# Patient Record
Sex: FEMALE | Race: WHITE | NOT HISPANIC OR LATINO | URBAN - METROPOLITAN AREA
[De-identification: names, ages, dates, MRNs, and addresses within clinical notes are randomized per-mention and may not be internally consistent; named-entity substitution may affect disease eponyms.]

---

## 2023-09-07 PROBLEM — G45.9 TRANSIENT ISCHEMIC ATTACK: Status: ACTIVE | Noted: 2023-09-07

## 2023-09-07 PROBLEM — J44.9 CHRONIC OBSTRUCTIVE LUNG DISEASE (MULTI): Status: ACTIVE | Noted: 2023-09-07

## 2023-09-07 PROBLEM — N93.9 ABNORMAL VAGINAL BLEEDING: Status: ACTIVE | Noted: 2023-09-07

## 2023-09-07 PROBLEM — E66.9 DIABETES MELLITUS TYPE 2 IN OBESE: Status: ACTIVE | Noted: 2023-09-07

## 2023-09-07 PROBLEM — I48.20 CHRONIC ATRIAL FIBRILLATION (MULTI): Status: ACTIVE | Noted: 2023-09-07

## 2023-09-07 PROBLEM — I50.9 HEART FAILURE (MULTI): Status: ACTIVE | Noted: 2023-09-07

## 2023-09-07 PROBLEM — E11.69 DIABETES MELLITUS TYPE 2 IN OBESE: Status: ACTIVE | Noted: 2023-09-07

## 2023-09-07 PROBLEM — I10 BENIGN ESSENTIAL HYPERTENSION: Status: ACTIVE | Noted: 2023-09-07

## 2023-09-07 PROBLEM — I42.9 CARDIOMYOPATHY (MULTI): Status: ACTIVE | Noted: 2023-09-07

## 2023-09-07 PROBLEM — I50.22 CHRONIC SYSTOLIC HEART FAILURE (MULTI): Status: ACTIVE | Noted: 2023-09-07

## 2023-09-07 PROBLEM — I27.81 COR PULMONALE (MULTI): Status: ACTIVE | Noted: 2023-09-07

## 2023-09-07 PROBLEM — E11.9 DIABETES MELLITUS (MULTI): Status: ACTIVE | Noted: 2023-09-07

## 2023-09-07 PROBLEM — I25.9 ASYMPTOMATIC CORONARY HEART DISEASE: Status: ACTIVE | Noted: 2023-09-07

## 2023-09-07 PROBLEM — Z91.89 AT RISK FOR INJURY RELATED TO RESTRAINTS: Status: ACTIVE | Noted: 2023-09-07

## 2023-09-07 PROBLEM — R53.1 ASTHENIA: Status: ACTIVE | Noted: 2023-09-07

## 2023-09-07 PROBLEM — N18.30 CHRONIC KIDNEY DISEASE, STAGE 3 (MULTI): Status: ACTIVE | Noted: 2023-09-07

## 2023-09-07 PROBLEM — R73.09 BLOOD GLUCOSE ABNORMAL: Status: ACTIVE | Noted: 2017-04-06

## 2023-09-19 DIAGNOSIS — Z79.01 LONG TERM (CURRENT) USE OF ANTICOAGULANTS: ICD-10-CM

## 2023-09-19 DIAGNOSIS — I48.91 ATRIAL FIBRILLATION, UNSPECIFIED TYPE (MULTI): Primary | ICD-10-CM

## 2023-09-19 LAB
INR IN PPP BY COAGULATION ASSAY EXTERNAL: 2.4
PROTHROMBIN TIME (PT) IN PPP BY COAGULATION ASSAY EXTERNAL: NORMAL SECONDS

## 2023-10-09 DIAGNOSIS — E11.21 TYPE 2 DIABETES MELLITUS WITH DIABETIC NEPHROPATHY, WITH LONG-TERM CURRENT USE OF INSULIN (MULTI): ICD-10-CM

## 2023-10-09 DIAGNOSIS — Z79.4 TYPE 2 DIABETES MELLITUS WITH DIABETIC NEPHROPATHY, WITH LONG-TERM CURRENT USE OF INSULIN (MULTI): ICD-10-CM

## 2023-10-09 PROBLEM — R26.81 UNSTEADY GAIT: Status: ACTIVE | Noted: 2023-10-09

## 2023-10-09 PROBLEM — I50.23 ACUTE ON CHRONIC SYSTOLIC HEART FAILURE (MULTI): Status: ACTIVE | Noted: 2023-09-07

## 2023-10-09 RX ORDER — WARFARIN 2 MG/1
TABLET ORAL
COMMUNITY
End: 2023-11-01 | Stop reason: SDUPTHER

## 2023-10-09 RX ORDER — ERGOCALCIFEROL 1.25 MG/1
1 CAPSULE ORAL
COMMUNITY
Start: 2018-12-11 | End: 2024-02-13

## 2023-10-09 RX ORDER — NYSTATIN 100000 [USP'U]/G
POWDER TOPICAL
COMMUNITY
Start: 2016-12-12

## 2023-10-09 RX ORDER — CARVEDILOL 3.12 MG/1
3.12 TABLET ORAL EVERY 12 HOURS
COMMUNITY
Start: 2020-11-18 | End: 2023-10-26

## 2023-10-09 RX ORDER — DAPAGLIFLOZIN 10 MG/1
10 TABLET, FILM COATED ORAL DAILY
COMMUNITY
End: 2024-02-13

## 2023-10-09 RX ORDER — SPIRONOLACTONE 25 MG/1
12.5 TABLET ORAL DAILY
COMMUNITY
End: 2024-02-13

## 2023-10-09 RX ORDER — INSULIN LISPRO 100 [IU]/ML
INJECTION, SOLUTION INTRAVENOUS; SUBCUTANEOUS
COMMUNITY
End: 2023-10-27

## 2023-10-09 RX ORDER — FUROSEMIDE 40 MG/1
40 TABLET ORAL DAILY
COMMUNITY
End: 2024-02-12 | Stop reason: SDUPTHER

## 2023-10-09 RX ORDER — INSULIN GLARGINE 100 [IU]/ML
22 INJECTION, SOLUTION SUBCUTANEOUS NIGHTLY
COMMUNITY
Start: 2017-07-25 | End: 2023-12-19 | Stop reason: SDUPTHER

## 2023-10-09 RX ORDER — LOSARTAN POTASSIUM 25 MG/1
25 TABLET ORAL DAILY
COMMUNITY
End: 2024-02-12 | Stop reason: SDUPTHER

## 2023-10-09 RX ORDER — BLOOD SUGAR DIAGNOSTIC
1 STRIP MISCELLANEOUS 3 TIMES DAILY
Qty: 100 STRIP | Refills: 11 | Status: SHIPPED | OUTPATIENT
Start: 2023-10-09

## 2023-10-09 RX ORDER — PEN NEEDLE, DIABETIC 30 GX3/16"
NEEDLE, DISPOSABLE MISCELLANEOUS
COMMUNITY
Start: 2017-02-13

## 2023-10-09 RX ORDER — LANCETS
EACH MISCELLANEOUS
Qty: 100 EACH | Refills: 11 | Status: SHIPPED | OUTPATIENT
Start: 2023-10-09

## 2023-10-09 RX ORDER — DEXTROSE 4 G
TABLET,CHEWABLE ORAL
Qty: 1 EACH | Refills: 0 | Status: SHIPPED | OUTPATIENT
Start: 2023-10-09

## 2023-10-16 PROBLEM — I48.91 ATRIAL FIBRILLATION (MULTI): Status: ACTIVE | Noted: 2023-10-16

## 2023-10-16 PROBLEM — Z79.01 LONG TERM (CURRENT) USE OF ANTICOAGULANTS: Status: ACTIVE | Noted: 2023-10-16

## 2023-10-17 ENCOUNTER — APPOINTMENT (OUTPATIENT)
Dept: CARDIOLOGY | Facility: CLINIC | Age: 73
End: 2023-10-17

## 2023-10-17 ENCOUNTER — ANTICOAGULATION - WARFARIN VISIT (OUTPATIENT)
Dept: CARDIOLOGY | Facility: CLINIC | Age: 73
End: 2023-10-17
Payer: MEDICARE

## 2023-10-17 DIAGNOSIS — Z79.01 LONG TERM (CURRENT) USE OF ANTICOAGULANTS: ICD-10-CM

## 2023-10-17 DIAGNOSIS — I48.91 ATRIAL FIBRILLATION, UNSPECIFIED TYPE (MULTI): ICD-10-CM

## 2023-10-17 LAB
POC INR: 1.5
POC PROTHROMBIN TIME: NORMAL

## 2023-10-17 PROCEDURE — 85610 PROTHROMBIN TIME: CPT | Mod: QW

## 2023-10-17 PROCEDURE — 99211 OFF/OP EST MAY X REQ PHY/QHP: CPT | Performed by: INTERNAL MEDICINE

## 2023-10-17 NOTE — PROGRESS NOTES
Patient identification verified with 2 identifiers.    Location: [facility] - [address] [place] [address] [phone]     Referring Physician: Broderick  Enrollment/ Re-enrollment date: 2024   INR Goal: 2.0-3.0  INR monitoring is per [facility] protocol.  Anticoagulation Medication: warfarin  Indication: atrial fibrillation    Subjective   Bleeding signs/symptoms: No    Bruising: No   Major bleeding event: No  Thrombosis signs/symptoms: No  Thromboembolic event: No  Missed doses: No  Extra doses: No  Medication changes: No  Dietary changes: No  Change in health: No  Change in activity: No  Alcohol: No  Other concerns: No    Upcoming Surgeries:  Does the Patient Have any upcoming surgeries that require interruption in anticoagulation therapy? no  Does the patient require bridging? no      Anticoagulation Summary  As of 10/17/2023      INR goal:  2.0-3.0   TTR:  16.3 % (2.6 wk)   INR used for dosin.50 (10/17/2023)   Weekly warfarin total:  14 mg               Assessment/Plan   Subtherapeutic     1. New dose: 14   2. Next INR: 1 week      Education provided to patient during the visit:  Patient instructed to call in interim with questions, concerns and changes.   Patient educated on compliance with dosing, follow up appointments, and prescribed plan of care.

## 2023-10-19 ENCOUNTER — DOCUMENTATION (OUTPATIENT)
Dept: PRIMARY CARE | Facility: CLINIC | Age: 73
End: 2023-10-19
Payer: MEDICARE

## 2023-10-19 NOTE — PROGRESS NOTES
[facility] CM follow up call placed to patient. Spoke with spouse, Matt. Remains active with anticoagulation clinic. Next appointment scheduled for 10/26. Weight staying around 200. Checking BG levels, was 166 this morning and 183 before lunch today. Taking meds as prescribed. Compliant with Lantus. Inconsistent with Humalog. Next cardiology follow up in January.

## 2023-10-24 ENCOUNTER — APPOINTMENT (OUTPATIENT)
Dept: CARDIOLOGY | Facility: CLINIC | Age: 73
End: 2023-10-24
Payer: MEDICARE

## 2023-10-24 ENCOUNTER — ANTICOAGULATION - WARFARIN VISIT (OUTPATIENT)
Dept: CARDIOLOGY | Facility: CLINIC | Age: 73
End: 2023-10-24
Payer: MEDICARE

## 2023-10-24 DIAGNOSIS — Z79.01 LONG TERM (CURRENT) USE OF ANTICOAGULANTS: ICD-10-CM

## 2023-10-24 DIAGNOSIS — I48.91 ATRIAL FIBRILLATION, UNSPECIFIED TYPE (MULTI): ICD-10-CM

## 2023-10-26 DIAGNOSIS — E11.65 TYPE 2 DIABETES MELLITUS WITH HYPERGLYCEMIA, WITH LONG-TERM CURRENT USE OF INSULIN (MULTI): Primary | ICD-10-CM

## 2023-10-26 DIAGNOSIS — I10 PRIMARY HYPERTENSION: Primary | ICD-10-CM

## 2023-10-26 DIAGNOSIS — Z79.4 TYPE 2 DIABETES MELLITUS WITH HYPERGLYCEMIA, WITH LONG-TERM CURRENT USE OF INSULIN (MULTI): Primary | ICD-10-CM

## 2023-10-26 RX ORDER — CARVEDILOL 3.12 MG/1
TABLET ORAL
Qty: 56 TABLET | Refills: 10 | Status: SHIPPED | OUTPATIENT
Start: 2023-10-26

## 2023-10-27 RX ORDER — INSULIN LISPRO 100 [IU]/ML
INJECTION, SOLUTION INTRAVENOUS; SUBCUTANEOUS
Qty: 30 ML | Refills: 10 | Status: SHIPPED | OUTPATIENT
Start: 2023-10-27

## 2023-10-31 ENCOUNTER — ANTICOAGULATION - WARFARIN VISIT (OUTPATIENT)
Dept: CARDIOLOGY | Facility: CLINIC | Age: 73
End: 2023-10-31
Payer: MEDICARE

## 2023-10-31 ENCOUNTER — TELEPHONE (OUTPATIENT)
Dept: PRIMARY CARE | Facility: CLINIC | Age: 73
End: 2023-10-31

## 2023-10-31 DIAGNOSIS — Z79.01 LONG TERM (CURRENT) USE OF ANTICOAGULANTS: ICD-10-CM

## 2023-10-31 DIAGNOSIS — I48.91 ATRIAL FIBRILLATION, UNSPECIFIED TYPE (MULTI): ICD-10-CM

## 2023-10-31 LAB
POC INR: 2.2
POC PROTHROMBIN TIME: NORMAL

## 2023-10-31 PROCEDURE — 99211 OFF/OP EST MAY X REQ PHY/QHP: CPT | Mod: 27 | Performed by: INTERNAL MEDICINE

## 2023-10-31 PROCEDURE — 85610 PROTHROMBIN TIME: CPT | Mod: QW

## 2023-10-31 NOTE — PROGRESS NOTES
Patient identification verified with 2 identifiers.    Location: [facility] - [address] [place] [address] [phone]     Referring Physician: Broderick  Enrollment/ Re-enrollment date: 2024   INR Goal: 2.0-3.0  INR monitoring is per [facility] protocol.  Anticoagulation Medication: warfarin  Indication: Atrial Fibrillation/Atrial Flutter    Subjective   Bleeding signs/symptoms: No    Bruising: No   Major bleeding event: No  Thrombosis signs/symptoms: No  Thromboembolic event: No  Missed doses: No  Extra doses: No  Medication changes: No  Dietary changes: No  Change in health: No  Change in activity: No  Alcohol: No  Other concerns: No    Upcoming Surgeries:  Does the Patient Have any upcoming surgeries that require interruption in anticoagulation therapy? no  Does the patient require bridging? no      Anticoagulation Summary  As of 10/31/2023      INR goal:  2.0-3.0   TTR:  21.6 % (1.1 mo)   INR used for dosin.20 (10/31/2023)   Weekly warfarin total:  14 mg               Assessment/Plan   Therapeutic     1. New dose: no change    2. Next INR:  10 days      Education provided to patient during the visit:  Patient instructed to call in interim with questions, concerns and changes.   Patient educated on compliance with dosing, follow up appointments, and prescribed plan of care.

## 2023-11-01 ENCOUNTER — LAB (OUTPATIENT)
Dept: LAB | Facility: LAB | Age: 73
End: 2023-11-01
Payer: MEDICARE

## 2023-11-01 ENCOUNTER — OFFICE VISIT (OUTPATIENT)
Dept: PRIMARY CARE | Facility: CLINIC | Age: 73
End: 2023-11-01
Payer: MEDICARE

## 2023-11-01 VITALS
HEIGHT: 64 IN | WEIGHT: 201 LBS | HEART RATE: 78 BPM | OXYGEN SATURATION: 94 % | BODY MASS INDEX: 34.31 KG/M2 | RESPIRATION RATE: 16 BRPM | SYSTOLIC BLOOD PRESSURE: 128 MMHG | DIASTOLIC BLOOD PRESSURE: 78 MMHG | TEMPERATURE: 97.7 F

## 2023-11-01 DIAGNOSIS — I10 BENIGN ESSENTIAL HYPERTENSION: ICD-10-CM

## 2023-11-01 DIAGNOSIS — J44.9 CHRONIC OBSTRUCTIVE PULMONARY DISEASE, UNSPECIFIED COPD TYPE (MULTI): ICD-10-CM

## 2023-11-01 DIAGNOSIS — I48.20 CHRONIC ATRIAL FIBRILLATION (MULTI): ICD-10-CM

## 2023-11-01 DIAGNOSIS — N18.32 TYPE 2 DIABETES MELLITUS WITH STAGE 3B CHRONIC KIDNEY DISEASE, WITH LONG-TERM CURRENT USE OF INSULIN (MULTI): Primary | ICD-10-CM

## 2023-11-01 DIAGNOSIS — I50.9 CHRONIC CONGESTIVE HEART FAILURE, UNSPECIFIED HEART FAILURE TYPE (MULTI): ICD-10-CM

## 2023-11-01 DIAGNOSIS — E55.9 VITAMIN D DEFICIENCY: ICD-10-CM

## 2023-11-01 DIAGNOSIS — E11.22 TYPE 2 DIABETES MELLITUS WITH STAGE 3B CHRONIC KIDNEY DISEASE, WITH LONG-TERM CURRENT USE OF INSULIN (MULTI): Primary | ICD-10-CM

## 2023-11-01 DIAGNOSIS — Z79.4 TYPE 2 DIABETES MELLITUS WITH STAGE 3B CHRONIC KIDNEY DISEASE, WITH LONG-TERM CURRENT USE OF INSULIN (MULTI): Primary | ICD-10-CM

## 2023-11-01 PROBLEM — I25.810 ARTERIOSCLEROSIS OF CORONARY ARTERY BYPASS GRAFT: Status: ACTIVE | Noted: 2023-11-01

## 2023-11-01 PROBLEM — I21.02 ST ELEVATION (STEMI) MYOCARDIAL INFARCTION INVOLVING LEFT ANTERIOR DESCENDING CORONARY ARTERY (MULTI): Status: ACTIVE | Noted: 2023-11-01

## 2023-11-01 PROBLEM — R41.82 ACUTE ALTERATION IN MENTAL STATUS: Status: ACTIVE | Noted: 2023-11-01

## 2023-11-01 PROBLEM — Z91.148 NONADHERENCE TO MEDICATION: Status: ACTIVE | Noted: 2023-11-01

## 2023-11-01 PROBLEM — G89.29 OTHER CHRONIC PAIN: Status: ACTIVE | Noted: 2023-11-01

## 2023-11-01 PROBLEM — I48.11 LONGSTANDING PERSISTENT ATRIAL FIBRILLATION (MULTI): Status: ACTIVE | Noted: 2023-11-01

## 2023-11-01 PROBLEM — I13.0 HYPERTENSIVE HEART AND CHRONIC KIDNEY DISEASE WITH HEART FAILURE AND STAGE 1 THROUGH STAGE 4 CHRONIC KIDNEY DISEASE, OR UNSPECIFIED CHRONIC KIDNEY DISEASE (MULTI): Status: ACTIVE | Noted: 2023-11-01

## 2023-11-01 PROBLEM — E66.01 MORBID OBESITY (MULTI): Status: ACTIVE | Noted: 2023-11-01

## 2023-11-01 PROBLEM — G47.33 OBSTRUCTIVE SLEEP APNEA: Status: ACTIVE | Noted: 2023-11-01

## 2023-11-01 PROBLEM — E78.5 HYPERLIPIDEMIA: Status: ACTIVE | Noted: 2023-11-01

## 2023-11-01 PROBLEM — I25.10 ARTERIOSCLEROSIS OF CORONARY ARTERY: Status: ACTIVE | Noted: 2023-11-01

## 2023-11-01 PROBLEM — Z90.49 HISTORY OF CHOLECYSTECTOMY: Status: ACTIVE | Noted: 2023-11-01

## 2023-11-01 PROBLEM — D72.829 LEUKOCYTOSIS: Status: ACTIVE | Noted: 2023-11-01

## 2023-11-01 PROBLEM — I25.5 ISCHEMIC CARDIOMYOPATHY: Status: ACTIVE | Noted: 2023-11-01

## 2023-11-01 PROBLEM — E03.8 OTHER SPECIFIED HYPOTHYROIDISM: Status: ACTIVE | Noted: 2023-11-01

## 2023-11-01 LAB
25(OH)D3 SERPL-MCNC: 50 NG/ML (ref 31–100)
ALBUMIN SERPL-MCNC: 4.1 G/DL (ref 3.5–5)
ALP BLD-CCNC: 102 U/L (ref 35–125)
ALT SERPL-CCNC: 21 U/L (ref 5–40)
ANION GAP SERPL CALC-SCNC: 16 MMOL/L
AST SERPL-CCNC: 22 U/L (ref 5–40)
BILIRUB SERPL-MCNC: 0.7 MG/DL (ref 0.1–1.2)
BUN SERPL-MCNC: 32 MG/DL (ref 8–25)
CALCIUM SERPL-MCNC: 9.5 MG/DL (ref 8.5–10.4)
CHLORIDE SERPL-SCNC: 102 MMOL/L (ref 97–107)
CO2 SERPL-SCNC: 21 MMOL/L (ref 24–31)
CREAT SERPL-MCNC: 1.6 MG/DL (ref 0.4–1.6)
ERYTHROCYTE [DISTWIDTH] IN BLOOD BY AUTOMATED COUNT: 13.9 % (ref 11.5–14.5)
EST. AVERAGE GLUCOSE BLD GHB EST-MCNC: 180 MG/DL
GFR SERPL CREATININE-BSD FRML MDRD: 34 ML/MIN/1.73M*2
GLUCOSE SERPL-MCNC: 221 MG/DL (ref 65–99)
HBA1C MFR BLD: 7.9 %
HCT VFR BLD AUTO: 43.8 % (ref 36–46)
HGB BLD-MCNC: 14.4 G/DL (ref 12–16)
MCH RBC QN AUTO: 34.4 PG (ref 26–34)
MCHC RBC AUTO-ENTMCNC: 32.9 G/DL (ref 32–36)
MCV RBC AUTO: 105 FL (ref 80–100)
NRBC BLD-RTO: 0 /100 WBCS (ref 0–0)
PLATELET # BLD AUTO: 193 X10*3/UL (ref 150–450)
POTASSIUM SERPL-SCNC: 4.1 MMOL/L (ref 3.4–5.1)
PROT SERPL-MCNC: 6.8 G/DL (ref 5.9–7.9)
RBC # BLD AUTO: 4.19 X10*6/UL (ref 4–5.2)
SODIUM SERPL-SCNC: 139 MMOL/L (ref 133–145)
WBC # BLD AUTO: 6.3 X10*3/UL (ref 4.4–11.3)

## 2023-11-01 PROCEDURE — 36415 COLL VENOUS BLD VENIPUNCTURE: CPT

## 2023-11-01 PROCEDURE — 36415 COLL VENOUS BLD VENIPUNCTURE: CPT | Performed by: NURSE PRACTITIONER

## 2023-11-01 PROCEDURE — 1126F AMNT PAIN NOTED NONE PRSNT: CPT | Performed by: NURSE PRACTITIONER

## 2023-11-01 PROCEDURE — 99349 HOME/RES VST EST MOD MDM 40: CPT | Performed by: NURSE PRACTITIONER

## 2023-11-01 PROCEDURE — 80053 COMPREHEN METABOLIC PANEL: CPT

## 2023-11-01 PROCEDURE — 82306 VITAMIN D 25 HYDROXY: CPT

## 2023-11-01 PROCEDURE — 85027 COMPLETE CBC AUTOMATED: CPT

## 2023-11-01 PROCEDURE — 3074F SYST BP LT 130 MM HG: CPT | Performed by: NURSE PRACTITIONER

## 2023-11-01 PROCEDURE — 3051F HG A1C>EQUAL 7.0%<8.0%: CPT | Performed by: NURSE PRACTITIONER

## 2023-11-01 PROCEDURE — 3078F DIAST BP <80 MM HG: CPT | Performed by: NURSE PRACTITIONER

## 2023-11-01 PROCEDURE — 1159F MED LIST DOCD IN RCRD: CPT | Performed by: NURSE PRACTITIONER

## 2023-11-01 PROCEDURE — 1160F RVW MEDS BY RX/DR IN RCRD: CPT | Performed by: NURSE PRACTITIONER

## 2023-11-01 PROCEDURE — 3066F NEPHROPATHY DOC TX: CPT | Performed by: NURSE PRACTITIONER

## 2023-11-01 PROCEDURE — 83036 HEMOGLOBIN GLYCOSYLATED A1C: CPT

## 2023-11-01 PROCEDURE — 1036F TOBACCO NON-USER: CPT | Performed by: NURSE PRACTITIONER

## 2023-11-01 PROCEDURE — 4010F ACE/ARB THERAPY RXD/TAKEN: CPT | Performed by: NURSE PRACTITIONER

## 2023-11-01 RX ORDER — WARFARIN 2 MG/1
2 TABLET ORAL EVERY EVENING
Qty: 30 TABLET | Refills: 3 | Status: SHIPPED | OUTPATIENT
Start: 2023-11-01 | End: 2024-02-26

## 2023-11-01 ASSESSMENT — ENCOUNTER SYMPTOMS
NERVOUS/ANXIOUS: 0
PALPITATIONS: 0
NAUSEA: 0
CHILLS: 0
ARTHRALGIAS: 0
TROUBLE SWALLOWING: 0
OCCASIONAL FEELINGS OF UNSTEADINESS: 0
ABDOMINAL PAIN: 0
DIZZINESS: 0
VOMITING: 0
FEVER: 0
APPETITE CHANGE: 0
ENDOCRINE COMMENTS: POSITIVE FOR DIABETES
DIARRHEA: 1
WHEEZING: 0
BRUISES/BLEEDS EASILY: 1
LIGHT-HEADEDNESS: 0
SHORTNESS OF BREATH: 0
CONSTIPATION: 0
COUGH: 0
LOSS OF SENSATION IN FEET: 0
DEPRESSION: 0
DIFFICULTY URINATING: 0

## 2023-11-01 ASSESSMENT — PAIN SCALES - GENERAL: PAINLEVEL: 0-NO PAIN

## 2023-11-01 NOTE — PROGRESS NOTES
Subjective   Patient ID: Jing Sanchez is a 73 y.o. female who presents for Follow-up (Chronic medical conditions, HTN, CHF, DM).    Visit for 72 y/o female seen today in private home, accompanied by her spouse Matt for routine follow up of chronic medical conditions. Patient is sitting on recliner this afternoon. She is alert, able to answer very simple questions regarding her health. Her spouse is present and will assist with HPI as needed. Patient does not drive or have a vehicle. She typically only leaves the house for medical appointments but does go out to lunch with her spouse on occasion. She receives her medications via health direct mail delivery in pill packs. She does get her Coumadin from the local drugstore. She reports that she is doing a better job taking her medications as prescribed. She is more compliant with her Lantus but does not take her Humalog consistently. She is monitoring her BG levels 2-3 times per day. She will report that she will occasionally skip a meal so she does not check it when she does that. She is keeping a log with her BG readings. FBS was 201 this morning. Her lowest glucose level was 153. She denies dizziness, lightheadedness. She has peripheral neuropathy. Reports numbness and tingling of bilateral feet. She is ambulatory without assistive device. She denies recent fall or injury. Remains active with [employer]. Receives meals on wheels M-F. She will cook at times but her spouse helps prepare most meals. She denies appetite changes. Has had some weight gain but does admit to unhealthy diet habits. She denies abdominal pain, nausea, vomiting. Reports intermittent loose stools, depending on what she eats. She denies urinary concerns but is incontinent at times. Denies difficulty sleeping. h/o Afib, CHF. Remains active with Coumadin Clinic. Last INR was 2.2. She denies any increased bruising or bleeding concerns. She remains active with cardiologist Dr. Villafana. Denies issues with  "chest pain, palpitations, shortness of breath, cough, wheezing or increased edema.     Home Visit:          Medically necessary due to: Illness or condition that results in activity lmitation or restriction that impacts the ability to leave home such as:, shortness of breath with minimal exertion, unsteady gait/poor balance.         Current Outpatient Medications:     blood sugar diagnostic (Accu-Chek Guide test strips) strip, 1 strip 3 times a day., Disp: 100 strip, Rfl: 11    blood-glucose meter (Accu-Chek Guide Glucose Meter) misc, Test TID, Disp: 1 each, Rfl: 0    carvedilol (Coreg) 3.125 mg tablet, 1 TAB BY MOUTH TWICE A DAY WITH OR AFTER MEALS HOLD FOR HR <55 BPM AND/OR SBP <90, Disp: 56 tablet, Rfl: 10    ergocalciferol (Vitamin D-2) 1.25 MG (06832 UT) capsule, Take 1 capsule (1,250 mcg) by mouth 1 (one) time per week., Disp: , Rfl:     Farxiga 10 mg, Take 1 tablet (10 mg) by mouth once daily., Disp: , Rfl:     furosemide (Lasix) 40 mg tablet, Take 1 tablet (40 mg) by mouth once daily., Disp: , Rfl:     insulin lispro (HumaLOG) 100 unit/mL injection, INJECT SUBQ PER SLIDING SCALE WITH MEALS (MAX DAILY DOSE OF 80 UNITS), Disp: 30 mL, Rfl: 10    lancets misc, Accucheck lancets, Disp: 100 each, Rfl: 11    Lantus Solostar U-100 Insulin 100 unit/mL (3 mL) pen, Inject 22 Units under the skin once daily at bedtime., Disp: , Rfl:     losartan (Cozaar) 25 mg tablet, Take 1 tablet (25 mg) by mouth once daily., Disp: , Rfl:     nystatin (Mycostatin) 100,000 unit/gram powder, apply to breasts Topically tid / as needed, Disp: , Rfl:     pen needle, diabetic (BD Ultra-Fine Short Pen Needle) 31 gauge x 5/16\" needle, Use and discard 4 pen needles per day subcutaneously Dx: E11.65 for 90 days, Disp: , Rfl:     spironolactone (Aldactone) 25 mg tablet, Take 0.5 tablets (12.5 mg) by mouth once daily., Disp: , Rfl:     warfarin (Coumadin) 2 mg tablet, TAKE 1 TABLET BY MOUTH EVERY DAY OR AS DIRECTED BY CLINIC, Disp: , Rfl:  " "    Review of Systems   Constitutional:  Negative for appetite change, chills and fever.   HENT:  Negative for hearing loss and trouble swallowing.    Respiratory:  Negative for cough, shortness of breath and wheezing.    Cardiovascular:  Negative for chest pain, palpitations and leg swelling.   Gastrointestinal:  Positive for diarrhea (occasional). Negative for abdominal pain, constipation, nausea and vomiting.   Endocrine:        Positive for diabetes   Genitourinary:  Negative for difficulty urinating.        Positive for urinary incontinence, intermittent   Musculoskeletal:  Negative for arthralgias.   Neurological:  Negative for dizziness and light-headedness.        Positive for numbness and tingling to BLE   Hematological:  Bruises/bleeds easily.   Psychiatric/Behavioral:  The patient is not nervous/anxious.      Objective   /78 (BP Location: Left arm, Patient Position: Sitting, BP Cuff Size: Adult)   Pulse 78   Temp 36.5 °C (97.7 °F) (Temporal)   Resp 16   Ht 1.626 m (5' 4\")   Wt 91.2 kg (201 lb)   SpO2 94%   BMI 34.50 kg/m²     Physical Exam  Constitutional:       General: She is not in acute distress.     Appearance: Normal appearance.      Comments: Sitting on recliner with legs dependent   HENT:      Head: Normocephalic and atraumatic.      Comments: Poor dentition, missing teeth     Nose: Nose normal.      Mouth/Throat:      Mouth: Mucous membranes are moist.      Pharynx: Oropharynx is clear.   Eyes:      Pupils: Pupils are equal, round, and reactive to light.      Comments: Wearing glasses   Cardiovascular:      Rate and Rhythm: Rhythm irregular.      Heart sounds: No murmur heard.     No friction rub. No gallop.   Pulmonary:      Effort: Pulmonary effort is normal. No respiratory distress.      Breath sounds: Normal breath sounds. No wheezing, rhonchi or rales.   Abdominal:      General: Bowel sounds are normal. There is no distension.      Palpations: Abdomen is soft.      Tenderness: " There is no abdominal tenderness.   Musculoskeletal:         General: Normal range of motion.      Cervical back: Neck supple.      Right lower leg: No edema.      Left lower leg: No edema.   Skin:     General: Skin is warm and dry.   Neurological:      General: No focal deficit present.      Mental Status: She is alert and oriented to person, place, and time.   Psychiatric:         Mood and Affect: Mood normal.         Behavior: Behavior normal.       Assessment/Plan   Diagnoses and all orders for this visit:  Type 2 diabetes mellitus with stage 3b chronic kidney disease, with long-term current use of insulin (CMS/[facility])  Comments:  chronic, managed with Lantus, Humalog, Pt to continue monitoring glucose levels 4x daily  Orders:  -     Hemoglobin A1c  Chronic obstructive pulmonary disease, unspecified COPD type (CMS/[facility])  Comments:  chronic, respiratory status stable  Benign essential hypertension  Comments:  chronic, vitals stable, continue carvedilol, losartan  Orders:  -     CBC  -     Comprehensive metabolic panel  Chronic atrial fibrillation (CMS/[facility])  Comments:  chronic, managed with coumadin. Continue to follow up with the coumadin clinic as scheduled  Chronic congestive heart failure, unspecified heart failure type (CMS/[facility])  Comments:  chronic, stable, continue furosemide, spironolactone  Vitamin D deficiency  Comments:  chronic, stable, continue vitamin d supplement    Routine labs obtained in home- CBC, CMP, A1c, Vitamin D level- pt tolerated well        MARTÍN Tan-CNP

## 2023-11-09 ENCOUNTER — ANTICOAGULATION - WARFARIN VISIT (OUTPATIENT)
Dept: CARDIOLOGY | Facility: CLINIC | Age: 73
End: 2023-11-09
Payer: MEDICARE

## 2023-11-09 ENCOUNTER — APPOINTMENT (OUTPATIENT)
Dept: CARDIOLOGY | Facility: CLINIC | Age: 73
End: 2023-11-09
Payer: MEDICARE

## 2023-11-09 DIAGNOSIS — Z79.01 LONG TERM (CURRENT) USE OF ANTICOAGULANTS: ICD-10-CM

## 2023-11-09 DIAGNOSIS — I48.91 ATRIAL FIBRILLATION, UNSPECIFIED TYPE (MULTI): ICD-10-CM

## 2023-11-09 LAB
POC INR: 2.3
POC PROTHROMBIN TIME: NORMAL

## 2023-11-09 PROCEDURE — 99211 OFF/OP EST MAY X REQ PHY/QHP: CPT | Mod: 27 | Performed by: INTERNAL MEDICINE

## 2023-11-09 PROCEDURE — 85610 PROTHROMBIN TIME: CPT | Mod: QW

## 2023-11-09 NOTE — PROGRESS NOTES
Patient identification verified with 2 identifiers.    Location: [facility] - [address] [place] [address] [phone]     Referring Physician: Broderick  Enrollment/ Re-enrollment date: 06/2024   INR Goal: 2.0-3.0  INR monitoring is per [facility] protocol.  Anticoagulation Medication: warfarin  Indication: Atrial Fibrillation/Atrial Flutter    Subjective   Bleeding signs/symptoms: No    Bruising: No   Major bleeding event: No  Thrombosis signs/symptoms: No  Thromboembolic event: No  Missed doses: No  Extra doses: No  Medication changes: No  Dietary changes: No  Change in health: No  Change in activity: No  Alcohol: No  Other concerns: No    Upcoming Surgeries:  Does the Patient Have any upcoming surgeries that require interruption in anticoagulation therapy? no  Does the patient require bridging? no      Anticoagulation Summary  As of 11/9/2023      INR goal:  2.0-3.0   TTR:  21.6 % (1.1 mo)   INR used for dosing:                 Assessment/Plan   Therapeutic     1. New dose: no change    2. Next INR: 1 month      Education provided to patient during the visit:  Patient instructed to call in interim with questions, concerns and changes.   Patient educated on compliance with dosing, follow up appointments, and prescribed plan of care.

## 2023-11-27 ENCOUNTER — DOCUMENTATION (OUTPATIENT)
Dept: PRIMARY CARE | Facility: CLINIC | Age: 73
End: 2023-11-27
Payer: MEDICARE

## 2023-11-27 NOTE — PROGRESS NOTES
House Calls CM follow up phone call. Patient reported taking medications as prescribed from prepackaged pill packs. She reports compliance with Lantus administration as well. Fasting blood glucose this morning 165. Continues with daily weights. Weight today 201 pounds (same weight documented on 11/1 provider visit). Stated weight has been staying between 201-203. Aware to report weight gain of 3 pounds or more in one day, swelling, or shortness of breath. Compliant with visits to Anticoagulation Clinic. Next visit 12/7/23.

## 2023-12-07 ENCOUNTER — ANTICOAGULATION - WARFARIN VISIT (OUTPATIENT)
Dept: CARDIOLOGY | Facility: CLINIC | Age: 73
End: 2023-12-07
Payer: MEDICARE

## 2023-12-07 DIAGNOSIS — Z79.01 LONG TERM (CURRENT) USE OF ANTICOAGULANTS: ICD-10-CM

## 2023-12-07 DIAGNOSIS — I48.91 ATRIAL FIBRILLATION, UNSPECIFIED TYPE (MULTI): ICD-10-CM

## 2023-12-07 LAB
POC INR: 3.2
POC PROTHROMBIN TIME: NORMAL

## 2023-12-07 PROCEDURE — 99211 OFF/OP EST MAY X REQ PHY/QHP: CPT | Performed by: INTERNAL MEDICINE

## 2023-12-07 PROCEDURE — 85610 PROTHROMBIN TIME: CPT | Mod: QW

## 2023-12-07 NOTE — PROGRESS NOTES
Patient identification verified with 2 identifiers.    Location: [facility] - [address] [place] [address] [phone]     Referring Physician: Broderick  Enrollment/ Re-enrollment date: 06/2024   INR Goal: 2.0-3.0  INR monitoring is per [facility] protocol.  Anticoagulation Medication: warfarin  Indication: Atrial Fibrillation/Atrial Flutter    Subjective   Bleeding signs/symptoms: No    Bruising: No   Major bleeding event: No  Thrombosis signs/symptoms: No  Thromboembolic event: No  Missed doses: No  Extra doses: No  Medication changes: No  Dietary changes: No  Change in health: No  Change in activity: No  Alcohol: No  Other concerns: No    Upcoming Surgeries:  Does the Patient Have any upcoming surgeries that require interruption in anticoagulation therapy? no  Does the patient require bridging? no      Anticoagulation Summary  As of 12/7/2023      INR goal:  2.0-3.0   TTR:  38.5 % (1.4 mo)   INR used for dosing:                 Assessment/Plan   Therapeutic     1. New dose: no change    2. Next INR: 1 week can not return for 3 weeks      Education provided to patient during the visit:  Patient instructed to call in interim with questions, concerns and changes.   Patient educated on compliance with dosing, follow up appointments, and prescribed plan of care.

## 2023-12-12 ENCOUNTER — DOCUMENTATION (OUTPATIENT)
Dept: PRIMARY CARE | Facility: CLINIC | Age: 73
End: 2023-12-12
Payer: MEDICARE

## 2023-12-12 DIAGNOSIS — N95.0 POSTMENOPAUSAL BLEEDING: Primary | ICD-10-CM

## 2023-12-12 NOTE — PROGRESS NOTES
Patient reported having bleeding on her pad for the last several months. She had some post menopausal bleeding earlier this year and was evaluated by GYN during hospitalization in March with recommendations to follow up outpatient which patient did not do. I will send referral to Dr. Chan who patient was referred to see earlier this year.

## 2023-12-12 NOTE — PROGRESS NOTES
House Calls CM follow up in home visit. Sitting in chair. Spouse present. Continues to record daily weights in notebook. Weight between 201-203 over past 2 weeks. Checking BG consistently in morning but inconsistently remainder of day. States she is taking Lantus routinely at night and Humalog as needed. Multiple insulin pens noted in refrigerator. Informed patient of 2  pens and placed on counter. Remains active with Anticoagulation Clinic. Next visit . Patient reported having bleeding on pad with some clots noted. She stated it is not every day, but has been persistent over past couple months. The bleeding is not associated with bowel movements. Provider, Kathie Selby CNP, updated. Per provider, patient had this issue previously and a referral was sent to Dr. Rocio Talley. Another referral to be sent. Patient updated and provided [facility]. Instructed to call to schedule appointment. Expressed understanding.

## 2023-12-19 ENCOUNTER — ANTICOAGULATION - WARFARIN VISIT (OUTPATIENT)
Dept: CARDIOLOGY | Facility: CLINIC | Age: 73
End: 2023-12-19
Payer: MEDICARE

## 2023-12-19 DIAGNOSIS — Z79.01 LONG TERM (CURRENT) USE OF ANTICOAGULANTS: ICD-10-CM

## 2023-12-19 DIAGNOSIS — E11.69 DIABETES MELLITUS TYPE 2 IN OBESE: Primary | ICD-10-CM

## 2023-12-19 DIAGNOSIS — E66.9 DIABETES MELLITUS TYPE 2 IN OBESE: Primary | ICD-10-CM

## 2023-12-19 DIAGNOSIS — I48.91 ATRIAL FIBRILLATION, UNSPECIFIED TYPE (MULTI): ICD-10-CM

## 2023-12-19 RX ORDER — INSULIN GLARGINE 100 [IU]/ML
22 INJECTION, SOLUTION SUBCUTANEOUS NIGHTLY
Qty: 18 ML | Refills: 3 | Status: SHIPPED | OUTPATIENT
Start: 2023-12-19 | End: 2024-03-04 | Stop reason: DRUGHIGH

## 2023-12-28 ENCOUNTER — ANTICOAGULATION - WARFARIN VISIT (OUTPATIENT)
Dept: CARDIOLOGY | Facility: CLINIC | Age: 73
End: 2023-12-28
Payer: MEDICARE

## 2023-12-28 DIAGNOSIS — I48.91 ATRIAL FIBRILLATION, UNSPECIFIED TYPE (MULTI): ICD-10-CM

## 2023-12-28 DIAGNOSIS — Z79.01 LONG TERM (CURRENT) USE OF ANTICOAGULANTS: ICD-10-CM

## 2023-12-28 LAB
POC INR: 2.8
POC PROTHROMBIN TIME: NORMAL

## 2023-12-28 PROCEDURE — 99211 OFF/OP EST MAY X REQ PHY/QHP: CPT | Mod: 27 | Performed by: INTERNAL MEDICINE

## 2023-12-28 PROCEDURE — 85610 PROTHROMBIN TIME: CPT | Mod: QW

## 2023-12-28 NOTE — PROGRESS NOTES
Patient identification verified with 2 identifiers.    Location: [facility] - [address] [place] [address] [phone]     Referring Physician: Broderick  Enrollment/ Re-enrollment date: 06/2024   INR Goal: 2.0-3.0  INR monitoring is per [facility] protocol.  Anticoagulation Medication: warfarin  Indication: Atrial Fibrillation/Atrial Flutter    Subjective   Bleeding signs/symptoms: No    Bruising: No   Major bleeding event: No  Thrombosis signs/symptoms: No  Thromboembolic event: No  Missed doses: No  Extra doses: No  Medication changes: No  Dietary changes: No  Change in health: No  Change in activity: No  Alcohol: No  Other concerns: No    Upcoming Surgeries:  Does the Patient Have any upcoming surgeries that require interruption in anticoagulation therapy? no  Does the patient require bridging? no      Anticoagulation Summary  As of 12/28/2023      INR goal:  2.0-3.0   TTR:  54.3 % (2.3 mo)   INR used for dosing:                 Assessment/Plan   Therapeutic     1. New dose: no change    2. Next INR: 1 month      Education provided to patient during the visit:  Patient instructed to call in interim with questions, concerns and changes.   Patient educated on compliance with dosing, follow up appointments, and prescribed plan of care.

## 2024-01-03 PROBLEM — E03.9 HYPOTHYROIDISM: Status: ACTIVE | Noted: 2023-11-01

## 2024-01-03 PROBLEM — G47.33 OBSTRUCTIVE SLEEP APNEA SYNDROME: Status: ACTIVE | Noted: 2023-03-11

## 2024-01-03 PROBLEM — E66.9 TYPE 2 DIABETES MELLITUS WITH OBESITY (MULTI): Status: ACTIVE | Noted: 2023-03-11

## 2024-01-03 PROBLEM — N18.30 STAGE 3 CHRONIC KIDNEY DISEASE (MULTI): Status: ACTIVE | Noted: 2023-03-11

## 2024-01-03 PROBLEM — E11.69 TYPE 2 DIABETES MELLITUS WITH OBESITY (MULTI): Status: ACTIVE | Noted: 2023-03-11

## 2024-01-04 ENCOUNTER — DOCUMENTATION (OUTPATIENT)
Dept: PRIMARY CARE | Facility: CLINIC | Age: 74
End: 2024-01-04
Payer: MEDICARE

## 2024-01-04 ENCOUNTER — TELEPHONE (OUTPATIENT)
Dept: PRIMARY CARE | Facility: CLINIC | Age: 74
End: 2024-01-04
Payer: MEDICARE

## 2024-01-04 NOTE — PROGRESS NOTES
Spoke with spouse to confirm [facility] Call[facility] provider visit scheduled for 1/5/24. Weight today 202.6, no change from previous month. Remains active with Anticoagulation Clinic. Last visit 12/28/23. To return in one month.

## 2024-01-05 ENCOUNTER — OFFICE VISIT (OUTPATIENT)
Dept: PRIMARY CARE | Facility: CLINIC | Age: 74
End: 2024-01-05
Payer: MEDICARE

## 2024-01-05 VITALS
HEART RATE: 89 BPM | DIASTOLIC BLOOD PRESSURE: 64 MMHG | HEIGHT: 64 IN | OXYGEN SATURATION: 95 % | WEIGHT: 203.2 LBS | RESPIRATION RATE: 16 BRPM | SYSTOLIC BLOOD PRESSURE: 110 MMHG | TEMPERATURE: 96.9 F | BODY MASS INDEX: 34.69 KG/M2

## 2024-01-05 DIAGNOSIS — E11.65 TYPE 2 DIABETES MELLITUS WITH HYPERGLYCEMIA, WITH LONG-TERM CURRENT USE OF INSULIN (MULTI): ICD-10-CM

## 2024-01-05 DIAGNOSIS — N95.0 POSTMENOPAUSAL BLEEDING: ICD-10-CM

## 2024-01-05 DIAGNOSIS — I48.20 CHRONIC ATRIAL FIBRILLATION (MULTI): ICD-10-CM

## 2024-01-05 DIAGNOSIS — I50.23 ACUTE ON CHRONIC SYSTOLIC HEART FAILURE (MULTI): ICD-10-CM

## 2024-01-05 DIAGNOSIS — Z79.4 TYPE 2 DIABETES MELLITUS WITH HYPERGLYCEMIA, WITH LONG-TERM CURRENT USE OF INSULIN (MULTI): ICD-10-CM

## 2024-01-05 DIAGNOSIS — I10 BENIGN ESSENTIAL HYPERTENSION: Primary | ICD-10-CM

## 2024-01-05 PROBLEM — L68.0 HIRSUTISM: Status: ACTIVE | Noted: 2023-03-11

## 2024-01-05 PROBLEM — I21.09 ACUTE MYOCARDIAL INFARCTION OF ANTERIOR WALL (MULTI): Status: ACTIVE | Noted: 2023-11-01

## 2024-01-05 PROBLEM — J96.00 ACUTE RESPIRATORY FAILURE (MULTI): Status: ACTIVE | Noted: 2023-03-11

## 2024-01-05 PROBLEM — K85.90 ACUTE PANCREATITIS (HHS-HCC): Status: ACTIVE | Noted: 2024-01-05

## 2024-01-05 PROBLEM — F41.9 ANXIETY: Status: ACTIVE | Noted: 2024-01-05

## 2024-01-05 PROCEDURE — 4010F ACE/ARB THERAPY RXD/TAKEN: CPT | Performed by: NURSE PRACTITIONER

## 2024-01-05 PROCEDURE — 3078F DIAST BP <80 MM HG: CPT | Performed by: NURSE PRACTITIONER

## 2024-01-05 PROCEDURE — 3074F SYST BP LT 130 MM HG: CPT | Performed by: NURSE PRACTITIONER

## 2024-01-05 PROCEDURE — 1126F AMNT PAIN NOTED NONE PRSNT: CPT | Performed by: NURSE PRACTITIONER

## 2024-01-05 PROCEDURE — 1036F TOBACCO NON-USER: CPT | Performed by: NURSE PRACTITIONER

## 2024-01-05 PROCEDURE — 99349 HOME/RES VST EST MOD MDM 40: CPT | Performed by: NURSE PRACTITIONER

## 2024-01-05 PROCEDURE — 3066F NEPHROPATHY DOC TX: CPT | Performed by: NURSE PRACTITIONER

## 2024-01-05 PROCEDURE — 1160F RVW MEDS BY RX/DR IN RCRD: CPT | Performed by: NURSE PRACTITIONER

## 2024-01-05 PROCEDURE — 1159F MED LIST DOCD IN RCRD: CPT | Performed by: NURSE PRACTITIONER

## 2024-01-05 ASSESSMENT — PAIN SCALES - GENERAL: PAINLEVEL: 0-NO PAIN

## 2024-01-05 NOTE — PROGRESS NOTES
"Subjective   Patient ID: Jing Sanchez is a 73 y.o. female who presents for Follow-up (HTN, Afib, HF, DM).    Visit for 72 y/o female seen today in private home, accompanied by her spouse Matt for follow up. Patient is sitting on couch this afternoon watching TV. She is alert, able to answer very simple questions regarding her health. Her spouse is present and will assist with HPI as needed. Patient does not drive or have a vehicle. She is able to manage her own medications. Receives all medications via health direct mail delivery in pill packs. She gets her coumadin from the local pharmacy. Reports that she has been doing better with medication compliance. She will miss doses of her insulin but has been trying to be more compliant with her Lantus. She is still not consistent with her Humalog. Pt reports that she has only been monitoring her glucose levels once daily. She states \"I know I should be doing it more often\". Her BG level before lunch was 270. She admits that she has had increased junk food and carbs over the holiday and has not been as strict with her diet. Pt continues to report numbness and tingling of bilateral feet.     Patient is able to perform all ADLs without difficulty. She will occasionally cook but her spouse does most of the meal prep. Pt is active with [facility]. She receives meals on wheels M-F. Denies appetite changes, abdominal pain, nausea, vomiting. Reports intermittent loose stools, depending on what she eats. She denies any urinary concerns. Continues to report intermittent \"spotting\" on her pads. She was referred to see gynecology last march during her hospitalization but never followed up on this. A new referral was sent to Dr. Chan with [facility] last month but patient reports that she is uncertain where she put the phone number. She denies dizziness, lightneadedness. Remains ambulatory without assistive device. Denies recent fall or injury.  h/o Afib, CHF. Remains active with " [facility] and cardiologist Dr. Villafana. Denies issues with chest pain, palpitations, shortness of breath, cough, wheezing or increased edema.     Home Visit:          Medically necessary due to: Illness or condition that results in activity lmitation or restriction that impacts the ability to leave home such as:, shortness of breath with minimal exertion, unsteady gait/poor balance.         Current Outpatient Medications:     blood sugar diagnostic (Accu-Chek Guide test strips) strip, 1 strip 3 times a day., Disp: 100 strip, Rfl: 11    blood-glucose meter (Accu-Chek Guide Glucose Meter) misc, Test TID, Disp: 1 each, Rfl: 0    carvedilol (Coreg) 3.125 mg tablet, 1 TAB BY MOUTH TWICE A DAY WITH OR AFTER MEALS HOLD FOR HR <55 BPM AND/OR SBP <90, Disp: 56 tablet, Rfl: 10    ergocalciferol (Vitamin D-2) 1.25 MG (80200 UT) capsule, Take 1 capsule (1,250 mcg) by mouth 1 (one) time per week., Disp: , Rfl:     Farxiga 10 mg, Take 1 tablet (10 mg) by mouth once daily., Disp: , Rfl:     furosemide (Lasix) 40 mg tablet, Take 1 tablet (40 mg) by mouth once daily., Disp: , Rfl:     insulin lispro (HumaLOG) 100 unit/mL injection, INJECT SUBQ PER SLIDING SCALE WITH MEALS (MAX DAILY DOSE OF 80 UNITS), Disp: 30 mL, Rfl: 10    lancets misc, Accucheck lancets, Disp: 100 each, Rfl: 11    Lantus Solostar U-100 Insulin 100 unit/mL (3 mL) pen, Inject 22 Units under the skin once daily at bedtime., Disp: 18 mL, Rfl: 3    losartan (Cozaar) 25 mg tablet, Take 1 tablet (25 mg) by mouth once daily., Disp: , Rfl:     nystatin (Mycostatin) 100,000 unit/gram powder, apply to breasts Topically tid / as needed, Disp: , Rfl:     pen needle, diabetic (BD Ultra-Fine Short Pen Needle) 31 gauge x 5/16\" needle, Use and discard 4 pen needles per day subcutaneously Dx: E11.65 for 90 days, Disp: , Rfl:     spironolactone (Aldactone) 25 mg tablet, Take 0.5 tablets (12.5 mg) by mouth once daily., Disp: , Rfl:     warfarin (Coumadin) 2 mg tablet, Take 1 " "tablet (2 mg) by mouth once daily in the evening. Take as directed per After Visit Summary., Disp: 30 tablet, Rfl: 3     Review of Systems  Constitutional: Negative for appetite change, chills and fever.   HENT: Negative for hearing loss and trouble swallowing.    Respiratory:  Negative for cough, shortness of breath and wheezing.    Cardiovascular:  Negative for chest pain, palpitations and leg swelling.   Gastrointestinal: Negative for abdominal pain, constipation, diarrhea, nausea and vomiting.   Endocrine: Positive for diabetes   Genitourinary: Positive for occasional urinary incontinence. Negative for difficulty urinating.   Musculoskeletal:  Negative for arthralgias.   Neurological: Positive for numbness and tingling to BLE. Negative for dizziness and light-headedness.   Hematological: Positive for bruising.   Psychiatric/Behavioral:  The patient is not nervous/anxious.      Objective   /64 (BP Location: Left arm, Patient Position: Sitting, BP Cuff Size: Adult)   Pulse 89   Temp 36.1 °C (96.9 °F)   Resp 16   Ht 1.626 m (5' 4\")   Wt 92.2 kg (203 lb 3.2 oz)   SpO2 95%   BMI 34.88 kg/m²     Physical Exam  Constitutional:       General: She is not in acute distress.     Appearance: Normal appearance.      Comments: Alert, sitting on recliner watching TV  HENT:      Head: Normocephalic and atraumatic.      Comments: poor dentition, missing teeth     Nose: Nose normal.      Mouth/Throat:      Mouth: Mucous membranes are moist.      Pharynx: Oropharynx is clear.   Eyes:      Pupils: Pupils are equal, round, and reactive to light.      Comments: wearing glasses   Cardiovascular:      Rate and Rhythm: Rhythm irregular, rate normal       Heart sounds: No murmur heard.     No friction rub. No gallop.   Pulmonary:      Effort: Pulmonary effort is normal. No respiratory distress.      Breath sounds: Normal breath sounds. No wheezing, rhonchi or rales.   Abdominal:      General: Bowel sounds are normal. There " is no distension.      Palpations: Abdomen is soft.      Tenderness: There is no abdominal tenderness.   Musculoskeletal:         General: Normal range of motion.      Cervical back: Neck supple.      Right lower leg: No edema.      Left lower leg: No edema.   Skin:     General: Skin is warm and dry.   Neurological:      General: No focal deficit present.      Mental Status: She is alert and oriented to person, place, and time.   Psychiatric:         Mood and Affect: Mood normal.         Behavior: Behavior normal.     Assessment/Plan   Diagnoses and all orders for this visit:  Benign essential hypertension  Comments:  chronic, vitals stable, continue Losartan, Carvedilol. Follow up with cardiology as scheduled  Acute on chronic systolic heart failure (CMS/HCC)  Comments:  chronic, euvolemic on exam, continue Furosemide, Spironolactone. Pt to monitor her weight daily and notify provider of continued wt. gain  Chronic atrial fibrillation (CMS/HCC)  Comments:  chronic, managed with coumadin. Continue to monitor INR as scheduled. Notify provider with any increased s/sx of bruising/bleeding  Type 2 diabetes mellitus with hyperglycemia, with long-term current use of insulin (CMS/[facility])  Comments:  chronic, managed with Farxiga, Humalog and Lantus  Postmenopausal bleeding  Comments:  continues to be a concern, referral was sent to [facility] last month. Discussed importance of scheduling an appt to be evaluated. Gave pt contact info again    Patient stable. Discussed importance of medication compliance with patient. Advised patient to weigh herself daily, reduce sodium intake and continue to monitor her BG levels 3-4 x daily. Will follow up with pt in 2 months with plans to update routine labs.        Kathie Selby, APRN-CNP

## 2024-01-23 ENCOUNTER — DOCUMENTATION (OUTPATIENT)
Dept: PRIMARY CARE | Facility: CLINIC | Age: 74
End: 2024-01-23
Payer: MEDICARE

## 2024-01-23 NOTE — PROGRESS NOTES
[facility] CM follow up in home visit. Sitting in chair with legs elevated. Denied SOB. No leg edema noted. Daily weights ranging from 202-204 for the month. Reviewed low sodium diet. Checking BG, but not consistently with each meal. Next anticoagulation clinic visit 1/25/24. Has cardiology follow up scheduled for 1/31/24. Will follow up in one month.

## 2024-01-25 ENCOUNTER — APPOINTMENT (OUTPATIENT)
Dept: CARDIOLOGY | Facility: CLINIC | Age: 74
End: 2024-01-25
Payer: MEDICARE

## 2024-01-25 ENCOUNTER — ANTICOAGULATION - WARFARIN VISIT (OUTPATIENT)
Dept: CARDIOLOGY | Facility: CLINIC | Age: 74
End: 2024-01-25
Payer: MEDICARE

## 2024-01-25 DIAGNOSIS — I48.91 ATRIAL FIBRILLATION, UNSPECIFIED TYPE (MULTI): ICD-10-CM

## 2024-01-25 DIAGNOSIS — Z79.01 LONG TERM (CURRENT) USE OF ANTICOAGULANTS: ICD-10-CM

## 2024-01-25 LAB
POC INR: 2.6
POC PROTHROMBIN TIME: NORMAL

## 2024-01-25 PROCEDURE — 99211 OFF/OP EST MAY X REQ PHY/QHP: CPT | Performed by: INTERNAL MEDICINE

## 2024-01-25 PROCEDURE — 85610 PROTHROMBIN TIME: CPT | Mod: QW

## 2024-01-25 NOTE — PROGRESS NOTES
Patient identification verified with 2 identifiers.    Location: [facility] - [address] [place] [address] [phone]     Referring Physician: Broderick  Enrollment/ Re-enrollment date: 06/2024   INR Goal: 2.0-3.0  INR monitoring is per [facility] protocol.  Anticoagulation Medication: warfarin  Indication: Atrial Fibrillation/Atrial Flutter    Subjective   Bleeding signs/symptoms: No    Bruising: No   Major bleeding event: No  Thrombosis signs/symptoms: No  Thromboembolic event: No  Missed doses: No  Extra doses: No  Medication changes: No  Dietary changes: No  Change in health: No  Change in activity: No  Alcohol: No  Other concerns: No    Upcoming Surgeries:  Does the Patient Have any upcoming surgeries that require interruption in anticoagulation therapy? no  Does the patient require bridging? no      Anticoagulation Summary  As of 1/25/2024      INR goal:  2.0-3.0   TTR:  53.3 % (3 mo)   INR used for dosing:                 Assessment/Plan   Therapeutic     1. New dose: no change    2. Next INR: 1 month      Education provided to patient during the visit:  Patient instructed to call in interim with questions, concerns and changes.   Patient educated on compliance with dosing, follow up appointments, and prescribed plan of care.

## 2024-01-31 ENCOUNTER — APPOINTMENT (OUTPATIENT)
Dept: CARDIOLOGY | Facility: CLINIC | Age: 74
End: 2024-01-31
Payer: MEDICARE

## 2024-02-12 DIAGNOSIS — E55.9 VITAMIN D DEFICIENCY: ICD-10-CM

## 2024-02-12 DIAGNOSIS — I50.23 ACUTE ON CHRONIC SYSTOLIC HEART FAILURE (MULTI): Primary | ICD-10-CM

## 2024-02-12 DIAGNOSIS — I10 BENIGN ESSENTIAL HYPERTENSION: Primary | ICD-10-CM

## 2024-02-12 RX ORDER — LOSARTAN POTASSIUM 25 MG/1
25 TABLET ORAL DAILY
Qty: 28 TABLET | Refills: 10 | Status: SHIPPED | OUTPATIENT
Start: 2024-02-12

## 2024-02-12 RX ORDER — FUROSEMIDE 40 MG/1
40 TABLET ORAL DAILY
Qty: 28 TABLET | Refills: 10 | Status: SHIPPED | OUTPATIENT
Start: 2024-02-12

## 2024-02-13 RX ORDER — ERGOCALCIFEROL 1.25 MG/1
CAPSULE ORAL
Qty: 4 CAPSULE | Refills: 10 | Status: SHIPPED | OUTPATIENT
Start: 2024-02-13

## 2024-02-13 RX ORDER — DAPAGLIFLOZIN 10 MG/1
10 TABLET, FILM COATED ORAL DAILY
Qty: 28 TABLET | Refills: 10 | Status: SHIPPED | OUTPATIENT
Start: 2024-02-13 | End: 2024-03-04

## 2024-02-13 RX ORDER — SPIRONOLACTONE 25 MG/1
TABLET ORAL
Qty: 14 TABLET | Refills: 10 | Status: SHIPPED | OUTPATIENT
Start: 2024-02-13

## 2024-02-16 ENCOUNTER — HOSPITAL ENCOUNTER (EMERGENCY)
Facility: HOSPITAL | Age: 74
Discharge: HOME | End: 2024-02-16
Attending: EMERGENCY MEDICINE
Payer: MEDICARE

## 2024-02-16 VITALS
HEIGHT: 66 IN | BODY MASS INDEX: 32.62 KG/M2 | TEMPERATURE: 98.4 F | WEIGHT: 203 LBS | SYSTOLIC BLOOD PRESSURE: 112 MMHG | DIASTOLIC BLOOD PRESSURE: 70 MMHG | RESPIRATION RATE: 18 BRPM | OXYGEN SATURATION: 97 % | HEART RATE: 90 BPM

## 2024-02-16 DIAGNOSIS — K62.5 RECTAL BLEEDING: Primary | ICD-10-CM

## 2024-02-16 LAB
ALBUMIN SERPL-MCNC: 4 G/DL (ref 3.5–5)
ALP BLD-CCNC: 96 U/L (ref 35–125)
ALT SERPL-CCNC: 22 U/L (ref 5–40)
ANION GAP SERPL CALC-SCNC: 10 MMOL/L
APTT PPP: 37.7 SECONDS (ref 22–32.5)
AST SERPL-CCNC: 19 U/L (ref 5–40)
BASOPHILS # BLD AUTO: 0.05 X10*3/UL (ref 0–0.1)
BASOPHILS NFR BLD AUTO: 0.8 %
BILIRUB SERPL-MCNC: 0.5 MG/DL (ref 0.1–1.2)
BUN SERPL-MCNC: 36 MG/DL (ref 8–25)
CALCIUM SERPL-MCNC: 9.5 MG/DL (ref 8.5–10.4)
CHLORIDE SERPL-SCNC: 101 MMOL/L (ref 97–107)
CO2 SERPL-SCNC: 25 MMOL/L (ref 24–31)
CREAT SERPL-MCNC: 1.9 MG/DL (ref 0.4–1.6)
EGFRCR SERPLBLD CKD-EPI 2021: 28 ML/MIN/1.73M*2
EOSINOPHIL # BLD AUTO: 0.19 X10*3/UL (ref 0–0.4)
EOSINOPHIL NFR BLD AUTO: 3.1 %
ERYTHROCYTE [DISTWIDTH] IN BLOOD BY AUTOMATED COUNT: 13.3 % (ref 11.5–14.5)
GLUCOSE SERPL-MCNC: 238 MG/DL (ref 65–99)
HCT VFR BLD AUTO: 42.2 % (ref 36–46)
HEMOCCULT SP1 STL QL: NEGATIVE
HGB BLD-MCNC: 14.3 G/DL (ref 12–16)
IMM GRANULOCYTES # BLD AUTO: 0.02 X10*3/UL (ref 0–0.5)
IMM GRANULOCYTES NFR BLD AUTO: 0.3 % (ref 0–0.9)
INR PPP: 2.6 (ref 0.9–1.2)
LYMPHOCYTES # BLD AUTO: 0.88 X10*3/UL (ref 0.8–3)
LYMPHOCYTES NFR BLD AUTO: 14.4 %
MCH RBC QN AUTO: 34.5 PG (ref 26–34)
MCHC RBC AUTO-ENTMCNC: 33.9 G/DL (ref 32–36)
MCV RBC AUTO: 102 FL (ref 80–100)
MONOCYTES # BLD AUTO: 0.56 X10*3/UL (ref 0.05–0.8)
MONOCYTES NFR BLD AUTO: 9.1 %
NEUTROPHILS # BLD AUTO: 4.43 X10*3/UL (ref 1.6–5.5)
NEUTROPHILS NFR BLD AUTO: 72.3 %
NRBC BLD-RTO: 0 /100 WBCS (ref 0–0)
PLATELET # BLD AUTO: 182 X10*3/UL (ref 150–450)
POTASSIUM SERPL-SCNC: 4.2 MMOL/L (ref 3.4–5.1)
PROT SERPL-MCNC: 7.2 G/DL (ref 5.9–7.9)
PROTHROMBIN TIME: 25.6 SECONDS (ref 9.3–12.7)
RBC # BLD AUTO: 4.14 X10*6/UL (ref 4–5.2)
SODIUM SERPL-SCNC: 136 MMOL/L (ref 133–145)
WBC # BLD AUTO: 6.1 X10*3/UL (ref 4.4–11.3)

## 2024-02-16 PROCEDURE — 99284 EMERGENCY DEPT VISIT MOD MDM: CPT | Performed by: EMERGENCY MEDICINE

## 2024-02-16 PROCEDURE — 84075 ASSAY ALKALINE PHOSPHATASE: CPT | Performed by: EMERGENCY MEDICINE

## 2024-02-16 PROCEDURE — 85610 PROTHROMBIN TIME: CPT | Performed by: EMERGENCY MEDICINE

## 2024-02-16 PROCEDURE — 85730 THROMBOPLASTIN TIME PARTIAL: CPT | Performed by: EMERGENCY MEDICINE

## 2024-02-16 PROCEDURE — 36415 COLL VENOUS BLD VENIPUNCTURE: CPT | Performed by: EMERGENCY MEDICINE

## 2024-02-16 PROCEDURE — 85025 COMPLETE CBC W/AUTO DIFF WBC: CPT | Performed by: EMERGENCY MEDICINE

## 2024-02-16 PROCEDURE — 99283 EMERGENCY DEPT VISIT LOW MDM: CPT

## 2024-02-16 PROCEDURE — 82270 OCCULT BLOOD FECES: CPT | Performed by: EMERGENCY MEDICINE

## 2024-02-16 ASSESSMENT — PAIN DESCRIPTION - PROGRESSION: CLINICAL_PROGRESSION: NOT CHANGED

## 2024-02-16 ASSESSMENT — PAIN DESCRIPTION - ORIENTATION: ORIENTATION: LOWER

## 2024-02-16 ASSESSMENT — PAIN SCALES - GENERAL
PAINLEVEL_OUTOF10: 0 - NO PAIN
PAINLEVEL_OUTOF10: 1

## 2024-02-16 ASSESSMENT — PAIN DESCRIPTION - LOCATION: LOCATION: ABDOMEN

## 2024-02-16 ASSESSMENT — COLUMBIA-SUICIDE SEVERITY RATING SCALE - C-SSRS
2. HAVE YOU ACTUALLY HAD ANY THOUGHTS OF KILLING YOURSELF?: NO
6. HAVE YOU EVER DONE ANYTHING, STARTED TO DO ANYTHING, OR PREPARED TO DO ANYTHING TO END YOUR LIFE?: NO
1. IN THE PAST MONTH, HAVE YOU WISHED YOU WERE DEAD OR WISHED YOU COULD GO TO SLEEP AND NOT WAKE UP?: NO

## 2024-02-16 ASSESSMENT — PAIN DESCRIPTION - DESCRIPTORS: DESCRIPTORS: CRAMPING

## 2024-02-16 ASSESSMENT — PAIN DESCRIPTION - PAIN TYPE: TYPE: ACUTE PAIN

## 2024-02-16 ASSESSMENT — PAIN DESCRIPTION - FREQUENCY: FREQUENCY: INTERMITTENT

## 2024-02-16 ASSESSMENT — PAIN DESCRIPTION - ONSET: ONSET: SUDDEN

## 2024-02-16 ASSESSMENT — PAIN - FUNCTIONAL ASSESSMENT: PAIN_FUNCTIONAL_ASSESSMENT: 0-10

## 2024-02-16 NOTE — ED TRIAGE NOTES
TRIAGE NOTE   I saw the patient as the Clinician in Triage and performed a brief history and physical exam, established acuity, and ordered appropriate tests to develop basic plan of care. Patient will be seen by an HELEN, resident and/or physician who will independently evaluate the patient. Please see subsequent provider notes for further details and disposition.     Brief HPI: In brief, Jing Sanchez is a 73 y.o. female with a history of coronary artery disease atrial fibrillation currently on warfarin, hemorrhoids, colon polyps that presents for rectal bleeding that she has noted off-and-on over the past year.  The patient states she came in today because she wiped and there is red blood on the toilet paper..  Patient states she ate a tuna fish sandwich earlier today without any problems said no vomiting hematemesis, melena.    Focused Physical exam:   Patient is currently in the triage room so is unable to do a rectal exam her vital signs show systolic blood pressure of 117, heart rate is around 80.  Pulse ox is 98% on room air.  Abdomen is soft nontender no guarding or rebound.  Patient's sclera are not pale  Plan/MDM:   The plan will be to assess the patient for rectal bleeding which includes rectal exam to assess for hemorrhoids, also check CBC CMP and coags as patient is on warfarin.  Please see subsequent provider note for further details and disposition

## 2024-02-16 NOTE — ED PROVIDER NOTES
HPI   Chief Complaint   Patient presents with    Rectal Bleeding     For the past 2 month I have had rectal bleeding I have lower abd cramping the blood is either full on the pad or it will be more than the pad can handle       This is a 73-year-old female with a history of A-fib on warfarin, coronary artery disease, CHF, high cholesterol, hypertension who presents to the emergency department with rectal bleeding off and on over the past year.  The patient states she wiped today and noticed some red blood on the toilet paper.  Patient denies any melena she denies any vomiting she denies any lightheadedness or dizziness.  Patient denies any abdominal pain.  The patient states she has had a history of hemorrhoids in the past.                          Matt Coma Scale Score: 15                     Patient History   Past Medical History:   Diagnosis Date    A-fib (CMS/[facility])     Acute MI (CMS/[facility]) 08/2016    cath by Nikos, wire in RCA and clot migrated to PDA and no PTCA.    ASHD (arteriosclerotic heart disease)     CAD (coronary artery disease)     CHF (congestive heart failure) (CMS/[facility])     DM (diabetes mellitus) (CMS/[facility])     Dyslipidemia     H/O echocardiogram 04/2017    EF 35%    H/O echocardiogram 09/2018    EF20%    History of nuclear stress test 02/2015    no ischemia and apex scar.    History of nuclear stress test 04/2016    apical scar and inf ischemia    HTN (hypertension)     Obesity     Obstructive sleep apnea     Personal history of other specified conditions     Acute AW MI / 9/4/13, cath LM-nl, , CX irreg, RCA irreg, LV ant hypo, PTCA with 2.75 RENETTA to LAD    S/P cardiac catheterization 06/2016    severe diffuse ASHD and recommended OHS    SOB (shortness of breath)      Past Surgical History:   Procedure Laterality Date    CARDIAC CATHETERIZATION  09/04/2013    with RENETTA    CHOLECYSTECTOMY  03/11/2021    COLONOSCOPY  04/2015    hemorrhoid removed    CORONARY ARTERY BYPASS GRAFT  10/2016     LIMA-LAD, SVG-PDA, SVG-Diag, SVG-M1 and M@    MR HEAD ANGIO WO IV CONTRAST  04/06/2017    MR HEAD ANGIO WO IV CONTRAST [facility]    TUMOR REMOVAL      Benign rectal tumor removed     Family History   Problem Relation Name Age of Onset    Hypertension Mother      Cancer Mother      Diabetes Father      Stroke Father      Diabetes Sister      Heart disease Maternal Grandmother      Heart disease Maternal Grandfather      Heart disease Paternal Grandmother      Heart disease Paternal Grandfather       Social History     Tobacco Use    Smoking status: Never     Passive exposure: Never    Smokeless tobacco: Never   Substance Use Topics    Alcohol use: Not Currently    Drug use: Never       Physical Exam   ED Triage Vitals [02/16/24 1439]   Temperature Heart Rate Respirations BP   36.3 °C (97.3 °F) (!) 116 18 117/74      Pulse Ox Temp Source Heart Rate Source Patient Position   (!) 93 % Oral Monitor Sitting      BP Location FiO2 (%)     Left arm --       Physical Exam  Vitals and nursing note reviewed.   Constitutional:       General: She is not in acute distress.     Appearance: She is well-developed.   HENT:      Head: Normocephalic and atraumatic.   Eyes:      Conjunctiva/sclera: Conjunctivae normal.   Cardiovascular:      Rate and Rhythm: Normal rate and regular rhythm.      Heart sounds: No murmur heard.  Pulmonary:      Effort: Pulmonary effort is normal. No respiratory distress.      Breath sounds: Normal breath sounds.   Abdominal:      Palpations: Abdomen is soft.      Tenderness: There is no abdominal tenderness.   Genitourinary:     Comments: Rectal exam reveals nonthrombosed hemorrhoids no active bleeding brown stool.  Hemoccult negative  Musculoskeletal:         General: No swelling.      Cervical back: Neck supple.   Skin:     General: Skin is warm and dry.      Capillary Refill: Capillary refill takes less than 2 seconds.   Neurological:      Mental Status: She is alert.   Psychiatric:          Mood and Affect: Mood normal.         ED Course & MDM   ED Course as of 02/16/24 1840 Fri Feb 16, 2024   1655 Creatinine(!): 1.90 [TH]      ED Course User Index  [TH] Manuel Coley DO         Diagnoses as of 02/16/24 1840   Rectal bleeding       Medical Decision Making  This is a 73-year-old female with a history of A-fib on warfarin, coronary artery disease, CHF, high cholesterol, hypertension who presents to the emergency department with rectal bleeding off and on over the past year.  The patient states she wiped today and noticed some red blood on the toilet paper.  Patient denies any melena she denies any vomiting she denies any lightheadedness or dizziness.  Patient denies any abdominal pain.  The patient states she has had a history of hemorrhoids in the past.    Physical exam does show some skin tags/nonthrombosed hemorrhoids.  Hemoccult study was negative for occult blood.  The patient had orthostatic vital signs done which were within normal limits.  CBC showed a steady hemoglobin and hematocrit 14.3/42.2 INR was therapeutic at 2.6  Chemistries were normal except for BUN and creatinine of 36 and 1.9 with a GFR 28.  The patient will be discharged home to follow-up with her primary care provider she was told to return if persistent bleeding, lightheadedness dizziness fever chills or sweats.          Procedure  Procedures     Manuel Coley DO  02/16/24 2155

## 2024-02-18 NOTE — PROGRESS NOTES
Subjective      Chief Complaint   Patient presents with    Follow-up     6 month follow up HTN          She was in the emergency room on February 16 with some rectal bleeding.  Evidently she has had this on and off for some time.  She does have a history of atrial fibrillation and coronary artery disease with congestive heart failure.  On examination in the emergency room guaiacs were negative she did have a creatinine of 1.9 and her H&H was 14.3/42.2 INR 2.6  Her last echocardiogram in 2023 showed ejection fraction of 30-34% range, the echo did have evidence of pulm hypertension.  She is known to have severe coronary artery disease and did have coronary artery bypass in 2016 with a LIMA to LAD status vein graft to PDA says related to diagonal status vein graft to the marginal.  She did have a microinfarction in 2013 at that time she received a stent to the LAD which was totally occluded.  She is not moving much.  Is not walking much.  Her breathing is doing well.  No PND or orthopnea. Legs are not swelling.  IS not getting any chest discomfort.  Has not felt her heart racing  The INR is doing well           ROS     Past Surgical History:   Procedure Laterality Date    CARDIAC CATHETERIZATION  09/04/2013    with RENETTA    CHOLECYSTECTOMY  03/11/2021    COLONOSCOPY  04/2015    hemorrhoid removed    CORONARY ARTERY BYPASS GRAFT  10/2016    LIMA-LAD, SVG-PDA, SVG-Diag, SVG-M1 and M@    MR HEAD ANGIO WO IV CONTRAST  04/06/2017    MR HEAD ANGIO WO IV CONTRAST [facility]    TUMOR REMOVAL      Benign rectal tumor removed        Active Ambulatory Problems     Diagnosis Date Noted    At risk for injury related to restraints 09/07/2023    Abnormal vaginal bleeding 09/07/2023    Asthenia 09/07/2023    Asymptomatic coronary heart disease 09/07/2023    Benign essential hypertension 09/07/2023    Blood glucose abnormal 04/06/2017    Cardiomyopathy (CMS/HCC) 09/07/2023    Chronic atrial fibrillation (CMS/HCC) 09/07/2023     Stage 3 chronic kidney disease (CMS/[facility]) 03/11/2023    Chronic obstructive pulmonary disease (CMS/[facility]) 09/07/2023    Acute on chronic systolic heart failure (CMS/HCC) 09/07/2023    Cor pulmonale (CMS/HCC) 09/07/2023    Diabetes mellitus (CMS/HCC) 09/07/2023    Type 2 diabetes mellitus with obesity (CMS/HCC) 03/11/2023    CHF (congestive heart failure) (CMS/HCC) 09/07/2023    Transient ischemic attack 09/07/2023    Type 2 diabetes mellitus with diabetic nephropathy (CMS/HCC) 10/09/2023    Unsteady gait 10/09/2023    Atrial fibrillation (CMS/HCC) 10/16/2023    Long term (current) use of anticoagulants 10/16/2023    Vitamin D deficiency 11/01/2023    ST elevation (STEMI) myocardial infarction involving left anterior descending coronary artery (CMS/HCC) 11/01/2023    Hypothyroidism 11/01/2023    Chronic pain 11/01/2023    Obstructive sleep apnea syndrome 03/11/2023    Nonadherence to medication 11/01/2023    Morbid obesity (CMS/HCC) 11/01/2023    Longstanding persistent atrial fibrillation (CMS/HCC) 11/01/2023    Leukocytosis 11/01/2023    Hypertensive heart and chronic kidney disease with heart failure and stage 1 through stage 4 chronic kidney disease, or unspecified chronic kidney disease (CMS/HCC) 11/01/2023    Hyperlipidemia 11/01/2023    Acute alteration in mental status 11/01/2023    History of cholecystectomy 11/01/2023    Ischemic cardiomyopathy 11/01/2023    Atherosclerosis of coronary artery bypass graft 11/01/2023    Arteriosclerosis of coronary artery 11/01/2023    Hirsutism 03/11/2023    Postmenopausal bleeding 03/11/2023    Anxiety 01/05/2024    Acute respiratory failure (CMS/HCC) 03/11/2023    Acute myocardial infarction of anterior wall (CMS/HCC) 11/01/2023    Acute pancreatitis 01/05/2024     Resolved Ambulatory Problems     Diagnosis Date Noted    No Resolved Ambulatory Problems     Past Medical History:   Diagnosis Date    A-fib (CMS/HCC)     Acute MI (CMS/HCC) 08/2016    ASHD (arteriosclerotic heart  "disease)     CAD (coronary artery disease)     DM (diabetes mellitus) (CMS/[facility])     Dyslipidemia     H/O echocardiogram 04/2017    H/O echocardiogram 09/2018    History of nuclear stress test 02/2015    History of nuclear stress test 04/2016    HTN (hypertension)     Obesity     Obstructive sleep apnea     Personal history of other specified conditions     S/P cardiac catheterization 06/2016    SOB (shortness of breath)         Visit Vitals  /72   Pulse 100   OB Status Perimenopausal   Smoking Status Never        Objective     Constitutional:       Appearance: Healthy appearance.   Eyes:      Pupils: Pupils are equal, round, and reactive to light.   Neck:      Vascular: JVD normal.   Pulmonary:      Breath sounds: Normal breath sounds.   Cardiovascular:      PMI at left midclavicular line. Normal rate. Irregularly irregular rhythm. Normal S1. Normal S2.       Murmurs: There is no murmur.      No gallop.  No click. No rub.   Pulses:     Intact distal pulses.   Edema:     Peripheral edema absent.   Abdominal:      Palpations: Abdomen is soft.      Tenderness: There is no abdominal tenderness.   Musculoskeletal:      Extremities: No clubbing present.Skin:     General: Skin is warm and dry.   Neurological:      General: No focal deficit present.            Lab Review:         Lab Results   Component Value Date    CHOL 109 (L) 02/21/2023    CHOL 184 01/23/2020    CHOL 149 07/19/2018     Lab Results   Component Value Date    HDL 27 (L) 02/21/2023    HDL 30 (L) 01/23/2020    HDL 27 (L) 07/19/2018     Lab Results   Component Value Date    LDLCALC 58 (L) 02/21/2023    LDLCALC 105 01/23/2020    LDLCALC 95 07/19/2018     Lab Results   Component Value Date    TRIG 120 02/21/2023    TRIG 247 (H) 01/23/2020    TRIG 137 07/19/2018     No components found for: \"CHOLHDL\"     Assessment/Plan     Cardiomyopathy (CMS/[facility])  Is doing alright and no angina and the chf seems to be stable.    Chronic atrial fibrillation (CMS/[facility])  The " afib is stable and the INR is doing well    Hyperlipidemia  Has been doing well

## 2024-02-19 ENCOUNTER — OFFICE VISIT (OUTPATIENT)
Dept: CARDIOLOGY | Facility: CLINIC | Age: 74
End: 2024-02-19
Payer: MEDICARE

## 2024-02-19 VITALS — SYSTOLIC BLOOD PRESSURE: 126 MMHG | DIASTOLIC BLOOD PRESSURE: 72 MMHG | HEART RATE: 100 BPM

## 2024-02-19 DIAGNOSIS — I25.5 ISCHEMIC CARDIOMYOPATHY: Primary | ICD-10-CM

## 2024-02-19 DIAGNOSIS — I48.20 CHRONIC ATRIAL FIBRILLATION (MULTI): ICD-10-CM

## 2024-02-19 DIAGNOSIS — E78.00 PURE HYPERCHOLESTEROLEMIA: ICD-10-CM

## 2024-02-19 PROCEDURE — 1036F TOBACCO NON-USER: CPT | Performed by: INTERNAL MEDICINE

## 2024-02-19 PROCEDURE — 1159F MED LIST DOCD IN RCRD: CPT | Performed by: INTERNAL MEDICINE

## 2024-02-19 PROCEDURE — 3078F DIAST BP <80 MM HG: CPT | Performed by: INTERNAL MEDICINE

## 2024-02-19 PROCEDURE — 1126F AMNT PAIN NOTED NONE PRSNT: CPT | Performed by: INTERNAL MEDICINE

## 2024-02-19 PROCEDURE — 4010F ACE/ARB THERAPY RXD/TAKEN: CPT | Performed by: INTERNAL MEDICINE

## 2024-02-19 PROCEDURE — 3074F SYST BP LT 130 MM HG: CPT | Performed by: INTERNAL MEDICINE

## 2024-02-19 PROCEDURE — 99213 OFFICE O/P EST LOW 20 MIN: CPT | Performed by: INTERNAL MEDICINE

## 2024-02-19 PROCEDURE — 1160F RVW MEDS BY RX/DR IN RCRD: CPT | Performed by: INTERNAL MEDICINE

## 2024-02-19 ASSESSMENT — PAIN SCALES - GENERAL: PAINLEVEL: 0-NO PAIN

## 2024-02-22 ENCOUNTER — DOCUMENTATION (OUTPATIENT)
Dept: PRIMARY CARE | Facility: CLINIC | Age: 74
End: 2024-02-22
Payer: MEDICARE

## 2024-02-22 ENCOUNTER — ANTICOAGULATION - WARFARIN VISIT (OUTPATIENT)
Dept: CARDIOLOGY | Facility: CLINIC | Age: 74
End: 2024-02-22
Payer: MEDICARE

## 2024-02-22 ENCOUNTER — APPOINTMENT (OUTPATIENT)
Dept: CARDIOLOGY | Facility: CLINIC | Age: 74
End: 2024-02-22
Payer: MEDICARE

## 2024-02-22 DIAGNOSIS — I48.91 ATRIAL FIBRILLATION, UNSPECIFIED TYPE (MULTI): ICD-10-CM

## 2024-02-22 DIAGNOSIS — Z79.01 LONG TERM (CURRENT) USE OF ANTICOAGULANTS: ICD-10-CM

## 2024-02-22 LAB
POC INR: 2.7
POC PROTHROMBIN TIME: NORMAL

## 2024-02-22 PROCEDURE — 99211 OFF/OP EST MAY X REQ PHY/QHP: CPT | Performed by: INTERNAL MEDICINE

## 2024-02-22 PROCEDURE — 85610 PROTHROMBIN TIME: CPT | Mod: QW

## 2024-02-22 NOTE — PROGRESS NOTES
Patient identification verified with 2 identifiers.    Location: [facility] - [address] [place] [address] [phone]     Referring Physician: Broderick  Enrollment/ Re-enrollment date: 2024   INR Goal: 2.0-3.0  INR monitoring is per [facility] protocol.  Anticoagulation Medication: warfarin  Indication: Atrial Fibrillation/Atrial Flutter    Subjective   Bleeding signs/symptoms: No    Bruising: No   Major bleeding event: No  Thrombosis signs/symptoms: No  Thromboembolic event: No  Missed doses: No  Extra doses: No  Medication changes: No  Dietary changes: No  Change in health: No  Change in activity: No  Alcohol: No  Other concerns: No    Upcoming Surgeries:  Does the Patient Have any upcoming surgeries that require interruption in anticoagulation therapy? no  Does the patient require bridging? no      Anticoagulation Summary  As of 2024      INR goal:  2.0-3.0   TTR:  71.2 % (4.9 mo)   INR used for dosin.70 (2024)   Weekly warfarin total:  14 mg             Assessment/Plan   Therapeutic     1. New dose: no change    2. Next INR: 1 month      Education provided to patient during the visit:  Patient instructed to call in interim with questions, concerns and changes.   Patient educated on interactions between medications and warfarin.   Patient educated on dietary consistency in vitamin k consumption.   Patient educated on affects of alcohol consumption while taking warfarin.   Patient educated on signs of bleeding/clotting.   Patient educated on compliance with dosing, follow up appointments, and prescribed plan of care.

## 2024-02-22 NOTE — PROGRESS NOTES
House Calls CM follow up call. Continues to weigh self daily. Today's weight 202, yesterday was 200.  Weight was 203 at January provider visit. Had cardiology follow up 2/19. No new orders. Had recent ED visit for rectal bleeding. Colonoscopy has been recommended. Has follow up appointment at anticoagulation clinic today. No shortness of breath or edema reported. Will follow up with in home visit.

## 2024-02-26 DIAGNOSIS — I48.20 CHRONIC ATRIAL FIBRILLATION (MULTI): ICD-10-CM

## 2024-02-26 RX ORDER — WARFARIN 2 MG/1
TABLET ORAL
Qty: 30 TABLET | Refills: 3 | Status: SHIPPED | OUTPATIENT
Start: 2024-02-26

## 2024-02-29 ENCOUNTER — TELEPHONE (OUTPATIENT)
Dept: PRIMARY CARE | Facility: CLINIC | Age: 74
End: 2024-02-29
Payer: MEDICARE

## 2024-02-29 NOTE — TELEPHONE ENCOUNTER
Insurance will not cover Farxiga. The covered alternative is Jardiance. I started a PA but they want to know why pt cannot take Jardiance.

## 2024-03-01 ENCOUNTER — LAB (OUTPATIENT)
Dept: LAB | Facility: LAB | Age: 74
End: 2024-03-01
Payer: MEDICARE

## 2024-03-01 ENCOUNTER — OFFICE VISIT (OUTPATIENT)
Dept: PRIMARY CARE | Facility: CLINIC | Age: 74
End: 2024-03-01
Payer: MEDICARE

## 2024-03-01 VITALS
OXYGEN SATURATION: 96 % | HEIGHT: 66 IN | WEIGHT: 205 LBS | DIASTOLIC BLOOD PRESSURE: 80 MMHG | TEMPERATURE: 97.3 F | RESPIRATION RATE: 18 BRPM | SYSTOLIC BLOOD PRESSURE: 128 MMHG | HEART RATE: 95 BPM | BODY MASS INDEX: 32.95 KG/M2

## 2024-03-01 DIAGNOSIS — Z00.00 HEALTHCARE MAINTENANCE: ICD-10-CM

## 2024-03-01 DIAGNOSIS — E66.9 TYPE 2 DIABETES MELLITUS WITH OBESITY (MULTI): Primary | ICD-10-CM

## 2024-03-01 DIAGNOSIS — E66.9 TYPE 2 DIABETES MELLITUS WITH OBESITY (MULTI): ICD-10-CM

## 2024-03-01 DIAGNOSIS — I10 BENIGN ESSENTIAL HYPERTENSION: ICD-10-CM

## 2024-03-01 DIAGNOSIS — E55.9 VITAMIN D DEFICIENCY: ICD-10-CM

## 2024-03-01 DIAGNOSIS — E78.00 PURE HYPERCHOLESTEROLEMIA: ICD-10-CM

## 2024-03-01 DIAGNOSIS — E11.69 TYPE 2 DIABETES MELLITUS WITH OBESITY (MULTI): ICD-10-CM

## 2024-03-01 DIAGNOSIS — I50.23 ACUTE ON CHRONIC SYSTOLIC HEART FAILURE (MULTI): ICD-10-CM

## 2024-03-01 DIAGNOSIS — I48.20 CHRONIC ATRIAL FIBRILLATION (MULTI): ICD-10-CM

## 2024-03-01 DIAGNOSIS — E11.69 TYPE 2 DIABETES MELLITUS WITH OBESITY (MULTI): Primary | ICD-10-CM

## 2024-03-01 LAB
ANION GAP SERPL CALC-SCNC: 16 MMOL/L (ref 10–20)
BASOPHILS # BLD AUTO: 0.06 X10*3/UL (ref 0–0.1)
BASOPHILS NFR BLD AUTO: 0.8 %
BUN SERPL-MCNC: 44 MG/DL (ref 6–23)
CALCIUM SERPL-MCNC: 9.1 MG/DL (ref 8.6–10.3)
CHLORIDE SERPL-SCNC: 101 MMOL/L (ref 98–107)
CHOLEST SERPL-MCNC: 174 MG/DL (ref 0–199)
CHOLESTEROL/HDL RATIO: 5.5
CO2 SERPL-SCNC: 22 MMOL/L (ref 21–32)
CREAT SERPL-MCNC: 1.77 MG/DL (ref 0.5–1.05)
EGFRCR SERPLBLD CKD-EPI 2021: 30 ML/MIN/1.73M*2
EOSINOPHIL # BLD AUTO: 0.2 X10*3/UL (ref 0–0.4)
EOSINOPHIL NFR BLD AUTO: 2.8 %
ERYTHROCYTE [DISTWIDTH] IN BLOOD BY AUTOMATED COUNT: 13.5 % (ref 11.5–14.5)
GLUCOSE SERPL-MCNC: 279 MG/DL (ref 74–99)
HCT VFR BLD AUTO: 40.6 % (ref 36–46)
HDLC SERPL-MCNC: 31.7 MG/DL
HGB BLD-MCNC: 13.6 G/DL (ref 12–16)
IMM GRANULOCYTES # BLD AUTO: 0.02 X10*3/UL (ref 0–0.5)
IMM GRANULOCYTES NFR BLD AUTO: 0.3 % (ref 0–0.9)
LDLC SERPL CALC-MCNC: 72 MG/DL
LYMPHOCYTES # BLD AUTO: 1.03 X10*3/UL (ref 0.8–3)
LYMPHOCYTES NFR BLD AUTO: 14.4 %
MCH RBC QN AUTO: 35.1 PG (ref 26–34)
MCHC RBC AUTO-ENTMCNC: 33.5 G/DL (ref 32–36)
MCV RBC AUTO: 105 FL (ref 80–100)
MONOCYTES # BLD AUTO: 0.59 X10*3/UL (ref 0.05–0.8)
MONOCYTES NFR BLD AUTO: 8.2 %
NEUTROPHILS # BLD AUTO: 5.27 X10*3/UL (ref 1.6–5.5)
NEUTROPHILS NFR BLD AUTO: 73.5 %
NON HDL CHOLESTEROL: 142 MG/DL (ref 0–149)
NRBC BLD-RTO: 0 /100 WBCS (ref 0–0)
PLATELET # BLD AUTO: 208 X10*3/UL (ref 150–450)
POTASSIUM SERPL-SCNC: 4.4 MMOL/L (ref 3.5–5.3)
RBC # BLD AUTO: 3.88 X10*6/UL (ref 4–5.2)
SODIUM SERPL-SCNC: 135 MMOL/L (ref 136–145)
TRIGL SERPL-MCNC: 350 MG/DL (ref 0–149)
TSH SERPL-ACNC: 1.59 MIU/L (ref 0.44–3.98)
VLDL: 70 MG/DL (ref 0–40)
WBC # BLD AUTO: 7.2 X10*3/UL (ref 4.4–11.3)

## 2024-03-01 PROCEDURE — 3048F LDL-C <100 MG/DL: CPT | Performed by: NURSE PRACTITIONER

## 2024-03-01 PROCEDURE — 84443 ASSAY THYROID STIM HORMONE: CPT

## 2024-03-01 PROCEDURE — 83036 HEMOGLOBIN GLYCOSYLATED A1C: CPT

## 2024-03-01 PROCEDURE — 80048 BASIC METABOLIC PNL TOTAL CA: CPT

## 2024-03-01 PROCEDURE — 36415 COLL VENOUS BLD VENIPUNCTURE: CPT

## 2024-03-01 PROCEDURE — 4010F ACE/ARB THERAPY RXD/TAKEN: CPT | Performed by: NURSE PRACTITIONER

## 2024-03-01 PROCEDURE — 99349 HOME/RES VST EST MOD MDM 40: CPT | Performed by: NURSE PRACTITIONER

## 2024-03-01 PROCEDURE — 36415 COLL VENOUS BLD VENIPUNCTURE: CPT | Performed by: NURSE PRACTITIONER

## 2024-03-01 PROCEDURE — 85025 COMPLETE CBC W/AUTO DIFF WBC: CPT

## 2024-03-01 PROCEDURE — 80061 LIPID PANEL: CPT

## 2024-03-01 PROCEDURE — 3079F DIAST BP 80-89 MM HG: CPT | Performed by: NURSE PRACTITIONER

## 2024-03-01 PROCEDURE — 82306 VITAMIN D 25 HYDROXY: CPT

## 2024-03-01 PROCEDURE — 1036F TOBACCO NON-USER: CPT | Performed by: NURSE PRACTITIONER

## 2024-03-01 PROCEDURE — 1159F MED LIST DOCD IN RCRD: CPT | Performed by: NURSE PRACTITIONER

## 2024-03-01 PROCEDURE — 1126F AMNT PAIN NOTED NONE PRSNT: CPT | Performed by: NURSE PRACTITIONER

## 2024-03-01 PROCEDURE — 1160F RVW MEDS BY RX/DR IN RCRD: CPT | Performed by: NURSE PRACTITIONER

## 2024-03-01 PROCEDURE — 3052F HG A1C>EQUAL 8.0%<EQUAL 9.0%: CPT | Performed by: NURSE PRACTITIONER

## 2024-03-01 PROCEDURE — 3074F SYST BP LT 130 MM HG: CPT | Performed by: NURSE PRACTITIONER

## 2024-03-01 ASSESSMENT — PAIN SCALES - GENERAL: PAINLEVEL: 0-NO PAIN

## 2024-03-01 NOTE — PROGRESS NOTES
Subjective   Patient ID: Jing Sanchez is a 73 y.o. female who presents for Follow-up (Routine follow up, recent ED visit for rectal bleeding ).    Visit for 72 y/o female seen today in private home, accompanied by her spouse Matt for routine follow up of chronic medical conditions. Patient is sitting on chair in living room eating her lunch. She is alert, able to answer very simple questions regarding her health. Her spouse will assist with HPI as needed. Patient lives with her spouse Matt. She does not drive or have a vehicle and will typically only leave the house for medical appointments or to go out to lunch on occasion. She receives all medications in pill packs. She is able to manage her own medications and denies any issues with compliance of her pills but does admit that she will forget her insulin on occasion. Patient is able to perform all ADLs without issue. She is active with [employer]. Receives meals on wheels M-F. Denies appetite changes, signs of weight loss, abdominal pain, nausea, vomiting. She reports having some rectal bleeding and went to the emergency room on 2/16 for evaluation.     ED H/P: This is a 73-year-old female with a history of A-fib on warfarin, coronary artery disease, CHF, high cholesterol, hypertension who presents to the emergency department with rectal bleeding off and on over the past year.  The patient states she wiped today and noticed some red blood on the toilet paper.  Patient denies any melena she denies any vomiting she denies any lightheadedness or dizziness.  Patient denies any abdominal pain.  The patient states she has had a history of hemorrhoids in the past. Physical exam does show some skin tags/nonthrombosed hemorrhoids.  Hemoccult study was negative for occult blood. The patient had orthostatic vital signs done which were within normal limits. CBC showed a steady hemoglobin and hematocrit 14.3/42.2 INR was therapeutic at 2.6. Chemistries were normal except for BUN  "and creatinine of 36 and 1.9 with a GFR 28.     DM- patient is monitoring glucose levels 3x daily. She reports her BG levels to be \"all over the place\". Pt continues to report numbness and tingling of bilateral feet. She denies any dizziness, lightheadedness, s/sx of hypoglycemia. She is due to have A1c level checked.     Afib/CHF- patient remains active with [facility] and cardiologist Dr. Villafana. She had follow up with cardiology on 2/19. She denies issues with chest pain, palpitations, shortness of breath, cough, wheezing or increased edema. She is monitoring her weight daily. She reports some weight gain but feels her clothes are loose.     Home Visit:          Medically necessary due to: Illness or condition that results in activity lmitation or restriction that impacts the ability to leave home such as:, shortness of breath with minimal exertion, unsteady gait/poor balance.         Current Outpatient Medications:     blood sugar diagnostic (Accu-Chek Guide test strips) strip, 1 strip 3 times a day., Disp: 100 strip, Rfl: 11    blood-glucose meter (Accu-Chek Guide Glucose Meter) misc, Test TID, Disp: 1 each, Rfl: 0    carvedilol (Coreg) 3.125 mg tablet, 1 TAB BY MOUTH TWICE A DAY WITH OR AFTER MEALS HOLD FOR HR <55 BPM AND/OR SBP <90, Disp: 56 tablet, Rfl: 10    ergocalciferol (Vitamin D-2) 1.25 MG (95418 UT) capsule, 1 CAP BY MOUTH WEEKLY ON SUNDAY, Disp: 4 capsule, Rfl: 10    Farxiga 10 mg, 1 TAB BY MOUTH ONCE A DAY, Disp: 28 tablet, Rfl: 10    furosemide (Lasix) 40 mg tablet, 1 TAB BY MOUTH ONCE A DAY, Disp: 28 tablet, Rfl: 10    insulin lispro (HumaLOG) 100 unit/mL injection, INJECT SUBQ PER SLIDING SCALE WITH MEALS (MAX DAILY DOSE OF 80 UNITS), Disp: 30 mL, Rfl: 10    lancets misc, Accucheck lancets, Disp: 100 each, Rfl: 11    Lantus Solostar U-100 Insulin 100 unit/mL (3 mL) pen, Inject 22 Units under the skin once daily at bedtime., Disp: 18 mL, Rfl: 3    losartan (Cozaar) 25 mg tablet, 1 TAB BY MOUTH " "ONCE A DAY, Disp: 28 tablet, Rfl: 10    nystatin (Mycostatin) 100,000 unit/gram powder, apply to breasts Topically tid / as needed, Disp: , Rfl:     pen needle, diabetic (BD Ultra-Fine Short Pen Needle) 31 gauge x 5/16\" needle, Use and discard 4 pen needles per day subcutaneously Dx: E11.65 for 90 days, Disp: , Rfl:     spironolactone (Aldactone) 25 mg tablet, ONE-*HALF* TAB (12.5MG) BY MOUTH ONCE A DAY, Disp: 14 tablet, Rfl: 10    warfarin (Coumadin) 2 mg tablet, TAKE 1 TABLET(2 MG) BY MOUTH EVERY DAY IN THE EVENING AS DIRECTED BY PRESCRIBER, Disp: 30 tablet, Rfl: 3     Review of Systems  Constitutional: Negative for appetite change, chills and fever.   HENT: Negative for hearing loss and trouble swallowing.    Respiratory:  Negative for cough, shortness of breath and wheezing.    Cardiovascular:  Negative for chest pain, palpitations and leg swelling.   Gastrointestinal: Negative for abdominal pain, constipation, diarrhea, nausea and vomiting.   Endocrine: Positive for diabetes   Genitourinary: Positive for occasional urinary incontinence. Negative for difficulty urinating.   Musculoskeletal:  Negative for arthralgias.   Neurological: Positive for numbness and tingling to BLE. Negative for dizziness and light-headedness.  Psychiatric/Behavioral:  The patient is not nervous/anxious.      Objective   /80 (BP Location: Left arm, Patient Position: Sitting, BP Cuff Size: Adult)   Pulse 95   Temp 36.3 °C (97.3 °F) (Temporal)   Resp 18   Ht 1.676 m (5' 6\")   Wt 93 kg (205 lb)   SpO2 96%   BMI 33.09 kg/m²     Physical Exam  Constitutional:       General: She is not in acute distress.     Appearance: Normal appearance.      Comments: Alert, seen in chair   HENT:      Head: Normocephalic and atraumatic.      Comments: poor dentition, missing teeth     Nose: Nose normal.      Mouth/Throat:      Mouth: Mucous membranes are moist.      Pharynx: Oropharynx is clear.   Eyes:      Pupils: Pupils are equal, round, and " reactive to light.      Comments: wearing glasses   Cardiovascular:      Rate and Rhythm: Rhythm irregular, rate normal       Heart sounds: No murmur heard.     No friction rub. No gallop.   Pulmonary:      Effort: Pulmonary effort is normal. No respiratory distress.      Breath sounds: Normal breath sounds. No wheezing, rhonchi or rales.   Abdominal:      General: Bowel sounds are normal. There is no distension.      Palpations: Abdomen is soft.      Tenderness: There is no abdominal tenderness.   Musculoskeletal:         General: Normal range of motion.      Cervical back: Neck supple.      Right lower leg: No edema.      Left lower leg: No edema.   Skin:     General: Skin is warm and dry.   Neurological:      General: No focal deficit present.      Mental Status: She is alert and oriented to person, place, and time.   Psychiatric:         Mood and Affect: Mood normal.         Behavior: Behavior normal.     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   -Follow with Coumadin Clinic as scheduled     Benign essential hypertension  -     CBC and Auto Differential; Future  -     Basic metabolic panel; Future  -chronic, vitals stable  -continue Carvedilol, Losartan.     Pure hypercholesterolemia  -     Lipid panel; Future  -chronic, will check lipid panel     Vitamin D deficiency  -     Vitamin D 25-Hydroxy,Total (for eval of Vitamin D levels); Future  -chronic, stable  -continue Ergocalciferol     Healthcare maintenance  -     Tsh With Reflex To Free T4 If Abnormal; Future      Patient received a  letter in the mail that her insurance does not want to cover Farxiga anymore and Jardiance is an alternative. Will do prior authorization for Farxiga.     Routine labs obtained in home- CBC, BMP, TSH, Vitamin D, A1c, lipid panel- pt tolerated well      Kathie Selby, MARTÍN-CNP

## 2024-03-02 LAB
25(OH)D3 SERPL-MCNC: 49 NG/ML (ref 30–100)
EST. AVERAGE GLUCOSE BLD GHB EST-MCNC: 186 MG/DL
HBA1C MFR BLD: 8.1 %

## 2024-03-04 DIAGNOSIS — E78.1 HYPERTRIGLYCERIDEMIA: ICD-10-CM

## 2024-03-04 DIAGNOSIS — I50.23 ACUTE ON CHRONIC SYSTOLIC HEART FAILURE (MULTI): Primary | ICD-10-CM

## 2024-03-04 DIAGNOSIS — E11.65 TYPE 2 DIABETES MELLITUS WITH HYPERGLYCEMIA, WITH LONG-TERM CURRENT USE OF INSULIN (MULTI): ICD-10-CM

## 2024-03-04 DIAGNOSIS — Z79.4 TYPE 2 DIABETES MELLITUS WITH HYPERGLYCEMIA, WITH LONG-TERM CURRENT USE OF INSULIN (MULTI): ICD-10-CM

## 2024-03-04 RX ORDER — INSULIN GLARGINE 100 [IU]/ML
25 INJECTION, SOLUTION SUBCUTANEOUS NIGHTLY
Qty: 15 ML | Refills: 1 | Status: SHIPPED | OUTPATIENT
Start: 2024-03-04 | End: 2024-05-08

## 2024-03-04 NOTE — PROGRESS NOTES
Insurance no longer covering Farxiga. Will switch to Jardiance. Additionally, will increase patients Lantus due to uncontrolled diabetes and start Fish oil for elevated triglycerides

## 2024-03-15 ENCOUNTER — TELEPHONE (OUTPATIENT)
Dept: PRIMARY CARE | Facility: CLINIC | Age: 74
End: 2024-03-15
Payer: MEDICARE

## 2024-03-15 NOTE — TELEPHONE ENCOUNTER
Matt taylor pt has received jury duty summons. He is asking if a letter can be written to excuse her for this.

## 2024-03-20 NOTE — TELEPHONE ENCOUNTER
Patient  calling requesting Jury Duty letter be emailed to patient at [email or web address].  Letter emailed as requested.

## 2024-03-21 ENCOUNTER — ANTICOAGULATION - WARFARIN VISIT (OUTPATIENT)
Dept: CARDIOLOGY | Facility: CLINIC | Age: 74
End: 2024-03-21
Payer: MEDICARE

## 2024-03-21 DIAGNOSIS — Z79.01 LONG TERM (CURRENT) USE OF ANTICOAGULANTS: ICD-10-CM

## 2024-03-21 DIAGNOSIS — I48.91 ATRIAL FIBRILLATION, UNSPECIFIED TYPE (MULTI): ICD-10-CM

## 2024-03-21 LAB
POC INR: 3
POC PROTHROMBIN TIME: NORMAL

## 2024-03-21 PROCEDURE — 85610 PROTHROMBIN TIME: CPT | Mod: QW

## 2024-03-21 PROCEDURE — 99211 OFF/OP EST MAY X REQ PHY/QHP: CPT | Performed by: INTERNAL MEDICINE

## 2024-03-21 NOTE — PROGRESS NOTES
Patient identification verified with 2 identifiers.    Location: [facility] - [address] [place] [address] [phone]     Referring Physician: Broderick  Enrollment/ Re-enrollment date: 06/2024   INR Goal: 2.0-3.0  INR monitoring is per [facility] protocol.  Anticoagulation Medication: warfarin  Indication: Atrial Fibrillation/Atrial Flutter    Subjective   Bleeding signs/symptoms: No    Bruising: No   Major bleeding event: No  Thrombosis signs/symptoms: No  Thromboembolic event: No  Missed doses: No  Extra doses: No  Medication changes: No  Dietary changes: No  Change in health: No  Change in activity: No  Alcohol: No  Other concerns: No    Upcoming Surgeries:  Does the Patient Have any upcoming surgeries that require interruption in anticoagulation therapy? no  Does the patient require bridging? no      Anticoagulation Summary  As of 3/21/2024      INR goal:  2.0-3.0   TTR:  75.8 % (5.8 mo)   INR used for dosing:  3.00 (3/21/2024)   Weekly warfarin total:  14 mg             Assessment/Plan   Therapeutic     1. New dose: no change    2. Next INR: 1 month      Education provided to patient during the visit:  Patient instructed to call in interim with questions, concerns and changes.   Patient educated on interactions between medications and warfarin.   Patient educated on dietary consistency in vitamin k consumption.   Patient educated on affects of alcohol consumption while taking warfarin.   Patient educated on signs of bleeding/clotting.   Patient educated on compliance with dosing, follow up appointments, and prescribed plan of care.

## 2024-03-28 ENCOUNTER — DOCUMENTATION (OUTPATIENT)
Dept: PRIMARY CARE | Facility: CLINIC | Age: 74
End: 2024-03-28
Payer: MEDICARE

## 2024-03-28 NOTE — PROGRESS NOTES
House Calls CM follow up in home visit. 3/27/24.  Sitting in chair upon arrival. Spouse present. Reported weight as 203.4 pounds today. Was 205 pounds on 3/1/24.  and 226. Stated she has been using Humalog but has not been taking Lantus. Believes Lantus was making her feel hungry. Encouraged to take medications and insulin as prescribed. Multiple insulin pens noted in refrigerator. Dates checked. Has not had rectal bleeding recently. Remains active with Anticoagulation Clinic. Will follow up in 2 months.

## 2024-04-18 ENCOUNTER — ANTICOAGULATION - WARFARIN VISIT (OUTPATIENT)
Dept: CARDIOLOGY | Facility: CLINIC | Age: 74
End: 2024-04-18
Payer: MEDICARE

## 2024-04-18 DIAGNOSIS — I48.91 ATRIAL FIBRILLATION, UNSPECIFIED TYPE (MULTI): ICD-10-CM

## 2024-04-18 DIAGNOSIS — Z79.01 LONG TERM (CURRENT) USE OF ANTICOAGULANTS: ICD-10-CM

## 2024-04-18 LAB
POC INR: 2.1
POC PROTHROMBIN TIME: NORMAL

## 2024-04-18 PROCEDURE — 99211 OFF/OP EST MAY X REQ PHY/QHP: CPT | Performed by: INTERNAL MEDICINE

## 2024-04-18 PROCEDURE — 85610 PROTHROMBIN TIME: CPT | Mod: QW

## 2024-04-18 NOTE — PROGRESS NOTES
Patient identification verified with 2 identifiers.    Location: [facility] - [address] [place] [address] [phone]     Referring Physician: Broderick  Enrollment/ Re-enrollment date: 2024   INR Goal: 2.0-3.0  INR monitoring is per [facility] protocol.  Anticoagulation Medication: warfarin  Indication: Atrial Fibrillation/Atrial Flutter    Subjective   Bleeding signs/symptoms: No    Bruising: No   Major bleeding event: No  Thrombosis signs/symptoms: No  Thromboembolic event: No  Missed doses: No  Extra doses: No  Medication changes: No  Dietary changes: No  Change in health: No  Change in activity: No  Alcohol: No  Other concerns: No    Upcoming Surgeries:  Does the Patient Have any upcoming surgeries that require interruption in anticoagulation therapy? no  Does the patient require bridging? no      Anticoagulation Summary  As of 2024      INR goal:  2.0-3.0   TTR:  79.1% (6.7 mo)   INR used for dosin.10 (2024)   Weekly warfarin total:  14 mg             Assessment/Plan   Therapeutic     1. New dose: no change    2. Next INR: 1 month      Education provided to patient during the visit:  Patient instructed to call in interim with questions, concerns and changes.   Patient educated on interactions between medications and warfarin.   Patient educated on dietary consistency in vitamin k consumption.   Patient educated on affects of alcohol consumption while taking warfarin.   Patient educated on signs of bleeding/clotting.   Patient educated on compliance with dosing, follow up appointments, and prescribed plan of care.

## 2024-04-30 ENCOUNTER — TELEPHONE (OUTPATIENT)
Dept: PRIMARY CARE | Facility: CLINIC | Age: 74
End: 2024-04-30
Payer: MEDICARE

## 2024-05-01 ENCOUNTER — OFFICE VISIT (OUTPATIENT)
Dept: PRIMARY CARE | Facility: CLINIC | Age: 74
End: 2024-05-01
Payer: MEDICARE

## 2024-05-01 VITALS
HEART RATE: 70 BPM | HEIGHT: 66 IN | WEIGHT: 201 LBS | DIASTOLIC BLOOD PRESSURE: 64 MMHG | BODY MASS INDEX: 32.3 KG/M2 | SYSTOLIC BLOOD PRESSURE: 112 MMHG | TEMPERATURE: 97.7 F | RESPIRATION RATE: 18 BRPM | OXYGEN SATURATION: 95 %

## 2024-05-01 DIAGNOSIS — I48.91 ATRIAL FIBRILLATION, UNSPECIFIED TYPE (MULTI): ICD-10-CM

## 2024-05-01 DIAGNOSIS — E78.1 HIGH TRIGLYCERIDES: ICD-10-CM

## 2024-05-01 DIAGNOSIS — Z79.4 TYPE 2 DIABETES MELLITUS WITH DIABETIC NEPHROPATHY, WITH LONG-TERM CURRENT USE OF INSULIN (MULTI): ICD-10-CM

## 2024-05-01 DIAGNOSIS — K21.9 GASTROESOPHAGEAL REFLUX DISEASE WITHOUT ESOPHAGITIS: Primary | ICD-10-CM

## 2024-05-01 DIAGNOSIS — I50.23 ACUTE ON CHRONIC SYSTOLIC HEART FAILURE (MULTI): ICD-10-CM

## 2024-05-01 DIAGNOSIS — I10 BENIGN ESSENTIAL HYPERTENSION: ICD-10-CM

## 2024-05-01 DIAGNOSIS — E11.21 TYPE 2 DIABETES MELLITUS WITH DIABETIC NEPHROPATHY, WITH LONG-TERM CURRENT USE OF INSULIN (MULTI): ICD-10-CM

## 2024-05-01 PROCEDURE — 1160F RVW MEDS BY RX/DR IN RCRD: CPT | Performed by: NURSE PRACTITIONER

## 2024-05-01 PROCEDURE — 99349 HOME/RES VST EST MOD MDM 40: CPT | Performed by: NURSE PRACTITIONER

## 2024-05-01 PROCEDURE — 4010F ACE/ARB THERAPY RXD/TAKEN: CPT | Performed by: NURSE PRACTITIONER

## 2024-05-01 PROCEDURE — 3052F HG A1C>EQUAL 8.0%<EQUAL 9.0%: CPT | Performed by: NURSE PRACTITIONER

## 2024-05-01 PROCEDURE — 3078F DIAST BP <80 MM HG: CPT | Performed by: NURSE PRACTITIONER

## 2024-05-01 PROCEDURE — 3048F LDL-C <100 MG/DL: CPT | Performed by: NURSE PRACTITIONER

## 2024-05-01 PROCEDURE — 1159F MED LIST DOCD IN RCRD: CPT | Performed by: NURSE PRACTITIONER

## 2024-05-01 PROCEDURE — 1126F AMNT PAIN NOTED NONE PRSNT: CPT | Performed by: NURSE PRACTITIONER

## 2024-05-01 PROCEDURE — 1036F TOBACCO NON-USER: CPT | Performed by: NURSE PRACTITIONER

## 2024-05-01 PROCEDURE — 3074F SYST BP LT 130 MM HG: CPT | Performed by: NURSE PRACTITIONER

## 2024-05-01 RX ORDER — PANTOPRAZOLE SODIUM 40 MG/1
40 TABLET, DELAYED RELEASE ORAL DAILY
Qty: 30 TABLET | Refills: 3 | Status: SHIPPED | OUTPATIENT
Start: 2024-05-01

## 2024-05-01 ASSESSMENT — PAIN SCALES - GENERAL: PAINLEVEL: 0-NO PAIN

## 2024-05-01 NOTE — PROGRESS NOTES
Subjective   Patient ID: Jing Sanchez is a 73 y.o. female who presents for Follow-up (Routine 2 month follow up).    Visit for 72 y/o female seen today in private home, accompanied by her spouse Matt for routine follow up. Patient is sitting on chair in living room. She is alert, answers very simple questions regarding her health. Her spouse will assist with HPI as needed. Patient lives with her spouse Matt. She does not drive or have a vehicle and reports it to be difficult to get out for medical appointments. Patient receives all medications in pill packs. She is able to manage her own medications. She admits to non compliance with her insulin. She performs all ADLs without issue. She is active with [employer]. Receives meals on wheels M-F. She denies appetite changes, signs of weight loss, abdominal pain, nausea, vomiting. She does endorse indigestion, several times per week. She will take Tums as needed with improvement. She does not currently see GI.     DM- patient is monitoring glucose levels 3x daily. Her last A1c was 8.1. She has been trying to reduce her portion sizes and carb intake. BG values ranging from 130-170. Pt admits that she has not been taking her Lantus in the evenings. She is only taking her Humalog on occasion, mostly in the mornings. She continues to report numbness and tingling of bilateral feet. She denies any dizziness, lightheadedness, s/sx of hypoglycemia.     Afib/CHF/elevated triglycerides- patient remains active with [facility] and cardiologist Dr. Villafana. Her last INR was 2.1 She denies any increased bruising or bleeding concerns. Denies chest pain, palpitations, shortness of breath, cough, wheezing or increased edema. She is monitoring her weight daily. She had lipid panel in March with elevated triglycerides. She was started on Fish oil and is tolerating supplement well.     Home Visit:          Medically necessary due to: Illness or condition that results in activity lmitation or  "restriction that impacts the ability to leave home such as:, shortness of breath with minimal exertion, unsteady gait/poor balance.         Current Outpatient Medications:     blood sugar diagnostic (Accu-Chek Guide test strips) strip, 1 strip 3 times a day., Disp: 100 strip, Rfl: 11    blood-glucose meter (Accu-Chek Guide Glucose Meter) misc, Test TID, Disp: 1 each, Rfl: 0    carvedilol (Coreg) 3.125 mg tablet, 1 TAB BY MOUTH TWICE A DAY WITH OR AFTER MEALS HOLD FOR HR <55 BPM AND/OR SBP <90, Disp: 56 tablet, Rfl: 10    empagliflozin (Jardiance) 10 mg, Take 1 tablet (10 mg) by mouth once daily., Disp: 30 tablet, Rfl: 11    ergocalciferol (Vitamin D-2) 1.25 MG (62191 UT) capsule, 1 CAP BY MOUTH WEEKLY ON SUNDAY, Disp: 4 capsule, Rfl: 10    fish oil (Omega-3) 60- mg capsule, Take 2 capsules (1,000 mg) by mouth once daily., Disp: 60 capsule, Rfl: 11    furosemide (Lasix) 40 mg tablet, 1 TAB BY MOUTH ONCE A DAY, Disp: 28 tablet, Rfl: 10    insulin glargine (Lantus Solostar U-100 Insulin) 100 unit/mL (3 mL) pen, Inject 25 Units under the skin once daily at bedtime. Take as directed per insulin instructions., Disp: 15 mL, Rfl: 1    insulin lispro (HumaLOG) 100 unit/mL injection, INJECT SUBQ PER SLIDING SCALE WITH MEALS (MAX DAILY DOSE OF 80 UNITS), Disp: 30 mL, Rfl: 10    lancets misc, Accucheck lancets, Disp: 100 each, Rfl: 11    losartan (Cozaar) 25 mg tablet, 1 TAB BY MOUTH ONCE A DAY, Disp: 28 tablet, Rfl: 10    nystatin (Mycostatin) 100,000 unit/gram powder, apply to breasts Topically tid / as needed, Disp: , Rfl:     pen needle, diabetic (BD Ultra-Fine Short Pen Needle) 31 gauge x 5/16\" needle, Use and discard 4 pen needles per day subcutaneously Dx: E11.65 for 90 days, Disp: , Rfl:     spironolactone (Aldactone) 25 mg tablet, ONE-*HALF* TAB (12.5MG) BY MOUTH ONCE A DAY, Disp: 14 tablet, Rfl: 10    warfarin (Coumadin) 2 mg tablet, TAKE 1 TABLET(2 MG) BY MOUTH EVERY DAY IN THE EVENING AS DIRECTED BY " "PRESCRIBER, Disp: 30 tablet, Rfl: 3     Review of Systems  Constitutional: Negative for appetite change, chills and fever.   HENT: Negative for hearing loss and trouble swallowing.    Respiratory:  Negative for cough, shortness of breath and wheezing.    Cardiovascular:  Negative for chest pain, palpitations and leg swelling.   Gastrointestinal: Positive for indigestion, constipation. Negative for abdominal pain, constipation, nausea and vomiting.   Endocrine: Positive for diabetes   Genitourinary: Positive for occasional urinary incontinence. Negative for difficulty urinating.   Musculoskeletal:  Negative for arthralgias.   Neurological: Positive for numbness and tingling to BLE. Negative for dizziness and light-headedness.  Psychiatric/Behavioral:  The patient is not nervous/anxious.      Objective   /64 (BP Location: Right arm, Patient Position: Sitting, BP Cuff Size: Adult)   Pulse 70   Temp 36.5 °C (97.7 °F) (Temporal)   Resp 18   Ht 1.676 m (5' 6\")   Wt 90.3 kg (199 lb)   SpO2 95%   BMI 32.12 kg/m²     Physical Exam  Constitutional:       General: She is not in acute distress.     Appearance: Normal appearance.      Comments: Alert, seen in chair, legs dependent   HENT:      Head: Normocephalic and atraumatic.      Comments: poor dentition, missing teeth     Nose: Nose normal.      Mouth/Throat:      Mouth: Mucous membranes are moist.      Pharynx: Oropharynx is clear.   Eyes:      Pupils: Pupils are equal, round, and reactive to light.      Comments: wearing glasses   Cardiovascular:      Rate and Rhythm: Rhythm irregular, rate normal       Heart sounds: No murmur heard.     No friction rub. No gallop.   Pulmonary:      Effort: Pulmonary effort is normal. No respiratory distress.      Breath sounds: Normal breath sounds. No wheezing, rhonchi or rales.   Abdominal:      General: Bowel sounds are normal. There is no distension.      Palpations: Abdomen is soft.      Tenderness: There is no " "abdominal tenderness.   Musculoskeletal:         General: Normal range of motion.      Cervical back: Neck supple.      Right lower leg: No edema.      Left lower leg: No edema.   Skin:     General: Skin is warm and dry.   Neurological:      General: No focal deficit present.      Mental Status: She is alert and oriented to person, place, and time.   Psychiatric:         Mood and Affect: Mood normal.         Behavior: Behavior normal.     Lab Results   Component Value Date    WBC 7.2 03/01/2024    HGB 13.6 03/01/2024    HCT 40.6 03/01/2024     (H) 03/01/2024     03/01/2024        Chemistry    Lab Results   Component Value Date/Time     (L) 03/01/2024 1341    K 4.4 03/01/2024 1341     03/01/2024 1341    CO2 22 03/01/2024 1341    BUN 44 (H) 03/01/2024 1341    CREATININE 1.77 (H) 03/01/2024 1341    Lab Results   Component Value Date/Time    CALCIUM 9.1 03/01/2024 1341    ALKPHOS 96 02/16/2024 1508    AST 19 02/16/2024 1508    ALT 22 02/16/2024 1508    BILITOT 0.5 02/16/2024 1508         Lab Results   Component Value Date    HGBA1C 8.1 (H) 03/01/2024      Lab Results   Component Value Date    CHOL 174 03/01/2024    CHOL 109 (L) 02/21/2023    CHOL 184 01/23/2020     Lab Results   Component Value Date    HDL 31.7 03/01/2024    HDL 27 (L) 02/21/2023    HDL 30 (L) 01/23/2020     Lab Results   Component Value Date    LDLCALC 72 03/01/2024    LDLCALC 58 (L) 02/21/2023    LDLCALC 105 01/23/2020     Lab Results   Component Value Date    TRIG 350 (H) 03/01/2024    TRIG 120 02/21/2023    TRIG 247 (H) 01/23/2020     No components found for: \"CHOLHDL\"     Lab Results   Component Value Date    TSH 1.59 03/01/2024        Assessment/Plan   Diagnoses and all orders for this visit:  Gastroesophageal reflux disease without esophagitis  -     pantoprazole (ProtoNix) 40 mg EC tablet; Take 1 tablet (40 mg) by mouth once daily. Do not crush, chew, or split.  -     Referral to Gastroenterology; Future  -indigestion " increasing in frequency. Discussed importance of GI referral. Phone number for central scheduling given to patient    Acute on chronic systolic heart failure (Multi)  -     Disability Placard  -chronic, stable, euvolemic on exam  -continue Furosemide, Spironolactone   -continue monitoring weight daily     Benign essential hypertension  -chronic, vitals stable  -continue Carvedilol, Losartan.   -continue to follow with cardiology routinely     Atrial fibrillation, unspecified type (Multi)  -chronic, stable  -continue warfarin. Monitor for s/sx of increased bruising/bleeding   -Follow with Coumadin Clinic as scheduled     High triglycerides  -chronic  -continue Fish oil supplement     Type 2 diabetes mellitus with diabetic nephropathy, with long-term current use of insulin (Multi)  -chronic, continues to be non compliant with insulin regimen  -discussed importance of compliance  -discussed low carb diet   -continue MARTÍN Bryant-CNP

## 2024-05-07 DIAGNOSIS — E11.65 TYPE 2 DIABETES MELLITUS WITH HYPERGLYCEMIA, WITH LONG-TERM CURRENT USE OF INSULIN (MULTI): ICD-10-CM

## 2024-05-07 DIAGNOSIS — Z79.4 TYPE 2 DIABETES MELLITUS WITH HYPERGLYCEMIA, WITH LONG-TERM CURRENT USE OF INSULIN (MULTI): ICD-10-CM

## 2024-05-08 RX ORDER — INSULIN GLARGINE 100 [IU]/ML
INJECTION, SOLUTION SUBCUTANEOUS
Qty: 9 ML | Refills: 11 | Status: SHIPPED | OUTPATIENT
Start: 2024-05-08

## 2024-05-16 ENCOUNTER — ANTICOAGULATION - WARFARIN VISIT (OUTPATIENT)
Dept: CARDIOLOGY | Facility: CLINIC | Age: 74
End: 2024-05-16
Payer: MEDICARE

## 2024-05-16 DIAGNOSIS — I48.91 ATRIAL FIBRILLATION, UNSPECIFIED TYPE (MULTI): ICD-10-CM

## 2024-05-16 DIAGNOSIS — Z79.01 LONG TERM (CURRENT) USE OF ANTICOAGULANTS: ICD-10-CM

## 2024-05-16 LAB
POC INR: 2.7
POC PROTHROMBIN TIME: NORMAL

## 2024-05-16 PROCEDURE — 85610 PROTHROMBIN TIME: CPT | Mod: QW

## 2024-05-16 PROCEDURE — 99211 OFF/OP EST MAY X REQ PHY/QHP: CPT | Performed by: INTERNAL MEDICINE

## 2024-05-16 NOTE — PROGRESS NOTES
Patient identification verified with 2 identifiers.    Location: [facility] - [address] [place] [address] [phone]     Referring Physician: Broderick  Enrollment/ Re-enrollment date: 05/22/25  INR Goal: 2.0-3.0  INR monitoring is per [facility] protocol.  Anticoagulation Medication: warfarin  Indication: Atrial Fibrillation/Atrial Flutter    Subjective   Bleeding signs/symptoms: No    Bruising: No   Major bleeding event: No  Thrombosis signs/symptoms: No  Thromboembolic event: No  Missed doses: No  Extra doses: No  Medication changes: No  Dietary changes: No  Change in health: No  Change in activity: No  Alcohol: No  Other concerns: No    Upcoming Surgeries:  Does the Patient Have any upcoming surgeries that require interruption in anticoagulation therapy? no  Does the patient require bridging? no      Anticoagulation Summary  As of 5/16/2024      INR goal:  2.0-3.0   TTR:  79.1% (6.7 mo)   INR used for dosing:               Assessment/Plan   Therapeutic     1. New dose: no change    2. Next INR: 1 month      Education provided to patient during the visit:  Patient instructed to call in interim with questions, concerns and changes.   Patient educated on interactions between medications and warfarin.   Patient educated on dietary consistency in vitamin k consumption.   Patient educated on affects of alcohol consumption while taking warfarin.   Patient educated on signs of bleeding/clotting.   Patient educated on compliance with dosing, follow up appointments, and prescribed plan of care.

## 2024-05-22 PROBLEM — I48.91 ATRIAL FIBRILLATION, UNSPECIFIED TYPE (MULTI): Status: ACTIVE | Noted: 2024-05-22

## 2024-06-13 ENCOUNTER — ANTICOAGULATION - WARFARIN VISIT (OUTPATIENT)
Dept: CARDIOLOGY | Facility: CLINIC | Age: 74
End: 2024-06-13
Payer: MEDICARE

## 2024-06-13 DIAGNOSIS — I48.91 ATRIAL FIBRILLATION, UNSPECIFIED TYPE (MULTI): ICD-10-CM

## 2024-06-13 DIAGNOSIS — Z79.01 LONG TERM (CURRENT) USE OF ANTICOAGULANTS: ICD-10-CM

## 2024-06-13 LAB
POC INR: 3.3
POC PROTHROMBIN TIME: NORMAL

## 2024-06-13 PROCEDURE — 99211 OFF/OP EST MAY X REQ PHY/QHP: CPT | Performed by: INTERNAL MEDICINE

## 2024-06-13 PROCEDURE — 85610 PROTHROMBIN TIME: CPT | Mod: QW

## 2024-06-13 NOTE — PROGRESS NOTES
Patient identification verified with 2 identifiers.    Location: [facility] - [address] [place] [address] [phone]     Referring Physician: Broderick  Enrollment/ Re-enrollment date: 05/22/25  INR Goal: 2.0-3.0  INR monitoring is per [facility] protocol.  Anticoagulation Medication: warfarin  Indication: Atrial Fibrillation/Atrial Flutter    Subjective   Bleeding signs/symptoms: No    Bruising: No   Major bleeding event: No  Thrombosis signs/symptoms: No  Thromboembolic event: No  Missed doses: No  Extra doses: No  Medication changes: No  Dietary changes: No  Change in health: No  Change in activity: No  Alcohol: No  Other concerns: No    Upcoming Surgeries:  Does the Patient Have any upcoming surgeries that require interruption in anticoagulation therapy? no  Does the patient require bridging? no      Anticoagulation Summary  As of 6/13/2024      INR goal:  2.0-3.0   TTR:  78.2% (8.6 mo)   INR used for dosing:  3.30 (6/13/2024)   Weekly warfarin total:  14 mg               Assessment/Plan   SupraTherapeutic     1. New dose: decrease   2. Next INR: 1 week Patient unable to return for 4 weeks due to her  is in hospital and has no transportation       Education provided to patient during the visit:  Patient instructed to call in interim with questions, concerns and changes.   Patient educated on interactions between medications and warfarin.   Patient educated on dietary consistency in vitamin k consumption.   Patient educated on affects of alcohol consumption while taking warfarin.   Patient educated on signs of bleeding/clotting.   Patient educated on compliance with dosing, follow up appointments, and prescribed plan of care.

## 2024-06-19 ENCOUNTER — DOCUMENTATION (OUTPATIENT)
Dept: PRIMARY CARE | Facility: CLINIC | Age: 74
End: 2024-06-19
Payer: MEDICARE

## 2024-06-19 NOTE — PROGRESS NOTES
House Calls CM follow up in home visit. Sitting up in chair upon arrival. States she is doing okay but very concerned about spouse currently in ICU. Reports she has been able to keep up with the household since he has been gone, except for bill paying. Has a friend that has provided transportation for her to visit at the hospital. Stated she has been taking medications and insulin consistently. Prefilled pill packs observed with 3 days remaining. Spoke with [facility]. Next shipment is due to arrive today. Weight stable at 199.6. Fasting  this morning. Continues to receive Meals on Wheels and has other food available. Next appointment at Anticoagulation Clinic is 7/8. Stated she will ask friend to take her. Offered assistance with using [facility] if needed. Will follow up with phone call next week.

## 2024-06-25 DIAGNOSIS — I48.20 CHRONIC ATRIAL FIBRILLATION (MULTI): ICD-10-CM

## 2024-06-25 RX ORDER — WARFARIN 2 MG/1
TABLET ORAL
Qty: 30 TABLET | Refills: 3 | Status: SHIPPED | OUTPATIENT
Start: 2024-06-25

## 2024-07-02 ENCOUNTER — TELEPHONE (OUTPATIENT)
Dept: PRIMARY CARE | Facility: CLINIC | Age: 74
End: 2024-07-02
Payer: MEDICARE

## 2024-07-03 ENCOUNTER — OFFICE VISIT (OUTPATIENT)
Dept: PRIMARY CARE | Facility: CLINIC | Age: 74
End: 2024-07-03
Payer: MEDICARE

## 2024-07-03 VITALS
SYSTOLIC BLOOD PRESSURE: 124 MMHG | HEART RATE: 62 BPM | OXYGEN SATURATION: 97 % | WEIGHT: 197.6 LBS | RESPIRATION RATE: 18 BRPM | TEMPERATURE: 97.1 F | DIASTOLIC BLOOD PRESSURE: 70 MMHG | BODY MASS INDEX: 31.76 KG/M2 | HEIGHT: 66 IN

## 2024-07-03 DIAGNOSIS — I48.20 CHRONIC ATRIAL FIBRILLATION (MULTI): ICD-10-CM

## 2024-07-03 DIAGNOSIS — Z79.4 TYPE 2 DIABETES MELLITUS WITH HYPERGLYCEMIA, WITH LONG-TERM CURRENT USE OF INSULIN (MULTI): Primary | ICD-10-CM

## 2024-07-03 DIAGNOSIS — I10 BENIGN ESSENTIAL HYPERTENSION: ICD-10-CM

## 2024-07-03 DIAGNOSIS — K21.9 GASTROESOPHAGEAL REFLUX DISEASE WITHOUT ESOPHAGITIS: ICD-10-CM

## 2024-07-03 DIAGNOSIS — Z91.148 NONADHERENCE TO MEDICATION: ICD-10-CM

## 2024-07-03 DIAGNOSIS — E11.65 TYPE 2 DIABETES MELLITUS WITH HYPERGLYCEMIA, WITH LONG-TERM CURRENT USE OF INSULIN (MULTI): Primary | ICD-10-CM

## 2024-07-03 DIAGNOSIS — I50.9 CHRONIC CONGESTIVE HEART FAILURE, UNSPECIFIED HEART FAILURE TYPE (MULTI): ICD-10-CM

## 2024-07-03 ASSESSMENT — PAIN SCALES - GENERAL: PAINLEVEL: 0-NO PAIN

## 2024-07-03 NOTE — PROGRESS NOTES
Subjective   Patient ID: Jing Sanchez is a 73 y.o. female who presents for Follow-up (2 month routine follow up).    Visit for 74 y/o female seen today in private home, alone for routine follow up of chronic medical conditions. Pt is sitting on chair in living room watching TV. She lives with her spouse Matt in a single family home. Pt is tearful today. She has been home alone for the past month as her spouse is currently hospitalized. This has been very difficult for patient as she does not drive anymore and has limited mobility making it difficult to get out for medical appointments or to see her spouse in the hospital. She had a friend come to the house this morning to take her to see Matt but he was unable to have visitors while at Dialysis so she is planning to go tomorrow instead. Pt feels she is managing well at home. Her friend has been getting groceries. She is trying to keep up with the household tasks. Pts friend Kei has been helping to manage the bills as patient has difficulty with finances. She receives all of her medications in pill packs. She does admit that she will occasionally forget her pills and is not always consistent with her insulin. Pt is active with [facility]. She receives meals on wheels M-F. She is able to prepare simple meals for herself, mostly using the microwave. Pt denies appetite changes, abdominal pain, nausea, vomiting. Denies any current bowel or bladder concerns. She has history of GERD. Was started on Pantoprazole 2 months ago and reports that all GI symptoms have improved. She is ambulatory. Denies recent fall or injury.      DM- patient is monitoring glucose levels 3x daily. FBS was 160 this morning. Pt does forget to take her insulin at times. She is eating a lot of quick meals, high carb options, mostly because she has difficulty standing for long periods to cook. Pt denies any dizziness, lightheadedness, s/sx of hypoglycemia.      Afib/CHF/elevated triglycerides- patient  remains [facility] with [facility] and cardiologist Dr. Villafana. Her last INR was 3.3 on 6/13. Her next appointment with [facility] Clinic is 7/8 and she is planning to have a friend take her. She denies any increased bruising or bleeding concerns. Denies chest pain, palpitations, shortness of breath, cough, wheezing or increased edema. She is monitoring her weight daily. She reports that she has lost a few lbs as she is doing more around the house. Next appt with Dr. Villafana is 8/7.      Home Visit:          Medically necessary due to: Illness or condition that results in activity lmitation or restriction that impacts the ability to leave home such as:, shortness of breath with minimal exertion, unsteady gait/poor balance.         Current Outpatient Medications:     blood sugar diagnostic (Accu-Chek Guide test strips) strip, 1 strip 3 times a day., Disp: 100 strip, Rfl: 11    blood-glucose meter (Accu-Chek Guide Glucose Meter) misc, Test TID, Disp: 1 each, Rfl: 0    carvedilol (Coreg) 3.125 mg tablet, 1 TAB BY MOUTH TWICE A DAY WITH OR AFTER MEALS HOLD FOR HR <55 BPM AND/OR SBP <90, Disp: 56 tablet, Rfl: 10    empagliflozin (Jardiance) 10 mg, Take 1 tablet (10 mg) by mouth once daily., Disp: 30 tablet, Rfl: 11    ergocalciferol (Vitamin D-2) 1.25 MG (88263 UT) capsule, 1 CAP BY MOUTH WEEKLY ON SUNDAY, Disp: 4 capsule, Rfl: 10    fish oil (Omega-3) 60- mg capsule, Take 2 capsules (1,000 mg) by mouth once daily., Disp: 60 capsule, Rfl: 11    furosemide (Lasix) 40 mg tablet, 1 TAB BY MOUTH ONCE A DAY, Disp: 28 tablet, Rfl: 10    insulin lispro (HumaLOG) 100 unit/mL injection, INJECT SUBQ PER SLIDING SCALE WITH MEALS (MAX DAILY DOSE OF 80 UNITS), Disp: 30 mL, Rfl: 10    lancets misc, Accucheck lancets, Disp: 100 each, Rfl: 11    Lantus Solostar U-100 Insulin 100 unit/mL (3 mL) pen, 25 UNITS SUBQ DAILY AT BEDTIME - TAKE AS DIRECTED PER INSULIN INSTRUCTIONS, Disp: 9 mL, Rfl: 11    losartan (Cozaar) 25 mg tablet, 1 TAB  "BY MOUTH ONCE A DAY, Disp: 28 tablet, Rfl: 10    nystatin (Mycostatin) 100,000 unit/gram powder, apply to breasts Topically tid / as needed, Disp: , Rfl:     pantoprazole (ProtoNix) 40 mg EC tablet, Take 1 tablet (40 mg) by mouth once daily. Do not crush, chew, or split., Disp: 30 tablet, Rfl: 3    pen needle, diabetic (BD Ultra-Fine Short Pen Needle) 31 gauge x 5/16\" needle, Use and discard 4 pen needles per day subcutaneously Dx: E11.65 for 90 days, Disp: , Rfl:     spironolactone (Aldactone) 25 mg tablet, ONE-*HALF* TAB (12.5MG) BY MOUTH ONCE A DAY, Disp: 14 tablet, Rfl: 10    warfarin (Coumadin) 2 mg tablet, TAKE 1 TABLET(2 MG) BY MOUTH EVERY DAY IN THE EVENING AS DIRECTED BY PRESCRIBER, Disp: 30 tablet, Rfl: 3     Review of Systems  Constitutional: Negative for appetite change, chills and fever.   HENT: Negative for hearing loss and trouble swallowing.    Respiratory:  Negative for cough, shortness of breath and wheezing.    Cardiovascular:  Negative for chest pain, palpitations and leg swelling.   Gastrointestinal: Negative for abdominal pain, nausea and vomiting, constipation, diarrhea  Endocrine: Positive for diabetes   Genitourinary: Positive for occasional urinary incontinence. Negative for difficulty urinating.   Musculoskeletal:  Negative for arthralgias.   Neurological: Positive for numbness and tingling to BLE. Negative for dizziness and light-headedness.  Psychiatric/Behavioral:  The patient is not nervous/anxious.      Objective   /70 (BP Location: Left arm, Patient Position: Sitting, BP Cuff Size: Adult)   Pulse 62   Temp 36.2 °C (97.1 °F) (Temporal)   Resp 18   Ht 1.676 m (5' 6\")   Wt 89.6 kg (197 lb 9.6 oz)   SpO2 97%   BMI 31.89 kg/m²     Physical Exam  Constitutional:       General: She is not in acute distress.     Appearance: Normal appearance.      Comments: Alert, sitting in chair watching TV  HENT:      Head: Normocephalic and atraumatic.      Comments: poor dentition, missing " teeth     Nose: Nose normal.      Mouth/Throat:      Mouth: Mucous membranes are moist.      Pharynx: Oropharynx is clear.   Eyes:      Pupils: Pupils are equal, round, and reactive to light.      Comments: wearing glasses   Cardiovascular:      Rate and Rhythm: Rhythm irregular, rate normal       Heart sounds: No murmur heard.     No friction rub. No gallop.   Pulmonary:      Effort: Pulmonary effort is normal. No respiratory distress.      Breath sounds: Normal breath sounds. No wheezing, rhonchi or rales.   Abdominal:      General: Bowel sounds are normal. There is no distension.      Palpations: Abdomen is soft.      Tenderness: There is no abdominal tenderness.   Musculoskeletal:         General: Normal range of motion.      Cervical back: Neck supple.      Right lower leg: No edema.      Left lower leg: No edema.   Skin:     General: Skin is warm and dry.   Neurological:      General: No focal deficit present.      Mental Status: She is alert and oriented to person, place, and time.   Psychiatric:         Mood and Affect: Mood normal.         Behavior: Behavior normal.     Assessment/Plan   Diagnoses and all orders for this visit:  Type 2 diabetes mellitus with hyperglycemia, with long-term current use of insulin (Multi)  -chronic, managed with Lantus, Humalog, Jardiance  -encouraged healthy diet habits  -continue to monitor glucose levels     Gastroesophageal reflux disease without esophagitis  -chronic, much improved since starting pantoprazole     Benign essential hypertension  -chronic, vitals stable  -continue losartan, carvedilol  -follow up with cardiology next month as scheduled    Chronic atrial fibrillation (Multi)  -chronic, HR irregular, rate controlled  -continue warfarin, carvedilol  -monitor INR through coumadin clinic as scheduled  -notify provider with any increased bruising or bleeding concerns     Chronic congestive heart failure, unspecified heart failure type (Multi)  -chronic, stable,  euvolemic on exam  -continue spironolactone   -monitor daily weight     Nonadherence to medication  -discussed importance of taking all medications as prescribed     Patient stable. Will continue house calls NP program as patient has limited mobility. Will follow up in 2 months and update routine labs at that time. Advised pt to contact house calls office with any acute concerns or medication needs.        Kathie Selby, APRN-CNP

## 2024-07-08 ENCOUNTER — APPOINTMENT (OUTPATIENT)
Dept: CARDIOLOGY | Facility: CLINIC | Age: 74
End: 2024-07-08
Payer: MEDICARE

## 2024-07-12 ENCOUNTER — ANTICOAGULATION - WARFARIN VISIT (OUTPATIENT)
Dept: CARDIOLOGY | Facility: CLINIC | Age: 74
End: 2024-07-12
Payer: MEDICARE

## 2024-07-12 DIAGNOSIS — Z79.01 LONG TERM (CURRENT) USE OF ANTICOAGULANTS: ICD-10-CM

## 2024-07-12 DIAGNOSIS — I48.91 ATRIAL FIBRILLATION, UNSPECIFIED TYPE (MULTI): ICD-10-CM

## 2024-07-12 DIAGNOSIS — K21.9 GASTROESOPHAGEAL REFLUX DISEASE WITHOUT ESOPHAGITIS: ICD-10-CM

## 2024-07-12 RX ORDER — PANTOPRAZOLE SODIUM 40 MG/1
TABLET, DELAYED RELEASE ORAL
Qty: 30 TABLET | Refills: 11 | Status: SHIPPED | OUTPATIENT
Start: 2024-07-12

## 2024-07-16 ENCOUNTER — ANTICOAGULATION - WARFARIN VISIT (OUTPATIENT)
Dept: CARDIOLOGY | Facility: CLINIC | Age: 74
End: 2024-07-16
Payer: MEDICARE

## 2024-07-16 DIAGNOSIS — Z79.01 LONG TERM (CURRENT) USE OF ANTICOAGULANTS: ICD-10-CM

## 2024-07-16 DIAGNOSIS — I48.91 ATRIAL FIBRILLATION, UNSPECIFIED TYPE (MULTI): ICD-10-CM

## 2024-07-16 LAB
POC INR: 2.7
POC PROTHROMBIN TIME: NORMAL

## 2024-07-16 PROCEDURE — 99211 OFF/OP EST MAY X REQ PHY/QHP: CPT | Performed by: INTERNAL MEDICINE

## 2024-07-16 PROCEDURE — 85610 PROTHROMBIN TIME: CPT | Mod: QW

## 2024-07-16 NOTE — PROGRESS NOTES
Patient identification verified with 2 identifiers.    Location: [facility][note 3] - [address] [place] [address] [phone]     Referring Physician: Broderick  Enrollment/ Re-enrollment date: 25  INR Goal: 2.0-3.0  INR monitoring is per [facility] protocol.  Anticoagulation Medication: warfarin  Indication: Atrial Fibrillation/Atrial Flutter    Subjective   Bleeding signs/symptoms: No    Bruising: No   Major bleeding event: No  Thrombosis signs/symptoms: No  Thromboembolic event: No  Missed doses: No  Extra doses: No  Medication changes: No  Dietary changes: No  Change in health: No  Change in activity: No  Alcohol: No  Other concerns: No    Upcoming Surgeries:  Does the Patient Have any upcoming surgeries that require interruption in anticoagulation therapy? no  Does the patient require bridging? no      Anticoagulation Summary  As of 2024      INR goal:  2.0-3.0   TTR:  75.0% (9.7 mo)   INR used for dosin.70 (2024)   Weekly warfarin total:  14 mg               Assessment/Plan   Therapeutic     1. New dose: no change    2. Next INR: 1 month      Education provided to patient during the visit:  Patient instructed to call in interim with questions, concerns and changes.   Patient educated on interactions between medications and warfarin.   Patient educated on dietary consistency in vitamin k consumption.   Patient educated on affects of alcohol consumption while taking warfarin.   Patient educated on signs of bleeding/clotting.   Patient educated on compliance with dosing, follow up appointments, and prescribed plan of care.

## 2024-08-07 ENCOUNTER — OFFICE VISIT (OUTPATIENT)
Dept: CARDIOLOGY | Facility: CLINIC | Age: 74
End: 2024-08-07
Payer: MEDICARE

## 2024-08-07 VITALS
DIASTOLIC BLOOD PRESSURE: 62 MMHG | OXYGEN SATURATION: 97 % | BODY MASS INDEX: 31.8 KG/M2 | WEIGHT: 197 LBS | HEART RATE: 92 BPM | SYSTOLIC BLOOD PRESSURE: 100 MMHG

## 2024-08-07 DIAGNOSIS — I10 BENIGN ESSENTIAL HYPERTENSION: ICD-10-CM

## 2024-08-07 DIAGNOSIS — I48.21 PERMANENT ATRIAL FIBRILLATION (MULTI): ICD-10-CM

## 2024-08-07 DIAGNOSIS — I25.10 ARTERIOSCLEROSIS OF CORONARY ARTERY: Primary | ICD-10-CM

## 2024-08-07 PROCEDURE — 3078F DIAST BP <80 MM HG: CPT | Performed by: INTERNAL MEDICINE

## 2024-08-07 PROCEDURE — 99213 OFFICE O/P EST LOW 20 MIN: CPT | Performed by: INTERNAL MEDICINE

## 2024-08-07 PROCEDURE — 1036F TOBACCO NON-USER: CPT | Performed by: INTERNAL MEDICINE

## 2024-08-07 PROCEDURE — 4010F ACE/ARB THERAPY RXD/TAKEN: CPT | Performed by: INTERNAL MEDICINE

## 2024-08-07 PROCEDURE — 1160F RVW MEDS BY RX/DR IN RCRD: CPT | Performed by: INTERNAL MEDICINE

## 2024-08-07 PROCEDURE — 1159F MED LIST DOCD IN RCRD: CPT | Performed by: INTERNAL MEDICINE

## 2024-08-07 PROCEDURE — 3074F SYST BP LT 130 MM HG: CPT | Performed by: INTERNAL MEDICINE

## 2024-08-07 PROCEDURE — 3052F HG A1C>EQUAL 8.0%<EQUAL 9.0%: CPT | Performed by: INTERNAL MEDICINE

## 2024-08-07 PROCEDURE — 1126F AMNT PAIN NOTED NONE PRSNT: CPT | Performed by: INTERNAL MEDICINE

## 2024-08-07 PROCEDURE — 3048F LDL-C <100 MG/DL: CPT | Performed by: INTERNAL MEDICINE

## 2024-08-07 ASSESSMENT — PAIN SCALES - GENERAL: PAINLEVEL: 0-NO PAIN

## 2024-08-07 NOTE — PROGRESS NOTES
Subjective      Chief Complaint   Patient presents with    Follow-up          She is known to have severe coronary artery disease and open heart surgery in 2016 with LIMA to LAD saphenous vein graft to the PDA saphenous vein graft to the marginal branch she has a history of having a myocardial infarction in 2013 that time had a stent in the LAD.  She was admitted in February 2020 for with rectal bleeding.  She does have a history of atrial fibrillation and congestive heart failure with ejection fraction 30 to 34% range she also has some pulmonary hypertension.  She is doing well and has not been in the hosp for sometime.  She is doing well.  She is not complaining of chest discomfort.  No complaints of PND or orthopnea.  The legs are not swelling on her.  NO palpitaions.  She has not felt the afib             ROS     Past Surgical History:   Procedure Laterality Date    CARDIAC CATHETERIZATION  09/04/2013    with RENETTA    CHOLECYSTECTOMY  03/11/2021    COLONOSCOPY  04/2015    hemorrhoid removed    CORONARY ARTERY BYPASS GRAFT  10/2016    LIMA-LAD, SVG-PDA, SVG-Diag, SVG-M1 and M@    MR HEAD ANGIO WO IV CONTRAST  04/06/2017    MR HEAD ANGIO WO IV CONTRAST [facility]    TUMOR REMOVAL      Benign rectal tumor removed        Active Ambulatory Problems     Diagnosis Date Noted    At risk for injury related to restraints 09/07/2023    Abnormal vaginal bleeding 09/07/2023    Asthenia 09/07/2023    Asymptomatic coronary heart disease 09/07/2023    Benign essential hypertension 09/07/2023    Blood glucose abnormal 04/06/2017    Cardiomyopathy (Multi) 09/07/2023    Chronic atrial fibrillation (Multi) 09/07/2023    Stage 3 chronic kidney disease (Multi) 03/11/2023    Chronic obstructive pulmonary disease (Multi) 09/07/2023    Acute on chronic systolic heart failure (Multi) 09/07/2023    Cor pulmonale (Multi) 09/07/2023    Diabetes mellitus (Multi) 09/07/2023    Type 2 diabetes mellitus with obesity (Multi) 03/11/2023     CHF (congestive heart failure) (Multi) 09/07/2023    Transient ischemic attack 09/07/2023    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) 10/09/2023    Unsteady gait 10/09/2023    Atrial fibrillation (Multi) 10/16/2023    Long term (current) use of anticoagulants 10/16/2023    Vitamin D deficiency 11/01/2023    ST elevation (STEMI) myocardial infarction involving left anterior descending coronary artery (Multi) 11/01/2023    Hypothyroidism 11/01/2023    Chronic pain 11/01/2023    Obstructive sleep apnea syndrome 03/11/2023    Nonadherence to medication 11/01/2023    Morbid obesity (Multi) 11/01/2023    Longstanding persistent atrial fibrillation (Multi) 11/01/2023    Leukocytosis 11/01/2023    Hypertensive heart and chronic kidney disease with heart failure and stage 1 through stage 4 chronic kidney disease, or unspecified chronic kidney disease (Multi) 11/01/2023    Hyperlipidemia 11/01/2023    Acute alteration in mental status 11/01/2023    History of cholecystectomy 11/01/2023    Ischemic cardiomyopathy 11/01/2023    Atherosclerosis of coronary artery bypass graft 11/01/2023    Arteriosclerosis of coronary artery 11/01/2023    Hirsutism 03/11/2023    Postmenopausal bleeding 03/11/2023    Anxiety 01/05/2024    Acute respiratory failure (Multi) 03/11/2023    Acute myocardial infarction of anterior wall (Multi) 11/01/2023    Acute pancreatitis ([facility]) 01/05/2024    Atrial fibrillation, unspecified type (Multi) 05/22/2024     Resolved Ambulatory Problems     Diagnosis Date Noted    No Resolved Ambulatory Problems     Past Medical History:   Diagnosis Date    A-fib (Multi)     Acute MI (Multi) 08/2016    ASHD (arteriosclerotic heart disease)     CAD (coronary artery disease)     DM (diabetes mellitus) (Multi)     Dyslipidemia     H/O echocardiogram 04/2017    H/O echocardiogram 09/2018    History of nuclear stress test 02/2015    History of nuclear stress test 04/2016    HTN (hypertension)      "Obesity     Obstructive sleep apnea     Personal history of other specified conditions     S/P cardiac catheterization 06/2016    SOB (shortness of breath)         Visit Vitals  /62   Pulse 92   Wt 89.4 kg (197 lb)   SpO2 97%   BMI 31.80 kg/m²   OB Status Perimenopausal   Smoking Status Never   BSA 2.04 m²        Objective     Constitutional:       Appearance: Healthy appearance.   Eyes:      Pupils: Pupils are equal, round, and reactive to light.   Neck:      Vascular: JVD normal.   Pulmonary:      Breath sounds: Normal breath sounds.   Cardiovascular:      PMI at left midclavicular line. Normal rate. Irregularly irregular rhythm. Normal S1. Normal S2.       Murmurs: There is no murmur.      No gallop.  No click. No rub.   Pulses:     Intact distal pulses.   Edema:     Peripheral edema absent.   Abdominal:      Palpations: Abdomen is soft.      Tenderness: There is no abdominal tenderness.   Musculoskeletal:      Extremities: No clubbing present.Skin:     General: Skin is warm and dry.   Neurological:      General: No focal deficit present.            Lab Review:         Lab Results   Component Value Date    CHOL 174 03/01/2024    CHOL 109 (L) 02/21/2023    CHOL 184 01/23/2020     Lab Results   Component Value Date    HDL 31.7 03/01/2024    HDL 27 (L) 02/21/2023    HDL 30 (L) 01/23/2020     Lab Results   Component Value Date    LDLCALC 72 03/01/2024    LDLCALC 58 (L) 02/21/2023    LDLCALC 105 01/23/2020     Lab Results   Component Value Date    TRIG 350 (H) 03/01/2024    TRIG 120 02/21/2023    TRIG 247 (H) 01/23/2020     No components found for: \"CHOLHDL\"     Assessment/Plan     Arteriosclerosis of coronary artery  She is doing well and no angina and no chf.  Is active    Atrial fibrillation (Multi)  The afib is stable and is on the coumadin.  The INR is checked at the coumadin clinic and was 2.7    Benign essential hypertension  Is controlled     "

## 2024-08-13 ENCOUNTER — ANTICOAGULATION - WARFARIN VISIT (OUTPATIENT)
Dept: CARDIOLOGY | Facility: CLINIC | Age: 74
End: 2024-08-13
Payer: MEDICARE

## 2024-08-13 DIAGNOSIS — I48.21 PERMANENT ATRIAL FIBRILLATION (MULTI): ICD-10-CM

## 2024-08-13 DIAGNOSIS — I48.91 ATRIAL FIBRILLATION, UNSPECIFIED TYPE (MULTI): ICD-10-CM

## 2024-08-13 DIAGNOSIS — Z79.01 LONG TERM (CURRENT) USE OF ANTICOAGULANTS: ICD-10-CM

## 2024-08-13 LAB
POC INR: 2.5
POC PROTHROMBIN TIME: NORMAL

## 2024-08-13 PROCEDURE — 85610 PROTHROMBIN TIME: CPT | Mod: QW

## 2024-08-13 PROCEDURE — 99211 OFF/OP EST MAY X REQ PHY/QHP: CPT | Performed by: INTERNAL MEDICINE

## 2024-08-13 NOTE — PROGRESS NOTES
Patient identification verified with 2 identifiers.    Location: [facility] - [address] [place] [address] [phone]     Referring Physician: Broderick  Enrollment/ Re-enrollment date: 25  INR Goal: 2.0-3.0  INR monitoring is per [facility] protocol.  Anticoagulation Medication: warfarin  Indication: Atrial Fibrillation/Atrial Flutter    Subjective   Bleeding signs/symptoms: No    Bruising: No   Major bleeding event: No  Thrombosis signs/symptoms: No  Thromboembolic event: No  Missed doses: No  Extra doses: No  Medication changes: No  Dietary changes: No  Change in health: No  Change in activity: No  Alcohol: No  Other concerns: No    Upcoming Surgeries:  Does the Patient Have any upcoming surgeries that require interruption in anticoagulation therapy? no  Does the patient require bridging? no      Anticoagulation Summary  As of 2024      INR goal:  2.0-3.0   TTR:  77.2% (10.6 mo)   INR used for dosin.50 (2024)   Weekly warfarin total:  14 mg                 Assessment/Plan   Therapeutic     1. New dose: no change    2. Next INR: 1 month      Education provided to patient during the visit:  Patient instructed to call in interim with questions, concerns and changes.   Patient educated on interactions between medications and warfarin.   Patient educated on dietary consistency in vitamin k consumption.   Patient educated on affects of alcohol consumption while taking warfarin.   Patient educated on signs of bleeding/clotting.   Patient educated on compliance with dosing, follow up appointments, and prescribed plan of care.

## 2024-08-21 ENCOUNTER — DOCUMENTATION (OUTPATIENT)
Dept: PRIMARY CARE | Facility: CLINIC | Age: 74
End: 2024-08-21
Payer: MEDICARE

## 2024-08-21 NOTE — PROGRESS NOTES
House Calls  follow up phone call 8/19. Stated she is taking medications and insulin as prescribed. Has had no weight gain. Attended anticoagulation clinic appointment as well as follow up with cardiology. Expressed concern regarding 's condition and extended stay at [facility]. Discussed reasons he requires more care than can be provided at home at this time. Will reach out to  for additional information regarding plan. Stated she received a visit from  from [facility]. Patient expressed concern about managing bills, etc. Informed  at [facility], Eri Ge, who will reach out to patient as well.  
This was a shared visit with the ZOE. I reviewed and verified the documentation and independently performed the documented:

## 2024-08-28 ENCOUNTER — DOCUMENTATION (OUTPATIENT)
Dept: PRIMARY CARE | Facility: CLINIC | Age: 74
End: 2024-08-28
Payer: MEDICARE

## 2024-08-28 NOTE — PROGRESS NOTES
House Calls CM follow up in home visit. Stated she is taking meds and insulin as prescribed. Up to date with visits to Anticoagulation Clinic. Weight 193. BG this am 179. Is managing ADL's and trying to keep up with household chores. States she is eating and drinking adequately.   Had visit from [facility] MARCUS and [facility] VELAZQUEZ yesterday. Situation with spouse in long term care and her home alone discussed. She is reluctant to leave her home. Reinforced spouse is unable to come home due to care needs. Spoke with MARCUS Ely with [facility]. Patient will be referred to volunteer  program. JFS will start Passport application.  Will follow up with phone call.

## 2024-09-04 ENCOUNTER — TELEPHONE (OUTPATIENT)
Dept: PRIMARY CARE | Facility: CLINIC | Age: 74
End: 2024-09-04
Payer: MEDICARE

## 2024-09-05 ENCOUNTER — OFFICE VISIT (OUTPATIENT)
Dept: PRIMARY CARE | Facility: CLINIC | Age: 74
End: 2024-09-05
Payer: MEDICARE

## 2024-09-05 ENCOUNTER — LAB (OUTPATIENT)
Dept: LAB | Facility: LAB | Age: 74
End: 2024-09-05
Payer: MEDICARE

## 2024-09-05 VITALS
RESPIRATION RATE: 18 BRPM | TEMPERATURE: 97.5 F | WEIGHT: 191 LBS | HEART RATE: 94 BPM | HEIGHT: 65 IN | BODY MASS INDEX: 31.82 KG/M2 | DIASTOLIC BLOOD PRESSURE: 70 MMHG | SYSTOLIC BLOOD PRESSURE: 118 MMHG | OXYGEN SATURATION: 99 %

## 2024-09-05 DIAGNOSIS — E66.9 TYPE 2 DIABETES MELLITUS WITH OBESITY (MULTI): Primary | ICD-10-CM

## 2024-09-05 DIAGNOSIS — I48.20 CHRONIC ATRIAL FIBRILLATION (MULTI): ICD-10-CM

## 2024-09-05 DIAGNOSIS — E11.69 TYPE 2 DIABETES MELLITUS WITH OBESITY (MULTI): Primary | ICD-10-CM

## 2024-09-05 DIAGNOSIS — I25.10 ARTERIOSCLEROSIS OF CORONARY ARTERY: ICD-10-CM

## 2024-09-05 DIAGNOSIS — I10 BENIGN ESSENTIAL HYPERTENSION: ICD-10-CM

## 2024-09-05 DIAGNOSIS — K21.9 GASTROESOPHAGEAL REFLUX DISEASE WITHOUT ESOPHAGITIS: ICD-10-CM

## 2024-09-05 DIAGNOSIS — I50.22 CHRONIC SYSTOLIC CONGESTIVE HEART FAILURE (MULTI): ICD-10-CM

## 2024-09-05 DIAGNOSIS — E66.9 TYPE 2 DIABETES MELLITUS WITH OBESITY (MULTI): ICD-10-CM

## 2024-09-05 DIAGNOSIS — E66.01 MORBID OBESITY (MULTI): ICD-10-CM

## 2024-09-05 DIAGNOSIS — E11.69 TYPE 2 DIABETES MELLITUS WITH OBESITY (MULTI): ICD-10-CM

## 2024-09-05 LAB
ANION GAP SERPL CALC-SCNC: 17 MMOL/L (ref 10–20)
BASOPHILS # BLD AUTO: 0.05 X10*3/UL (ref 0–0.1)
BASOPHILS NFR BLD AUTO: 0.8 %
BUN SERPL-MCNC: 33 MG/DL (ref 6–23)
CALCIUM SERPL-MCNC: 9.1 MG/DL (ref 8.6–10.3)
CHLORIDE SERPL-SCNC: 105 MMOL/L (ref 98–107)
CO2 SERPL-SCNC: 20 MMOL/L (ref 21–32)
CREAT SERPL-MCNC: 1.74 MG/DL (ref 0.5–1.05)
EGFRCR SERPLBLD CKD-EPI 2021: 31 ML/MIN/1.73M*2
EOSINOPHIL # BLD AUTO: 0.17 X10*3/UL (ref 0–0.4)
EOSINOPHIL NFR BLD AUTO: 2.7 %
ERYTHROCYTE [DISTWIDTH] IN BLOOD BY AUTOMATED COUNT: 13.4 % (ref 11.5–14.5)
GLUCOSE SERPL-MCNC: 148 MG/DL (ref 74–99)
HCT VFR BLD AUTO: 38.8 % (ref 36–46)
HGB BLD-MCNC: 13.1 G/DL (ref 12–16)
IMM GRANULOCYTES # BLD AUTO: 0.02 X10*3/UL (ref 0–0.5)
IMM GRANULOCYTES NFR BLD AUTO: 0.3 % (ref 0–0.9)
LYMPHOCYTES # BLD AUTO: 0.93 X10*3/UL (ref 0.8–3)
LYMPHOCYTES NFR BLD AUTO: 14.6 %
MCH RBC QN AUTO: 34 PG (ref 26–34)
MCHC RBC AUTO-ENTMCNC: 33.8 G/DL (ref 32–36)
MCV RBC AUTO: 101 FL (ref 80–100)
MONOCYTES # BLD AUTO: 0.7 X10*3/UL (ref 0.05–0.8)
MONOCYTES NFR BLD AUTO: 11 %
NEUTROPHILS # BLD AUTO: 4.5 X10*3/UL (ref 1.6–5.5)
NEUTROPHILS NFR BLD AUTO: 70.6 %
NRBC BLD-RTO: 0 /100 WBCS (ref 0–0)
PLATELET # BLD AUTO: 199 X10*3/UL (ref 150–450)
POTASSIUM SERPL-SCNC: 4.3 MMOL/L (ref 3.5–5.3)
RBC # BLD AUTO: 3.85 X10*6/UL (ref 4–5.2)
SODIUM SERPL-SCNC: 138 MMOL/L (ref 136–145)
WBC # BLD AUTO: 6.4 X10*3/UL (ref 4.4–11.3)

## 2024-09-05 PROCEDURE — 3008F BODY MASS INDEX DOCD: CPT | Performed by: NURSE PRACTITIONER

## 2024-09-05 PROCEDURE — 1160F RVW MEDS BY RX/DR IN RCRD: CPT | Performed by: NURSE PRACTITIONER

## 2024-09-05 PROCEDURE — 4010F ACE/ARB THERAPY RXD/TAKEN: CPT | Performed by: NURSE PRACTITIONER

## 2024-09-05 PROCEDURE — 1126F AMNT PAIN NOTED NONE PRSNT: CPT | Performed by: NURSE PRACTITIONER

## 2024-09-05 PROCEDURE — 3074F SYST BP LT 130 MM HG: CPT | Performed by: NURSE PRACTITIONER

## 2024-09-05 PROCEDURE — 3052F HG A1C>EQUAL 8.0%<EQUAL 9.0%: CPT | Performed by: NURSE PRACTITIONER

## 2024-09-05 PROCEDURE — 80048 BASIC METABOLIC PNL TOTAL CA: CPT

## 2024-09-05 PROCEDURE — 83036 HEMOGLOBIN GLYCOSYLATED A1C: CPT

## 2024-09-05 PROCEDURE — 1159F MED LIST DOCD IN RCRD: CPT | Performed by: NURSE PRACTITIONER

## 2024-09-05 PROCEDURE — 85025 COMPLETE CBC W/AUTO DIFF WBC: CPT

## 2024-09-05 PROCEDURE — 3078F DIAST BP <80 MM HG: CPT | Performed by: NURSE PRACTITIONER

## 2024-09-05 PROCEDURE — 3048F LDL-C <100 MG/DL: CPT | Performed by: NURSE PRACTITIONER

## 2024-09-05 PROCEDURE — 36415 COLL VENOUS BLD VENIPUNCTURE: CPT | Performed by: NURSE PRACTITIONER

## 2024-09-05 PROCEDURE — 36415 COLL VENOUS BLD VENIPUNCTURE: CPT

## 2024-09-05 PROCEDURE — 99349 HOME/RES VST EST MOD MDM 40: CPT | Performed by: NURSE PRACTITIONER

## 2024-09-05 ASSESSMENT — PAIN SCALES - GENERAL: PAINLEVEL: 0-NO PAIN

## 2024-09-05 NOTE — PROGRESS NOTES
"Subjective   Patient ID: Jing Sanchez is a 73 y.o. female who presents for Follow-up (Routine 2 month follow up).    Visit for 72 y/o female seen today in private home, alone for routine follow up. Pt is sitting on couch. She is alert, able to answer most questions regarding her health. Pt lives with her spouse Matt but has been home alone for the past 3 months as her spouse has been hospitalized/at SNF. Patients spouse Matt has a friend Kei who has been helping Minal in the home with finances, transportation to see Matt and to the grocery store. Patient is hoping her spouse will return home but is not certain as [facility]PS and [facility] are involved and are discussing patient go to an assisted living. Pt has a vehicle but does not have a drivers license as she reports declining vision so she does have difficulty getting out for medical appointments. Pt is trying to keep up with the household tasks. She has difficulty with the finances. Pt reports losing weight because she has been doing \"more\" around the house. Pt receives all of her medications in pill packs. She has been compliant with her medications but admits that she will miss her insulin doses on occasion. Pt receives meals on wheels M-F. She is able to prepare simple meals for herself, mostly using the microwave. Pt denies appetite changes, abdominal pain, nausea, vomiting. Denies any current bowel concerns. Pt does have intermittent urinary incontinence. She has history of GERD, well managed with Pantoprazole. Pt is ambulatory. She denies recent fall or injury.      DM- patient has a glucometer in home and has been monitoring her levels 2-3x per day. She does not always check fasting sugars. Her BG was 156 this morning. Pt admits that she forgets to take her insulin \"here and there\". Pt denies any dizziness, lightheadedness, s/sx of hypoglycemia.      Afib/CHF/elevated triglycerides- patient [facility] and cardiologist Dr. Villafana. She had " follow up with cardiology on 8/7/24. No new changes. Last INR was 2.5. Pt due to have INR checked on 9/10. Pt denies any increased bruising or bleeding concerns. Denies chest pain, palpitations, shortness of breath, cough, wheezing or increased edema. She is monitoring her weight daily.      Home Visit:          Medically necessary due to: Illness or condition that results in activity lmitation or restriction that impacts the ability to leave home such as:, shortness of breath with minimal exertion, unsteady gait/poor balance.         Current Outpatient Medications:     blood sugar diagnostic (Accu-Chek Guide test strips) strip, 1 strip 3 times a day., Disp: 100 strip, Rfl: 11    blood-glucose meter (Accu-Chek Guide Glucose Meter) misc, Test TID, Disp: 1 each, Rfl: 0    carvedilol (Coreg) 3.125 mg tablet, 1 TAB BY MOUTH TWICE A DAY WITH OR AFTER MEALS HOLD FOR HR <55 BPM AND/OR SBP <90, Disp: 56 tablet, Rfl: 10    empagliflozin (Jardiance) 10 mg, Take 1 tablet (10 mg) by mouth once daily., Disp: 30 tablet, Rfl: 11    ergocalciferol (Vitamin D-2) 1.25 MG (05324 UT) capsule, 1 CAP BY MOUTH WEEKLY ON SUNDAY, Disp: 4 capsule, Rfl: 10    fish oil (Omega-3) 60- mg capsule, Take 2 capsules (1,000 mg) by mouth once daily., Disp: 60 capsule, Rfl: 11    furosemide (Lasix) 40 mg tablet, 1 TAB BY MOUTH ONCE A DAY, Disp: 28 tablet, Rfl: 10    insulin lispro (HumaLOG) 100 unit/mL injection, INJECT SUBQ PER SLIDING SCALE WITH MEALS (MAX DAILY DOSE OF 80 UNITS), Disp: 30 mL, Rfl: 10    lancets misc, Accucheck lancets, Disp: 100 each, Rfl: 11    Lantus Solostar U-100 Insulin 100 unit/mL (3 mL) pen, 25 UNITS SUBQ DAILY AT BEDTIME - TAKE AS DIRECTED PER INSULIN INSTRUCTIONS (Patient not taking: Reported on 8/7/2024), Disp: 9 mL, Rfl: 11    losartan (Cozaar) 25 mg tablet, 1 TAB BY MOUTH ONCE A DAY, Disp: 28 tablet, Rfl: 10    nystatin (Mycostatin) 100,000 unit/gram powder, apply to breasts Topically tid / as needed, Disp: , Rfl:  "    pantoprazole (ProtoNix) 40 mg EC tablet, 1 TAB BY MOUTH ONCE A DAY; DO NOT CRUSH, CHEW, OR SPLIT, Disp: 30 tablet, Rfl: 11    pen needle, diabetic (BD Ultra-Fine Short Pen Needle) 31 gauge x 5/16\" needle, Use and discard 4 pen needles per day subcutaneously Dx: E11.65 for 90 days, Disp: , Rfl:     spironolactone (Aldactone) 25 mg tablet, ONE-*HALF* TAB (12.5MG) BY MOUTH ONCE A DAY, Disp: 14 tablet, Rfl: 10    warfarin (Coumadin) 2 mg tablet, TAKE 1 TABLET(2 MG) BY MOUTH EVERY DAY IN THE EVENING AS DIRECTED BY PRESCRIBER, Disp: 30 tablet, Rfl: 3     Review of Systems  Constitutional: Negative for appetite change, chills and fever.   HENT: Negative for hearing loss and trouble swallowing.    Respiratory:  Negative for cough, shortness of breath and wheezing.    Cardiovascular:  Negative for chest pain, palpitations and leg swelling.   Gastrointestinal: Negative for abdominal pain, nausea and vomiting, constipation, diarrhea  Endocrine: Positive for diabetes   Genitourinary: Positive for urinary incontinence. Negative for difficulty urinating.   Musculoskeletal:  Negative for arthralgias.   Neurological: Positive for numbness and tingling to BLE. Negative for dizziness and light-headedness.  Psychiatric/Behavioral:  The patient is not nervous/anxious.      Objective   /70 (BP Location: Right arm, Patient Position: Sitting, BP Cuff Size: Adult)   Pulse 94   Temp 36.4 °C (97.5 °F) (Temporal)   Resp 18   Ht 1.651 m (5' 5\")   Wt 86.6 kg (191 lb)   SpO2 99%   BMI 31.78 kg/m²     Physical Exam  Constitutional:       General: She is not in acute distress.     Appearance: Normal appearance.      Comments: Alert.   HENT:      Head: Normocephalic and atraumatic.      Comments: poor dentition, missing teeth     Nose: Nose normal.      Mouth/Throat:      Mouth: Mucous membranes are moist.      Pharynx: Oropharynx is clear.   Eyes:      Pupils: Pupils are equal, round, and reactive to light.      Comments: wearing " glasses   Cardiovascular:      Rate and Rhythm: Rhythm irregular, rate normal       Heart sounds: No murmur heard.     No friction rub. No gallop.   Pulmonary:      Effort: Pulmonary effort is normal. No respiratory distress.      Breath sounds: Normal breath sounds. No wheezing, rhonchi or rales.   Abdominal:      General: Bowel sounds are normal. There is no distension.      Palpations: Abdomen is soft.      Tenderness: There is no abdominal tenderness.   Musculoskeletal:         General: Normal range of motion.      Cervical back: Neck supple.      Right lower leg: No edema.      Left lower leg: No edema.   Skin:     General: Skin is warm and dry.   Neurological:      General: No focal deficit present.      Mental Status: She is alert and oriented to person, place, and time.   Psychiatric:         Mood and Affect: Mood normal.         Behavior: Behavior normal.      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)  -     Hemoglobin A1c; Future  -chronic, managed with Lantus, Jardiance, Humalog   -encouraged compliance of insulin/medications    Morbid obesity (Multi)  -chronic, losing weight   -encouraged healthy diet habits, increased physical activity     Chronic systolic congestive heart failure (Multi)  -     CBC and Auto Differential  -     Basic metabolic panel; Future  -chronic, stable, euvolemic on exam  -continue Furosemide, Spironolactone   -recommend monitoring daily weight   -continue to follow with cardiology    Benign essential hypertension  -chronic, vitals stable  -continue Losartan, Carvedilol     Arteriosclerosis of coronary artery  -chronic, stable, no reports of angina   -continue fish oil     Chronic atrial fibrillation (Multi)  -chronic, managed with Warfarin   -continue to follow with Coumadin clinic  -monitor for increased s/sx of bruising/bleeding     Gastroesophageal reflux disease without esophagitis  -chronic, stable, no current  GI concerns   -continue Pantoprazole     Routine labs obtained in home- CBC, BMP, A1c level- pt tolerated well        MARTÍN Tan-CNP

## 2024-09-06 LAB
EST. AVERAGE GLUCOSE BLD GHB EST-MCNC: 189 MG/DL
HBA1C MFR BLD: 8.2 %

## 2024-09-10 ENCOUNTER — ANTICOAGULATION - WARFARIN VISIT (OUTPATIENT)
Dept: CARDIOLOGY | Facility: CLINIC | Age: 74
End: 2024-09-10
Payer: MEDICARE

## 2024-09-10 DIAGNOSIS — I48.21 PERMANENT ATRIAL FIBRILLATION (MULTI): ICD-10-CM

## 2024-09-10 DIAGNOSIS — Z79.01 LONG TERM (CURRENT) USE OF ANTICOAGULANTS: ICD-10-CM

## 2024-09-10 DIAGNOSIS — I48.91 ATRIAL FIBRILLATION, UNSPECIFIED TYPE (MULTI): ICD-10-CM

## 2024-09-10 LAB
POC INR: 2.2
POC PROTHROMBIN TIME: NORMAL

## 2024-09-10 PROCEDURE — 85610 PROTHROMBIN TIME: CPT | Mod: QW

## 2024-09-10 NOTE — PROGRESS NOTES
Patient identification verified with 2 identifiers.    Location: [facility] - [address] [place] [address] [phone]     Referring Physician: Broderick  Enrollment/ Re-enrollment date: 25  INR Goal: 2.0-3.0  INR monitoring is per [facility] protocol.  Anticoagulation Medication: warfarin  Indication: Atrial Fibrillation/Atrial Flutter    Subjective   Bleeding signs/symptoms: No    Bruising: No   Major bleeding event: No  Thrombosis signs/symptoms: No  Thromboembolic event: No  Missed doses: No  Extra doses: No  Medication changes: No  Dietary changes: No  Change in health: No  Change in activity: No  Alcohol: No  Other concerns: No    Upcoming Surgeries:  Does the Patient Have any upcoming surgeries that require interruption in anticoagulation therapy? no  Does the patient require bridging? no      Anticoagulation Summary  As of 9/10/2024      INR goal:  2.0-3.0   TTR:  79.1% (11.6 mo)   INR used for dosin.20 (9/10/2024)   Weekly warfarin total:  14 mg             Assessment/Plan   Therapeutic     1. New dose: no change    2. Next INR: 1 month      Education provided to patient during the visit:  Patient instructed to call in interim with questions, concerns and changes.   Patient educated on interactions between medications and warfarin.   Patient educated on dietary consistency in vitamin k consumption.   Patient educated on affects of alcohol consumption while taking warfarin.   Patient educated on signs of bleeding/clotting.   Patient educated on compliance with dosing, follow up appointments, and prescribed plan of care.

## 2024-09-13 ENCOUNTER — TELEPHONE (OUTPATIENT)
Dept: PRIMARY CARE | Facility: CLINIC | Age: 74
End: 2024-09-13
Payer: MEDICARE

## 2024-09-13 PROCEDURE — RXMED WILLOW AMBULATORY MEDICATION CHARGE

## 2024-09-13 NOTE — TELEPHONE ENCOUNTER
Spoke with patient. Stated she had recent visit to anticoagulation clinic. Next appointment is 10/10 and will need help to set up transportation. She is taking her medications and insulin most days.  She had recent visit from Kathie from [facility] and believes a [employer] referral has been placed. Spouse remains in LTC but she is hopeful he will be able to return home. Will follow up for assistance with transportation.

## 2024-09-21 DIAGNOSIS — I10 PRIMARY HYPERTENSION: ICD-10-CM

## 2024-09-23 ENCOUNTER — PHARMACY VISIT (OUTPATIENT)
Dept: PHARMACY | Facility: CLINIC | Age: 74
End: 2024-09-23
Payer: COMMERCIAL

## 2024-09-23 RX ORDER — CARVEDILOL 3.12 MG/1
TABLET ORAL
Qty: 60 TABLET | Refills: 11 | Status: SHIPPED | OUTPATIENT
Start: 2024-09-23

## 2024-09-27 DIAGNOSIS — I48.20 CHRONIC ATRIAL FIBRILLATION (MULTI): ICD-10-CM

## 2024-09-27 RX ORDER — WARFARIN 2 MG/1
TABLET ORAL
Qty: 30 TABLET | Refills: 11 | Status: SHIPPED | OUTPATIENT
Start: 2024-09-27

## 2024-10-01 ENCOUNTER — DOCUMENTATION (OUTPATIENT)
Dept: PRIMARY CARE | Facility: CLINIC | Age: 74
End: 2024-10-01
Payer: MEDICARE

## 2024-10-01 NOTE — PROGRESS NOTES
House Calls CM follow up in home visit. Currently living alone as spouse remains in long term care facility. Stated he is making some progress and hopes he can return home. Friend that has been helping her with finances and transportation will be leaving this month for an extended period of time. Assisted patient set up and add funds to account with [employer]. Ride scheduled for 10/10 for next Anticoagulation Clinic appointment. Stated she is waiting to hear from [employer] regarding assistance in the home. Active with [employer] and receives meals on wheels and will be having volunteer . Reported taking medications and insulin as prescribed.  Weight today 190 pounds. Fasting . Will follow up in one month.

## 2024-10-10 ENCOUNTER — ANTICOAGULATION - WARFARIN VISIT (OUTPATIENT)
Dept: CARDIOLOGY | Facility: CLINIC | Age: 74
End: 2024-10-10
Payer: MEDICARE

## 2024-10-10 DIAGNOSIS — I48.91 ATRIAL FIBRILLATION, UNSPECIFIED TYPE (MULTI): ICD-10-CM

## 2024-10-10 DIAGNOSIS — I48.21 PERMANENT ATRIAL FIBRILLATION (MULTI): ICD-10-CM

## 2024-10-10 DIAGNOSIS — Z79.01 LONG TERM (CURRENT) USE OF ANTICOAGULANTS: ICD-10-CM

## 2024-10-10 LAB
POC INR: 2.8
POC PROTHROMBIN TIME: NORMAL

## 2024-10-10 PROCEDURE — 85610 PROTHROMBIN TIME: CPT | Mod: QW

## 2024-10-10 PROCEDURE — 99211 OFF/OP EST MAY X REQ PHY/QHP: CPT | Performed by: INTERNAL MEDICINE

## 2024-10-10 NOTE — PROGRESS NOTES
Patient identification verified with 2 identifiers.    Location: [facility] - [address] [place] [address] [phone]     Referring Physician: Broderick  Enrollment/ Re-enrollment date: 05/22/25  INR Goal: 2.0-3.0  INR monitoring is per [facility] protocol.  Anticoagulation Medication: warfarin  Indication: Atrial Fibrillation/Atrial Flutter    Subjective   Bleeding signs/symptoms: No    Bruising: No   Major bleeding event: No  Thrombosis signs/symptoms: No  Thromboembolic event: No  Missed doses: No  Extra doses: No  Medication changes: No  Dietary changes: No  Change in health: No  Change in activity: No  Alcohol: No  Other concerns: No    Upcoming Surgeries:  Does the Patient Have any upcoming surgeries that require interruption in anticoagulation therapy? no  Does the patient require bridging? no      Anticoagulation Summary  As of 10/10/2024      INR goal:  2.0-3.0   TTR:  79.1% (11.6 mo)   INR used for dosing:  --             Assessment/Plan   Therapeutic     1. New dose: no change    2. Next INR: 1 month      Education provided to patient during the visit:  Patient instructed to call in interim with questions, concerns and changes.   Patient educated on interactions between medications and warfarin.   Patient educated on dietary consistency in vitamin k consumption.   Patient educated on affects of alcohol consumption while taking warfarin.   Patient educated on signs of bleeding/clotting.   Patient educated on compliance with dosing, follow up appointments, and prescribed plan of care.

## 2024-10-28 ENCOUNTER — TELEPHONE (OUTPATIENT)
Dept: PRIMARY CARE | Facility: CLINIC | Age: 74
End: 2024-10-28
Payer: MEDICARE

## 2024-10-28 PROCEDURE — RXMED WILLOW AMBULATORY MEDICATION CHARGE

## 2024-10-30 ENCOUNTER — PHARMACY VISIT (OUTPATIENT)
Dept: PHARMACY | Facility: CLINIC | Age: 74
End: 2024-10-30
Payer: COMMERCIAL

## 2024-11-01 ENCOUNTER — DOCUMENTATION (OUTPATIENT)
Dept: PRIMARY CARE | Facility: CLINIC | Age: 74
End: 2024-11-01
Payer: MEDICARE

## 2024-11-04 ENCOUNTER — TELEPHONE (OUTPATIENT)
Dept: PRIMARY CARE | Facility: CLINIC | Age: 74
End: 2024-11-04
Payer: MEDICARE

## 2024-11-05 ENCOUNTER — OFFICE VISIT (OUTPATIENT)
Dept: PRIMARY CARE | Facility: CLINIC | Age: 74
End: 2024-11-05
Payer: MEDICARE

## 2024-11-05 VITALS
DIASTOLIC BLOOD PRESSURE: 64 MMHG | HEART RATE: 92 BPM | WEIGHT: 189 LBS | OXYGEN SATURATION: 97 % | HEIGHT: 65 IN | TEMPERATURE: 96.9 F | RESPIRATION RATE: 18 BRPM | SYSTOLIC BLOOD PRESSURE: 118 MMHG | BODY MASS INDEX: 31.49 KG/M2

## 2024-11-05 DIAGNOSIS — Z91.148 NONADHERENCE TO MEDICATION: ICD-10-CM

## 2024-11-05 DIAGNOSIS — E11.21 TYPE 2 DIABETES MELLITUS WITH DIABETIC NEPHROPATHY, WITH LONG-TERM CURRENT USE OF INSULIN: Primary | ICD-10-CM

## 2024-11-05 DIAGNOSIS — N18.32 STAGE 3B CHRONIC KIDNEY DISEASE (MULTI): ICD-10-CM

## 2024-11-05 DIAGNOSIS — I50.22 CHRONIC SYSTOLIC CONGESTIVE HEART FAILURE: ICD-10-CM

## 2024-11-05 DIAGNOSIS — I10 BENIGN ESSENTIAL HYPERTENSION: ICD-10-CM

## 2024-11-05 DIAGNOSIS — Z79.4 TYPE 2 DIABETES MELLITUS WITH DIABETIC NEPHROPATHY, WITH LONG-TERM CURRENT USE OF INSULIN: Primary | ICD-10-CM

## 2024-11-05 DIAGNOSIS — I48.21 PERMANENT ATRIAL FIBRILLATION (MULTI): ICD-10-CM

## 2024-11-05 DIAGNOSIS — R26.81 UNSTEADY GAIT: ICD-10-CM

## 2024-11-05 PROCEDURE — 3048F LDL-C <100 MG/DL: CPT | Performed by: NURSE PRACTITIONER

## 2024-11-05 PROCEDURE — 1160F RVW MEDS BY RX/DR IN RCRD: CPT | Performed by: NURSE PRACTITIONER

## 2024-11-05 PROCEDURE — 3074F SYST BP LT 130 MM HG: CPT | Performed by: NURSE PRACTITIONER

## 2024-11-05 PROCEDURE — 3078F DIAST BP <80 MM HG: CPT | Performed by: NURSE PRACTITIONER

## 2024-11-05 PROCEDURE — 1126F AMNT PAIN NOTED NONE PRSNT: CPT | Performed by: NURSE PRACTITIONER

## 2024-11-05 PROCEDURE — 99349 HOME/RES VST EST MOD MDM 40: CPT | Performed by: NURSE PRACTITIONER

## 2024-11-05 PROCEDURE — 1159F MED LIST DOCD IN RCRD: CPT | Performed by: NURSE PRACTITIONER

## 2024-11-05 PROCEDURE — 3008F BODY MASS INDEX DOCD: CPT | Performed by: NURSE PRACTITIONER

## 2024-11-05 PROCEDURE — 4010F ACE/ARB THERAPY RXD/TAKEN: CPT | Performed by: NURSE PRACTITIONER

## 2024-11-05 PROCEDURE — 3052F HG A1C>EQUAL 8.0%<EQUAL 9.0%: CPT | Performed by: NURSE PRACTITIONER

## 2024-11-05 RX ORDER — BLOOD SUGAR DIAGNOSTIC
1 STRIP MISCELLANEOUS 3 TIMES DAILY
Qty: 100 STRIP | Refills: 11 | Status: SHIPPED | OUTPATIENT
Start: 2024-11-05

## 2024-11-05 ASSESSMENT — PAIN SCALES - GENERAL: PAINLEVEL_OUTOF10: 0-NO PAIN

## 2024-11-05 NOTE — PROGRESS NOTES
"Subjective   Patient ID: Jing Sanchez is a 74 y.o. female who presents for Follow-up (Routine 2 month follow up).    Visit for 73 y/o female seen today in private home, alone for routine follow up of chronic medical conditions. Pt was sleeping on couch upon provider arrival. She awoke without difficulty and is able to answer most questions regarding her health. Pt lives in a single family home with her spouse Matt but has been alone for the past 4 months as her spouse was hospitalized and continues to be at [facility] due to current health status. Pt has unrealistic expectations that her spouse will be coming home and she will continuously talk about how she wants him home because she misses him but patient can not realistically care for him as she has difficulty caring for herself. Pt does not drive and has difficulty getting out for medical appointments. Patients spouse Matt has a friend Kei who has been helping Minal in the home but he is currently in [place] and will not be back for several months. Pt is established with [facility]. She receives meals on wheels and has a volunteer  through the [facility]. Pt reports that she is trying to keep up with the household tasks but her home is very unkept with piles of paperwork, boxes, and blankets scattered throughout home and pt has poor insight and judgement regarding her living situation and spouses health. JOEYS did speak with patient regarding potentially going to an Assisted Living but patient declined as her spouse told her \"don't go anywhere, we worked hard for our house\". Pt receives all of her medications in pill packs through [facility]. She continues to admit to non compliance with her Lantus at night but does report that she is taking her SSC during the day. Pt reports that she is trying to cut back on her carbs. She has reduced her sandwich intake. Pt is able to prepare simple meals for herself, mostly using the microwave. Pt denies appetite changes, " "abdominal pain, nausea, vomiting. Denies any current bowel concerns. Pt does have intermittent urinary incontinence. She remains ambulatory without assistive device. Gait unsteady. Pt denies recent fall or injury.     DM- patient has a glucometer in home and has been monitoring her levels 2-3x per day. FBS was 180 this morning. Pt is not taking her Lantus at night. She states \"my sugars go higher when I take it\". Lowest BG level was 130. Pt denies dizziness, lightheadedness, s/sx of hypoglycemia.      Afib/CHF/elevated triglycerides- patient remains [facility] with [facility] and cardiologist Dr. Villafana. Last INR was 2.8. Pt denies any increased bruising or bleeding concerns. Denies chest pain, palpitations, shortness of breath, cough or wheezing. She is monitoring her weight daily and denies any weight gain or worsening edema.       Home Visit:          Medically necessary due to: Illness or condition that results in activity lmitation or restriction that impacts the ability to leave home such as:, shortness of breath with minimal exertion, unsteady gait/poor balance         Current Outpatient Medications:     blood sugar diagnostic (Accu-Chek Guide test strips) strip, 1 strip 3 times a day., Disp: 100 strip, Rfl: 11    blood-glucose meter (Accu-Chek Guide Glucose Meter) misc, Test TID, Disp: 1 each, Rfl: 0    carvedilol (Coreg) 3.125 mg tablet, 1 TAB BY MOUTH TWICE A DAY WITH OR AFTER MEALS HOLD FOR HR <55 BPM AND/OR SBP <90, Disp: 60 tablet, Rfl: 11    empagliflozin (Jardiance) 10 mg, Take 1 tablet (10 mg) by mouth once daily., Disp: 30 tablet, Rfl: 11    ergocalciferol (Vitamin D-2) 1.25 MG (80172 UT) capsule, 1 CAP BY MOUTH WEEKLY ON SUNDAY, Disp: 4 capsule, Rfl: 10    fish oil (Omega-3) 60- mg capsule, Take 2 capsules (1,000 mg) by mouth once daily., Disp: 60 capsule, Rfl: 11    furosemide (Lasix) 40 mg tablet, 1 TAB BY MOUTH ONCE A DAY, Disp: 28 tablet, Rfl: 10    insulin lispro (HumaLOG) 100 unit/mL " "injection, INJECT SUBQ PER SLIDING SCALE WITH MEALS (MAX DAILY DOSE OF 80 UNITS), Disp: 30 mL, Rfl: 10    lancets misc, Accucheck lancets, Disp: 100 each, Rfl: 11    Lantus Solostar U-100 Insulin 100 unit/mL (3 mL) pen, 25 UNITS SUBQ DAILY AT BEDTIME - TAKE AS DIRECTED PER INSULIN INSTRUCTIONS (Patient not taking: Reported on 11/5/2024), Disp: 9 mL, Rfl: 11    losartan (Cozaar) 25 mg tablet, 1 TAB BY MOUTH ONCE A DAY, Disp: 28 tablet, Rfl: 10    nystatin (Mycostatin) 100,000 unit/gram powder, apply to breasts Topically tid / as needed, Disp: , Rfl:     pantoprazole (ProtoNix) 40 mg EC tablet, 1 TAB BY MOUTH ONCE A DAY; DO NOT CRUSH, CHEW, OR SPLIT, Disp: 30 tablet, Rfl: 11    pen needle, diabetic (BD Ultra-Fine Short Pen Needle) 31 gauge x 5/16\" needle, Use and discard 4 pen needles per day subcutaneously Dx: E11.65 for 90 days, Disp: , Rfl:     spironolactone (Aldactone) 25 mg tablet, ONE-*HALF* TAB (12.5MG) BY MOUTH ONCE A DAY, Disp: 14 tablet, Rfl: 10    warfarin (Coumadin) 2 mg tablet, TAKE 1 TABLET (2 MG) BY MOUTH EVERY DAY IN THE EVENING AS DIRECTED BY PRESCRIBER, Disp: 30 tablet, Rfl: 11     Review of Systems  Constitutional: Negative for appetite change, chills and fever, unexplained weight loss.   HENT: Negative for hearing loss and trouble swallowing.    Respiratory: Negative for cough, shortness of breath and wheezing.    Cardiovascular: Negative for chest pain, palpitations and leg swelling.   Gastrointestinal: Negative for abdominal pain, nausea and vomiting, constipation, diarrhea  Endocrine: Positive for diabetes, uncontrolled   Genitourinary: Positive for urinary incontinence. Negative for difficulty urinating.   Musculoskeletal:  Negative for arthralgias.   Neurological: Positive for numbness and tingling to BLE. Negative for dizziness and light-headedness.  Psychiatric/Behavioral:  The patient is not nervous/anxious.      Objective   /64 (BP Location: Left arm, Patient Position: Sitting, BP " "Cuff Size: Adult)   Pulse 92   Temp 36.1 °C (96.9 °F) (Temporal)   Resp 18   Ht 1.651 m (5' 5\")   Wt 85.7 kg (189 lb)   SpO2 97%   BMI 31.45 kg/m²     Physical Exam  Constitutional:       General: She is not in acute distress.     Appearance: Normal appearance.      Comments: Alert.   HENT:      Head: Normocephalic and atraumatic.      Comments: poor dentition, missing teeth     Nose: Nose normal.      Mouth/Throat:      Mouth: Mucous membranes are moist.      Pharynx: Oropharynx is clear.   Eyes:      Pupils: Pupils are equal, round, and reactive to light.      Comments: wearing glasses   Cardiovascular:      Rate and Rhythm: Rhythm irregular, rate normal       Heart sounds: No murmur heard.     No friction rub. No gallop.   Pulmonary:      Effort: Pulmonary effort is normal. No respiratory distress.      Breath sounds: Normal breath sounds. No wheezing, rhonchi or rales.   Abdominal:      General: Bowel sounds are normal. There is no distension.      Palpations: Abdomen is soft.      Tenderness: There is no abdominal tenderness.   Musculoskeletal:         General: Normal range of motion.      Cervical back: Neck supple.      Right lower leg: No edema.      Left lower leg: No edema.   Skin:     General: Skin is warm and dry.   Neurological:      General: No focal deficit present.      Mental Status: She is alert and oriented to person, place, and time.   Psychiatric:         Mood and Affect: Mood normal.         Behavior: Behavior normal.      Poor insight and judgement regarding health/home environment     Lab Results   Component Value Date    WBC 6.4 09/05/2024    HGB 13.1 09/05/2024    HCT 38.8 09/05/2024     (H) 09/05/2024     09/05/2024       Chemistry    Lab Results   Component Value Date/Time     09/05/2024 1500    K 4.3 09/05/2024 1500     09/05/2024 1500    CO2 20 (L) 09/05/2024 1500    BUN 33 (H) 09/05/2024 1500    CREATININE 1.74 (H) 09/05/2024 1500    Lab Results "   Component Value Date/Time    CALCIUM 9.1 09/05/2024 1500    ALKPHOS 96 02/16/2024 1508    AST 19 02/16/2024 1508    ALT 22 02/16/2024 1508    BILITOT 0.5 02/16/2024 1508        Lab Results   Component Value Date    HGBA1C 8.2 (H) 09/05/2024      Assessment/Plan   Diagnoses and all orders for this visit:  Type 2 diabetes mellitus with diabetic nephropathy, with long-term current use of insulin  -     blood sugar diagnostic (Accu-Chek Guide test strips) strip; 1 strip 3 times a day.  -chronic, last A1c worsened, likely due to continued poor diet habits and non compliance with insulin   -encouraged compliance of all medications including Lantus, Humalog with SSC during day and Jardiance   -limit carb/sugar intake  -increase physical activity     Benign essential hypertension  -chronic, vitals stable  -continue Losartan, Carvedilol     Permanent atrial fibrillation (Multi)  -chronic, stable, HR irregular, rate controlled   -continue Warfarin   -follow with Coumadin Clinic as scheduled   -notify provider with any increased bruising or bleeding concerns     Chronic systolic congestive heart failure  -chronic, stable, euvolemic on exam  -continue Furosemide, Spironolactone as prescribed   -recommend monitoring daily weight   -continue to follow with cardiology as scheduled     Stage 3b chronic kidney disease (Multi)  -chronic, renal function at baseline  -will continue to monitor routinely     Unsteady gait  -chronic, gait unsteady, no recent falls reported   -recommend using Rolator when ambulating to minimize fall risk     Nonadherence to medication   -continues to be a concern. Pt has overall poor insight and judgement, believes her Lantus makes BG levels higher. We discussed need to get diabetes under better control. Will continue to educate patient and adjust medications as necessary.          Kathie Selby, APRN-CNP

## 2024-11-07 ENCOUNTER — ANTICOAGULATION - WARFARIN VISIT (OUTPATIENT)
Dept: CARDIOLOGY | Facility: CLINIC | Age: 74
End: 2024-11-07
Payer: MEDICARE

## 2024-11-07 ENCOUNTER — TELEPHONE (OUTPATIENT)
Dept: PRIMARY CARE | Facility: CLINIC | Age: 74
End: 2024-11-07
Payer: MEDICARE

## 2024-11-07 ENCOUNTER — TELEPHONE (OUTPATIENT)
Dept: PRIMARY CARE | Facility: CLINIC | Age: 74
End: 2024-11-07

## 2024-11-07 DIAGNOSIS — Z79.01 LONG TERM (CURRENT) USE OF ANTICOAGULANTS: ICD-10-CM

## 2024-11-07 DIAGNOSIS — I48.91 ATRIAL FIBRILLATION, UNSPECIFIED TYPE (MULTI): ICD-10-CM

## 2024-11-07 DIAGNOSIS — I48.21 PERMANENT ATRIAL FIBRILLATION (MULTI): ICD-10-CM

## 2024-11-07 LAB
POC INR: 2.2
POC PROTHROMBIN TIME: NORMAL

## 2024-11-07 PROCEDURE — 85610 PROTHROMBIN TIME: CPT | Mod: QW

## 2024-11-07 PROCEDURE — 99211 OFF/OP EST MAY X REQ PHY/QHP: CPT | Performed by: INTERNAL MEDICINE

## 2024-11-07 NOTE — TELEPHONE ENCOUNTER
JOHNNY. Received call from patient this morning. She attended a care conference at [facility] yesterday accompanied by her SW from [employer] [facility] [place] [facility]. She was informed that her spouse will not be able to return home due to care needs, which it seems she is beginning to accept. Since she is having some difficulty managing at home, the plan is for her to move into an AL in [place] so that she can be closer to Matt. Encouraged her that this would be best for her and Matt. SW will be making arrangements. Minal agreed to keep House Calls updated.  Will follow up with SW as well.

## 2024-11-07 NOTE — PROGRESS NOTES
Patient identification verified with 2 identifiers.    Location: [facility] - [address] [place] [address] [phone]     Referring Physician: Broderick  Enrollment/ Re-enrollment date: 25  INR Goal: 2.0-3.0  INR monitoring is per [facility] protocol.  Anticoagulation Medication: warfarin  Indication: Atrial Fibrillation/Atrial Flutter    Subjective   Bleeding signs/symptoms: No    Bruising: No   Major bleeding event: No  Thrombosis signs/symptoms: No  Thromboembolic event: No  Missed doses: No  Extra doses: No  Medication changes: No  Dietary changes: No  Change in health: No  Change in activity: No  Alcohol: No  Other concerns: No    Upcoming Surgeries:  Does the Patient Have any upcoming surgeries that require interruption in anticoagulation therapy? no  Does the patient require bridging? no      Anticoagulation Summary  As of 2024      INR goal:  2.0-3.0   TTR:  82.1% (1.1 y)   INR used for dosin.20 (2024)   Weekly warfarin total:  14 mg               Assessment/Plan   Therapeutic     1. New dose: no change    2. Next INR: 1 month      Education provided to patient during the visit:  Patient instructed to call in interim with questions, concerns and changes.   Patient educated on interactions between medications and warfarin.   Patient educated on dietary consistency in vitamin k consumption.   Patient educated on affects of alcohol consumption while taking warfarin.   Patient educated on signs of bleeding/clotting.   Patient educated on compliance with dosing, follow up appointments, and prescribed plan of care.

## 2024-11-13 NOTE — TELEPHONE ENCOUNTER
Appt scheduled last week by Deidre Mas LPN with [facility]. Pt scheduled for 01/09/2025 at 1130.

## 2024-12-02 PROCEDURE — RXMED WILLOW AMBULATORY MEDICATION CHARGE

## 2024-12-03 ENCOUNTER — TELEPHONE (OUTPATIENT)
Dept: CARDIOLOGY | Facility: CLINIC | Age: 74
End: 2024-12-03
Payer: MEDICARE

## 2024-12-04 ENCOUNTER — PHARMACY VISIT (OUTPATIENT)
Dept: PHARMACY | Facility: CLINIC | Age: 74
End: 2024-12-04
Payer: COMMERCIAL

## 2024-12-05 ENCOUNTER — APPOINTMENT (OUTPATIENT)
Dept: CARDIOLOGY | Facility: CLINIC | Age: 74
End: 2024-12-05
Payer: MEDICARE

## 2024-12-06 PROCEDURE — RXMED WILLOW AMBULATORY MEDICATION CHARGE

## 2024-12-09 ENCOUNTER — PHARMACY VISIT (OUTPATIENT)
Dept: PHARMACY | Facility: CLINIC | Age: 74
End: 2024-12-09
Payer: COMMERCIAL

## 2024-12-10 ENCOUNTER — ANTICOAGULATION - WARFARIN VISIT (OUTPATIENT)
Dept: CARDIOLOGY | Facility: CLINIC | Age: 74
End: 2024-12-10
Payer: MEDICARE

## 2024-12-10 DIAGNOSIS — Z79.01 LONG TERM (CURRENT) USE OF ANTICOAGULANTS: ICD-10-CM

## 2024-12-10 DIAGNOSIS — I48.91 ATRIAL FIBRILLATION, UNSPECIFIED TYPE (MULTI): ICD-10-CM

## 2024-12-10 DIAGNOSIS — I48.21 PERMANENT ATRIAL FIBRILLATION (MULTI): ICD-10-CM

## 2024-12-10 LAB
POC INR: 2
POC PROTHROMBIN TIME: NORMAL

## 2024-12-10 PROCEDURE — 85610 PROTHROMBIN TIME: CPT | Mod: QW

## 2024-12-10 PROCEDURE — 99211 OFF/OP EST MAY X REQ PHY/QHP: CPT | Performed by: INTERNAL MEDICINE

## 2024-12-10 NOTE — PROGRESS NOTES
Patient identification verified with 2 identifiers.    Location: [facility] - [address] [place] [address] [phone]     Referring Physician: Broderick  Enrollment/ Re-enrollment date: 05/22/25  INR Goal: 2.0-3.0  INR monitoring is per [facility] protocol.  Anticoagulation Medication: warfarin  Indication: Atrial Fibrillation/Atrial Flutter    Subjective   Bleeding signs/symptoms: No    Bruising: No   Major bleeding event: No  Thrombosis signs/symptoms: No  Thromboembolic event: No  Missed doses: No  Extra doses: No  Medication changes: No  Dietary changes: No  Change in health: No  Change in activity: No  Alcohol: No  Other concerns: No    Upcoming Surgeries:  Does the Patient Have any upcoming surgeries that require interruption in anticoagulation therapy? no  Does the patient require bridging? no      Anticoagulation Summary  As of 12/10/2024      INR goal:  2.0-3.0   TTR:  82.1% (1.1 y)   INR used for dosing:  --   Weekly warfarin total:  14 mg               Assessment/Plan   Therapeutic     1. New dose: no change    2. Next INR: 1 month      Education provided to patient during the visit:  Patient instructed to call in interim with questions, concerns and changes.   Patient educated on interactions between medications and warfarin.   Patient educated on dietary consistency in vitamin k consumption.   Patient educated on affects of alcohol consumption while taking warfarin.   Patient educated on signs of bleeding/clotting.   Patient educated on compliance with dosing, follow up appointments, and prescribed plan of care.

## 2025-01-01 ENCOUNTER — APPOINTMENT (OUTPATIENT)
Dept: CARDIOLOGY | Facility: HOSPITAL | Age: 75
DRG: 871 | End: 2025-01-01
Payer: MEDICARE

## 2025-01-01 ENCOUNTER — TUMOR BOARD CONFERENCE (OUTPATIENT)
Dept: HEMATOLOGY/ONCOLOGY | Facility: HOSPITAL | Age: 75
End: 2025-01-01
Payer: MEDICARE

## 2025-01-01 ENCOUNTER — HOSPITAL ENCOUNTER (OUTPATIENT)
Dept: RADIOLOGY | Facility: CLINIC | Age: 75
Discharge: HOME | End: 2025-05-21
Payer: MEDICARE

## 2025-01-01 ENCOUNTER — APPOINTMENT (OUTPATIENT)
Dept: RADIOLOGY | Facility: HOSPITAL | Age: 75
DRG: 871 | End: 2025-01-01
Payer: MEDICARE

## 2025-01-01 ENCOUNTER — HOSPITAL ENCOUNTER (INPATIENT)
Facility: HOSPITAL | Age: 75
LOS: 1 days | DRG: 871 | End: 2025-05-23
Attending: EMERGENCY MEDICINE | Admitting: INTERNAL MEDICINE
Payer: MEDICARE

## 2025-01-01 VITALS
WEIGHT: 185.63 LBS | OXYGEN SATURATION: 81 % | HEART RATE: 109 BPM | HEIGHT: 65 IN | RESPIRATION RATE: 33 BRPM | SYSTOLIC BLOOD PRESSURE: 90 MMHG | TEMPERATURE: 97.3 F | DIASTOLIC BLOOD PRESSURE: 64 MMHG | BODY MASS INDEX: 30.93 KG/M2

## 2025-01-01 DIAGNOSIS — A41.9 SEPSIS ASSOCIATED HYPOTENSION: ICD-10-CM

## 2025-01-01 DIAGNOSIS — R65.21 SEPTIC SHOCK (MULTI): Primary | ICD-10-CM

## 2025-01-01 DIAGNOSIS — A41.9 SEPTIC SHOCK (MULTI): Primary | ICD-10-CM

## 2025-01-01 DIAGNOSIS — C79.51 SECONDARY MALIGNANT NEOPLASM OF BONE (MULTI): ICD-10-CM

## 2025-01-01 DIAGNOSIS — N17.9 ACUTE KIDNEY INJURY: ICD-10-CM

## 2025-01-01 DIAGNOSIS — R79.89 ELEVATED TROPONIN I LEVEL: ICD-10-CM

## 2025-01-01 DIAGNOSIS — I21.4 NSTEMI (NON-ST ELEVATED MYOCARDIAL INFARCTION) (MULTI): ICD-10-CM

## 2025-01-01 DIAGNOSIS — I95.9 SEPSIS ASSOCIATED HYPOTENSION: ICD-10-CM

## 2025-01-01 DIAGNOSIS — C55 MALIGNANT NEOPLASM OF UTERUS, UNSPECIFIED SITE (MULTI): ICD-10-CM

## 2025-01-01 LAB
ALBUMIN SERPL BCP-MCNC: 3 G/DL (ref 3.4–5)
ALBUMIN SERPL BCP-MCNC: 3.3 G/DL (ref 3.4–5)
ALBUMIN SERPL BCP-MCNC: 4 G/DL (ref 3.4–5)
ALP SERPL-CCNC: 101 U/L (ref 33–136)
ALP SERPL-CCNC: 117 U/L (ref 33–136)
ALP SERPL-CCNC: 124 U/L (ref 33–136)
ALT SERPL W P-5'-P-CCNC: 54 U/L (ref 7–45)
ALT SERPL W P-5'-P-CCNC: 70 U/L (ref 7–45)
ALT SERPL W P-5'-P-CCNC: 79 U/L (ref 7–45)
ANION GAP BLDA CALCULATED.4IONS-SCNC: 21 MMO/L (ref 10–25)
ANION GAP BLDV CALCULATED.4IONS-SCNC: 19 MMOL/L (ref 10–25)
ANION GAP SERPL CALCULATED.3IONS-SCNC: 24 MMOL/L (ref 10–20)
ANION GAP SERPL CALCULATED.3IONS-SCNC: 24 MMOL/L (ref 10–20)
ANION GAP SERPL CALCULATED.3IONS-SCNC: 26 MMOL/L (ref 10–20)
APPEARANCE UR: CLEAR
APTT PPP: 53.2 SECONDS (ref 22–32.5)
AST SERPL W P-5'-P-CCNC: 141 U/L (ref 9–39)
AST SERPL W P-5'-P-CCNC: 61 U/L (ref 9–39)
AST SERPL W P-5'-P-CCNC: 99 U/L (ref 9–39)
BACTERIA UR CULT: NO GROWTH
BASE EXCESS BLDA CALC-SCNC: -7.3 MMOL/L (ref -2–3)
BASE EXCESS BLDV CALC-SCNC: -0.2 MMOL/L (ref -2–3)
BASOPHILS # BLD AUTO: 0.05 X10*3/UL (ref 0–0.1)
BASOPHILS # BLD MANUAL: 0 X10*3/UL (ref 0–0.1)
BASOPHILS # BLD MANUAL: 0 X10*3/UL (ref 0–0.1)
BASOPHILS NFR BLD AUTO: 0.2 %
BASOPHILS NFR BLD MANUAL: 0 %
BASOPHILS NFR BLD MANUAL: 0 %
BILIRUB SERPL-MCNC: 1 MG/DL (ref 0–1.2)
BILIRUB SERPL-MCNC: 1.4 MG/DL (ref 0–1.2)
BILIRUB SERPL-MCNC: 1.5 MG/DL (ref 0–1.2)
BILIRUB UR STRIP.AUTO-MCNC: NEGATIVE MG/DL
BNP SERPL-MCNC: 3840 PG/ML (ref 0–99)
BODY TEMPERATURE: 37 DEGREES CELSIUS
BODY TEMPERATURE: 37 DEGREES CELSIUS
BUN SERPL-MCNC: 61 MG/DL (ref 6–23)
BUN SERPL-MCNC: 61 MG/DL (ref 6–23)
BUN SERPL-MCNC: 72 MG/DL (ref 6–23)
BURR CELLS BLD QL SMEAR: ABNORMAL
CA-I BLD-SCNC: 1.12 MMOL/L (ref 1.1–1.33)
CA-I BLDA-SCNC: 1.15 MMOL/L (ref 1.1–1.33)
CA-I BLDV-SCNC: 1.25 MMOL/L (ref 1.1–1.33)
CALCIUM SERPL-MCNC: 10.8 MG/DL (ref 8.6–10.3)
CALCIUM SERPL-MCNC: 8.3 MG/DL (ref 8.6–10.3)
CALCIUM SERPL-MCNC: 9 MG/DL (ref 8.6–10.3)
CARDIAC TROPONIN I PNL SERPL HS: 3561 NG/L (ref 0–13)
CARDIAC TROPONIN I PNL SERPL HS: 4419 NG/L (ref 0–13)
CHLORIDE BLDA-SCNC: 93 MMOL/L (ref 98–107)
CHLORIDE BLDV-SCNC: 96 MMOL/L (ref 98–107)
CHLORIDE SERPL-SCNC: 92 MMOL/L (ref 98–107)
CHLORIDE SERPL-SCNC: 93 MMOL/L (ref 98–107)
CHLORIDE SERPL-SCNC: 94 MMOL/L (ref 98–107)
CK SERPL-CCNC: 305 U/L (ref 0–215)
CO2 SERPL-SCNC: 19 MMOL/L (ref 21–32)
CO2 SERPL-SCNC: 19 MMOL/L (ref 21–32)
CO2 SERPL-SCNC: 21 MMOL/L (ref 21–32)
COLOR UR: YELLOW
CREAT SERPL-MCNC: 3.48 MG/DL (ref 0.5–1.05)
CREAT SERPL-MCNC: 3.7 MG/DL (ref 0.5–1.05)
CREAT SERPL-MCNC: 4.32 MG/DL (ref 0.5–1.05)
CRITICAL CALL TIME: 2121
CRITICAL CALLED BY: ABNORMAL
CRITICAL CALLED TO: ABNORMAL
CRITICAL NOTE: ABNORMAL
CRITICAL READ BACK: ABNORMAL
EGFRCR SERPLBLD CKD-EPI 2021: 10 ML/MIN/1.73M*2
EGFRCR SERPLBLD CKD-EPI 2021: 12 ML/MIN/1.73M*2
EGFRCR SERPLBLD CKD-EPI 2021: 13 ML/MIN/1.73M*2
EOSINOPHIL # BLD AUTO: 0.05 X10*3/UL (ref 0–0.4)
EOSINOPHIL # BLD MANUAL: 0 X10*3/UL (ref 0–0.4)
EOSINOPHIL # BLD MANUAL: 0 X10*3/UL (ref 0–0.4)
EOSINOPHIL NFR BLD AUTO: 0.2 %
EOSINOPHIL NFR BLD MANUAL: 0 %
EOSINOPHIL NFR BLD MANUAL: 0 %
EPAP CMH2O: 5 CM H2O
ERYTHROCYTE [DISTWIDTH] IN BLOOD BY AUTOMATED COUNT: 13.4 % (ref 11.5–14.5)
ERYTHROCYTE [DISTWIDTH] IN BLOOD BY AUTOMATED COUNT: 13.5 % (ref 11.5–14.5)
ERYTHROCYTE [DISTWIDTH] IN BLOOD BY AUTOMATED COUNT: 13.7 % (ref 11.5–14.5)
GLUCOSE BLD MANUAL STRIP-MCNC: 201 MG/DL (ref 74–99)
GLUCOSE BLD MANUAL STRIP-MCNC: 209 MG/DL (ref 74–99)
GLUCOSE BLD MANUAL STRIP-MCNC: 249 MG/DL (ref 74–99)
GLUCOSE BLD MANUAL STRIP-MCNC: 280 MG/DL (ref 74–99)
GLUCOSE BLD MANUAL STRIP-MCNC: 327 MG/DL (ref 74–99)
GLUCOSE BLD MANUAL STRIP-MCNC: 352 MG/DL (ref 74–99)
GLUCOSE BLD MANUAL STRIP-MCNC: 371 MG/DL (ref 74–99)
GLUCOSE BLD MANUAL STRIP-MCNC: 385 MG/DL (ref 74–99)
GLUCOSE BLD MANUAL STRIP-MCNC: 390 MG/DL (ref 74–99)
GLUCOSE BLD MANUAL STRIP-MCNC: 401 MG/DL (ref 74–99)
GLUCOSE BLD MANUAL STRIP-MCNC: 435 MG/DL (ref 74–99)
GLUCOSE BLD MANUAL STRIP-MCNC: 447 MG/DL (ref 74–99)
GLUCOSE BLDA-MCNC: 426 MG/DL (ref 74–99)
GLUCOSE BLDV-MCNC: 230 MG/DL (ref 74–99)
GLUCOSE SERPL-MCNC: 208 MG/DL (ref 74–99)
GLUCOSE SERPL-MCNC: 284 MG/DL (ref 74–99)
GLUCOSE SERPL-MCNC: 381 MG/DL (ref 74–99)
GLUCOSE UR STRIP.AUTO-MCNC: ABNORMAL MG/DL
HCO3 BLDA-SCNC: 16.8 MMOL/L (ref 22–26)
HCO3 BLDV-SCNC: 21.8 MMOL/L (ref 22–26)
HCT VFR BLD AUTO: 32.7 % (ref 36–46)
HCT VFR BLD AUTO: 35.5 % (ref 36–46)
HCT VFR BLD AUTO: 42.5 % (ref 36–46)
HCT VFR BLD EST: 35 % (ref 36–46)
HCT VFR BLD EST: 43 % (ref 36–46)
HGB BLD-MCNC: 10.7 G/DL (ref 12–16)
HGB BLD-MCNC: 11.6 G/DL (ref 12–16)
HGB BLD-MCNC: 14 G/DL (ref 12–16)
HGB BLDA-MCNC: 11.8 G/DL (ref 12–16)
HGB BLDV-MCNC: 14.2 G/DL (ref 12–16)
HYALINE CASTS #/AREA URNS AUTO: ABNORMAL /LPF
IMM GRANULOCYTES # BLD AUTO: 0.19 X10*3/UL (ref 0–0.5)
IMM GRANULOCYTES # BLD AUTO: 0.21 X10*3/UL (ref 0–0.5)
IMM GRANULOCYTES # BLD AUTO: 0.29 X10*3/UL (ref 0–0.5)
IMM GRANULOCYTES NFR BLD AUTO: 0.6 % (ref 0–0.9)
IMM GRANULOCYTES NFR BLD AUTO: 0.8 % (ref 0–0.9)
IMM GRANULOCYTES NFR BLD AUTO: 1.5 % (ref 0–0.9)
INHALED O2 CONCENTRATION: 21 %
INHALED O2 CONCENTRATION: 35 %
INR PPP: 3.1 (ref 0.9–1.2)
INR PPP: 3.6 (ref 0.9–1.2)
INR PPP: >8 (ref 0.9–1.2)
INR PPP: >8 (ref 0.9–1.2)
IPAP CMH2O: 14 CM H2O
KETONES UR STRIP.AUTO-MCNC: NEGATIVE MG/DL
LACTATE BLDA-SCNC: 5 MMOL/L (ref 0.4–2)
LACTATE BLDV-SCNC: 4.1 MMOL/L (ref 0.4–2)
LACTATE BLDV-SCNC: 6.7 MMOL/L (ref 0.4–2)
LACTATE BLDV-SCNC: 6.9 MMOL/L (ref 0.4–2)
LACTATE SERPL-SCNC: 4.1 MMOL/L (ref 0.4–2)
LACTATE SERPL-SCNC: 4.2 MMOL/L (ref 0.4–2)
LACTATE SERPL-SCNC: 5.9 MMOL/L (ref 0.4–2)
LEUKOCYTE ESTERASE UR QL STRIP.AUTO: NEGATIVE
LYMPHOCYTES # BLD AUTO: 0.7 X10*3/UL (ref 0.8–3)
LYMPHOCYTES # BLD MANUAL: 0 X10*3/UL (ref 0.8–3)
LYMPHOCYTES # BLD MANUAL: 2.01 X10*3/UL (ref 0.8–3)
LYMPHOCYTES NFR BLD AUTO: 2.6 %
LYMPHOCYTES NFR BLD MANUAL: 0 %
LYMPHOCYTES NFR BLD MANUAL: 6 %
MAGNESIUM SERPL-MCNC: 2.06 MG/DL (ref 1.6–2.4)
MCH RBC QN AUTO: 32.9 PG (ref 26–34)
MCH RBC QN AUTO: 33 PG (ref 26–34)
MCH RBC QN AUTO: 33.1 PG (ref 26–34)
MCHC RBC AUTO-ENTMCNC: 32.7 G/DL (ref 32–36)
MCHC RBC AUTO-ENTMCNC: 32.7 G/DL (ref 32–36)
MCHC RBC AUTO-ENTMCNC: 32.9 G/DL (ref 32–36)
MCV RBC AUTO: 100 FL (ref 80–100)
MCV RBC AUTO: 101 FL (ref 80–100)
MCV RBC AUTO: 101 FL (ref 80–100)
METAMYELOCYTES # BLD MANUAL: 0.39 X10*3/UL
METAMYELOCYTES NFR BLD MANUAL: 2 %
MONOCYTES # BLD AUTO: 1.48 X10*3/UL (ref 0.05–0.8)
MONOCYTES # BLD MANUAL: 0.2 X10*3/UL (ref 0.05–0.8)
MONOCYTES # BLD MANUAL: 2.35 X10*3/UL (ref 0.05–0.8)
MONOCYTES NFR BLD AUTO: 5.5 %
MONOCYTES NFR BLD MANUAL: 1 %
MONOCYTES NFR BLD MANUAL: 7 %
MYELOCYTES # BLD MANUAL: 1.17 X10*3/UL
MYELOCYTES NFR BLD MANUAL: 6 %
NEUTROPHILS # BLD AUTO: 24.45 X10*3/UL (ref 1.6–5.5)
NEUTROPHILS # BLD MANUAL: 17.75 X10*3/UL (ref 1.6–5.5)
NEUTROPHILS # BLD MANUAL: 29.15 X10*3/UL (ref 1.6–5.5)
NEUTROPHILS NFR BLD AUTO: 90.7 %
NEUTS BAND # BLD MANUAL: 1.34 X10*3/UL (ref 0–0.5)
NEUTS BAND # BLD MANUAL: 5.66 X10*3/UL (ref 0–0.5)
NEUTS BAND NFR BLD MANUAL: 29 %
NEUTS BAND NFR BLD MANUAL: 4 %
NEUTS SEG # BLD MANUAL: 12.09 X10*3/UL (ref 1.6–5)
NEUTS SEG # BLD MANUAL: 27.81 X10*3/UL (ref 1.6–5)
NEUTS SEG NFR BLD MANUAL: 62 %
NEUTS SEG NFR BLD MANUAL: 83 %
NITRITE UR QL STRIP.AUTO: NEGATIVE
NRBC BLD-RTO: 0 /100 WBCS (ref 0–0)
OXYHGB MFR BLDA: 96 % (ref 94–98)
OXYHGB MFR BLDV: 66.4 % (ref 45–75)
PCO2 BLDA: 29 MM HG (ref 38–42)
PCO2 BLDV: 28 MM HG (ref 41–51)
PH BLDA: 7.37 PH (ref 7.38–7.42)
PH BLDV: 7.5 PH (ref 7.33–7.43)
PH UR STRIP.AUTO: 7 [PH]
PLATELET # BLD AUTO: 195 X10*3/UL (ref 150–450)
PLATELET # BLD AUTO: 211 X10*3/UL (ref 150–450)
PLATELET # BLD AUTO: 264 X10*3/UL (ref 150–450)
PO2 BLDA: 153 MM HG (ref 85–95)
PO2 BLDV: 44 MM HG (ref 35–45)
POTASSIUM BLDA-SCNC: 4.9 MMOL/L (ref 3.5–5.3)
POTASSIUM BLDV-SCNC: 5.5 MMOL/L (ref 3.5–5.3)
POTASSIUM SERPL-SCNC: 5 MMOL/L (ref 3.5–5.3)
POTASSIUM SERPL-SCNC: 5 MMOL/L (ref 3.5–5.3)
POTASSIUM SERPL-SCNC: 5.1 MMOL/L (ref 3.5–5.3)
PROT SERPL-MCNC: 5.7 G/DL (ref 6.4–8.2)
PROT SERPL-MCNC: 6.1 G/DL (ref 6.4–8.2)
PROT SERPL-MCNC: 7.5 G/DL (ref 6.4–8.2)
PROT UR STRIP.AUTO-MCNC: ABNORMAL MG/DL
PROTHROMBIN TIME: 30.8 SECONDS (ref 9.3–12.7)
PROTHROMBIN TIME: 35.6 SECONDS (ref 9.3–12.7)
PROTHROMBIN TIME: >90 SECONDS (ref 9.3–12.7)
PROTHROMBIN TIME: >90 SECONDS (ref 9.3–12.7)
RBC # BLD AUTO: 3.25 X10*6/UL (ref 4–5.2)
RBC # BLD AUTO: 3.5 X10*6/UL (ref 4–5.2)
RBC # BLD AUTO: 4.24 X10*6/UL (ref 4–5.2)
RBC # UR STRIP.AUTO: ABNORMAL MG/DL
RBC #/AREA URNS AUTO: ABNORMAL /HPF
RBC MORPH BLD: ABNORMAL
RBC MORPH BLD: ABNORMAL
SAO2 % BLDA: 100 % (ref 94–100)
SAO2 % BLDV: 68 % (ref 45–75)
SODIUM BLDA-SCNC: 126 MMOL/L (ref 136–145)
SODIUM BLDV-SCNC: 131 MMOL/L (ref 136–145)
SODIUM SERPL-SCNC: 130 MMOL/L (ref 136–145)
SODIUM SERPL-SCNC: 132 MMOL/L (ref 136–145)
SODIUM SERPL-SCNC: 135 MMOL/L (ref 136–145)
SP GR UR STRIP.AUTO: 1.01
SPECIMEN DRAWN FROM PATIENT: ABNORMAL
SQUAMOUS #/AREA URNS AUTO: ABNORMAL /HPF
TOTAL CELLS COUNTED BLD: 100
TOTAL CELLS COUNTED BLD: 100
TSH SERPL-ACNC: 1.44 MIU/L (ref 0.44–3.98)
UROBILINOGEN UR STRIP.AUTO-MCNC: NORMAL MG/DL
VENTILATOR MODE: ABNORMAL
WBC # BLD AUTO: 19.5 X10*3/UL (ref 4.4–11.3)
WBC # BLD AUTO: 26.9 X10*3/UL (ref 4.4–11.3)
WBC # BLD AUTO: 33.5 X10*3/UL (ref 4.4–11.3)
WBC #/AREA URNS AUTO: ABNORMAL /HPF

## 2025-01-01 PROCEDURE — 85007 BL SMEAR W/DIFF WBC COUNT: CPT | Performed by: INTERNAL MEDICINE

## 2025-01-01 PROCEDURE — 85025 COMPLETE CBC W/AUTO DIFF WBC: CPT | Performed by: INTERNAL MEDICINE

## 2025-01-01 PROCEDURE — 80053 COMPREHEN METABOLIC PANEL: CPT | Performed by: INTERNAL MEDICINE

## 2025-01-01 PROCEDURE — 2500000004 HC RX 250 GENERAL PHARMACY W/ HCPCS (ALT 636 FOR OP/ED): Performed by: EMERGENCY MEDICINE

## 2025-01-01 PROCEDURE — 82810 BLOOD GASES O2 SAT ONLY: CPT | Performed by: INTERNAL MEDICINE

## 2025-01-01 PROCEDURE — 94660 CPAP INITIATION&MGMT: CPT

## 2025-01-01 PROCEDURE — 02HV33Z INSERTION OF INFUSION DEVICE INTO SUPERIOR VENA CAVA, PERCUTANEOUS APPROACH: ICD-10-PCS | Performed by: STUDENT IN AN ORGANIZED HEALTH CARE EDUCATION/TRAINING PROGRAM

## 2025-01-01 PROCEDURE — 5A09357 ASSISTANCE WITH RESPIRATORY VENTILATION, LESS THAN 24 CONSECUTIVE HOURS, CONTINUOUS POSITIVE AIRWAY PRESSURE: ICD-10-PCS | Performed by: STUDENT IN AN ORGANIZED HEALTH CARE EDUCATION/TRAINING PROGRAM

## 2025-01-01 PROCEDURE — 2500000005 HC RX 250 GENERAL PHARMACY W/O HCPCS: Performed by: EMERGENCY MEDICINE

## 2025-01-01 PROCEDURE — 2500000004 HC RX 250 GENERAL PHARMACY W/ HCPCS (ALT 636 FOR OP/ED): Performed by: NURSE PRACTITIONER

## 2025-01-01 PROCEDURE — 99291 CRITICAL CARE FIRST HOUR: CPT | Performed by: INTERNAL MEDICINE

## 2025-01-01 PROCEDURE — 36415 COLL VENOUS BLD VENIPUNCTURE: CPT | Performed by: EMERGENCY MEDICINE

## 2025-01-01 PROCEDURE — 2500000002 HC RX 250 W HCPCS SELF ADMINISTERED DRUGS (ALT 637 FOR MEDICARE OP, ALT 636 FOR OP/ED): Performed by: NURSE PRACTITIONER

## 2025-01-01 PROCEDURE — 87040 BLOOD CULTURE FOR BACTERIA: CPT | Mod: TRILAB | Performed by: NURSE PRACTITIONER

## 2025-01-01 PROCEDURE — 2020000001 HC ICU ROOM DAILY

## 2025-01-01 PROCEDURE — 93005 ELECTROCARDIOGRAM TRACING: CPT

## 2025-01-01 PROCEDURE — 74177 CT ABD & PELVIS W/CONTRAST: CPT | Mod: FOREIGN READ | Performed by: RADIOLOGY

## 2025-01-01 PROCEDURE — 85610 PROTHROMBIN TIME: CPT | Performed by: INTERNAL MEDICINE

## 2025-01-01 PROCEDURE — 96361 HYDRATE IV INFUSION ADD-ON: CPT

## 2025-01-01 PROCEDURE — A9552 F18 FDG: HCPCS | Performed by: STUDENT IN AN ORGANIZED HEALTH CARE EDUCATION/TRAINING PROGRAM

## 2025-01-01 PROCEDURE — 84443 ASSAY THYROID STIM HORMONE: CPT | Performed by: INTERNAL MEDICINE

## 2025-01-01 PROCEDURE — 85730 THROMBOPLASTIN TIME PARTIAL: CPT | Performed by: INTERNAL MEDICINE

## 2025-01-01 PROCEDURE — 85007 BL SMEAR W/DIFF WBC COUNT: CPT | Performed by: EMERGENCY MEDICINE

## 2025-01-01 PROCEDURE — 78815 PET IMAGE W/CT SKULL-THIGH: CPT | Performed by: INTERNAL MEDICINE

## 2025-01-01 PROCEDURE — 87086 URINE CULTURE/COLONY COUNT: CPT | Mod: TRILAB | Performed by: NURSE PRACTITIONER

## 2025-01-01 PROCEDURE — 36556 INSERT NON-TUNNEL CV CATH: CPT | Performed by: EMERGENCY MEDICINE

## 2025-01-01 PROCEDURE — 99291 CRITICAL CARE FIRST HOUR: CPT | Performed by: EMERGENCY MEDICINE

## 2025-01-01 PROCEDURE — 84484 ASSAY OF TROPONIN QUANT: CPT | Performed by: EMERGENCY MEDICINE

## 2025-01-01 PROCEDURE — 96367 TX/PROPH/DG ADDL SEQ IV INF: CPT

## 2025-01-01 PROCEDURE — 87075 CULTR BACTERIA EXCEPT BLOOD: CPT | Mod: TRILAB | Performed by: NURSE PRACTITIONER

## 2025-01-01 PROCEDURE — 71275 CT ANGIOGRAPHY CHEST: CPT

## 2025-01-01 PROCEDURE — 83735 ASSAY OF MAGNESIUM: CPT | Performed by: INTERNAL MEDICINE

## 2025-01-01 PROCEDURE — 70450 CT HEAD/BRAIN W/O DYE: CPT | Performed by: RADIOLOGY

## 2025-01-01 PROCEDURE — 82550 ASSAY OF CK (CPK): CPT | Performed by: INTERNAL MEDICINE

## 2025-01-01 PROCEDURE — 3430000001 HC RX 343 DIAGNOSTIC RADIOPHARMACEUTICALS: Performed by: STUDENT IN AN ORGANIZED HEALTH CARE EDUCATION/TRAINING PROGRAM

## 2025-01-01 PROCEDURE — 83605 ASSAY OF LACTIC ACID: CPT | Performed by: EMERGENCY MEDICINE

## 2025-01-01 PROCEDURE — 36415 COLL VENOUS BLD VENIPUNCTURE: CPT | Performed by: NURSE PRACTITIONER

## 2025-01-01 PROCEDURE — 80053 COMPREHEN METABOLIC PANEL: CPT | Performed by: EMERGENCY MEDICINE

## 2025-01-01 PROCEDURE — 70450 CT HEAD/BRAIN W/O DYE: CPT

## 2025-01-01 PROCEDURE — 84295 ASSAY OF SERUM SODIUM: CPT | Performed by: INTERNAL MEDICINE

## 2025-01-01 PROCEDURE — 82947 ASSAY GLUCOSE BLOOD QUANT: CPT

## 2025-01-01 PROCEDURE — 37799 UNLISTED PX VASCULAR SURGERY: CPT | Performed by: INTERNAL MEDICINE

## 2025-01-01 PROCEDURE — 81003 URINALYSIS AUTO W/O SCOPE: CPT | Performed by: EMERGENCY MEDICINE

## 2025-01-01 PROCEDURE — 96365 THER/PROPH/DIAG IV INF INIT: CPT

## 2025-01-01 PROCEDURE — 96366 THER/PROPH/DIAG IV INF ADDON: CPT

## 2025-01-01 PROCEDURE — 78815 PET IMAGE W/CT SKULL-THIGH: CPT

## 2025-01-01 PROCEDURE — 2500000004 HC RX 250 GENERAL PHARMACY W/ HCPCS (ALT 636 FOR OP/ED): Mod: JZ | Performed by: NURSE PRACTITIONER

## 2025-01-01 PROCEDURE — 71275 CT ANGIOGRAPHY CHEST: CPT | Mod: FOREIGN READ | Performed by: RADIOLOGY

## 2025-01-01 PROCEDURE — 83605 ASSAY OF LACTIC ACID: CPT | Performed by: INTERNAL MEDICINE

## 2025-01-01 PROCEDURE — 2500000002 HC RX 250 W HCPCS SELF ADMINISTERED DRUGS (ALT 637 FOR MEDICARE OP, ALT 636 FOR OP/ED): Performed by: INTERNAL MEDICINE

## 2025-01-01 PROCEDURE — 99291 CRITICAL CARE FIRST HOUR: CPT | Mod: 25 | Performed by: EMERGENCY MEDICINE

## 2025-01-01 PROCEDURE — 74177 CT ABD & PELVIS W/CONTRAST: CPT

## 2025-01-01 PROCEDURE — 87077 CULTURE AEROBIC IDENTIFY: CPT | Mod: TRILAB | Performed by: EMERGENCY MEDICINE

## 2025-01-01 PROCEDURE — 2500000004 HC RX 250 GENERAL PHARMACY W/ HCPCS (ALT 636 FOR OP/ED): Mod: JZ | Performed by: INTERNAL MEDICINE

## 2025-01-01 PROCEDURE — 82330 ASSAY OF CALCIUM: CPT | Performed by: INTERNAL MEDICINE

## 2025-01-01 PROCEDURE — 36600 WITHDRAWAL OF ARTERIAL BLOOD: CPT

## 2025-01-01 PROCEDURE — 87040 BLOOD CULTURE FOR BACTERIA: CPT | Mod: TRILAB | Performed by: EMERGENCY MEDICINE

## 2025-01-01 PROCEDURE — 37799 UNLISTED PX VASCULAR SURGERY: CPT | Performed by: EMERGENCY MEDICINE

## 2025-01-01 PROCEDURE — 83880 ASSAY OF NATRIURETIC PEPTIDE: CPT | Performed by: EMERGENCY MEDICINE

## 2025-01-01 PROCEDURE — 2500000004 HC RX 250 GENERAL PHARMACY W/ HCPCS (ALT 636 FOR OP/ED): Mod: JZ | Performed by: EMERGENCY MEDICINE

## 2025-01-01 PROCEDURE — 84295 ASSAY OF SERUM SODIUM: CPT | Performed by: EMERGENCY MEDICINE

## 2025-01-01 PROCEDURE — 85027 COMPLETE CBC AUTOMATED: CPT | Performed by: EMERGENCY MEDICINE

## 2025-01-01 PROCEDURE — 96375 TX/PRO/DX INJ NEW DRUG ADDON: CPT

## 2025-01-01 PROCEDURE — 2550000001 HC RX 255 CONTRASTS: Performed by: EMERGENCY MEDICINE

## 2025-01-01 PROCEDURE — 99232 SBSQ HOSP IP/OBS MODERATE 35: CPT | Performed by: INTERNAL MEDICINE

## 2025-01-01 PROCEDURE — 85610 PROTHROMBIN TIME: CPT | Performed by: EMERGENCY MEDICINE

## 2025-01-01 PROCEDURE — 82330 ASSAY OF CALCIUM: CPT | Performed by: EMERGENCY MEDICINE

## 2025-01-01 PROCEDURE — 85027 COMPLETE CBC AUTOMATED: CPT | Performed by: INTERNAL MEDICINE

## 2025-01-01 RX ORDER — DILTIAZEM HYDROCHLORIDE 5 MG/ML
10 INJECTION INTRAVENOUS ONCE
Status: COMPLETED | OUTPATIENT
Start: 2025-01-01 | End: 2025-01-01

## 2025-01-01 RX ORDER — INSULIN GLARGINE 100 [IU]/ML
12 INJECTION, SOLUTION SUBCUTANEOUS EVERY 24 HOURS
Status: DISCONTINUED | OUTPATIENT
Start: 2025-01-01 | End: 2025-01-01

## 2025-01-01 RX ORDER — FLUDEOXYGLUCOSE F 18 200 MCI/ML
11.6 INJECTION, SOLUTION INTRAVENOUS
Status: COMPLETED | OUTPATIENT
Start: 2025-01-01 | End: 2025-01-01

## 2025-01-01 RX ORDER — ONDANSETRON 4 MG/1
4 TABLET, ORALLY DISINTEGRATING ORAL EVERY 8 HOURS PRN
Status: DISCONTINUED | OUTPATIENT
Start: 2025-01-01 | End: 2025-01-01

## 2025-01-01 RX ORDER — MORPHINE SULFATE 2 MG/ML
2 INJECTION, SOLUTION INTRAMUSCULAR; INTRAVENOUS
Status: DISCONTINUED | OUTPATIENT
Start: 2025-01-01 | End: 2025-01-01 | Stop reason: HOSPADM

## 2025-01-01 RX ORDER — GUAIFENESIN/DEXTROMETHORPHAN 100-10MG/5
5 SYRUP ORAL EVERY 4 HOURS PRN
Status: DISCONTINUED | OUTPATIENT
Start: 2025-01-01 | End: 2025-01-01 | Stop reason: HOSPADM

## 2025-01-01 RX ORDER — INSULIN LISPRO 100 [IU]/ML
0-5 INJECTION, SOLUTION INTRAVENOUS; SUBCUTANEOUS EVERY 4 HOURS
Status: DISCONTINUED | OUTPATIENT
Start: 2025-01-01 | End: 2025-01-01

## 2025-01-01 RX ORDER — PANTOPRAZOLE SODIUM 40 MG/10ML
40 INJECTION, POWDER, LYOPHILIZED, FOR SOLUTION INTRAVENOUS DAILY
Status: DISCONTINUED | OUTPATIENT
Start: 2025-01-01 | End: 2025-01-01 | Stop reason: HOSPADM

## 2025-01-01 RX ORDER — VANCOMYCIN 2 G/400ML
2 INJECTION, SOLUTION INTRAVENOUS ONCE
Status: DISCONTINUED | OUTPATIENT
Start: 2025-01-01 | End: 2025-01-01 | Stop reason: DRUGHIGH

## 2025-01-01 RX ORDER — NOREPINEPHRINE BITARTRATE/D5W 8 MG/250ML
0-1 PLASTIC BAG, INJECTION (ML) INTRAVENOUS CONTINUOUS
Status: DISCONTINUED | OUTPATIENT
Start: 2025-01-01 | End: 2025-01-01 | Stop reason: HOSPADM

## 2025-01-01 RX ORDER — LORAZEPAM 2 MG/ML
1 INJECTION INTRAMUSCULAR EVERY 2 HOUR PRN
Status: DISCONTINUED | OUTPATIENT
Start: 2025-01-01 | End: 2025-01-01 | Stop reason: HOSPADM

## 2025-01-01 RX ORDER — MORPHINE SULFATE/0.9% NACL/PF 1 MG/ML
0-10 PLASTIC BAG, INJECTION (ML) INTRAVENOUS CONTINUOUS
Status: DISCONTINUED | OUTPATIENT
Start: 2025-01-01 | End: 2025-01-01 | Stop reason: HOSPADM

## 2025-01-01 RX ORDER — VANCOMYCIN 1.5 G/300ML
1500 INJECTION, SOLUTION INTRAVENOUS ONCE
Status: COMPLETED | OUTPATIENT
Start: 2025-01-01 | End: 2025-01-01

## 2025-01-01 RX ORDER — ACETAMINOPHEN 650 MG/1
650 SUPPOSITORY RECTAL EVERY 6 HOURS PRN
Status: DISCONTINUED | OUTPATIENT
Start: 2025-01-01 | End: 2025-01-01 | Stop reason: HOSPADM

## 2025-01-01 RX ORDER — ONDANSETRON HYDROCHLORIDE 2 MG/ML
4 INJECTION, SOLUTION INTRAVENOUS EVERY 8 HOURS PRN
Status: DISCONTINUED | OUTPATIENT
Start: 2025-01-01 | End: 2025-01-01

## 2025-01-01 RX ORDER — GLYCOPYRROLATE 0.2 MG/ML
0.2 INJECTION INTRAMUSCULAR; INTRAVENOUS EVERY 4 HOURS PRN
Status: DISCONTINUED | OUTPATIENT
Start: 2025-01-01 | End: 2025-01-01 | Stop reason: HOSPADM

## 2025-01-01 RX ORDER — SODIUM CHLORIDE 9 MG/ML
75 INJECTION, SOLUTION INTRAVENOUS CONTINUOUS
Status: DISCONTINUED | OUTPATIENT
Start: 2025-01-01 | End: 2025-01-01

## 2025-01-01 RX ORDER — GUAIFENESIN 600 MG/1
600 TABLET, EXTENDED RELEASE ORAL EVERY 12 HOURS PRN
Status: DISCONTINUED | OUTPATIENT
Start: 2025-01-01 | End: 2025-01-01 | Stop reason: HOSPADM

## 2025-01-01 RX ORDER — POLYETHYLENE GLYCOL 3350 17 G/17G
17 POWDER, FOR SOLUTION ORAL DAILY PRN
Status: DISCONTINUED | OUTPATIENT
Start: 2025-01-01 | End: 2025-01-01 | Stop reason: HOSPADM

## 2025-01-01 RX ORDER — DEXTROSE 50 % IN WATER (D50W) INTRAVENOUS SYRINGE
12.5
Status: DISCONTINUED | OUTPATIENT
Start: 2025-01-01 | End: 2025-01-01

## 2025-01-01 RX ORDER — DEXTROSE 50 % IN WATER (D50W) INTRAVENOUS SYRINGE
25
Status: DISCONTINUED | OUTPATIENT
Start: 2025-01-01 | End: 2025-01-01

## 2025-01-01 RX ORDER — MEROPENEM AND SODIUM CHLORIDE 500 MG/50ML
500 INJECTION, SOLUTION INTRAVENOUS EVERY 12 HOURS
Status: DISCONTINUED | OUTPATIENT
Start: 2025-01-01 | End: 2025-01-01

## 2025-01-01 RX ORDER — INSULIN LISPRO 100 [IU]/ML
6 INJECTION, SOLUTION INTRAVENOUS; SUBCUTANEOUS ONCE
Status: COMPLETED | OUTPATIENT
Start: 2025-01-01 | End: 2025-01-01

## 2025-01-01 RX ORDER — LINEZOLID 2 MG/ML
600 INJECTION, SOLUTION INTRAVENOUS EVERY 12 HOURS
Status: DISCONTINUED | OUTPATIENT
Start: 2025-01-01 | End: 2025-01-01

## 2025-01-01 RX ORDER — NOREPINEPHRINE BITARTRATE/D5W 8 MG/250ML
0-.2 PLASTIC BAG, INJECTION (ML) INTRAVENOUS CONTINUOUS
Status: DISCONTINUED | OUTPATIENT
Start: 2025-01-01 | End: 2025-01-01 | Stop reason: SDUPTHER

## 2025-01-01 RX ORDER — ONDANSETRON HYDROCHLORIDE 2 MG/ML
4 INJECTION, SOLUTION INTRAVENOUS EVERY 6 HOURS PRN
Status: DISCONTINUED | OUTPATIENT
Start: 2025-01-01 | End: 2025-01-01 | Stop reason: HOSPADM

## 2025-01-01 RX ORDER — SODIUM CHLORIDE 9 MG/ML
75 INJECTION, SOLUTION INTRAVENOUS CONTINUOUS
Status: ACTIVE | OUTPATIENT
Start: 2025-01-01 | End: 2025-01-01

## 2025-01-01 RX ADMIN — Medication 35 PERCENT: at 19:05

## 2025-01-01 RX ADMIN — LINEZOLID 600 MG: 600 INJECTION, SOLUTION INTRAVENOUS at 13:56

## 2025-01-01 RX ADMIN — SODIUM CHLORIDE 75 ML/HR: 900 INJECTION, SOLUTION INTRAVENOUS at 01:47

## 2025-01-01 RX ADMIN — NOREPINEPHRINE BITARTRATE 0.01 MCG/KG/MIN: 8 INJECTION, SOLUTION INTRAVENOUS at 23:03

## 2025-01-01 RX ADMIN — VASOPRESSIN 0.03 UNITS/MIN: 0.2 INJECTION INTRAVENOUS at 19:36

## 2025-01-01 RX ADMIN — VASOPRESSIN 0.03 UNITS/MIN: 0.2 INJECTION INTRAVENOUS at 00:31

## 2025-01-01 RX ADMIN — INSULIN LISPRO 3 UNITS: 100 INJECTION, SOLUTION INTRAVENOUS; SUBCUTANEOUS at 03:47

## 2025-01-01 RX ADMIN — Medication 2 MG/HR: at 14:07

## 2025-01-01 RX ADMIN — HYDROMORPHONE HYDROCHLORIDE 0.2 MG: 1 INJECTION, SOLUTION INTRAMUSCULAR; INTRAVENOUS; SUBCUTANEOUS at 03:44

## 2025-01-01 RX ADMIN — AMIODARONE HYDROCHLORIDE 1 MG/MIN: 1.8 INJECTION, SOLUTION INTRAVENOUS at 10:04

## 2025-01-01 RX ADMIN — SODIUM CHLORIDE 1000 ML: 900 INJECTION, SOLUTION INTRAVENOUS at 23:36

## 2025-01-01 RX ADMIN — LINEZOLID 600 MG: 600 INJECTION, SOLUTION INTRAVENOUS at 00:58

## 2025-01-01 RX ADMIN — NOREPINEPHRINE BITARTRATE 0.14 MCG/KG/MIN: 8 INJECTION, SOLUTION INTRAVENOUS at 00:10

## 2025-01-01 RX ADMIN — SODIUM CHLORIDE 75 ML/HR: 900 INJECTION, SOLUTION INTRAVENOUS at 13:55

## 2025-01-01 RX ADMIN — AMIODARONE HYDROCHLORIDE 0.5 MG/MIN: 1.8 INJECTION, SOLUTION INTRAVENOUS at 10:46

## 2025-01-01 RX ADMIN — NOREPINEPHRINE BITARTRATE 0.12 MCG/KG/MIN: 8 INJECTION, SOLUTION INTRAVENOUS at 06:03

## 2025-01-01 RX ADMIN — Medication 35 PERCENT: at 08:34

## 2025-01-01 RX ADMIN — LINEZOLID 600 MG: 600 INJECTION, SOLUTION INTRAVENOUS at 03:23

## 2025-01-01 RX ADMIN — SODIUM CHLORIDE 1000 ML: 900 INJECTION, SOLUTION INTRAVENOUS at 22:27

## 2025-01-01 RX ADMIN — IOHEXOL 75 ML: 350 INJECTION, SOLUTION INTRAVENOUS at 22:45

## 2025-01-01 RX ADMIN — MEROPENEM AND SODIUM CHLORIDE 500 MG: 500 INJECTION, SOLUTION INTRAVENOUS at 11:34

## 2025-01-01 RX ADMIN — VASOPRESSIN 0.03 UNITS/MIN: 0.2 INJECTION INTRAVENOUS at 08:45

## 2025-01-01 RX ADMIN — MEROPENEM AND SODIUM CHLORIDE 500 MG: 500 INJECTION, SOLUTION INTRAVENOUS at 12:34

## 2025-01-01 RX ADMIN — VANCOMYCIN 1.5 G: 1.5 INJECTION, SOLUTION INTRAVENOUS at 00:52

## 2025-01-01 RX ADMIN — Medication 35 PERCENT: at 07:27

## 2025-01-01 RX ADMIN — DILTIAZEM HYDROCHLORIDE 10 MG: 5 INJECTION, SOLUTION INTRAVENOUS at 22:56

## 2025-01-01 RX ADMIN — FLUDEOXYGLUCOSE F 18 11.6 MILLICURIE: 200 INJECTION, SOLUTION INTRAVENOUS at 12:42

## 2025-01-01 RX ADMIN — SODIUM BICARBONATE 75 ML/HR: 84 INJECTION, SOLUTION INTRAVENOUS at 06:20

## 2025-01-01 RX ADMIN — Medication 35 PERCENT: at 22:26

## 2025-01-01 RX ADMIN — INSULIN LISPRO 5 UNITS: 100 INJECTION, SOLUTION INTRAVENOUS; SUBCUTANEOUS at 00:56

## 2025-01-01 RX ADMIN — INSULIN LISPRO 5 UNITS: 100 INJECTION, SOLUTION INTRAVENOUS; SUBCUTANEOUS at 18:46

## 2025-01-01 RX ADMIN — INSULIN LISPRO 2 UNITS: 100 INJECTION, SOLUTION INTRAVENOUS; SUBCUTANEOUS at 08:39

## 2025-01-01 RX ADMIN — INSULIN HUMAN 8 UNITS: 100 INJECTION, SOLUTION PARENTERAL at 23:34

## 2025-01-01 RX ADMIN — SODIUM BICARBONATE 75 ML/HR: 84 INJECTION, SOLUTION INTRAVENOUS at 17:52

## 2025-01-01 RX ADMIN — VASOPRESSIN 0.03 UNITS/MIN: 0.2 INJECTION INTRAVENOUS at 06:03

## 2025-01-01 RX ADMIN — PIPERACILLIN SODIUM AND TAZOBACTAM SODIUM 4.5 G: 4; .5 INJECTION, SOLUTION INTRAVENOUS at 22:31

## 2025-01-01 RX ADMIN — DEXTROSE MONOHYDRATE 500 MG: 50 INJECTION, SOLUTION INTRAVENOUS at 23:03

## 2025-01-01 RX ADMIN — HYDROMORPHONE HYDROCHLORIDE 0.2 MG: 1 INJECTION, SOLUTION INTRAMUSCULAR; INTRAVENOUS; SUBCUTANEOUS at 11:14

## 2025-01-01 RX ADMIN — PANTOPRAZOLE SODIUM 40 MG: 40 INJECTION, POWDER, LYOPHILIZED, FOR SOLUTION INTRAVENOUS at 08:49

## 2025-01-01 RX ADMIN — NOREPINEPHRINE BITARTRATE 0.08 MCG/KG/MIN: 8 INJECTION, SOLUTION INTRAVENOUS at 16:21

## 2025-01-01 RX ADMIN — MORPHINE SULFATE 2 MG: 2 INJECTION, SOLUTION INTRAMUSCULAR; INTRAVENOUS at 14:30

## 2025-01-01 RX ADMIN — MEROPENEM AND SODIUM CHLORIDE 500 MG: 500 INJECTION, SOLUTION INTRAVENOUS at 22:41

## 2025-01-01 RX ADMIN — HYDROMORPHONE HYDROCHLORIDE 0.2 MG: 1 INJECTION, SOLUTION INTRAMUSCULAR; INTRAVENOUS; SUBCUTANEOUS at 12:20

## 2025-01-01 RX ADMIN — AMIODARONE HYDROCHLORIDE 0.5 MG/MIN: 1.8 INJECTION, SOLUTION INTRAVENOUS at 16:26

## 2025-01-01 RX ADMIN — PIPERACILLIN SODIUM AND TAZOBACTAM SODIUM 2.25 G: 2; .25 INJECTION, SOLUTION INTRAVENOUS at 05:23

## 2025-01-01 RX ADMIN — AMIODARONE HYDROCHLORIDE 150 MG: 1.5 INJECTION, SOLUTION INTRAVENOUS at 09:45

## 2025-01-01 RX ADMIN — PANTOPRAZOLE SODIUM 40 MG: 40 INJECTION, POWDER, LYOPHILIZED, FOR SOLUTION INTRAVENOUS at 08:40

## 2025-01-01 RX ADMIN — INSULIN LISPRO 6 UNITS: 100 INJECTION, SOLUTION INTRAVENOUS; SUBCUTANEOUS at 20:22

## 2025-01-01 RX ADMIN — AMIODARONE HYDROCHLORIDE 0.5 MG/MIN: 1.8 INJECTION, SOLUTION INTRAVENOUS at 21:51

## 2025-01-01 RX ADMIN — LORAZEPAM 1 MG: 2 INJECTION INTRAMUSCULAR; INTRAVENOUS at 14:26

## 2025-01-01 RX ADMIN — PIPERACILLIN SODIUM AND TAZOBACTAM SODIUM 2.25 G: 2; .25 INJECTION, SOLUTION INTRAVENOUS at 09:02

## 2025-01-01 RX ADMIN — INSULIN LISPRO 5 UNITS: 100 INJECTION, SOLUTION INTRAVENOUS; SUBCUTANEOUS at 14:48

## 2025-01-01 SDOH — HEALTH STABILITY: MENTAL HEALTH
DO YOU FEEL STRESS - TENSE, RESTLESS, NERVOUS, OR ANXIOUS, OR UNABLE TO SLEEP AT NIGHT BECAUSE YOUR MIND IS TROUBLED ALL THE TIME - THESE DAYS?: PATIENT UNABLE TO ANSWER

## 2025-01-01 SDOH — SOCIAL STABILITY: SOCIAL INSECURITY: HAS ANYONE EVER THREATENED TO HURT YOUR FAMILY OR YOUR PETS?: UNABLE TO ASSESS

## 2025-01-01 SDOH — SOCIAL STABILITY: SOCIAL INSECURITY: WERE YOU ABLE TO COMPLETE ALL THE BEHAVIORAL HEALTH SCREENINGS?: NO

## 2025-01-01 SDOH — SOCIAL STABILITY: SOCIAL NETWORK
DO YOU BELONG TO ANY CLUBS OR ORGANIZATIONS SUCH AS CHURCH GROUPS, UNIONS, FRATERNAL OR ATHLETIC GROUPS, OR SCHOOL GROUPS?: PATIENT UNABLE TO ANSWER

## 2025-01-01 SDOH — SOCIAL STABILITY: SOCIAL INSECURITY
WITHIN THE LAST YEAR, HAVE YOU BEEN HUMILIATED OR EMOTIONALLY ABUSED IN OTHER WAYS BY YOUR PARTNER OR EX-PARTNER?: PATIENT UNABLE TO ANSWER

## 2025-01-01 SDOH — SOCIAL STABILITY: SOCIAL NETWORK: IN A TYPICAL WEEK, HOW MANY TIMES DO YOU TALK ON THE PHONE WITH FAMILY, FRIENDS, OR NEIGHBORS?: PATIENT UNABLE TO ANSWER

## 2025-01-01 SDOH — HEALTH STABILITY: PHYSICAL HEALTH
ON AVERAGE, HOW MANY DAYS PER WEEK DO YOU ENGAGE IN MODERATE TO STRENUOUS EXERCISE (LIKE A BRISK WALK)?: PATIENT UNABLE TO ANSWER

## 2025-01-01 SDOH — SOCIAL STABILITY: SOCIAL INSECURITY
WITHIN THE LAST YEAR, HAVE YOU BEEN KICKED, HIT, SLAPPED, OR OTHERWISE PHYSICALLY HURT BY YOUR PARTNER OR EX-PARTNER?: PATIENT UNABLE TO ANSWER

## 2025-01-01 SDOH — SOCIAL STABILITY: SOCIAL NETWORK: HOW OFTEN DO YOU GET TOGETHER WITH FRIENDS OR RELATIVES?: PATIENT UNABLE TO ANSWER

## 2025-01-01 SDOH — ECONOMIC STABILITY: INCOME INSECURITY
IN THE PAST 12 MONTHS HAS THE ELECTRIC, GAS, OIL, OR WATER COMPANY THREATENED TO SHUT OFF SERVICES IN YOUR HOME?: PATIENT UNABLE TO ANSWER

## 2025-01-01 SDOH — HEALTH STABILITY: PHYSICAL HEALTH
HOW OFTEN DO YOU NEED TO HAVE SOMEONE HELP YOU WHEN YOU READ INSTRUCTIONS, PAMPHLETS, OR OTHER WRITTEN MATERIAL FROM YOUR DOCTOR OR PHARMACY?: PATIENT UNABLE TO RESPOND

## 2025-01-01 SDOH — SOCIAL STABILITY: SOCIAL INSECURITY: ARE YOU MARRIED, WIDOWED, DIVORCED, SEPARATED, NEVER MARRIED, OR LIVING WITH A PARTNER?: PATIENT UNABLE TO ANSWER

## 2025-01-01 SDOH — SOCIAL STABILITY: SOCIAL INSECURITY
WITHIN THE LAST YEAR, HAVE YOU BEEN RAPED OR FORCED TO HAVE ANY KIND OF SEXUAL ACTIVITY BY YOUR PARTNER OR EX-PARTNER?: PATIENT UNABLE TO ANSWER

## 2025-01-01 SDOH — ECONOMIC STABILITY: FOOD INSECURITY
WITHIN THE PAST 12 MONTHS, YOU WORRIED THAT YOUR FOOD WOULD RUN OUT BEFORE YOU GOT THE MONEY TO BUY MORE.: PATIENT UNABLE TO ANSWER

## 2025-01-01 SDOH — SOCIAL STABILITY: SOCIAL INSECURITY: HAVE YOU HAD THOUGHTS OF HARMING ANYONE ELSE?: UNABLE TO ASSESS

## 2025-01-01 SDOH — SOCIAL STABILITY: SOCIAL INSECURITY: WITHIN THE LAST YEAR, HAVE YOU BEEN AFRAID OF YOUR PARTNER OR EX-PARTNER?: PATIENT UNABLE TO ANSWER

## 2025-01-01 SDOH — SOCIAL STABILITY: SOCIAL INSECURITY: ABUSE: ADULT

## 2025-01-01 SDOH — SOCIAL STABILITY: SOCIAL INSECURITY: DO YOU FEEL UNSAFE GOING BACK TO THE PLACE WHERE YOU ARE LIVING?: UNABLE TO ASSESS

## 2025-01-01 SDOH — ECONOMIC STABILITY: FOOD INSECURITY
WITHIN THE PAST 12 MONTHS, THE FOOD YOU BOUGHT JUST DIDN'T LAST AND YOU DIDN'T HAVE MONEY TO GET MORE.: PATIENT UNABLE TO ANSWER

## 2025-01-01 SDOH — SOCIAL STABILITY: SOCIAL NETWORK: HOW OFTEN DO YOU ATTEND MEETINGS OF THE CLUBS OR ORGANIZATIONS YOU BELONG TO?: PATIENT UNABLE TO ANSWER

## 2025-01-01 SDOH — HEALTH STABILITY: PHYSICAL HEALTH: ON AVERAGE, HOW MANY MINUTES DO YOU ENGAGE IN EXERCISE AT THIS LEVEL?: PATIENT UNABLE TO ANSWER

## 2025-01-01 SDOH — SOCIAL STABILITY: SOCIAL NETWORK: HOW OFTEN DO YOU ATTEND CHURCH OR RELIGIOUS SERVICES?: PATIENT UNABLE TO ANSWER

## 2025-01-01 SDOH — SOCIAL STABILITY: SOCIAL INSECURITY: DOES ANYONE TRY TO KEEP YOU FROM HAVING/CONTACTING OTHER FRIENDS OR DOING THINGS OUTSIDE YOUR HOME?: UNABLE TO ASSESS

## 2025-01-01 SDOH — SOCIAL STABILITY: SOCIAL INSECURITY: HAVE YOU HAD ANY THOUGHTS OF HARMING ANYONE ELSE?: UNABLE TO ASSESS

## 2025-01-01 SDOH — SOCIAL STABILITY: SOCIAL INSECURITY: ARE YOU OR HAVE YOU BEEN THREATENED OR ABUSED PHYSICALLY, EMOTIONALLY, OR SEXUALLY BY ANYONE?: UNABLE TO ASSESS

## 2025-01-01 SDOH — SOCIAL STABILITY: SOCIAL INSECURITY: DO YOU FEEL ANYONE HAS EXPLOITED OR TAKEN ADVANTAGE OF YOU FINANCIALLY OR OF YOUR PERSONAL PROPERTY?: UNABLE TO ASSESS

## 2025-01-01 SDOH — SOCIAL STABILITY: SOCIAL INSECURITY: ARE THERE ANY APPARENT SIGNS OF INJURIES/BEHAVIORS THAT COULD BE RELATED TO ABUSE/NEGLECT?: UNABLE TO ASSESS

## 2025-01-01 ASSESSMENT — LIFESTYLE VARIABLES
SKIP TO QUESTIONS 9-10: 0
HOW OFTEN DO YOU HAVE A DRINK CONTAINING ALCOHOL: PATIENT UNABLE TO ANSWER
HOW OFTEN DO YOU HAVE 6 OR MORE DRINKS ON ONE OCCASION: PATIENT UNABLE TO ANSWER
AUDIT-C TOTAL SCORE: -1
AUDIT-C TOTAL SCORE: -1
HOW MANY STANDARD DRINKS CONTAINING ALCOHOL DO YOU HAVE ON A TYPICAL DAY: PATIENT UNABLE TO ANSWER

## 2025-01-01 ASSESSMENT — PAIN - FUNCTIONAL ASSESSMENT
PAIN_FUNCTIONAL_ASSESSMENT: 0-10
PAIN_FUNCTIONAL_ASSESSMENT: CPOT (CRITICAL CARE PAIN OBSERVATION TOOL)
PAIN_FUNCTIONAL_ASSESSMENT: CPOT (CRITICAL CARE PAIN OBSERVATION TOOL)
PAIN_FUNCTIONAL_ASSESSMENT: 0-10

## 2025-01-01 ASSESSMENT — ENCOUNTER SYMPTOMS
WEAKNESS: 0
PALPITATIONS: 0
DYSPNEA ON EXERTION: 0
WEIGHT GAIN: 0
NEAR-SYNCOPE: 0
SHORTNESS OF BREATH: 0
COUGH: 0
MYALGIAS: 0
FEVER: 0
DIZZINESS: 0
DIAPHORESIS: 0
PND: 0
CLAUDICATION: 0
SYNCOPE: 0
WEIGHT LOSS: 0
IRREGULAR HEARTBEAT: 0
WHEEZING: 0
ORTHOPNEA: 0

## 2025-01-01 ASSESSMENT — RESPIRATORY DISTRESS OBSERVATION SCALE (RDOS)
RESPIRATORY RATE PER MINUTE: 2 - >30 BREATHS
HEART RATE PER MINUTE: 2 - >109 BEATS
HEART RATE PER MINUTE: 2 - >109 BEATS
ACCESSORY MUSCLE RISE IN CLAVICLE DURING INSPIRATION: 1 - SLIGHT RISE
PARADOXICAL BREATHING PATTERN: 2 - PRESENT
RDOS TOTAL SCORE: 7
RESPIRATORY RATE PER MINUTE: 2 - >30 BREATHS
ACCESSORY MUSCLE RISE IN CLAVICLE DURING INSPIRATION: 2 - PRONOUNCED RISE
RESPIRATORY RATE PER MINUTE: 2 - >30 BREATHS
RESPIRATORY RATE PER MINUTE: 2 - >30 BREATHS
GRUNTING AT END OF EXPIRATION: 0 - NONE
INVOLUNTARY NASAL FLARING: 0 - NONE
LOOK OF FEAR: 2 - EYES WIDE OPEN, FACIAL MUSCLES TENSE, BROW FURROWED, MOUTH OPEN
ACCESSORY MUSCLE RISE IN CLAVICLE DURING INSPIRATION: 2 - PRONOUNCED RISE
GRUNTING AT END OF EXPIRATION: 0 - NONE
RDOS TOTAL SCORE: 12
RESPIRATORY RATE PER MINUTE: 2 - >30 BREATHS
LOOK OF FEAR: 0 - NONE
LOOK OF FEAR: 0 - NONE
RESTLESS NONPURPOSEFUL MOVEMENTS: 0 - NONE
PARADOXICAL BREATHING PATTERN: 2 - PRESENT
PARADOXICAL BREATHING PATTERN: 2 - PRESENT
HEART RATE PER MINUTE: 1 - 90-109 BEATS
PARADOXICAL BREATHING PATTERN: 0 - NONE
LOOK OF FEAR: 0 - NONE
RESTLESS NONPURPOSEFUL MOVEMENTS: 0 - NONE
LOOK OF FEAR: 2 - EYES WIDE OPEN, FACIAL MUSCLES TENSE, BROW FURROWED, MOUTH OPEN
PARADOXICAL BREATHING PATTERN: 2 - PRESENT
GRUNTING AT END OF EXPIRATION: 0 - NONE
GRUNTING AT END OF EXPIRATION: 0 - NONE
RDOS TOTAL SCORE: 6
HEART RATE PER MINUTE: 2 - >109 BEATS
INVOLUNTARY NASAL FLARING: 2 - PRESENT
INVOLUNTARY NASAL FLARING: 0 - NONE
GRUNTING AT END OF EXPIRATION: 0 - NONE
INVOLUNTARY NASAL FLARING: 0 - NONE
RESTLESS NONPURPOSEFUL MOVEMENTS: 0 - NONE
INVOLUNTARY NASAL FLARING: 0 - NONE
HEART RATE PER MINUTE: 2 - >109 BEATS
ACCESSORY MUSCLE RISE IN CLAVICLE DURING INSPIRATION: 0 - NONE
RDOS TOTAL SCORE: 3
RDOS TOTAL SCORE: 10
ACCESSORY MUSCLE RISE IN CLAVICLE DURING INSPIRATION: 0 - NONE

## 2025-01-01 ASSESSMENT — PAIN DESCRIPTION - LOCATION: LOCATION: ABDOMEN

## 2025-01-01 ASSESSMENT — ACTIVITIES OF DAILY LIVING (ADL)
JUDGMENT_ADEQUATE_SAFELY_COMPLETE_DAILY_ACTIVITIES: UNABLE TO ASSESS
BATHING: UNABLE TO ASSESS
WALKS IN HOME: UNABLE TO ASSESS
FEEDING YOURSELF: UNABLE TO ASSESS
LACK_OF_TRANSPORTATION: PATIENT UNABLE TO ANSWER
GROOMING: UNABLE TO ASSESS
ADEQUATE_TO_COMPLETE_ADL: UNABLE TO ASSESS
HEARING - LEFT EAR: FUNCTIONAL
PATIENT'S MEMORY ADEQUATE TO SAFELY COMPLETE DAILY ACTIVITIES?: UNABLE TO ASSESS
TOILETING: UNABLE TO ASSESS
HEARING - RIGHT EAR: FUNCTIONAL
DRESSING YOURSELF: UNABLE TO ASSESS

## 2025-01-01 ASSESSMENT — COGNITIVE AND FUNCTIONAL STATUS - GENERAL
PATIENT BASELINE BEDBOUND: NO
MOVING FROM LYING ON BACK TO SITTING ON SIDE OF FLAT BED WITH BEDRAILS: A LITTLE
MOBILITY SCORE: 23
DAILY ACTIVITIY SCORE: 24

## 2025-01-01 ASSESSMENT — PAIN SCALES - GENERAL
PAINLEVEL_OUTOF10: 0 - NO PAIN
PAINLEVEL_OUTOF10: 5 - MODERATE PAIN
PAINLEVEL_OUTOF10: 0 - NO PAIN
PAINLEVEL_OUTOF10: 0 - NO PAIN
PAINLEVEL_OUTOF10: 7
PAINLEVEL_OUTOF10: 0 - NO PAIN
PAINLEVEL_OUTOF10: 7
PAINLEVEL_OUTOF10: 0 - NO PAIN

## 2025-01-01 ASSESSMENT — PATIENT HEALTH QUESTIONNAIRE - PHQ9
1. LITTLE INTEREST OR PLEASURE IN DOING THINGS: NOT AT ALL
2. FEELING DOWN, DEPRESSED OR HOPELESS: NOT AT ALL
SUM OF ALL RESPONSES TO PHQ9 QUESTIONS 1 & 2: 0

## 2025-01-01 ASSESSMENT — COLUMBIA-SUICIDE SEVERITY RATING SCALE - C-SSRS
1. IN THE PAST MONTH, HAVE YOU WISHED YOU WERE DEAD OR WISHED YOU COULD GO TO SLEEP AND NOT WAKE UP?: NO
2. HAVE YOU ACTUALLY HAD ANY THOUGHTS OF KILLING YOURSELF?: NO
6. HAVE YOU EVER DONE ANYTHING, STARTED TO DO ANYTHING, OR PREPARED TO DO ANYTHING TO END YOUR LIFE?: NO

## 2025-01-01 ASSESSMENT — PAIN DESCRIPTION - ORIENTATION: ORIENTATION: RIGHT;LEFT

## 2025-01-01 ASSESSMENT — PAIN DESCRIPTION - DESCRIPTORS: DESCRIPTORS: ACHING

## 2025-01-07 ENCOUNTER — ANTICOAGULATION - WARFARIN VISIT (OUTPATIENT)
Dept: CARDIOLOGY | Facility: CLINIC | Age: 75
End: 2025-01-07
Payer: MEDICARE

## 2025-01-07 DIAGNOSIS — I48.91 ATRIAL FIBRILLATION, UNSPECIFIED TYPE (MULTI): ICD-10-CM

## 2025-01-07 DIAGNOSIS — I48.21 PERMANENT ATRIAL FIBRILLATION (MULTI): ICD-10-CM

## 2025-01-07 DIAGNOSIS — Z79.01 LONG TERM (CURRENT) USE OF ANTICOAGULANTS: ICD-10-CM

## 2025-01-07 LAB
POC INR: 3.3
POC PROTHROMBIN TIME: NORMAL

## 2025-01-07 PROCEDURE — 85610 PROTHROMBIN TIME: CPT | Mod: QW

## 2025-01-07 PROCEDURE — 99211 OFF/OP EST MAY X REQ PHY/QHP: CPT | Performed by: INTERNAL MEDICINE

## 2025-01-07 NOTE — PROGRESS NOTES
Patient identification verified with 2 identifiers.    Location: [facility] - [address] [place] [address] [phone]     Referring Physician: Broderick  Enrollment/ Re-enrollment date: 05/22/25  INR Goal: 2.0-3.0  INR monitoring is per [facility] protocol.  Anticoagulation Medication: warfarin  Indication: Atrial Fibrillation/Atrial Flutter    Subjective   Bleeding signs/symptoms: No    Bruising: No   Major bleeding event: No  Thrombosis signs/symptoms: No  Thromboembolic event: No  Missed doses: No  Extra doses: No  Medication changes: No  Dietary changes: No  Change in health: No  Change in activity: No  Alcohol: No  Other concerns: No    Upcoming Surgeries:  Does the Patient Have any upcoming surgeries that require interruption in anticoagulation therapy? no  Does the patient require bridging? no      Anticoagulation Summary  As of 1/7/2025      INR goal:  2.0-3.0   TTR:  83.0% (1.3 y)   INR used for dosing:  3.30 (1/7/2025)   Weekly warfarin total:  14 mg             Assessment/Plan   Supratherapeutic     1. New dose: no change    2. Next INR: 1 week      Education provided to patient during the visit:  Patient instructed to call in interim with questions, concerns and changes.   Patient educated on interactions between medications and warfarin.   Patient educated on dietary consistency in vitamin k consumption.   Patient educated on affects of alcohol consumption while taking warfarin.   Patient educated on signs of bleeding/clotting.   Patient educated on compliance with dosing, follow up appointments, and prescribed plan of care.

## 2025-01-09 ENCOUNTER — APPOINTMENT (OUTPATIENT)
Dept: PRIMARY CARE | Facility: CLINIC | Age: 75
End: 2025-01-09
Payer: MEDICARE

## 2025-01-14 ENCOUNTER — TELEPHONE (OUTPATIENT)
Dept: PRIMARY CARE | Facility: CLINIC | Age: 75
End: 2025-01-14
Payer: MEDICARE

## 2025-01-14 DIAGNOSIS — I10 BENIGN ESSENTIAL HYPERTENSION: ICD-10-CM

## 2025-01-14 DIAGNOSIS — I50.23 ACUTE ON CHRONIC SYSTOLIC HEART FAILURE: ICD-10-CM

## 2025-01-14 DIAGNOSIS — E55.9 VITAMIN D DEFICIENCY: ICD-10-CM

## 2025-01-15 ENCOUNTER — OFFICE VISIT (OUTPATIENT)
Dept: PRIMARY CARE | Facility: CLINIC | Age: 75
End: 2025-01-15
Payer: MEDICARE

## 2025-01-15 VITALS
TEMPERATURE: 98.6 F | OXYGEN SATURATION: 98 % | BODY MASS INDEX: 30.72 KG/M2 | SYSTOLIC BLOOD PRESSURE: 110 MMHG | HEART RATE: 90 BPM | DIASTOLIC BLOOD PRESSURE: 60 MMHG | WEIGHT: 184.6 LBS | RESPIRATION RATE: 18 BRPM

## 2025-01-15 DIAGNOSIS — I50.23 ACUTE ON CHRONIC SYSTOLIC HEART FAILURE: ICD-10-CM

## 2025-01-15 DIAGNOSIS — I10 BENIGN ESSENTIAL HYPERTENSION: Primary | ICD-10-CM

## 2025-01-15 DIAGNOSIS — Z79.4 TYPE 2 DIABETES MELLITUS WITH HYPERGLYCEMIA, WITH LONG-TERM CURRENT USE OF INSULIN: ICD-10-CM

## 2025-01-15 DIAGNOSIS — I48.21 PERMANENT ATRIAL FIBRILLATION (MULTI): ICD-10-CM

## 2025-01-15 DIAGNOSIS — E11.65 TYPE 2 DIABETES MELLITUS WITH HYPERGLYCEMIA, WITH LONG-TERM CURRENT USE OF INSULIN: ICD-10-CM

## 2025-01-15 PROBLEM — K21.00 GASTROESOPHAGEAL REFLUX DISEASE WITH ESOPHAGITIS: Status: ACTIVE | Noted: 2025-01-15

## 2025-01-15 PROCEDURE — 3078F DIAST BP <80 MM HG: CPT | Performed by: NURSE PRACTITIONER

## 2025-01-15 PROCEDURE — 3074F SYST BP LT 130 MM HG: CPT | Performed by: NURSE PRACTITIONER

## 2025-01-15 PROCEDURE — 4010F ACE/ARB THERAPY RXD/TAKEN: CPT | Performed by: NURSE PRACTITIONER

## 2025-01-15 PROCEDURE — 99349 HOME/RES VST EST MOD MDM 40: CPT | Performed by: NURSE PRACTITIONER

## 2025-01-15 PROCEDURE — 1159F MED LIST DOCD IN RCRD: CPT | Performed by: NURSE PRACTITIONER

## 2025-01-15 PROCEDURE — 1160F RVW MEDS BY RX/DR IN RCRD: CPT | Performed by: NURSE PRACTITIONER

## 2025-01-15 RX ORDER — LOSARTAN POTASSIUM 25 MG/1
25 TABLET ORAL DAILY
Qty: 28 TABLET | Refills: 11 | Status: SHIPPED | OUTPATIENT
Start: 2025-01-15

## 2025-01-15 RX ORDER — SPIRONOLACTONE 25 MG/1
TABLET ORAL
Qty: 14 TABLET | Refills: 11 | Status: SHIPPED | OUTPATIENT
Start: 2025-01-15

## 2025-01-15 RX ORDER — FUROSEMIDE 40 MG/1
40 TABLET ORAL DAILY
Qty: 28 TABLET | Refills: 11 | Status: SHIPPED | OUTPATIENT
Start: 2025-01-15

## 2025-01-15 RX ORDER — ERGOCALCIFEROL 1.25 MG/1
CAPSULE ORAL
Qty: 4 CAPSULE | Refills: 11 | Status: SHIPPED | OUTPATIENT
Start: 2025-01-15

## 2025-01-15 ASSESSMENT — ENCOUNTER SYMPTOMS
MYALGIAS: 1
WEAKNESS: 1
ACTIVITY CHANGE: 1
SINUS PRESSURE: 1
FATIGUE: 1
ARTHRALGIAS: 1
HEMATOLOGIC/LYMPHATIC NEGATIVE: 1
EYES NEGATIVE: 1
BACK PAIN: 1
ABDOMINAL PAIN: 1
TREMORS: 1
APPETITE CHANGE: 1
COUGH: 1
DIARRHEA: 1
PSYCHIATRIC NEGATIVE: 1
RHINORRHEA: 1
CARDIOVASCULAR NEGATIVE: 1
LIGHT-HEADEDNESS: 1
ENDOCRINE NEGATIVE: 1

## 2025-01-15 NOTE — PROGRESS NOTES
Subjective   Patient ID: Jing Sanchez is a 74 y.o. female who is being seen for routine 2 month house calls follow up.    HPI Pt seen in single family home unaccompanied. PMHX: HTN, AFIB, CHF, DM, Vitamin D deficiency, hypothyroidism, TIA, Anxiety, CKD, COPD, RISHI, unsteady gait.   Pt seen sitting on couch with legs dependent. Pt seen in conjunction with Laura Trejo RN case manager. Pt continues to be noncompliant with medications and states she takes her insulin sometimes. Pt continues to get pill packs through [facility]. Pt checking blood sugar in the mornings and states she was 159 this morning. Pt states numbers in the morning have been below 160. Pt still receiving meals on wheels. Pt reporting appetite not as good lately.  Pt eating small amounts. Pt states phone is not working since yesterday. Pt can receive calls but can't call out. Pt fixated on finding bill for [employer] so that Laura can assist with getting phone service turned back on. Pts  also called during visit which caused pt to focus on phone call. Pt continues to live alone with  in nursing home which he is now in [facility] which is closer and easier for pt to visit. Pt active with social work with [facility] who Laura called and left message for . Pt receives meals on wheels. Pt with medi alert button which she has in the kitchen. Pt reports diarrhea over the last week with upper respiratory infection. Pt reports energy as fair, doesn't leave the house often. Pt does get out on Sundays to see . Pt primarily housebound and no longer drives. Sleep reported as off and on , sleeps in increments and gets up twice a night to urinate. Pt denies fever, chills, night sweats, or headaches. Pt admits to occasional dizziness when ambulating. Pt has a walker she does not use because of the clutter in the home and inability to fit walker through. Pt denies trouble chewing or swallowing. Pt denies SOB admits to dry  non productive. Denies CP or palpitations. Denies N/V/ or constipation with a bowel movement every other day. Pt admits to left shoulder pain worse with movement.   Pt continues to go to Coumadin clinic for INR monitoring and is scheduled to Friday, last INR was 3.3. Pt with no other concerns or complaints at present.   Home Visit medically necessary due to: pt has chronic condition that makes access to a traditional office visit very difficult, illness or condition that results in activity limitation or restriction that impacts the ability to leave home such as; unsteady gait/ poor condition.    Review of Systems   Constitutional:  Positive for activity change, appetite change and fatigue.   HENT:  Positive for congestion, rhinorrhea, sinus pressure and sneezing.    Eyes: Negative.    Respiratory:  Positive for cough (dry).    Cardiovascular: Negative.    Gastrointestinal:  Positive for abdominal pain and diarrhea.   Endocrine: Negative.    Genitourinary: Negative.    Musculoskeletal:  Positive for arthralgias (left shoulder), back pain, gait problem and myalgias.   Skin: Negative.    Neurological:  Positive for tremors, weakness and light-headedness.   Hematological: Negative.    Psychiatric/Behavioral: Negative.         Objective   /60 (BP Location: Left arm, Patient Position: Sitting, BP Cuff Size: Adult)   Pulse 90   Temp 37 °C (98.6 °F) (Temporal)   Resp 18   Wt 83.7 kg (184 lb 9.6 oz)   SpO2 98%   BMI 30.72 kg/m²       Current Outpatient Medications:     blood sugar diagnostic (Accu-Chek Guide test strips) strip, 1 strip 3 times a day., Disp: 100 strip, Rfl: 11    blood-glucose meter (Accu-Chek Guide Glucose Meter) misc, Test TID, Disp: 1 each, Rfl: 0    carvedilol (Coreg) 3.125 mg tablet, 1 TAB BY MOUTH TWICE A DAY WITH OR AFTER MEALS HOLD FOR HR <55 BPM AND/OR SBP <90, Disp: 60 tablet, Rfl: 11    empagliflozin (Jardiance) 10 mg, Take 1 tablet (10 mg) by mouth once daily., Disp: 30 tablet, Rfl:  "11    ergocalciferol (Vitamin D-2) 1.25 MG (19087 UT) capsule, 1 CAP BY MOUTH WEEKLY ON SUNDAY, Disp: 4 capsule, Rfl: 11    fish oil (Omega-3) 60- mg capsule, Take 2 capsules (1,000 mg) by mouth once daily., Disp: 60 capsule, Rfl: 11    furosemide (Lasix) 40 mg tablet, 1 TAB BY MOUTH ONCE A DAY, Disp: 28 tablet, Rfl: 11    insulin lispro (HumaLOG) 100 unit/mL injection, INJECT SUBQ PER SLIDING SCALE WITH MEALS (MAX DAILY DOSE OF 80 UNITS), Disp: 30 mL, Rfl: 10    lancets misc, Accucheck lancets, Disp: 100 each, Rfl: 11    Lantus Solostar U-100 Insulin 100 unit/mL (3 mL) pen, 25 UNITS SUBQ DAILY AT BEDTIME - TAKE AS DIRECTED PER INSULIN INSTRUCTIONS, Disp: 9 mL, Rfl: 11    losartan (Cozaar) 25 mg tablet, 1 TAB BY MOUTH ONCE A DAY, Disp: 28 tablet, Rfl: 11    nystatin (Mycostatin) 100,000 unit/gram powder, apply to breasts Topically tid / as needed, Disp: , Rfl:     pantoprazole (ProtoNix) 40 mg EC tablet, 1 TAB BY MOUTH ONCE A DAY; DO NOT CRUSH, CHEW, OR SPLIT, Disp: 30 tablet, Rfl: 11    pen needle, diabetic (BD Ultra-Fine Short Pen Needle) 31 gauge x 5/16\" needle, Use and discard 4 pen needles per day subcutaneously Dx: E11.65 for 90 days, Disp: , Rfl:     spironolactone (Aldactone) 25 mg tablet, ONE-*HALF* TAB (12.5MG) BY MOUTH ONCE A DAY, Disp: 14 tablet, Rfl: 11    warfarin (Coumadin) 2 mg tablet, TAKE 1 TABLET (2 MG) BY MOUTH EVERY DAY IN THE EVENING AS DIRECTED BY PRESCRIBER, Disp: 30 tablet, Rfl: 11      Physical Exam  Constitutional:       Appearance: Normal appearance. She is obese.   HENT:      Head: Normocephalic and atraumatic.      Nose: Nose normal.      Mouth/Throat:      Mouth: Mucous membranes are moist.      Pharynx: Oropharynx is clear.      Comments: Poor dentition with missing teeth  Eyes:      Conjunctiva/sclera: Conjunctivae normal.      Pupils: Pupils are equal, round, and reactive to light.   Cardiovascular:      Rate and Rhythm: Normal rate and regular rhythm.      Pulses: Normal " pulses.      Heart sounds: Normal heart sounds.   Pulmonary:      Effort: Pulmonary effort is normal.      Breath sounds: Normal breath sounds.   Abdominal:      General: Bowel sounds are normal.      Palpations: Abdomen is soft.   Musculoskeletal:         General: Normal range of motion.      Cervical back: Normal range of motion and neck supple. Tenderness present.   Feet:      Right foot:      Toenail Condition: Right toenails are abnormally thick. Fungal disease present.     Left foot:      Toenail Condition: Left toenails are abnormally thick. Fungal disease present.  Skin:     General: Skin is warm and dry.      Capillary Refill: Capillary refill takes less than 2 seconds.      Comments: Feet with purple discoloration.    Neurological:      Mental Status: She is alert.      Motor: Weakness present.      Gait: Gait abnormal.   Psychiatric:         Mood and Affect: Mood normal.         Behavior: Behavior normal.     Patient Active Problem List   Diagnosis    At risk for injury related to restraints    Abnormal vaginal bleeding    Asthenia    Asymptomatic coronary heart disease    Benign essential hypertension    Blood glucose abnormal    Cardiomyopathy    Chronic atrial fibrillation (Multi)    Stage 3 chronic kidney disease (Multi)    Chronic obstructive pulmonary disease (Multi)    Acute on chronic systolic heart failure    Cor pulmonale (Multi)    Diabetes mellitus (Multi)    Type 2 diabetes mellitus with obesity (Multi)    CHF (congestive heart failure)    Transient ischemic attack    Type 2 diabetes mellitus with diabetic nephropathy    Unsteady gait    Atrial fibrillation (Multi)    Long term (current) use of anticoagulants    Vitamin D deficiency    ST elevation (STEMI) myocardial infarction involving left anterior descending coronary artery (Multi)    Hypothyroidism    Chronic pain    Obstructive sleep apnea syndrome    Nonadherence to medication    Morbid obesity (Multi)    Longstanding persistent atrial  fibrillation (Multi)    Leukocytosis    Hypertensive heart and chronic kidney disease with heart failure and stage 1 through stage 4 chronic kidney disease, or unspecified chronic kidney disease    Hyperlipidemia    Acute alteration in mental status    History of cholecystectomy    Ischemic cardiomyopathy    Atherosclerosis of coronary artery bypass graft    Arteriosclerosis of coronary artery    Hirsutism    Postmenopausal bleeding    Anxiety    Acute respiratory failure    Acute myocardial infarction of anterior wall (Multi)    Acute pancreatitis    Atrial fibrillation, unspecified type (Multi)         Assessment/Plan   Diagnoses and all orders for this visit:  Benign essential hypertension  Comments:  Stable   Continue Cozaar 25 mg po daily.  Continue Coreg 3.125 mg po twice daily.  Permanent atrial fibrillation (Multi)  Comments:  Continue INR monitoring a coumadin clinic.  Continue Warfarn per coumadin clinic.   Return Friday 1/17 for INR  Acute on chronic systolic heart failure  Comments:  Continue Lasix 40 mg po daily.  Low sodium diet.  Type 2 diabetes mellitus with hyperglycemia, with long-term current use of insulin  Comments:  Continue Jardiance 10 mg po daily.  Continue Lantus 25 units SQ at bedtime  Continue Humalog sliding scale with meals ,  Encouraged compliance with medications.  Continue blood sugar monitoring daily.     Follow up in 2 months or prn.  Eri Vazquez, APRN-CNP

## 2025-01-16 ENCOUNTER — DOCUMENTATION (OUTPATIENT)
Dept: PRIMARY CARE | Facility: CLINIC | Age: 75
End: 2025-01-16
Payer: MEDICARE

## 2025-01-16 NOTE — PROGRESS NOTES
House Calls CM in home visit with provider 1/15/25. Sitting up in chair in living room upon arrival. Home cluttered and unkempt. Multiple papers/bills in various locations. Stated she had a visitor to help with bills set up by MARCUS from [facility]. Currently concerned over inability to make outgoing calls. Can receive calls and only able to call 911. Unable to locate [employer] statement. Message left with customer service to return call to patient to rectify. Stated she is not eating as much. Weight loss noted. Reported recent loose stools. Reports taking morning meds. Sometimes forgets evening meds. Not taking insulin consistently. Reports BG this am 160's. Remains active with [employer]. Receives Meals on Wheels. Has friend that provides transportation to Anticoagulation Clinic and to see spouse in long term care facility weekly. Will collaborate with MARCUS from [facility] and follow up with phone call to patient.

## 2025-01-16 NOTE — PROGRESS NOTES
Follow up call placed to patient. Stated she received call back from [employer] and issue has been resolved. Also spoke with MARCUS Ely with [facility]. She will follow up with patient regarding bill paying.

## 2025-01-17 ENCOUNTER — ANTICOAGULATION - WARFARIN VISIT (OUTPATIENT)
Dept: CARDIOLOGY | Facility: CLINIC | Age: 75
End: 2025-01-17
Payer: MEDICARE

## 2025-01-17 DIAGNOSIS — Z79.01 LONG TERM (CURRENT) USE OF ANTICOAGULANTS: ICD-10-CM

## 2025-01-17 DIAGNOSIS — I48.21 PERMANENT ATRIAL FIBRILLATION (MULTI): ICD-10-CM

## 2025-01-17 DIAGNOSIS — I48.91 ATRIAL FIBRILLATION, UNSPECIFIED TYPE (MULTI): ICD-10-CM

## 2025-01-17 LAB
POC INR: 2.5
POC PROTHROMBIN TIME: NORMAL

## 2025-01-17 PROCEDURE — 99211 OFF/OP EST MAY X REQ PHY/QHP: CPT | Performed by: INTERNAL MEDICINE

## 2025-01-17 PROCEDURE — 85610 PROTHROMBIN TIME: CPT | Mod: QW

## 2025-01-17 NOTE — PROGRESS NOTES
Patient identification verified with 2 identifiers.    Location: [facility] - [address] [place] [address] [phone]     Referring Physician: Broderick  Enrollment/ Re-enrollment date: 25  INR Goal: 2.0-3.0  INR monitoring is per [facility] protocol.  Anticoagulation Medication: warfarin  Indication: Atrial Fibrillation/Atrial Flutter    Subjective   Bleeding signs/symptoms: No    Bruising: No   Major bleeding event: No  Thrombosis signs/symptoms: No  Thromboembolic event: No  Missed doses: No  Extra doses: No  Medication changes: No  Dietary changes: No  Change in health: No  Change in activity: No  Alcohol: No  Other concerns: No    Upcoming Surgeries:  Does the Patient Have any upcoming surgeries that require interruption in anticoagulation therapy? no  Does the patient require bridging? no      Anticoagulation Summary  As of 2025      INR goal:  2.0-3.0   TTR:  82.6% (1.3 y)   INR used for dosin.50 (2025)   Weekly warfarin total:  14 mg             Assessment/Plan   therapeutic     1. New dose: no change    2. Next INR: 4 weeks      Education provided to patient during the visit:  Patient instructed to call in interim with questions, concerns and changes.   Patient educated on interactions between medications and warfarin.   Patient educated on dietary consistency in vitamin k consumption.   Patient educated on affects of alcohol consumption while taking warfarin.   Patient educated on signs of bleeding/clotting.   Patient educated on compliance with dosing, follow up appointments, and prescribed plan of care.

## 2025-01-20 ENCOUNTER — DOCUMENTATION (OUTPATIENT)
Dept: PRIMARY CARE | Facility: CLINIC | Age: 75
End: 2025-01-20
Payer: MEDICARE

## 2025-01-20 NOTE — PROGRESS NOTES
Spoke with MARCUS Ely with [facility] 1/17/25. Stated she unaware of anyone helping will bill paying. She had put referral in for [employer] and called to follow up. She was told that when they called [place], she told them she only needed help taking garbage down. Another referral placed by Eri as patient in need of assistance in home.

## 2025-01-21 ENCOUNTER — TELEPHONE (OUTPATIENT)
Dept: PRIMARY CARE | Facility: CLINIC | Age: 75
End: 2025-01-21
Payer: MEDICARE

## 2025-01-23 ENCOUNTER — DOCUMENTATION (OUTPATIENT)
Dept: PRIMARY CARE | Facility: CLINIC | Age: 75
End: 2025-01-23
Payer: MEDICARE

## 2025-01-23 NOTE — PROGRESS NOTES
Follow up call placed to patient. Stated she called House Calls on call in attempt to reach spouse currently hospitalized at [facility]. She spoke with him yesterday and now has [facility] phone number as well as his room number. She is aware that MARCUS Ely with [facility] is working on assistance with paying her bills as well as a new [employer] referral.   Stated her BG was 242 this morning and she took her humalog. Did not take Lantus last evening because her hip was hurting, but is feeling better now. Discussed importance of taking insulin as prescribed. Will follow up with phone call.

## 2025-01-29 ENCOUNTER — DOCUMENTATION (OUTPATIENT)
Dept: PRIMARY CARE | Facility: CLINIC | Age: 75
End: 2025-01-29
Payer: MEDICARE

## 2025-01-29 NOTE — PROGRESS NOTES
SSA form  completed and returned to MARCUS Ely at [facility] for the process of securing a payee.

## 2025-02-06 DIAGNOSIS — I50.23 ACUTE ON CHRONIC SYSTOLIC HEART FAILURE: ICD-10-CM

## 2025-02-06 DIAGNOSIS — I10 PRIMARY HYPERTENSION: ICD-10-CM

## 2025-02-06 DIAGNOSIS — I10 BENIGN ESSENTIAL HYPERTENSION: ICD-10-CM

## 2025-02-06 DIAGNOSIS — K21.9 GASTROESOPHAGEAL REFLUX DISEASE WITHOUT ESOPHAGITIS: ICD-10-CM

## 2025-02-06 DIAGNOSIS — I48.20 CHRONIC ATRIAL FIBRILLATION (MULTI): ICD-10-CM

## 2025-02-06 RX ORDER — FUROSEMIDE 40 MG/1
40 TABLET ORAL DAILY
Qty: 90 TABLET | Refills: 2 | Status: SHIPPED | OUTPATIENT
Start: 2025-02-06

## 2025-02-06 RX ORDER — SPIRONOLACTONE 25 MG/1
25 TABLET ORAL ONCE
Qty: 90 TABLET | Refills: 2 | Status: SHIPPED | OUTPATIENT
Start: 2025-02-06 | End: 2025-02-06

## 2025-02-06 RX ORDER — CARVEDILOL 3.12 MG/1
3.12 TABLET ORAL 2 TIMES DAILY
Qty: 180 TABLET | Refills: 2 | Status: SHIPPED | OUTPATIENT
Start: 2025-02-06 | End: 2026-02-06

## 2025-02-06 RX ORDER — PANTOPRAZOLE SODIUM 40 MG/1
40 TABLET, DELAYED RELEASE ORAL
Qty: 90 TABLET | Refills: 2 | Status: SHIPPED | OUTPATIENT
Start: 2025-02-06 | End: 2026-02-06

## 2025-02-06 RX ORDER — LOSARTAN POTASSIUM 25 MG/1
25 TABLET ORAL DAILY
Qty: 90 TABLET | Refills: 2 | Status: SHIPPED | OUTPATIENT
Start: 2025-02-06 | End: 2026-02-06

## 2025-02-06 RX ORDER — WARFARIN 2 MG/1
TABLET ORAL
Qty: 90 TABLET | Refills: 0 | Status: SHIPPED | OUTPATIENT
Start: 2025-02-06

## 2025-02-10 ENCOUNTER — TELEPHONE (OUTPATIENT)
Dept: PRIMARY CARE | Facility: CLINIC | Age: 75
End: 2025-02-10

## 2025-02-10 ENCOUNTER — ANTICOAGULATION - WARFARIN VISIT (OUTPATIENT)
Dept: CARDIOLOGY | Facility: CLINIC | Age: 75
End: 2025-02-10
Payer: MEDICARE

## 2025-02-10 DIAGNOSIS — I48.20 CHRONIC ATRIAL FIBRILLATION (MULTI): ICD-10-CM

## 2025-02-10 DIAGNOSIS — Z79.01 LONG TERM (CURRENT) USE OF ANTICOAGULANTS: ICD-10-CM

## 2025-02-10 DIAGNOSIS — I48.21 PERMANENT ATRIAL FIBRILLATION (MULTI): ICD-10-CM

## 2025-02-10 DIAGNOSIS — I48.91 ATRIAL FIBRILLATION, UNSPECIFIED TYPE (MULTI): ICD-10-CM

## 2025-02-10 LAB
POC INR: 2.1
POC PROTHROMBIN TIME: NORMAL

## 2025-02-10 PROCEDURE — 99211 OFF/OP EST MAY X REQ PHY/QHP: CPT | Performed by: INTERNAL MEDICINE

## 2025-02-10 PROCEDURE — 85610 PROTHROMBIN TIME: CPT | Mod: QW

## 2025-02-10 RX ORDER — WARFARIN 2 MG/1
TABLET ORAL
Qty: 90 TABLET | Refills: 0 | Status: SHIPPED | OUTPATIENT
Start: 2025-02-10

## 2025-02-10 NOTE — PROGRESS NOTES
Patient identification verified with 2 identifiers.    Location: [facility] - [address] [place] [address] [phone]     Referring Physician: Broderick  Enrollment/ Re-enrollment date: 25  INR Goal: 2.0-3.0  INR monitoring is per [facility] protocol.  Anticoagulation Medication: warfarin  Indication: Atrial Fibrillation/Atrial Flutter    Subjective   Bleeding signs/symptoms: No    Bruising: No   Major bleeding event: No  Thrombosis signs/symptoms: No  Thromboembolic event: No  Missed doses: No  Extra doses: No  Medication changes: No  Dietary changes: No  Change in health: No  Change in activity: No  Alcohol: No  Other concerns: No    Upcoming Surgeries:  Does the Patient Have any upcoming surgeries that require interruption in anticoagulation therapy? no  Does the patient require bridging? no      Anticoagulation Summary  As of 2/10/2025      INR goal:  2.0-3.0   TTR:  83.4% (1.4 y)   INR used for dosin.10 (2/10/2025)   Weekly warfarin total:  14 mg             Assessment/Plan   Therapeutic     1. New dose: no change    2. Next INR: 1 month      Education provided to patient during the visit:  Patient instructed to call in interim with questions, concerns and changes.   Patient educated on interactions between medications and warfarin.   Patient educated on dietary consistency in vitamin k consumption.   Patient educated on affects of alcohol consumption while taking warfarin.   Patient educated on signs of bleeding/clotting.   Patient educated on compliance with dosing, follow up appointments, and prescribed plan of care.

## 2025-02-10 NOTE — TELEPHONE ENCOUNTER
Pt has not taken her warfarin since Thursday 02/06 because she has been out. Pt requesting short supply to local [facility] and her friend will pick it up. OK for me to call in 10 day supply? If sent electronically it will cancel mail order rx.

## 2025-02-11 ENCOUNTER — DOCUMENTATION (OUTPATIENT)
Dept: PRIMARY CARE | Facility: CLINIC | Age: 75
End: 2025-02-11
Payer: MEDICARE

## 2025-02-11 NOTE — PROGRESS NOTES
House Calls CM follow up in home visit. Sitting up in recliner. Using walker in home on occasion. Friend picked up short supply of Coumadin from local pharmacy until mail order arrives. Stated she took yesterday's dose. Encouraged to take meds and insulin as prescribed. BG this morning 182. Four boxes of  insulin pens removed from refrigerator. Pharmacy is now [facility] home delivery pharmacy with new [employer] plan. Stated she received in home assistance for cleaning last week and expects again this week. Expresses concern about spouse's condition. He is currently in ICU at [facility]. Friend has been taking her to visit weekly. She spoke with nurse on phone during visit. He remains intubated on ventilator. Support provided. Will follow up with phone call next week.

## 2025-02-12 DIAGNOSIS — E78.1 HYPERTRIGLYCERIDEMIA: ICD-10-CM

## 2025-02-12 DIAGNOSIS — I50.23 ACUTE ON CHRONIC SYSTOLIC HEART FAILURE: ICD-10-CM

## 2025-02-24 ENCOUNTER — TELEPHONE (OUTPATIENT)
Dept: PRIMARY CARE | Facility: CLINIC | Age: 75
End: 2025-02-24
Payer: MEDICARE

## 2025-02-24 DIAGNOSIS — I48.20 CHRONIC ATRIAL FIBRILLATION (MULTI): ICD-10-CM

## 2025-02-24 RX ORDER — WARFARIN 2 MG/1
TABLET ORAL
Qty: 90 TABLET | Refills: 2 | Status: SHIPPED | OUTPATIENT
Start: 2025-02-24 | End: 2025-02-25

## 2025-02-24 NOTE — TELEPHONE ENCOUNTER
Received brief message from patient stating she needs a refill of warfarin 2 mg sent to [facility].  It is noted that mail order was sent on 2/10/25 to [facility].  Do you assist this patient with her medications?

## 2025-02-25 DIAGNOSIS — I48.20 CHRONIC ATRIAL FIBRILLATION (MULTI): ICD-10-CM

## 2025-02-25 RX ORDER — WARFARIN 2 MG/1
TABLET ORAL
Qty: 30 TABLET | Refills: 11 | Status: SHIPPED | OUTPATIENT
Start: 2025-02-25

## 2025-02-25 NOTE — TELEPHONE ENCOUNTER
Spoke with patient. Was at hospital yesterday afternoon with spouse. [facility]. Agreed to call [facility] regarding delivery of Coumadin Rx this morning. Plan for in home visit this afternoon.

## 2025-02-26 ENCOUNTER — DOCUMENTATION (OUTPATIENT)
Dept: PRIMARY CARE | Facility: CLINIC | Age: 75
End: 2025-02-26
Payer: MEDICARE

## 2025-02-26 NOTE — PROGRESS NOTES
House Calls CM follow up in home visit. Sitting in chair upon arrival. Discussed concerns regarding spouse's re-hospitalization. New Coumadin Rx sent to [facility] and obtained. Taking meds as prescribed. Has 2 packs of weekly pill packaging from [facility] remaining. Medications received from [facility] in bottles placed in weekly pill box. Stated her appetite has been poor. Checked BG, was 187. Took Humalog and ate delivered meal during visit. Will follow up in one week.

## 2025-02-28 ENCOUNTER — DOCUMENTATION (OUTPATIENT)
Dept: PRIMARY CARE | Facility: CLINIC | Age: 75
End: 2025-02-28
Payer: MEDICARE

## 2025-02-28 NOTE — PROGRESS NOTES
Returned call from patient. Stated she received the additional meds from [facility]. She has one week remaining with original [facility] compliance packaging. Aware to start it on Sunday. Will follow up with in home visit next week to set up subsequent medications.

## 2025-03-06 ENCOUNTER — DOCUMENTATION (OUTPATIENT)
Dept: PRIMARY CARE | Facility: CLINIC | Age: 75
End: 2025-03-06
Payer: MEDICARE

## 2025-03-06 NOTE — PROGRESS NOTES
Follow up in home visit. Sitting up in recliner. Voiced no complaints. Had eaten breakfast and took morning meds. Reported her morning BG was 214. Reminded to take insulin as prescribed. Discussed status of spouse who has been extubated and expected to return to [facility] soon.  Medications received from 2 pharmacies, bottles and compliance packs placed in weekly pill box for her to start this Sunday. Expressed understanding. MARCUS Ely with [facility] also visiting today to assist with bills. Will follow up next week.

## 2025-03-10 ENCOUNTER — ANTICOAGULATION - WARFARIN VISIT (OUTPATIENT)
Dept: CARDIOLOGY | Facility: CLINIC | Age: 75
End: 2025-03-10
Payer: MEDICARE

## 2025-03-10 DIAGNOSIS — I48.21 PERMANENT ATRIAL FIBRILLATION (MULTI): ICD-10-CM

## 2025-03-10 DIAGNOSIS — Z79.01 LONG TERM (CURRENT) USE OF ANTICOAGULANTS: ICD-10-CM

## 2025-03-10 DIAGNOSIS — I48.91 ATRIAL FIBRILLATION, UNSPECIFIED TYPE (MULTI): ICD-10-CM

## 2025-03-10 LAB
POC INR: 3
POC PROTHROMBIN TIME: NORMAL

## 2025-03-10 PROCEDURE — 99211 OFF/OP EST MAY X REQ PHY/QHP: CPT | Performed by: INTERNAL MEDICINE

## 2025-03-10 PROCEDURE — 85610 PROTHROMBIN TIME: CPT | Mod: QW

## 2025-03-10 NOTE — PROGRESS NOTES
Patient identification verified with 2 identifiers.    Location: [facility] in [place] - [address] [place] [address] [phone]     Referring Physician: Broderick  Enrollment/ Re-enrollment date: 05/22/25  INR Goal: 2.0-3.0  INR monitoring is per [facility] protocol.  Anticoagulation Medication: warfarin  Indication: Atrial Fibrillation/Atrial Flutter    Subjective   Bleeding signs/symptoms: No    Bruising: No   Major bleeding event: No  Thrombosis signs/symptoms: No  Thromboembolic event: No  Missed doses: No  Extra doses: No  Medication changes: No  Dietary changes: No  Change in health: No  Change in activity: No  Alcohol: No  Other concerns: No    Upcoming Surgeries:  Does the Patient Have any upcoming surgeries that require interruption in anticoagulation therapy? no  Does the patient require bridging? no      Anticoagulation Summary  As of 3/10/2025      INR goal:  2.0-3.0   TTR:  84.3% (1.4 y)   INR used for dosing:  3.00 (3/10/2025)   Weekly warfarin total:  14 mg             Assessment/Plan   Therapeutic     1. New dose: no change    2. Next INR: 1 month      Education provided to patient during the visit:  Patient instructed to call in interim with questions, concerns and changes.   Patient educated on interactions between medications and warfarin.   Patient educated on dietary consistency in vitamin k consumption.   Patient educated on affects of alcohol consumption while taking warfarin.   Patient educated on signs of bleeding/clotting.   Patient educated on compliance with dosing, follow up appointments, and prescribed plan of care.

## 2025-03-13 ENCOUNTER — TELEPHONE (OUTPATIENT)
Dept: PRIMARY CARE | Facility: CLINIC | Age: 75
End: 2025-03-13
Payer: MEDICARE

## 2025-03-14 ENCOUNTER — OFFICE VISIT (OUTPATIENT)
Dept: PRIMARY CARE | Facility: CLINIC | Age: 75
End: 2025-03-14
Payer: MEDICARE

## 2025-03-14 VITALS
RESPIRATION RATE: 18 BRPM | BODY MASS INDEX: 28.89 KG/M2 | OXYGEN SATURATION: 96 % | DIASTOLIC BLOOD PRESSURE: 64 MMHG | HEART RATE: 97 BPM | SYSTOLIC BLOOD PRESSURE: 126 MMHG | HEIGHT: 65 IN | TEMPERATURE: 97.7 F | WEIGHT: 173.4 LBS

## 2025-03-14 DIAGNOSIS — E03.9 HYPOTHYROIDISM, UNSPECIFIED TYPE: ICD-10-CM

## 2025-03-14 DIAGNOSIS — I50.22 CHRONIC SYSTOLIC CONGESTIVE HEART FAILURE: ICD-10-CM

## 2025-03-14 DIAGNOSIS — I10 BENIGN ESSENTIAL HYPERTENSION: ICD-10-CM

## 2025-03-14 DIAGNOSIS — K21.00 GASTROESOPHAGEAL REFLUX DISEASE WITH ESOPHAGITIS WITHOUT HEMORRHAGE: ICD-10-CM

## 2025-03-14 DIAGNOSIS — Z91.148 NONADHERENCE TO MEDICATION: ICD-10-CM

## 2025-03-14 DIAGNOSIS — E11.21 TYPE 2 DIABETES MELLITUS WITH DIABETIC NEPHROPATHY, WITH LONG-TERM CURRENT USE OF INSULIN: Primary | ICD-10-CM

## 2025-03-14 DIAGNOSIS — Z79.4 TYPE 2 DIABETES MELLITUS WITH DIABETIC NEPHROPATHY, WITH LONG-TERM CURRENT USE OF INSULIN: Primary | ICD-10-CM

## 2025-03-14 DIAGNOSIS — I48.20 CHRONIC ATRIAL FIBRILLATION (MULTI): ICD-10-CM

## 2025-03-14 DIAGNOSIS — E78.00 PURE HYPERCHOLESTEROLEMIA: ICD-10-CM

## 2025-03-14 DIAGNOSIS — E55.9 VITAMIN D DEFICIENCY: ICD-10-CM

## 2025-03-14 PROCEDURE — 4010F ACE/ARB THERAPY RXD/TAKEN: CPT | Performed by: NURSE PRACTITIONER

## 2025-03-14 PROCEDURE — 3008F BODY MASS INDEX DOCD: CPT | Performed by: NURSE PRACTITIONER

## 2025-03-14 PROCEDURE — 1125F AMNT PAIN NOTED PAIN PRSNT: CPT | Performed by: NURSE PRACTITIONER

## 2025-03-14 PROCEDURE — 3078F DIAST BP <80 MM HG: CPT | Performed by: NURSE PRACTITIONER

## 2025-03-14 PROCEDURE — 1160F RVW MEDS BY RX/DR IN RCRD: CPT | Performed by: NURSE PRACTITIONER

## 2025-03-14 PROCEDURE — 1159F MED LIST DOCD IN RCRD: CPT | Performed by: NURSE PRACTITIONER

## 2025-03-14 PROCEDURE — 3074F SYST BP LT 130 MM HG: CPT | Performed by: NURSE PRACTITIONER

## 2025-03-14 PROCEDURE — 99349 HOME/RES VST EST MOD MDM 40: CPT | Performed by: NURSE PRACTITIONER

## 2025-03-14 ASSESSMENT — PAIN SCALES - GENERAL: PAINLEVEL_OUTOF10: 2

## 2025-03-14 NOTE — PROGRESS NOTES
"Subjective   Patient ID: Jing Sanchez is a 74 y.o. female who presents for Follow-up (Routine follow up, chronic medical conditions, labs).    Visit for 73 y/o female seen today in private home, alone for routine follow up of chronic medical conditions. PMHX: HTN, AFIB, CHF, DM, Vitamin D deficiency, hypothyroidism, TIA, Anxiety, CKD, COPD, RISHI, unsteady gait. Pt is sitting on couch in living room. She is alert, oriented, able to answer all questions regarding her health. Pt lives alone in a single family home. Her spouse passed away 2 days ago. Pt is very tearful today. She reports that Matt's friend Desmond took her to the  home yesterday to start making arrangements and she will be going back this afternoon. Pt does not drive or have a vehicle. Desmond will [facility] the [facility] but otherwise she does not go out for medical appointments. Pt was following with cardiology but she missed her last appointment and has not rescheduled it. Pt is established with [facility]. She receives meals on wheels and has a volunteer  through the [facility]. The [facility] hospital did arrange for a  to come to the home and clean the carpets, refrigerator and  the house but no one assists patient routinely with this. Her house is very unkept. Pt admits that it is difficult to keep it clean and states \"I just have so much going on\". Pt has all medications delivered. House calls KAREN Sanchez does assist with arranging pill boxes for patient. She reports compliance with her oral medications but admits that she is not taking her Lantus at all right now. Pt is able to prepare simple meals for herself, mostly using the microwave. She has had some weight loss. Denies appetite changes, abdominal pain, nausea, vomiting. Denies any current bowel concerns. Pt endorses urinary incontinence. She remains ambulatory without assistive device. Gait unsteady. Denies recent fall or injury. Pt is able to do her own dressing and " toileting but showers very infrequent as she is worried she will fall.      DM- patient has a glucometer in home and has been monitoring her levels 2-3x per day. FBS was 224 this morning. Pt did not take her Lantus last night and admits that she is unable to remember the last time she actually took her night time insulin. Pt is compliant with her Humalog with [facility]. She denies dizziness, lightheadedness, s/sx of hypoglycemia.      Afib/CHF/elevated triglycerides- patient remains active with [facility]. She is scheduled for a follow up on 4/7/25. Pt denies any increased bruising or bleeding concerns. She denies chest pain, palpitations, shortness of breath, cough or wheezing. She is monitoring her weight daily.      Home Visit:          Medically necessary due to: Illness or condition that results in activity lmitation or restriction that impacts the ability to leave home such as:, shortness of breath with minimal exertion, unsteady gait/poor balance       Current Outpatient Medications:     blood sugar diagnostic (Accu-Chek Guide test strips) strip, 1 strip 3 times a day., Disp: 100 strip, Rfl: 11    blood-glucose meter (Accu-Chek Guide Glucose Meter) misc, Test TID, Disp: 1 each, Rfl: 0    carvedilol (Coreg) 3.125 mg tablet, Take 1 tablet (3.125 mg) by mouth 2 times a day., Disp: 180 tablet, Rfl: 2    empagliflozin (Jardiance) 10 mg, Take 1 tablet (10 mg) by mouth once daily., Disp: 30 tablet, Rfl: 11    ergocalciferol (Vitamin D-2) 1.25 MG (47379 UT) capsule, 1 CAP BY MOUTH WEEKLY ON SUNDAY, Disp: 4 capsule, Rfl: 11    fish oil (Omega-3) 60- mg capsule, Take 2 capsules (1,000 mg) by mouth once daily., Disp: 60 capsule, Rfl: 11    furosemide (Lasix) 40 mg tablet, Take 1 tablet (40 mg) by mouth once daily., Disp: 90 tablet, Rfl: 2    insulin lispro (HumaLOG) 100 unit/mL injection, INJECT SUBQ PER SLIDING SCALE WITH MEALS (MAX DAILY DOSE OF 80 UNITS), Disp: 30 mL, Rfl: 10    lancets misc, Accucheck lancets,  "Disp: 100 each, Rfl: 11    Lantus Solostar U-100 Insulin 100 unit/mL (3 mL) pen, 25 UNITS SUBQ DAILY AT BEDTIME - TAKE AS DIRECTED PER INSULIN INSTRUCTIONS (Patient not taking: Reported on 3/14/2025), Disp: 9 mL, Rfl: 11    losartan (Cozaar) 25 mg tablet, Take 1 tablet (25 mg) by mouth once daily., Disp: 90 tablet, Rfl: 2    nystatin (Mycostatin) 100,000 unit/gram powder, apply to breasts Topically tid / as needed, Disp: , Rfl:     pantoprazole (ProtoNix) 40 mg EC tablet, Take 1 tablet (40 mg) by mouth once daily in the morning. Take before meals., Disp: 90 tablet, Rfl: 2    pen needle, diabetic (BD Ultra-Fine Short Pen Needle) 31 gauge x 5/16\" needle, Use and discard 4 pen needles per day subcutaneously Dx: E11.65 for 90 days, Disp: , Rfl:     spironolactone (Aldactone) 25 mg tablet, Take 1 tablet (25 mg) by mouth 1 time for 1 dose., Disp: 90 tablet, Rfl: 2    warfarin (Coumadin) 2 mg tablet, TAKE 1 TABLET BY MOUTH EVERY DAY, Disp: 30 tablet, Rfl: 11     Review of Systems  Constitutional: Positive for weight loss. Negative for appetite change, chills and fever.   HENT: Negative for hearing loss and trouble swallowing.    Respiratory: Negative for cough, shortness of breath and wheezing.    Cardiovascular: Negative for chest pain, palpitations and leg swelling.   Gastrointestinal: Negative for abdominal pain, nausea and vomiting, constipation, diarrhea  Endocrine: Positive for diabetes, uncontrolled   Genitourinary: Positive for urinary incontinence. Negative for difficulty urinating.   Musculoskeletal: Negative for arthralgias, left shoulder pain, unsteady gait   Neurological: Positive for numbness and tingling to bilateral legs, weakness. Negative for dizziness and light-headedness.  Psychiatric/Behavioral: The patient is not nervous/anxious.     Objective   /64 (BP Location: Right arm, Patient Position: Sitting, BP Cuff Size: Adult)   Pulse 97   Temp 36.5 °C (97.7 °F) (Temporal)   Resp 18   Ht 1.651 m " "(5' 5\")   Wt 78.7 kg (173 lb 6.4 oz)   SpO2 96%   BMI 28.86 kg/m²     Physical Exam  Constitutional:       General: She is not in acute distress.     Appearance: Normal appearance.      Comments: Alert. Obese. Sitting in chair, legs dependent.   HENT:      Head: Normocephalic and atraumatic.      Comments: poor dentition, missing teeth     Nose: Nose normal.      Mouth/Throat:      Mouth: Mucous membranes are moist.      Pharynx: Oropharynx is clear.   Eyes:      Pupils: Pupils are equal, round, and reactive to light.      Comments: wearing glasses   Cardiovascular:      Rate and Rhythm: Rhythm irregular, rate normal       Heart sounds: No murmur heard.     No friction rub. No gallop.   Pulmonary:      Effort: Pulmonary effort is normal. No respiratory distress.      Breath sounds: Normal breath sounds. No wheezing, rhonchi or rales.   Abdominal:      General: Bowel sounds are normal. There is no distension.      Palpations: Abdomen is soft.      Tenderness: There is no abdominal tenderness.   Musculoskeletal:         General: Normal range of motion.      Cervical back: Neck supple.      Right lower leg: No edema.      Left lower leg: No edema.      Pain with range of motion of left shoulder.   Skin:     General: Skin is warm and dry.   Neurological:      General: No focal deficit present.      Mental Status: She is alert and oriented to person, place, and time.      Motor: weakness present      Gait: abnormal  Psychiatric:         Mood and Affect: Mood normal.         Behavior: Behavior normal.      Poor insight and judgement regarding health/home environment.      Tearful at times throughout exam/conversation today     Assessment/Plan   Diagnoses and all orders for this visit:  Type 2 diabetes mellitus with diabetic nephropathy, with long-term current use of insulin  -     Hemoglobin A1c; Future  Gastroesophageal reflux disease with esophagitis without hemorrhage  Vitamin D deficiency  -     Vitamin D " 25-Hydroxy,Total (for eval of Vitamin D levels); Future  Benign essential hypertension  -     CBC and Auto Differential; Future  -     Comprehensive metabolic panel; Future  Chronic atrial fibrillation (Multi)  Chronic systolic congestive heart failure  Pure hypercholesterolemia  -     Lipid panel; Future  Hypothyroidism, unspecified type  -     Tsh With Reflex To Free T4 If Abnormal; Future  Nonadherence to medication    Unable to obtain labs in home. Will order through [facility].        MARTÍN Tan-CNP

## 2025-03-14 NOTE — Clinical Note
Can you order CBC, CMP, Lipid panel, A1c, Vitamin D and TSH level through [facility]. Thank you

## 2025-03-20 ENCOUNTER — DOCUMENTATION (OUTPATIENT)
Dept: PRIMARY CARE | Facility: CLINIC | Age: 75
End: 2025-03-20
Payer: MEDICARE

## 2025-03-20 NOTE — PROGRESS NOTES
Follow up in home visit to assist with med management. Compliant with taking medications over past with with exception of Lantus in the evening. States she has been falling asleep. Encouraged patient to take once daily Lantus earlier in evening. Talkes Humalong per [facility]. Fasting  this morning. Weight 171.6. Reports compliance with Coumadin. Has 6 tablets remaining. Pill box filled for week with meds from 2 different pharmacies. Has received assistance from  spouses friends with finances as well as assistance from [employer]. Is now active with [facility] [employer] case management as well. Passport evaluation pending. Will follow up in one week.

## 2025-03-27 ENCOUNTER — DOCUMENTATION (OUTPATIENT)
Dept: PRIMARY CARE | Facility: CLINIC | Age: 75
End: 2025-03-27
Payer: MEDICARE

## 2025-03-27 NOTE — PROGRESS NOTES
Follow up in home visit. Sitting up in chair. Reported she received a letter from [employer] that her Passport application was denied due to not being eligible for Medicaid. Now active with [facility] for counseling and case management. Tearful regarding recent death of spouse. Has been receiving support from friends. Taking meds as prescribed with exception of missing second dose of Carvedilol several days. All meds available. Has received refill of Coumadin.

## 2025-04-03 ENCOUNTER — HOSPITAL ENCOUNTER (EMERGENCY)
Facility: HOSPITAL | Age: 75
Discharge: HOME | End: 2025-04-03
Payer: MEDICARE

## 2025-04-03 ENCOUNTER — APPOINTMENT (OUTPATIENT)
Dept: RADIOLOGY | Facility: HOSPITAL | Age: 75
End: 2025-04-03
Payer: MEDICARE

## 2025-04-03 ENCOUNTER — DOCUMENTATION (OUTPATIENT)
Dept: PRIMARY CARE | Facility: CLINIC | Age: 75
End: 2025-04-03
Payer: MEDICARE

## 2025-04-03 VITALS
HEART RATE: 74 BPM | BODY MASS INDEX: 29.32 KG/M2 | HEIGHT: 65 IN | TEMPERATURE: 97.7 F | DIASTOLIC BLOOD PRESSURE: 93 MMHG | RESPIRATION RATE: 18 BRPM | SYSTOLIC BLOOD PRESSURE: 142 MMHG | WEIGHT: 176 LBS | OXYGEN SATURATION: 97 %

## 2025-04-03 DIAGNOSIS — S42.295A OTHER CLOSED NONDISPLACED FRACTURE OF PROXIMAL END OF LEFT HUMERUS, INITIAL ENCOUNTER: Primary | ICD-10-CM

## 2025-04-03 PROCEDURE — 99283 EMERGENCY DEPT VISIT LOW MDM: CPT

## 2025-04-03 PROCEDURE — 2500000001 HC RX 250 WO HCPCS SELF ADMINISTERED DRUGS (ALT 637 FOR MEDICARE OP): Performed by: NURSE PRACTITIONER

## 2025-04-03 PROCEDURE — 73030 X-RAY EXAM OF SHOULDER: CPT | Mod: LT

## 2025-04-03 PROCEDURE — 73030 X-RAY EXAM OF SHOULDER: CPT | Mod: LEFT SIDE | Performed by: RADIOLOGY

## 2025-04-03 RX ORDER — TRAMADOL HYDROCHLORIDE 50 MG/1
50 TABLET ORAL ONCE
Status: COMPLETED | OUTPATIENT
Start: 2025-04-03 | End: 2025-04-03

## 2025-04-03 RX ADMIN — TRAMADOL HYDROCHLORIDE 50 MG: 50 TABLET, COATED ORAL at 17:46

## 2025-04-03 ASSESSMENT — PAIN DESCRIPTION - ORIENTATION: ORIENTATION: LEFT

## 2025-04-03 ASSESSMENT — PAIN DESCRIPTION - FREQUENCY: FREQUENCY: CONSTANT/CONTINUOUS

## 2025-04-03 ASSESSMENT — PAIN SCALES - GENERAL: PAINLEVEL_OUTOF10: 9

## 2025-04-03 ASSESSMENT — PAIN DESCRIPTION - DESCRIPTORS: DESCRIPTORS: ACHING

## 2025-04-03 ASSESSMENT — PAIN DESCRIPTION - PROGRESSION: CLINICAL_PROGRESSION: NOT CHANGED

## 2025-04-03 ASSESSMENT — PAIN - FUNCTIONAL ASSESSMENT: PAIN_FUNCTIONAL_ASSESSMENT: 0-10

## 2025-04-03 ASSESSMENT — PAIN DESCRIPTION - LOCATION: LOCATION: ARM

## 2025-04-03 ASSESSMENT — PAIN DESCRIPTION - PAIN TYPE: TYPE: ACUTE PAIN

## 2025-04-03 ASSESSMENT — PAIN DESCRIPTION - ONSET: ONSET: SUDDEN

## 2025-04-03 ASSESSMENT — PAIN SCALES - PAIN ASSESSMENT IN ADVANCED DEMENTIA (PAINAD): TOTALSCORE: MEDICATION (SEE MAR)

## 2025-04-03 NOTE — PROGRESS NOTES
Follow up in home visit. Sitting up in chair upon arrival. Taking all morning meds and Coumadin as prescribed. Forgets to take evening dose of Carvedilol. States she  has been taking her Lantus more consistently. Fasting BG this am 94. Pill box arranged for another week. Aware of appointment at [facility] Clinic 4/7. Friend will transport. Stated she was supposed to have a visit from someone this week regarding Medicaid, but this person did not show. Had visit from [facility] this week for assistance will mail/bills. Now active with [facility] for CM and counseling as well. Will follow up in one week.

## 2025-04-03 NOTE — ED PROVIDER NOTES
HPI   Chief Complaint   Patient presents with    Arm Injury       I estimate there is LOW risk for COMPARTMENT SYNDROME, DEEP VENOUS THROMBOSIS, SEPTIC ARTHRITIS, TENDON OR NEUROVASCULAR INJURY, thus I consider the discharge disposition reasonable. We have discussed the diagnosis and risks, and we agree with discharging home to follow-up with their primary doctor or the referral orthopedist. We also discussed returning to the Emergency Department immediately if new or worsening symptoms occur. We have discussed the symptoms which aremost concerning (e.g., changing or worsening pain, numbness, weakness) that necessitates immediate return.        See my MDM      Patient History   Past Medical History:   Diagnosis Date    A-fib (Multi)     Acute MI (Multi) 08/2016    cath by Nikos, wire in RCA and clot migrated to PDA and no PTCA.    ASHD (arteriosclerotic heart disease)     CAD (coronary artery disease)     CHF (congestive heart failure)     DM (diabetes mellitus) (Multi)     Dyslipidemia     H/O echocardiogram 04/2017    EF 35%    H/O echocardiogram 09/2018    EF20%    History of nuclear stress test 02/2015    no ischemia and apex scar.    History of nuclear stress test 04/2016    apical scar and inf ischemia    HTN (hypertension)     Obesity     Obstructive sleep apnea     Personal history of other specified conditions     Acute AW MI / 9/4/13, cath LM-nl, , CX irreg, RCA irreg, LV ant hypo, PTCA with 2.75 RENETTA to LAD    S/P cardiac catheterization 06/2016    severe diffuse ASHD and recommended OHS    SOB (shortness of breath)      Past Surgical History:   Procedure Laterality Date    CARDIAC CATHETERIZATION  09/04/2013    with RENETTA    CHOLECYSTECTOMY  03/11/2021    COLONOSCOPY  04/2015    hemorrhoid removed    CORONARY ARTERY BYPASS GRAFT  10/2016    LIMA-LAD, SVG-PDA, SVG-Diag, SVG-M1 and M@    MR HEAD ANGIO WO IV CONTRAST  04/06/2017    MR HEAD ANGIO WO IV CONTRAST [facility]    TUMOR  REMOVAL      Benign rectal tumor removed     Family History   Problem Relation Name Age of Onset    Hypertension Mother      Cancer Mother      Diabetes Father      Stroke Father      Diabetes Sister      Heart disease Maternal Grandmother      Heart disease Maternal Grandfather      Heart disease Paternal Grandmother      Heart disease Paternal Grandfather       Social History     Tobacco Use    Smoking status: Never     Passive exposure: Never    Smokeless tobacco: Never   Substance Use Topics    Alcohol use: Not Currently    Drug use: Never       Physical Exam   ED Triage Vitals [04/03/25 1734]   Temperature Heart Rate Respirations BP   36.5 °C (97.7 °F) 74 18 (!) 142/93      Pulse Ox Temp Source Heart Rate Source Patient Position   97 % Temporal Monitor Sitting      BP Location FiO2 (%)     Right arm --       Physical Exam    CONSTITUTIONAL: Vital signs reviewed as charted, well-developed and in no distress  Eyes: Extraocular muscles are intact. Pupils equal round and reactive to light. Conjunctiva are pink.    ENT: Mucous membranes are moist. Tongue in the midline. Pharynx was without erythema or exudates, uvula midline  LUNGS: Breath sounds equal and clear to auscultation. Good air exchange, no wheezes rales or retractions, pulse oximetry is charted.  HEART: Regular rate and rhythm without murmur thrill or rub, strong tones, auscultation is normal.  ABDOMEN: Soft and nontender without guarding rebound rigidity or mass. Bowel sounds are present and normal in all quadrants. There is no palpable masses or aneurysms identified. No hepatosplenomegaly, normal abdominal exam.  Neuro: The patient is awake, alert and oriented ×3. Moving all 4 extremities and answering questions appropriately.   MUSCULOSKELETAL: Exam the left shoulder shows tenderness palpation over the proximal humerus into the biceps.  There is no ecchymosis deformity.  No step-off deformity noted.  Pain with forward flexion and abduction greater than  90 Grease passive range of motion.  Motor sensation and pulse are intact distally.  PSYCH: Awake alert oriented, normal mood and affect.  Skin:  Dry, normal color, warm to the touch, no rash present.      ED Course & MDM   Diagnoses as of 04/03/25 1838   Other closed nondisplaced fracture of proximal end of left humerus, initial encounter                 No data recorded     Matt Coma Scale Score: 15 (04/03/25 1738 : Melva Leon RN)                           Medical Decision Making    History obtained from: patient    Vital signs, nursing notes, current medications, past medical history, Surgical history, allergies, social history, family History were reviewed.         HPI:  Patient 74-year-old female presenting emergency room today for evaluation of left shoulder pain.  She states she is lifting heavy boxes felt a pop in her shoulder and since then has been having left arm pain.  States it hurts to lift up even.  No obvious deformity.  Motor sensation and pulse are intact distally.  Denies dizziness, chest pain, shortness breath, abdominal pain or extremity edema.      10 point ROS was reviewed and negative except Noted above in HPI.  DDX: as listed above          MDM Summary/considerations:  Labs Reviewed - No data to display  XR shoulder left 2+ views   Final Result   Displaced fracture involving the proximal humeral diaphysis as above.   Moderate acromioclavicular joint osteoarthrosis.             MACRO:   None.        Signed by: Evan Finkelstein 4/3/2025 6:17 PM   Dictation workstation:   [id number]        Medications   traMADol (Ultram) tablet 50 mg (50 mg oral Given 4/3/25 1301)     New Prescriptions    No medications on file     I estimate there is LOW risk for COMPARTMENT SYNDROME, DEEP VENOUS THROMBOSIS, SEPTIC ARTHRITIS, TENDON OR NEUROVASCULAR INJURY, thus I consider the discharge disposition reasonable. We have discussed the diagnosis and risks, and we agree with discharging home to follow-up  with their primary doctor or the referral orthopedist. We also discussed returning to the Emergency Department immediately if new or worsening symptoms occur. We have discussed the symptoms which aremost concerning (e.g., changing or worsening pain, numbness, weakness) that necessitates immediate return.    X-ray shows fracture of the proximal third of the humerus.  Patient placed into a sling by nursing staff was given orthopedic referral.  Will follow the PCP as needed.  Was discharged home stable condition.    I was present for splint application. Post splint application, the patient was neurologically intact, motor and sensation were intact. Cap refill is less than 3 seconds.        All of the patient's questions were answered to the best of my ability.  Patient states understanding that they have been screened for an emergency today and we have not found any etiology of symptoms that requires emergent treatment or admission to the hospital at this point. They understand that they have not had definitive care day and require follow-up for treatment of their condition. They also state understanding that they may have an emergent condition that may potentially have not of detected at this visit and they must return to the emergency department if they develop any worsening of symptoms or new complaints.      I have evaluated this patient, my supervising physician was available for consultation.        Critical Care: Not warranted at this time        This chart was completed using voice recognition transcription software. Please excuse any errors of transcription including grammatical, punctuation, syntax and spelling errors.  Please contact me with any questions regarding this chart.  Procedure  Procedures     MARTÍN Timmons-RICHARD  04/03/25 [id number]

## 2025-04-03 NOTE — ED TRIAGE NOTES
"Patient presents to ED via EMS from home for left shoulder/arm pain after lifting a box of pads. Patient states she felt a \"pop\" and is now unable to move her arm. The incident happened today at 1500. Denies numbness/tingling. Admits to putting on Bengay and tylenol.  "

## 2025-04-04 ENCOUNTER — TELEPHONE (OUTPATIENT)
Dept: PRIMARY CARE | Facility: CLINIC | Age: 75
End: 2025-04-04
Payer: MEDICARE

## 2025-04-04 NOTE — TELEPHONE ENCOUNTER
JOHNNY. Patient treated and released from [facility] ED 4/3/25. Presented for evaluation of left shoulder pain. Reported she had been lifting heavy boxes and felt a pop in the shoulder. Having pain even when lifting arm. X-ray showed closed nondisplaced fracture of proximal third of humerus. Placed in sling and given ortho referral (Dr. Marmolejo). Discharged in stable condition. Will follow up with patient regarding ortho appointment.  Next House Calls provider visit 4/14/25.

## 2025-04-06 ENCOUNTER — TELEPHONE (OUTPATIENT)
Dept: CARDIOLOGY | Facility: CLINIC | Age: 75
End: 2025-04-06

## 2025-04-06 NOTE — TELEPHONE ENCOUNTER
Patient cancelled appt since she broke her arm and will see orthopedic surgeon tomorrow 4/7/25 in PM. Pt. Will call back to reschedule

## 2025-04-07 ENCOUNTER — APPOINTMENT (OUTPATIENT)
Dept: RADIOLOGY | Facility: HOSPITAL | Age: 75
End: 2025-04-07
Payer: MEDICARE

## 2025-04-07 ENCOUNTER — HOSPITAL ENCOUNTER (OUTPATIENT)
Facility: HOSPITAL | Age: 75
Setting detail: OBSERVATION
Discharge: NURSING, REHAB OR LONG-TERM CARE | End: 2025-04-10
Attending: INTERNAL MEDICINE | Admitting: INTERNAL MEDICINE
Payer: MEDICARE

## 2025-04-07 ENCOUNTER — APPOINTMENT (OUTPATIENT)
Dept: CARDIOLOGY | Facility: CLINIC | Age: 75
End: 2025-04-07
Payer: MEDICARE

## 2025-04-07 ENCOUNTER — APPOINTMENT (OUTPATIENT)
Dept: CARDIOLOGY | Facility: HOSPITAL | Age: 75
End: 2025-04-07
Payer: MEDICARE

## 2025-04-07 DIAGNOSIS — I48.11 LONGSTANDING PERSISTENT ATRIAL FIBRILLATION (MULTI): ICD-10-CM

## 2025-04-07 DIAGNOSIS — E11.21 TYPE 2 DIABETES MELLITUS WITH DIABETIC NEPHROPATHY, WITH LONG-TERM CURRENT USE OF INSULIN: ICD-10-CM

## 2025-04-07 DIAGNOSIS — Z79.4 TYPE 2 DIABETES MELLITUS WITH DIABETIC NEPHROPATHY, WITH LONG-TERM CURRENT USE OF INSULIN: ICD-10-CM

## 2025-04-07 DIAGNOSIS — Z12.31 ENCOUNTER FOR SCREENING MAMMOGRAM FOR MALIGNANT NEOPLASM OF BREAST: ICD-10-CM

## 2025-04-07 DIAGNOSIS — C80.1 CANCER (MULTI): ICD-10-CM

## 2025-04-07 DIAGNOSIS — S42.295D OTHER CLOSED NONDISPLACED FRACTURE OF PROXIMAL END OF LEFT HUMERUS WITH ROUTINE HEALING, SUBSEQUENT ENCOUNTER: Primary | ICD-10-CM

## 2025-04-07 PROBLEM — R26.2 AMBULATORY DYSFUNCTION: Status: ACTIVE | Noted: 2025-04-07

## 2025-04-07 LAB
ALBUMIN SERPL BCP-MCNC: 4.5 G/DL (ref 3.4–5)
ALP SERPL-CCNC: 120 U/L (ref 33–136)
ALT SERPL W P-5'-P-CCNC: 13 U/L (ref 7–45)
ANION GAP SERPL CALCULATED.3IONS-SCNC: 18 MMOL/L (ref 10–20)
AST SERPL W P-5'-P-CCNC: 15 U/L (ref 9–39)
BASOPHILS # BLD AUTO: 0.07 X10*3/UL (ref 0–0.1)
BASOPHILS NFR BLD AUTO: 0.8 %
BILIRUB SERPL-MCNC: 0.8 MG/DL (ref 0–1.2)
BUN SERPL-MCNC: 53 MG/DL (ref 6–23)
CALCIUM SERPL-MCNC: 10.5 MG/DL (ref 8.6–10.3)
CARDIAC TROPONIN I PNL SERPL HS: 11 NG/L (ref 0–13)
CARDIAC TROPONIN I PNL SERPL HS: 12 NG/L (ref 0–13)
CHLORIDE SERPL-SCNC: 101 MMOL/L (ref 98–107)
CO2 SERPL-SCNC: 24 MMOL/L (ref 21–32)
CREAT SERPL-MCNC: 1.88 MG/DL (ref 0.5–1.05)
EGFRCR SERPLBLD CKD-EPI 2021: 28 ML/MIN/1.73M*2
EOSINOPHIL # BLD AUTO: 0.3 X10*3/UL (ref 0–0.4)
EOSINOPHIL NFR BLD AUTO: 3.5 %
ERYTHROCYTE [DISTWIDTH] IN BLOOD BY AUTOMATED COUNT: 14.9 % (ref 11.5–14.5)
FLUAV RNA RESP QL NAA+PROBE: NOT DETECTED
FLUBV RNA RESP QL NAA+PROBE: NOT DETECTED
GLUCOSE BLD MANUAL STRIP-MCNC: 158 MG/DL (ref 74–99)
GLUCOSE SERPL-MCNC: 189 MG/DL (ref 74–99)
HCT VFR BLD AUTO: 39.9 % (ref 36–46)
HGB BLD-MCNC: 13.1 G/DL (ref 12–16)
IMM GRANULOCYTES # BLD AUTO: 0.03 X10*3/UL (ref 0–0.5)
IMM GRANULOCYTES NFR BLD AUTO: 0.3 % (ref 0–0.9)
LYMPHOCYTES # BLD AUTO: 0.85 X10*3/UL (ref 0.8–3)
LYMPHOCYTES NFR BLD AUTO: 9.9 %
MAGNESIUM SERPL-MCNC: 2.15 MG/DL (ref 1.6–2.4)
MCH RBC QN AUTO: 33.7 PG (ref 26–34)
MCHC RBC AUTO-ENTMCNC: 32.8 G/DL (ref 32–36)
MCV RBC AUTO: 103 FL (ref 80–100)
MONOCYTES # BLD AUTO: 0.61 X10*3/UL (ref 0.05–0.8)
MONOCYTES NFR BLD AUTO: 7.1 %
NEUTROPHILS # BLD AUTO: 6.75 X10*3/UL (ref 1.6–5.5)
NEUTROPHILS NFR BLD AUTO: 78.4 %
NRBC BLD-RTO: 0 /100 WBCS (ref 0–0)
PLATELET # BLD AUTO: 242 X10*3/UL (ref 150–450)
POTASSIUM SERPL-SCNC: 3.8 MMOL/L (ref 3.5–5.3)
PROT SERPL-MCNC: 8.2 G/DL (ref 6.4–8.2)
RBC # BLD AUTO: 3.89 X10*6/UL (ref 4–5.2)
SARS-COV-2 RNA RESP QL NAA+PROBE: NOT DETECTED
SODIUM SERPL-SCNC: 139 MMOL/L (ref 136–145)
WBC # BLD AUTO: 8.6 X10*3/UL (ref 4.4–11.3)

## 2025-04-07 PROCEDURE — 93005 ELECTROCARDIOGRAM TRACING: CPT

## 2025-04-07 PROCEDURE — 2500000001 HC RX 250 WO HCPCS SELF ADMINISTERED DRUGS (ALT 637 FOR MEDICARE OP)

## 2025-04-07 PROCEDURE — 2500000001 HC RX 250 WO HCPCS SELF ADMINISTERED DRUGS (ALT 637 FOR MEDICARE OP): Performed by: INTERNAL MEDICINE

## 2025-04-07 PROCEDURE — 36415 COLL VENOUS BLD VENIPUNCTURE: CPT

## 2025-04-07 PROCEDURE — G0378 HOSPITAL OBSERVATION PER HR: HCPCS

## 2025-04-07 PROCEDURE — 71045 X-RAY EXAM CHEST 1 VIEW: CPT | Performed by: RADIOLOGY

## 2025-04-07 PROCEDURE — 83735 ASSAY OF MAGNESIUM: CPT

## 2025-04-07 PROCEDURE — 84484 ASSAY OF TROPONIN QUANT: CPT

## 2025-04-07 PROCEDURE — 2500000002 HC RX 250 W HCPCS SELF ADMINISTERED DRUGS (ALT 637 FOR MEDICARE OP, ALT 636 FOR OP/ED): Performed by: INTERNAL MEDICINE

## 2025-04-07 PROCEDURE — 93010 ELECTROCARDIOGRAM REPORT: CPT | Performed by: INTERNAL MEDICINE

## 2025-04-07 PROCEDURE — 99285 EMERGENCY DEPT VISIT HI MDM: CPT

## 2025-04-07 PROCEDURE — 71045 X-RAY EXAM CHEST 1 VIEW: CPT

## 2025-04-07 PROCEDURE — 99223 1ST HOSP IP/OBS HIGH 75: CPT | Performed by: INTERNAL MEDICINE

## 2025-04-07 PROCEDURE — 87636 SARSCOV2 & INF A&B AMP PRB: CPT

## 2025-04-07 PROCEDURE — 84075 ASSAY ALKALINE PHOSPHATASE: CPT

## 2025-04-07 PROCEDURE — 82947 ASSAY GLUCOSE BLOOD QUANT: CPT

## 2025-04-07 PROCEDURE — 85025 COMPLETE CBC W/AUTO DIFF WBC: CPT

## 2025-04-07 RX ORDER — FAMOTIDINE 10 MG/ML
20 INJECTION, SOLUTION INTRAVENOUS ONCE
Status: DISCONTINUED | OUTPATIENT
Start: 2025-04-07 | End: 2025-04-10 | Stop reason: HOSPADM

## 2025-04-07 RX ORDER — PANTOPRAZOLE SODIUM 40 MG/1
40 TABLET, DELAYED RELEASE ORAL
Status: DISCONTINUED | OUTPATIENT
Start: 2025-04-08 | End: 2025-04-10 | Stop reason: HOSPADM

## 2025-04-07 RX ORDER — DEXTROSE 50 % IN WATER (D50W) INTRAVENOUS SYRINGE
25
Status: DISCONTINUED | OUTPATIENT
Start: 2025-04-07 | End: 2025-04-10 | Stop reason: HOSPADM

## 2025-04-07 RX ORDER — INSULIN GLARGINE 100 [IU]/ML
25 INJECTION, SOLUTION SUBCUTANEOUS EVERY 24 HOURS
Status: DISCONTINUED | OUTPATIENT
Start: 2025-04-07 | End: 2025-04-10 | Stop reason: HOSPADM

## 2025-04-07 RX ORDER — OXYCODONE HYDROCHLORIDE 5 MG/1
5 TABLET ORAL EVERY 6 HOURS PRN
Status: DISCONTINUED | OUTPATIENT
Start: 2025-04-07 | End: 2025-04-10 | Stop reason: HOSPADM

## 2025-04-07 RX ORDER — LOSARTAN POTASSIUM 25 MG/1
25 TABLET ORAL DAILY
Status: DISCONTINUED | OUTPATIENT
Start: 2025-04-08 | End: 2025-04-10 | Stop reason: HOSPADM

## 2025-04-07 RX ORDER — DEXTROSE 50 % IN WATER (D50W) INTRAVENOUS SYRINGE
12.5
Status: DISCONTINUED | OUTPATIENT
Start: 2025-04-07 | End: 2025-04-10 | Stop reason: HOSPADM

## 2025-04-07 RX ORDER — ACETAMINOPHEN 650 MG/1
650 SUPPOSITORY RECTAL EVERY 4 HOURS PRN
Status: DISCONTINUED | OUTPATIENT
Start: 2025-04-07 | End: 2025-04-10 | Stop reason: HOSPADM

## 2025-04-07 RX ORDER — SPIRONOLACTONE 25 MG/1
25 TABLET ORAL ONCE
Status: COMPLETED | OUTPATIENT
Start: 2025-04-07 | End: 2025-04-07

## 2025-04-07 RX ORDER — ACETAMINOPHEN 325 MG/1
650 TABLET ORAL EVERY 4 HOURS PRN
Status: DISCONTINUED | OUTPATIENT
Start: 2025-04-07 | End: 2025-04-10 | Stop reason: HOSPADM

## 2025-04-07 RX ORDER — OXYCODONE AND ACETAMINOPHEN 5; 325 MG/1; MG/1
1 TABLET ORAL ONCE
Status: COMPLETED | OUTPATIENT
Start: 2025-04-07 | End: 2025-04-07

## 2025-04-07 RX ORDER — INSULIN LISPRO 100 [IU]/ML
0-10 INJECTION, SOLUTION INTRAVENOUS; SUBCUTANEOUS
Status: DISCONTINUED | OUTPATIENT
Start: 2025-04-08 | End: 2025-04-10 | Stop reason: HOSPADM

## 2025-04-07 RX ORDER — FUROSEMIDE 40 MG/1
40 TABLET ORAL DAILY
Status: DISCONTINUED | OUTPATIENT
Start: 2025-04-08 | End: 2025-04-10 | Stop reason: HOSPADM

## 2025-04-07 RX ORDER — WARFARIN 2 MG/1
2 TABLET ORAL DAILY
Status: DISCONTINUED | OUTPATIENT
Start: 2025-04-08 | End: 2025-04-10 | Stop reason: HOSPADM

## 2025-04-07 RX ORDER — ACETAMINOPHEN 160 MG/5ML
650 SOLUTION ORAL EVERY 4 HOURS PRN
Status: DISCONTINUED | OUTPATIENT
Start: 2025-04-07 | End: 2025-04-10 | Stop reason: HOSPADM

## 2025-04-07 RX ORDER — CARVEDILOL 3.12 MG/1
3.12 TABLET ORAL 2 TIMES DAILY
Status: DISCONTINUED | OUTPATIENT
Start: 2025-04-07 | End: 2025-04-10 | Stop reason: HOSPADM

## 2025-04-07 RX ORDER — POLYETHYLENE GLYCOL 3350 17 G/17G
17 POWDER, FOR SOLUTION ORAL DAILY
Status: DISCONTINUED | OUTPATIENT
Start: 2025-04-08 | End: 2025-04-10 | Stop reason: HOSPADM

## 2025-04-07 RX ADMIN — SPIRONOLACTONE 25 MG: 25 TABLET ORAL at 23:12

## 2025-04-07 RX ADMIN — CARVEDILOL 3.12 MG: 3.12 TABLET, FILM COATED ORAL at 23:11

## 2025-04-07 RX ADMIN — OXYCODONE HYDROCHLORIDE 5 MG: 5 TABLET ORAL at 23:12

## 2025-04-07 RX ADMIN — OXYCODONE HYDROCHLORIDE AND ACETAMINOPHEN 1 TABLET: 5; 325 TABLET ORAL at 18:10

## 2025-04-07 ASSESSMENT — COGNITIVE AND FUNCTIONAL STATUS - GENERAL
MOVING FROM LYING ON BACK TO SITTING ON SIDE OF FLAT BED WITH BEDRAILS: A LITTLE
MOVING TO AND FROM BED TO CHAIR: A LITTLE
TURNING FROM BACK TO SIDE WHILE IN FLAT BAD: A LITTLE
STANDING UP FROM CHAIR USING ARMS: A LITTLE
CLIMB 3 TO 5 STEPS WITH RAILING: A LITTLE
WALKING IN HOSPITAL ROOM: A LITTLE

## 2025-04-07 ASSESSMENT — ENCOUNTER SYMPTOMS
HEMATOLOGIC/LYMPHATIC NEGATIVE: 1
ENDOCRINE NEGATIVE: 1
CARDIOVASCULAR NEGATIVE: 1
GASTROINTESTINAL NEGATIVE: 1
EYES NEGATIVE: 1
ALLERGIC/IMMUNOLOGIC NEGATIVE: 1
CONSTITUTIONAL NEGATIVE: 1
RESPIRATORY NEGATIVE: 1
NEUROLOGICAL NEGATIVE: 1
PSYCHIATRIC NEGATIVE: 1
MUSCULOSKELETAL NEGATIVE: 1

## 2025-04-07 ASSESSMENT — PAIN SCALES - GENERAL
PAINLEVEL_OUTOF10: 2
PAINLEVEL_OUTOF10: 7
PAINLEVEL_OUTOF10: 4
PAINLEVEL_OUTOF10: 0 - NO PAIN

## 2025-04-07 ASSESSMENT — PAIN - FUNCTIONAL ASSESSMENT
PAIN_FUNCTIONAL_ASSESSMENT: 0-10

## 2025-04-07 NOTE — PROGRESS NOTES
Jing Sanchez is a 74 y.o. female on day 0 of admission presenting with No Principal Problem: There is no principal problem currently on the Problem List. Please update the Problem List and refresh..    RNCC was consulted as patient requesting SNF admission.   Patient presented 04/03/2025 with fx of L humerus, placed in sling, dc home with ortho referral. She had an MRI scheduled today, called 911 thinking they would take her radiology, came to ED instead. While in ED it was brought up that she can not function at home without using both arms. She is requesting admission to SNF.   RNCC explained patient will need a prior auth from her insurance, will need H&P and PT/OT evals prior to starting this process.   As of the writing of this note, patient has not been admitted. RNCC will continue to follow.      Frances Lance RN

## 2025-04-07 NOTE — PROGRESS NOTES
"Pharmacy Medication History Review    Jing Sanchez is a 74 y.o. female admitted for an arm injury . Pharmacy reviewed the patient's [id number] medications and allergies for accuracy.    Medications ADDED:  N/A  Medications CHANGED:  N/A  Medications REMOVED:   Nystatin      The list below reflects the updated PTA list.   Prior to Admission Medications   Prescriptions Last Dose Informant Patient Reported? Taking?   Lantus Solostar U-100 Insulin 100 unit/mL (3 mL) pen Past Week Bedtime Self No Yes   Si UNITS SUBQ DAILY AT BEDTIME - TAKE AS DIRECTED PER INSULIN INSTRUCTIONS   blood sugar diagnostic (Accu-Chek Guide test strips) strip  Self No No   Si strip 3 times a day.   blood-glucose meter (Accu-Chek Guide Glucose Meter) misc  Self No No   Sig: Test TID   carvedilol (Coreg) 3.125 mg tablet 2025 Morning Self No Yes   Sig: Take 1 tablet (3.125 mg) by mouth 2 times a day.   empagliflozin (Jardiance) 10 mg 2025 Morning Self No Yes   Sig: Take 1 tablet (10 mg) by mouth once daily.   ergocalciferol (Vitamin D-2) 1.25 MG (73729 UT) capsule 2025 Morning Self No Yes   Si CAP BY MOUTH WEEKLY ON    fish oil (Omega-3) 60- mg capsule 2025 Morning Self No Yes   Sig: Take 2 capsules (1,000 mg) by mouth once daily.   furosemide (Lasix) 40 mg tablet 2025 Morning Self No Yes   Sig: Take 1 tablet (40 mg) by mouth once daily.   insulin lispro (HumaLOG) 100 unit/mL injection Past Week Self No Yes   Sig: INJECT SUBQ PER SLIDING SCALE WITH MEALS (MAX DAILY DOSE OF 80 UNITS)   lancets misc  Self No No   Sig: Accucheck lancets   losartan (Cozaar) 25 mg tablet 2025 Morning Self No Yes   Sig: Take 1 tablet (25 mg) by mouth once daily.   pantoprazole (ProtoNix) 40 mg EC tablet 2025 Morning Self No Yes   Sig: Take 1 tablet (40 mg) by mouth once daily in the morning. Take before meals.   pen needle, diabetic (BD Ultra-Fine Short Pen Needle) 31 gauge x 5/16\" needle  Self Yes No "   Sig: Use and discard 4 pen needles per day subcutaneously Dx: E11.65 for 90 days   spironolactone (Aldactone) 25 mg tablet 4/7/2025 Morning Self No Yes   Sig: Take 1 tablet (25 mg) by mouth 1 time for 1 dose.   warfarin (Coumadin) 2 mg tablet 4/6/2025 Bedtime Self No Yes   Sig: TAKE 1 TABLET BY MOUTH EVERY DAY      Facility-Administered Medications: None        The list below reflects the updated allergy list. Please review each documented allergy for additional clarification and justification.  Allergies  Reviewed by Bijal Cadet RN on 4/7/2025        Severity Reactions Comments    Cetirizine Not Specified Swelling Reaction: swollen tongue            Patient accepts M2B at discharge.     Sources:   Pharmacy dispense history  Patient Interview Good historian  Chart Review     Additional Comments:  None       Neftali Oliveira  Pharmacy Technician  04/07/25     Secure Chat preferred

## 2025-04-07 NOTE — ED PROVIDER NOTES
HPI   Chief Complaint   Patient presents with    Arm Injury     Pt was here recently with a left arm fracture. Pt has an appointment for and MRI and called 911 to have her brought here for her appointment       Patient is a 74-year-old female with past medical history of CHF, CAD, A-fib presenting with concerns for inability to get around the home.  She was seen in the emergency department several days ago and was diagnosed with a broken left humerus.  She has seen orthopedics and was scheduled for an MRI.  She was also scheduled for additional imaging for possible cancerous findings.  Son at bedside states that the patient is having a hard time getting around the home because of the broken arm.  Reportedly, she needs to arms in order to get up her stairs where most of her items are.  She believes that she would benefit from rehab at this time.  Son who states that he was at the appointment endorses that the orthopedics did not mention anything about surgery to them.  Patient reportedly did have a brief bout of chest pain earlier today.  It was after a meal and resolved quickly.  Described as a dull, chest ache after swallowing.  It did not radiate.  Patient denies fevers, chills, cough, sore throat, runny nose, shortness of breath, abdominal pain, nausea, vomiting, diarrhea or urinary complaints.              Patient History   Past Medical History:   Diagnosis Date    A-fib (Multi)     Acute MI (Multi) 08/2016    cath by [facility], wire in RCA and clot migrated to PDA and no PTCA.    ASHD (arteriosclerotic heart disease)     CAD (coronary artery disease)     CHF (congestive heart failure)     DM (diabetes mellitus) (Multi)     Dyslipidemia     H/O echocardiogram 04/2017    EF 35%    H/O echocardiogram 09/2018    EF20%    History of nuclear stress test 02/2015    no ischemia and apex scar.    History of nuclear stress test 04/2016    apical scar and inf ischemia    HTN (hypertension)     Obesity     Obstructive  sleep apnea     Personal history of other specified conditions     Acute AW MI / 9/4/13, cath LM-nl, , CX irreg, RCA irreg, LV ant hypo, PTCA with 2.75 RENETTA to LAD    S/P cardiac catheterization 06/2016    severe diffuse ASHD and recommended OHS    SOB (shortness of breath)      Past Surgical History:   Procedure Laterality Date    CARDIAC CATHETERIZATION  09/04/2013    with RENETTA    CHOLECYSTECTOMY  03/11/2021    COLONOSCOPY  04/2015    hemorrhoid removed    CORONARY ARTERY BYPASS GRAFT  10/2016    LIMA-LAD, SVG-PDA, SVG-Diag, SVG-M1 and M@    MR HEAD ANGIO WO IV CONTRAST  04/06/2017    MR HEAD ANGIO WO IV CONTRAST [facility]    TUMOR REMOVAL      Benign rectal tumor removed     Family History   Problem Relation Name Age of Onset    Hypertension Mother      Cancer Mother      Diabetes Father      Stroke Father      Diabetes Sister      Heart disease Maternal Grandmother      Heart disease Maternal Grandfather      Heart disease Paternal Grandmother      Heart disease Paternal Grandfather       Social History     Tobacco Use    Smoking status: Never     Passive exposure: Never    Smokeless tobacco: Never   Substance Use Topics    Alcohol use: Not Currently    Drug use: Never       Physical Exam   ED Triage Vitals [04/07/25 1444]   Temperature Heart Rate Respirations BP   36.3 °C (97.4 °F) 88 15 119/74      Pulse Ox Temp src Heart Rate Source Patient Position   96 % -- -- --      BP Location FiO2 (%)     -- --       Physical Exam  Vitals and nursing note reviewed.   Constitutional:       Appearance: She is well-developed.      Comments: Awake, laying in examination bed   HENT:      Head: Normocephalic and atraumatic.      Nose: Nose normal.      Mouth/Throat:      Mouth: Mucous membranes are moist.      Pharynx: Oropharynx is clear.   Eyes:      Extraocular Movements: Extraocular movements intact.      Conjunctiva/sclera: Conjunctivae normal.      Pupils: Pupils are equal, round, and reactive to light.    Cardiovascular:      Rate and Rhythm: Normal rate and regular rhythm.      Pulses: Normal pulses.      Heart sounds: Normal heart sounds. No murmur heard.  Pulmonary:      Effort: Pulmonary effort is normal. No respiratory distress.      Breath sounds: Normal breath sounds.   Abdominal:      General: Abdomen is flat.      Palpations: Abdomen is soft.      Tenderness: There is no abdominal tenderness.   Musculoskeletal:         General: No swelling.      Cervical back: Normal range of motion and neck supple.      Comments: Left arm in sling   Skin:     General: Skin is warm and dry.      Capillary Refill: Capillary refill takes less than 2 seconds.   Neurological:      General: No focal deficit present.      Mental Status: She is alert and oriented to person, place, and time.   Psychiatric:         Mood and Affect: Mood normal.         Behavior: Behavior normal.           ED Course & MDM   ED Course as of 04/07/25 2307 Mon Apr 07, 2025   1536 I spoke with the care coordinator who states that the patient would need at least observation status and evaluation by PT/OT due to her insurance requiring a precertification. [AR]   1608 ECG 12 lead  Performed at  1553, HR of 104, irregularly irregular rhythm, LAD, QTc 470, no sign of STEMI, no Q wave or T wave abnormality noted.    Reviewed and interpreted by me at time performed   [JM]   1944 I spoke with admitting provider, Dr. Wu, who is agreeable to admission of this patient to be evaluated by PT/OT and placement to rehab.  [AR]      ED Course User Index  [AR] Chad Grady PA-C  [JM] Miriam Huston MD         Diagnoses as of 04/07/25 2307   Other closed nondisplaced fracture of proximal end of left humerus with routine healing, subsequent encounter                 No data recorded     Matt Coma Scale Score: 15 (04/07/25 1452 : Bijal Cadet, PETER)                           Medical Decision Making  Patient is a 74-year-old female with past  medical history of CHF, CAD, A-fib presenting with concerns for inability get around the home.  Lab work, urine, imaging ordered.  Conditions considered include but are not limited to: Weakness, electrolyte abnormality, viral illness, pneumonia.    Attending physician was available for consultation on this patient.  CBC is without leukocytosis or anemia.  CMP without significant electrolyte abnormality but does show CKD with creatinine 1.88.  Initial and repeat troponin within normal limit.  Viral swabs are negative.  Magnesium within normal limits.  Chest x-ray without acute cardiopulmonary process.    I spoke with admitting provider, Dr. Wu, who is able to admit this patient to observation with the plan of rehab placement.  As I deemed necessary from the patient's history, physical, laboratory and imaging findings as well as ED course, I considered the above listed diagnoses.    Portions of this note made with Dragon software, please be mindful of potential grammatical errors.      Medications   famotidine PF (Pepcid) injection 20 mg (20 mg intravenous Not Given 4/7/25 1619)   carvedilol (Coreg) tablet 3.125 mg (has no administration in time range)   empagliflozin (Jardiance) tablet 10 mg (has no administration in time range)   furosemide (Lasix) tablet 40 mg (has no administration in time range)   insulin glargine (Lantus) injection 25 Units (has no administration in time range)   glucagon (Glucagen) injection 1 mg (has no administration in time range)   dextrose 50 % injection 25 g (has no administration in time range)   glucagon (Glucagen) injection 1 mg (has no administration in time range)   dextrose 50 % injection 12.5 g (has no administration in time range)   insulin lispro injection 0-10 Units (has no administration in time range)   losartan (Cozaar) tablet 25 mg (has no administration in time range)   pantoprazole (ProtoNix) EC tablet 40 mg (has no administration in time range)   spironolactone  (Aldactone) tablet 25 mg (has no administration in time range)   warfarin (Coumadin) tablet 2 mg (has no administration in time range)   acetaminophen (Tylenol) tablet 650 mg (has no administration in time range)     Or   acetaminophen (Tylenol) oral liquid 650 mg (has no administration in time range)     Or   acetaminophen (Tylenol) suppository 650 mg (has no administration in time range)   polyethylene glycol (Glycolax, Miralax) packet 17 g (has no administration in time range)   oxyCODONE (Roxicodone) immediate release tablet 5 mg (has no administration in time range)   oxyCODONE-acetaminophen (Percocet) 5-325 mg per tablet 1 tablet (1 tablet oral Given 4/7/25 1810)     Labs Reviewed   CBC WITH AUTO DIFFERENTIAL - Abnormal       Result Value    WBC 8.6      nRBC 0.0      RBC 3.89 (*)     Hemoglobin 13.1      Hematocrit 39.9       (*)     MCH 33.7      MCHC 32.8      RDW 14.9 (*)     Platelets 242      Neutrophils % 78.4      Immature Granulocytes %, Automated 0.3      Lymphocytes % 9.9      Monocytes % 7.1      Eosinophils % 3.5      Basophils % 0.8      Neutrophils Absolute 6.75 (*)     Immature Granulocytes Absolute, Automated 0.03      Lymphocytes Absolute 0.85      Monocytes Absolute 0.61      Eosinophils Absolute 0.30      Basophils Absolute 0.07     COMPREHENSIVE METABOLIC PANEL - Abnormal    Glucose 189 (*)     Sodium 139      Potassium 3.8      Chloride 101      Bicarbonate 24      Anion Gap 18      Urea Nitrogen 53 (*)     Creatinine 1.88 (*)     eGFR 28 (*)     Calcium 10.5 (*)     Albumin 4.5      Alkaline Phosphatase 120      Total Protein 8.2      AST 15      Bilirubin, Total 0.8      ALT 13     POCT GLUCOSE - Abnormal    POCT Glucose 158 (*)    SARS-COV-2 AND INFLUENZA A/B PCR - Normal    Flu A Result Not Detected      Flu B Result Not Detected      Coronavirus 2019, PCR Not Detected      Narrative:     This assay is an FDA-cleared, in vitro diagnostic nucleic acid amplification test for the  qualitative detection and differentiation of SARS CoV-2/ Influenza A/B from nasopharyngeal specimens collected from individuals with signs and symptoms of respiratory tract infections, and has been validated for use at [facility] [place]. Negative results do not preclude COVID-19/ Influenza A/B infections and should not be used as the sole basis for diagnosis, treatment, or other management decisions. Testing for SARS CoV-2 is recommended only for patients who meet current clinical and/or epidemiological criteria defined by federal, state, or local public health directives.   SERIAL TROPONIN-INITIAL - Normal    Troponin I, High Sensitivity 11      Narrative:     Less than 99th percentile of normal range cutoff-  Female and children under 18 years old <14 ng/L; Male <21 ng/L: Negative  Repeat testing should be performed if clinically indicated.     Female and children under 18 years old 14-50 ng/L; Male 21-50 ng/L:  Consistent with possible cardiac damage and possible increased clinical   risk. Serial measurements may help to assess extent of myocardial damage.     >50 ng/L: Consistent with cardiac damage, increased clinical risk and  myocardial infarction. Serial measurements may help assess extent of   myocardial damage.      NOTE: Children less than 1 year old may have higher baseline troponin   levels and results should be interpreted in conjunction with the overall   clinical context.     NOTE: Troponin I testing is performed using a different   testing methodology at [facility] than at [facility]. Direct result comparisons should only   be made within the same method.   MAGNESIUM - Normal    Magnesium 2.15     SERIAL TROPONIN, 1 HOUR - Normal    Troponin I, High Sensitivity 12      Narrative:     Less than 99th percentile of normal range cutoff-  Female and children under 18 years old <14 ng/L; Male <21 ng/L: Negative  Repeat testing should be performed if clinically  indicated.     Female and children under 18 years old 14-50 ng/L; Male 21-50 ng/L:  Consistent with possible cardiac damage and possible increased clinical   risk. Serial measurements may help to assess extent of myocardial damage.     >50 ng/L: Consistent with cardiac damage, increased clinical risk and  myocardial infarction. Serial measurements may help assess extent of   myocardial damage.      NOTE: Children less than 1 year old may have higher baseline troponin   levels and results should be interpreted in conjunction with the overall   clinical context.     NOTE: Troponin I testing is performed using a different   testing methodology at [facility] than at other   [facility]. Direct result comparisons should only   be made within the same method.   OCCULT BLOOD X1, STOOL   TROPONIN SERIES- (INITIAL, 1 HR)    Narrative:     The following orders were created for panel order Troponin I Series, High Sensitivity (0, 1 HR).  Procedure                               Abnormality         Status                     ---------                               -----------         ------                     Troponin I, High Sensiti...[[id number]]  Normal              Final result               Troponin, High Sensitivi...[[id number]]  Normal              Final result                 Please view results for these tests on the individual orders.   PROTIME-INR   CBC WITH AUTO DIFFERENTIAL   COMPREHENSIVE METABOLIC PANEL   PROTIME-INR   HEMOGLOBIN A1C   MAGNESIUM   TYPE AND SCREEN   URINALYSIS WITH REFLEX MICROSCOPIC   POCT GLUCOSE METER     XR chest 1 view   Final Result   Cardiomegaly and postoperative change from CABG with coronary artery   stent graft placement.        No acute pulmonary pathology.        Signed by: Paty Florian 4/7/2025 4:51 PM   Dictation workstation:   [id number]            Procedure  Procedures     Chad Grady PA-C  04/07/25 4725     Additional Notes: PT will RTC, she will bring her most recent BW. Detail Level: Simple

## 2025-04-08 ENCOUNTER — APPOINTMENT (OUTPATIENT)
Dept: RADIOLOGY | Facility: HOSPITAL | Age: 75
End: 2025-04-08
Payer: MEDICARE

## 2025-04-08 ENCOUNTER — TELEPHONE (OUTPATIENT)
Dept: PRIMARY CARE | Facility: CLINIC | Age: 75
End: 2025-04-08
Payer: MEDICARE

## 2025-04-08 DIAGNOSIS — C90.00 MULTIPLE MYELOMA NOT HAVING ACHIEVED REMISSION (MULTI): Primary | ICD-10-CM

## 2025-04-08 LAB
ABO GROUP (TYPE) IN BLOOD: NORMAL
ABO GROUP (TYPE) IN BLOOD: NORMAL
ALBUMIN SERPL BCP-MCNC: 3.5 G/DL (ref 3.4–5)
ALBUMIN SERPL BCP-MCNC: 3.7 G/DL (ref 3.4–5)
ALP SERPL-CCNC: 89 U/L (ref 33–136)
ALP SERPL-CCNC: 92 U/L (ref 33–136)
ALT SERPL W P-5'-P-CCNC: 10 U/L (ref 7–45)
ALT SERPL W P-5'-P-CCNC: 9 U/L (ref 7–45)
ANION GAP SERPL CALCULATED.3IONS-SCNC: 13 MMOL/L (ref 10–20)
ANION GAP SERPL CALCULATED.3IONS-SCNC: 14 MMOL/L (ref 10–20)
ANTIBODY SCREEN: NORMAL
AST SERPL W P-5'-P-CCNC: 11 U/L (ref 9–39)
AST SERPL W P-5'-P-CCNC: 12 U/L (ref 9–39)
BASOPHILS # BLD AUTO: 0.06 X10*3/UL (ref 0–0.1)
BASOPHILS # BLD AUTO: 0.07 X10*3/UL (ref 0–0.1)
BASOPHILS NFR BLD AUTO: 0.8 %
BASOPHILS NFR BLD AUTO: 1 %
BILIRUB SERPL-MCNC: 0.7 MG/DL (ref 0–1.2)
BILIRUB SERPL-MCNC: 0.7 MG/DL (ref 0–1.2)
BUN SERPL-MCNC: 44 MG/DL (ref 6–23)
BUN SERPL-MCNC: 48 MG/DL (ref 6–23)
CALCIUM SERPL-MCNC: 9.4 MG/DL (ref 8.6–10.3)
CALCIUM SERPL-MCNC: 9.9 MG/DL (ref 8.6–10.3)
CHLORIDE SERPL-SCNC: 103 MMOL/L (ref 98–107)
CHLORIDE SERPL-SCNC: 106 MMOL/L (ref 98–107)
CO2 SERPL-SCNC: 23 MMOL/L (ref 21–32)
CO2 SERPL-SCNC: 23 MMOL/L (ref 21–32)
CREAT SERPL-MCNC: 1.51 MG/DL (ref 0.5–1.05)
CREAT SERPL-MCNC: 1.72 MG/DL (ref 0.5–1.05)
EGFRCR SERPLBLD CKD-EPI 2021: 31 ML/MIN/1.73M*2
EGFRCR SERPLBLD CKD-EPI 2021: 36 ML/MIN/1.73M*2
EOSINOPHIL # BLD AUTO: 0.36 X10*3/UL (ref 0–0.4)
EOSINOPHIL # BLD AUTO: 0.38 X10*3/UL (ref 0–0.4)
EOSINOPHIL NFR BLD AUTO: 5 %
EOSINOPHIL NFR BLD AUTO: 5.1 %
ERYTHROCYTE [DISTWIDTH] IN BLOOD BY AUTOMATED COUNT: 14.6 % (ref 11.5–14.5)
ERYTHROCYTE [DISTWIDTH] IN BLOOD BY AUTOMATED COUNT: 14.6 % (ref 11.5–14.5)
ERYTHROCYTE [SEDIMENTATION RATE] IN BLOOD BY WESTERGREN METHOD: 71 MM/H (ref 0–30)
EST. AVERAGE GLUCOSE BLD GHB EST-MCNC: 174 MG/DL
FERRITIN SERPL-MCNC: 293 NG/ML (ref 8–150)
GLUCOSE BLD MANUAL STRIP-MCNC: 136 MG/DL (ref 74–99)
GLUCOSE BLD MANUAL STRIP-MCNC: 148 MG/DL (ref 74–99)
GLUCOSE BLD MANUAL STRIP-MCNC: 150 MG/DL (ref 74–99)
GLUCOSE BLD MANUAL STRIP-MCNC: 235 MG/DL (ref 74–99)
GLUCOSE SERPL-MCNC: 132 MG/DL (ref 74–99)
GLUCOSE SERPL-MCNC: 149 MG/DL (ref 74–99)
HBA1C MFR BLD: 7.7 %
HCT VFR BLD AUTO: 34.2 % (ref 36–46)
HCT VFR BLD AUTO: 36.2 % (ref 36–46)
HGB BLD-MCNC: 11.2 G/DL (ref 12–16)
HGB BLD-MCNC: 11.6 G/DL (ref 12–16)
IMM GRANULOCYTES # BLD AUTO: 0.02 X10*3/UL (ref 0–0.5)
IMM GRANULOCYTES # BLD AUTO: 0.03 X10*3/UL (ref 0–0.5)
IMM GRANULOCYTES NFR BLD AUTO: 0.3 % (ref 0–0.9)
IMM GRANULOCYTES NFR BLD AUTO: 0.4 % (ref 0–0.9)
INR PPP: 2.2 (ref 0.9–1.2)
IRON SATN MFR SERPL: 13 % (ref 25–45)
IRON SERPL-MCNC: 32 UG/DL (ref 35–150)
LYMPHOCYTES # BLD AUTO: 0.87 X10*3/UL (ref 0.8–3)
LYMPHOCYTES # BLD AUTO: 1.03 X10*3/UL (ref 0.8–3)
LYMPHOCYTES NFR BLD AUTO: 12.1 %
LYMPHOCYTES NFR BLD AUTO: 14 %
MAGNESIUM SERPL-MCNC: 1.94 MG/DL (ref 1.6–2.4)
MCH RBC QN AUTO: 33.2 PG (ref 26–34)
MCH RBC QN AUTO: 33.5 PG (ref 26–34)
MCHC RBC AUTO-ENTMCNC: 32 G/DL (ref 32–36)
MCHC RBC AUTO-ENTMCNC: 32.7 G/DL (ref 32–36)
MCV RBC AUTO: 102 FL (ref 80–100)
MCV RBC AUTO: 104 FL (ref 80–100)
MONOCYTES # BLD AUTO: 0.73 X10*3/UL (ref 0.05–0.8)
MONOCYTES # BLD AUTO: 0.77 X10*3/UL (ref 0.05–0.8)
MONOCYTES NFR BLD AUTO: 10.7 %
MONOCYTES NFR BLD AUTO: 9.9 %
NEUTROPHILS # BLD AUTO: 5.12 X10*3/UL (ref 1.6–5.5)
NEUTROPHILS # BLD AUTO: 5.15 X10*3/UL (ref 1.6–5.5)
NEUTROPHILS NFR BLD AUTO: 69.8 %
NEUTROPHILS NFR BLD AUTO: 70.9 %
NRBC BLD-RTO: 0 /100 WBCS (ref 0–0)
NRBC BLD-RTO: 0 /100 WBCS (ref 0–0)
PLATELET # BLD AUTO: 200 X10*3/UL (ref 150–450)
PLATELET # BLD AUTO: 225 X10*3/UL (ref 150–450)
POTASSIUM SERPL-SCNC: 3.6 MMOL/L (ref 3.5–5.3)
POTASSIUM SERPL-SCNC: 4 MMOL/L (ref 3.5–5.3)
PROT SERPL-MCNC: 6.4 G/DL (ref 6.4–8.2)
PROT SERPL-MCNC: 6.5 G/DL (ref 6.4–8.2)
PROTHROMBIN TIME: 22.7 SECONDS (ref 9.3–12.7)
RBC # BLD AUTO: 3.34 X10*6/UL (ref 4–5.2)
RBC # BLD AUTO: 3.49 X10*6/UL (ref 4–5.2)
RH FACTOR (ANTIGEN D): NORMAL
RH FACTOR (ANTIGEN D): NORMAL
SODIUM SERPL-SCNC: 135 MMOL/L (ref 136–145)
SODIUM SERPL-SCNC: 139 MMOL/L (ref 136–145)
TIBC SERPL-MCNC: 240 UG/DL (ref 240–445)
UIBC SERPL-MCNC: 208 UG/DL (ref 110–370)
WBC # BLD AUTO: 7.2 X10*3/UL (ref 4.4–11.3)
WBC # BLD AUTO: 7.4 X10*3/UL (ref 4.4–11.3)

## 2025-04-08 PROCEDURE — 96361 HYDRATE IV INFUSION ADD-ON: CPT

## 2025-04-08 PROCEDURE — G0378 HOSPITAL OBSERVATION PER HR: HCPCS

## 2025-04-08 PROCEDURE — 83540 ASSAY OF IRON: CPT | Performed by: INTERNAL MEDICINE

## 2025-04-08 PROCEDURE — 83521 IG LIGHT CHAINS FREE EACH: CPT | Mod: TRILAB | Performed by: INTERNAL MEDICINE

## 2025-04-08 PROCEDURE — 2500000002 HC RX 250 W HCPCS SELF ADMINISTERED DRUGS (ALT 637 FOR MEDICARE OP, ALT 636 FOR OP/ED): Performed by: INTERNAL MEDICINE

## 2025-04-08 PROCEDURE — G0452 MOLECULAR PATHOLOGY INTERPR: HCPCS | Performed by: PATHOLOGY

## 2025-04-08 PROCEDURE — 85652 RBC SED RATE AUTOMATED: CPT | Performed by: INTERNAL MEDICINE

## 2025-04-08 PROCEDURE — 85025 COMPLETE CBC W/AUTO DIFF WBC: CPT | Performed by: INTERNAL MEDICINE

## 2025-04-08 PROCEDURE — 97165 OT EVAL LOW COMPLEX 30 MIN: CPT | Mod: GO

## 2025-04-08 PROCEDURE — 83735 ASSAY OF MAGNESIUM: CPT | Performed by: INTERNAL MEDICINE

## 2025-04-08 PROCEDURE — 96360 HYDRATION IV INFUSION INIT: CPT

## 2025-04-08 PROCEDURE — A9575 INJ GADOTERATE MEGLUMI 0.1ML: HCPCS | Performed by: NURSE PRACTITIONER

## 2025-04-08 PROCEDURE — 84075 ASSAY ALKALINE PHOSPHATASE: CPT | Performed by: INTERNAL MEDICINE

## 2025-04-08 PROCEDURE — 2500000004 HC RX 250 GENERAL PHARMACY W/ HCPCS (ALT 636 FOR OP/ED): Performed by: NURSE PRACTITIONER

## 2025-04-08 PROCEDURE — 74176 CT ABD & PELVIS W/O CONTRAST: CPT | Performed by: RADIOLOGY

## 2025-04-08 PROCEDURE — 99232 SBSQ HOSP IP/OBS MODERATE 35: CPT | Performed by: NURSE PRACTITIONER

## 2025-04-08 PROCEDURE — 2500000004 HC RX 250 GENERAL PHARMACY W/ HCPCS (ALT 636 FOR OP/ED): Performed by: INTERNAL MEDICINE

## 2025-04-08 PROCEDURE — 73220 MRI UPPR EXTREMITY W/O&W/DYE: CPT | Mod: LEFT SIDE | Performed by: RADIOLOGY

## 2025-04-08 PROCEDURE — 83036 HEMOGLOBIN GLYCOSYLATED A1C: CPT | Mod: TRILAB | Performed by: INTERNAL MEDICINE

## 2025-04-08 PROCEDURE — 82746 ASSAY OF FOLIC ACID SERUM: CPT | Mod: TRILAB | Performed by: INTERNAL MEDICINE

## 2025-04-08 PROCEDURE — 2500000001 HC RX 250 WO HCPCS SELF ADMINISTERED DRUGS (ALT 637 FOR MEDICARE OP): Performed by: NURSE PRACTITIONER

## 2025-04-08 PROCEDURE — 82947 ASSAY GLUCOSE BLOOD QUANT: CPT

## 2025-04-08 PROCEDURE — 2500000001 HC RX 250 WO HCPCS SELF ADMINISTERED DRUGS (ALT 637 FOR MEDICARE OP): Performed by: INTERNAL MEDICINE

## 2025-04-08 PROCEDURE — 97161 PT EVAL LOW COMPLEX 20 MIN: CPT | Mod: GP

## 2025-04-08 PROCEDURE — 2550000001 HC RX 255 CONTRASTS: Performed by: NURSE PRACTITIONER

## 2025-04-08 PROCEDURE — 74176 CT ABD & PELVIS W/O CONTRAST: CPT

## 2025-04-08 PROCEDURE — 86304 IMMUNOASSAY TUMOR CA 125: CPT | Mod: TRILAB | Performed by: INTERNAL MEDICINE

## 2025-04-08 PROCEDURE — 85810 BLOOD VISCOSITY EXAMINATION: CPT | Mod: TRILAB | Performed by: INTERNAL MEDICINE

## 2025-04-08 PROCEDURE — 82728 ASSAY OF FERRITIN: CPT | Performed by: INTERNAL MEDICINE

## 2025-04-08 PROCEDURE — 82232 ASSAY OF BETA-2 PROTEIN: CPT | Mod: TRILAB | Performed by: INTERNAL MEDICINE

## 2025-04-08 PROCEDURE — 86301 IMMUNOASSAY TUMOR CA 19-9: CPT | Mod: TRILAB | Performed by: INTERNAL MEDICINE

## 2025-04-08 PROCEDURE — 81450 HL NEO GSAP 5-50DNA/DNA&RNA: CPT | Performed by: INTERNAL MEDICINE

## 2025-04-08 PROCEDURE — 36415 COLL VENOUS BLD VENIPUNCTURE: CPT | Performed by: INTERNAL MEDICINE

## 2025-04-08 PROCEDURE — 73220 MRI UPPR EXTREMITY W/O&W/DYE: CPT | Mod: LT

## 2025-04-08 PROCEDURE — 71250 CT THORAX DX C-: CPT | Performed by: RADIOLOGY

## 2025-04-08 PROCEDURE — 82607 VITAMIN B-12: CPT | Mod: TRILAB | Performed by: INTERNAL MEDICINE

## 2025-04-08 PROCEDURE — 86900 BLOOD TYPING SEROLOGIC ABO: CPT | Performed by: INTERNAL MEDICINE

## 2025-04-08 PROCEDURE — 80053 COMPREHEN METABOLIC PANEL: CPT | Performed by: INTERNAL MEDICINE

## 2025-04-08 PROCEDURE — 97530 THERAPEUTIC ACTIVITIES: CPT | Mod: GP

## 2025-04-08 PROCEDURE — 99223 1ST HOSP IP/OBS HIGH 75: CPT | Performed by: INTERNAL MEDICINE

## 2025-04-08 PROCEDURE — 82784 ASSAY IGA/IGD/IGG/IGM EACH: CPT | Mod: TRILAB | Performed by: INTERNAL MEDICINE

## 2025-04-08 PROCEDURE — 82306 VITAMIN D 25 HYDROXY: CPT | Mod: TRILAB | Performed by: INTERNAL MEDICINE

## 2025-04-08 PROCEDURE — 85610 PROTHROMBIN TIME: CPT | Performed by: INTERNAL MEDICINE

## 2025-04-08 PROCEDURE — 86300 IMMUNOASSAY TUMOR CA 15-3: CPT | Mod: TRILAB | Performed by: INTERNAL MEDICINE

## 2025-04-08 RX ORDER — SODIUM CHLORIDE 9 MG/ML
50 INJECTION, SOLUTION INTRAVENOUS CONTINUOUS
Status: DISCONTINUED | OUTPATIENT
Start: 2025-04-08 | End: 2025-04-09

## 2025-04-08 RX ORDER — NYSTATIN 100000 [USP'U]/G
1 POWDER TOPICAL 2 TIMES DAILY
Status: DISCONTINUED | OUTPATIENT
Start: 2025-04-08 | End: 2025-04-10 | Stop reason: HOSPADM

## 2025-04-08 RX ORDER — GADOTERATE MEGLUMINE 376.9 MG/ML
20 INJECTION INTRAVENOUS
Status: COMPLETED | OUTPATIENT
Start: 2025-04-08 | End: 2025-04-08

## 2025-04-08 RX ADMIN — OXYCODONE HYDROCHLORIDE 5 MG: 5 TABLET ORAL at 12:13

## 2025-04-08 RX ADMIN — OXYCODONE HYDROCHLORIDE 5 MG: 5 TABLET ORAL at 05:18

## 2025-04-08 RX ADMIN — WARFARIN SODIUM 2 MG: 2 TABLET ORAL at 17:30

## 2025-04-08 RX ADMIN — CARVEDILOL 3.12 MG: 3.12 TABLET, FILM COATED ORAL at 21:22

## 2025-04-08 RX ADMIN — PANTOPRAZOLE SODIUM 40 MG: 40 TABLET, DELAYED RELEASE ORAL at 06:34

## 2025-04-08 RX ADMIN — INSULIN LISPRO 4 UNITS: 100 INJECTION, SOLUTION INTRAVENOUS; SUBCUTANEOUS at 12:13

## 2025-04-08 RX ADMIN — GADOTERATE MEGLUMINE 16 ML: 376.9 INJECTION INTRAVENOUS at 20:48

## 2025-04-08 RX ADMIN — EMPAGLIFLOZIN 10 MG: 10 TABLET, FILM COATED ORAL at 09:56

## 2025-04-08 RX ADMIN — INSULIN GLARGINE 25 UNITS: 100 INJECTION, SOLUTION SUBCUTANEOUS at 21:22

## 2025-04-08 RX ADMIN — SODIUM CHLORIDE 50 ML/HR: 900 INJECTION, SOLUTION INTRAVENOUS at 15:54

## 2025-04-08 RX ADMIN — POLYETHYLENE GLYCOL 3350 17 G: 17 POWDER, FOR SOLUTION ORAL at 09:56

## 2025-04-08 RX ADMIN — NYSTATIN 1 APPLICATION: 100000 POWDER TOPICAL at 21:27

## 2025-04-08 SDOH — ECONOMIC STABILITY: INCOME INSECURITY: IN THE PAST 12 MONTHS HAS THE ELECTRIC, GAS, OIL, OR WATER COMPANY THREATENED TO SHUT OFF SERVICES IN YOUR HOME?: NO

## 2025-04-08 SDOH — SOCIAL STABILITY: SOCIAL INSECURITY: WITHIN THE LAST YEAR, HAVE YOU BEEN HUMILIATED OR EMOTIONALLY ABUSED IN OTHER WAYS BY YOUR PARTNER OR EX-PARTNER?: NO

## 2025-04-08 SDOH — ECONOMIC STABILITY: HOUSING INSECURITY: AT ANY TIME IN THE PAST 12 MONTHS, WERE YOU HOMELESS OR LIVING IN A SHELTER (INCLUDING NOW)?: PATIENT UNABLE TO ANSWER

## 2025-04-08 SDOH — SOCIAL STABILITY: SOCIAL INSECURITY
WITHIN THE LAST YEAR, HAVE YOU BEEN KICKED, HIT, SLAPPED, OR OTHERWISE PHYSICALLY HURT BY YOUR PARTNER OR EX-PARTNER?: NO

## 2025-04-08 SDOH — ECONOMIC STABILITY: HOUSING INSECURITY: AT ANY TIME IN THE PAST 12 MONTHS, WERE YOU HOMELESS OR LIVING IN A SHELTER (INCLUDING NOW)?: NO

## 2025-04-08 SDOH — ECONOMIC STABILITY: FOOD INSECURITY: HOW HARD IS IT FOR YOU TO PAY FOR THE VERY BASICS LIKE FOOD, HOUSING, MEDICAL CARE, AND HEATING?: NOT HARD AT ALL

## 2025-04-08 SDOH — ECONOMIC STABILITY: HOUSING INSECURITY: IN THE LAST 12 MONTHS, WAS THERE A TIME WHEN YOU WERE NOT ABLE TO PAY THE MORTGAGE OR RENT ON TIME?: NO

## 2025-04-08 SDOH — SOCIAL STABILITY: SOCIAL INSECURITY: DOES ANYONE TRY TO KEEP YOU FROM HAVING/CONTACTING OTHER FRIENDS OR DOING THINGS OUTSIDE YOUR HOME?: NO

## 2025-04-08 SDOH — SOCIAL STABILITY: SOCIAL INSECURITY: HAVE YOU HAD ANY THOUGHTS OF HARMING ANYONE ELSE?: NO

## 2025-04-08 SDOH — ECONOMIC STABILITY: FOOD INSECURITY: WITHIN THE PAST 12 MONTHS, YOU WORRIED THAT YOUR FOOD WOULD RUN OUT BEFORE YOU GOT THE MONEY TO BUY MORE.: NEVER TRUE

## 2025-04-08 SDOH — ECONOMIC STABILITY: HOUSING INSECURITY: IN THE PAST 12 MONTHS, HOW MANY TIMES HAVE YOU MOVED WHERE YOU WERE LIVING?: 0

## 2025-04-08 SDOH — SOCIAL STABILITY: SOCIAL INSECURITY: WITHIN THE LAST YEAR, HAVE YOU BEEN AFRAID OF YOUR PARTNER OR EX-PARTNER?: NO

## 2025-04-08 SDOH — HEALTH STABILITY: PHYSICAL HEALTH: ON AVERAGE, HOW MANY DAYS PER WEEK DO YOU ENGAGE IN MODERATE TO STRENUOUS EXERCISE (LIKE A BRISK WALK)?: 0 DAYS

## 2025-04-08 SDOH — SOCIAL STABILITY: SOCIAL INSECURITY
WITHIN THE LAST YEAR, HAVE YOU BEEN RAPED OR FORCED TO HAVE ANY KIND OF SEXUAL ACTIVITY BY YOUR PARTNER OR EX-PARTNER?: NO

## 2025-04-08 SDOH — ECONOMIC STABILITY: HOUSING INSECURITY
IN THE LAST 12 MONTHS, WAS THERE A TIME WHEN YOU WERE NOT ABLE TO PAY THE MORTGAGE OR RENT ON TIME?: PATIENT UNABLE TO ANSWER

## 2025-04-08 SDOH — ECONOMIC STABILITY: TRANSPORTATION INSECURITY
IN THE PAST 12 MONTHS, HAS LACK OF TRANSPORTATION KEPT YOU FROM MEDICAL APPOINTMENTS OR FROM GETTING MEDICATIONS?: PATIENT DECLINED

## 2025-04-08 SDOH — HEALTH STABILITY: PHYSICAL HEALTH: ON AVERAGE, HOW MANY MINUTES DO YOU ENGAGE IN EXERCISE AT THIS LEVEL?: 0 MIN

## 2025-04-08 SDOH — SOCIAL STABILITY: SOCIAL INSECURITY: ABUSE: ADULT

## 2025-04-08 SDOH — SOCIAL STABILITY: SOCIAL INSECURITY: DO YOU FEEL UNSAFE GOING BACK TO THE PLACE WHERE YOU ARE LIVING?: NO

## 2025-04-08 SDOH — ECONOMIC STABILITY: TRANSPORTATION INSECURITY
IN THE PAST 12 MONTHS, HAS LACK OF TRANSPORTATION KEPT YOU FROM MEDICAL APPOINTMENTS OR FROM GETTING MEDICATIONS?: PATIENT UNABLE TO ANSWER

## 2025-04-08 SDOH — ECONOMIC STABILITY: FOOD INSECURITY: WITHIN THE PAST 12 MONTHS, THE FOOD YOU BOUGHT JUST DIDN'T LAST AND YOU DIDN'T HAVE MONEY TO GET MORE.: NEVER TRUE

## 2025-04-08 SDOH — HEALTH STABILITY: PHYSICAL HEALTH
HOW OFTEN DO YOU NEED TO HAVE SOMEONE HELP YOU WHEN YOU READ INSTRUCTIONS, PAMPHLETS, OR OTHER WRITTEN MATERIAL FROM YOUR DOCTOR OR PHARMACY?: SOMETIMES

## 2025-04-08 SDOH — ECONOMIC STABILITY: FOOD INSECURITY
HOW HARD IS IT FOR YOU TO PAY FOR THE VERY BASICS LIKE FOOD, HOUSING, MEDICAL CARE, AND HEATING?: PATIENT UNABLE TO ANSWER

## 2025-04-08 SDOH — SOCIAL STABILITY: SOCIAL INSECURITY: HAS ANYONE EVER THREATENED TO HURT YOUR FAMILY OR YOUR PETS?: NO

## 2025-04-08 SDOH — SOCIAL STABILITY: SOCIAL INSECURITY: HAVE YOU HAD THOUGHTS OF HARMING ANYONE ELSE?: NO

## 2025-04-08 SDOH — SOCIAL STABILITY: SOCIAL INSECURITY: WERE YOU ABLE TO COMPLETE ALL THE BEHAVIORAL HEALTH SCREENINGS?: YES

## 2025-04-08 SDOH — SOCIAL STABILITY: SOCIAL INSECURITY: ARE YOU OR HAVE YOU BEEN THREATENED OR ABUSED PHYSICALLY, EMOTIONALLY, OR SEXUALLY BY ANYONE?: NO

## 2025-04-08 SDOH — SOCIAL STABILITY: SOCIAL INSECURITY: DO YOU FEEL ANYONE HAS EXPLOITED OR TAKEN ADVANTAGE OF YOU FINANCIALLY OR OF YOUR PERSONAL PROPERTY?: NO

## 2025-04-08 SDOH — SOCIAL STABILITY: SOCIAL INSECURITY: ARE THERE ANY APPARENT SIGNS OF INJURIES/BEHAVIORS THAT COULD BE RELATED TO ABUSE/NEGLECT?: NO

## 2025-04-08 ASSESSMENT — COGNITIVE AND FUNCTIONAL STATUS - GENERAL
MOBILITY SCORE: 15
MOBILITY SCORE: 18
DRESSING REGULAR UPPER BODY CLOTHING: A LITTLE
MOVING FROM LYING ON BACK TO SITTING ON SIDE OF FLAT BED WITH BEDRAILS: A LITTLE
DAILY ACTIVITIY SCORE: 14
MOVING TO AND FROM BED TO CHAIR: A LITTLE
DRESSING REGULAR LOWER BODY CLOTHING: A LITTLE
EATING MEALS: A LITTLE
STANDING UP FROM CHAIR USING ARMS: A LITTLE
CLIMB 3 TO 5 STEPS WITH RAILING: A LITTLE
MOVING TO AND FROM BED TO CHAIR: A LITTLE
DRESSING REGULAR LOWER BODY CLOTHING: A LITTLE
STANDING UP FROM CHAIR USING ARMS: A LITTLE
DRESSING REGULAR LOWER BODY CLOTHING: A LITTLE
PERSONAL GROOMING: A LITTLE
HELP NEEDED FOR BATHING: A LOT
TOILETING: A LOT
DRESSING REGULAR UPPER BODY CLOTHING: A LOT
EATING MEALS: A LITTLE
PATIENT BASELINE BEDBOUND: NO
WALKING IN HOSPITAL ROOM: A LITTLE
PERSONAL GROOMING: A LITTLE
CLIMB 3 TO 5 STEPS WITH RAILING: A LITTLE
TOILETING: A LITTLE
DAILY ACTIVITIY SCORE: 18
TURNING FROM BACK TO SIDE WHILE IN FLAT BAD: A LITTLE
MOBILITY SCORE: 18
MOVING FROM LYING ON BACK TO SITTING ON SIDE OF FLAT BED WITH BEDRAILS: A LITTLE
EATING MEALS: A LITTLE
CLIMB 3 TO 5 STEPS WITH RAILING: A LOT
HELP NEEDED FOR BATHING: A LITTLE
TOILETING: A LITTLE
EATING MEALS: A LITTLE
HELP NEEDED FOR BATHING: A LITTLE
DAILY ACTIVITIY SCORE: 18
WALKING IN HOSPITAL ROOM: A LITTLE
MOVING TO AND FROM BED TO CHAIR: A LITTLE
TURNING FROM BACK TO SIDE WHILE IN FLAT BAD: A LOT
HELP NEEDED FOR BATHING: A LITTLE
CLIMB 3 TO 5 STEPS WITH RAILING: A LITTLE
PERSONAL GROOMING: A LITTLE
WALKING IN HOSPITAL ROOM: A LITTLE
PERSONAL GROOMING: A LITTLE
MOVING FROM LYING ON BACK TO SITTING ON SIDE OF FLAT BED WITH BEDRAILS: A LITTLE
STANDING UP FROM CHAIR USING ARMS: A LITTLE
WALKING IN HOSPITAL ROOM: A LITTLE
DRESSING REGULAR UPPER BODY CLOTHING: A LITTLE
DRESSING REGULAR UPPER BODY CLOTHING: A LITTLE
MOVING FROM LYING ON BACK TO SITTING ON SIDE OF FLAT BED WITH BEDRAILS: A LOT
TURNING FROM BACK TO SIDE WHILE IN FLAT BAD: A LITTLE
MOVING TO AND FROM BED TO CHAIR: A LITTLE
TURNING FROM BACK TO SIDE WHILE IN FLAT BAD: A LITTLE
DRESSING REGULAR LOWER BODY CLOTHING: A LOT
STANDING UP FROM CHAIR USING ARMS: A LITTLE
TOILETING: A LITTLE

## 2025-04-08 ASSESSMENT — PAIN SCALES - GENERAL
PAINLEVEL_OUTOF10: 7
PAINLEVEL_OUTOF10: 2
PAINLEVEL_OUTOF10: 0 - NO PAIN
PAINLEVEL_OUTOF10: 0 - NO PAIN
PAINLEVEL_OUTOF10: 3
PAINLEVEL_OUTOF10: 9
PAINLEVEL_OUTOF10: 7
PAINLEVEL_OUTOF10: 8

## 2025-04-08 ASSESSMENT — LIFESTYLE VARIABLES
HOW MANY STANDARD DRINKS CONTAINING ALCOHOL DO YOU HAVE ON A TYPICAL DAY: PATIENT DOES NOT DRINK
HOW OFTEN DO YOU HAVE 6 OR MORE DRINKS ON ONE OCCASION: NEVER
AUDIT-C TOTAL SCORE: 0
AUDIT-C TOTAL SCORE: 0
SKIP TO QUESTIONS 9-10: 1
HOW OFTEN DO YOU HAVE A DRINK CONTAINING ALCOHOL: NEVER

## 2025-04-08 ASSESSMENT — PAIN - FUNCTIONAL ASSESSMENT
PAIN_FUNCTIONAL_ASSESSMENT: 0-10

## 2025-04-08 ASSESSMENT — ACTIVITIES OF DAILY LIVING (ADL)
WALKS IN HOME: NEEDS ASSISTANCE
ADEQUATE_TO_COMPLETE_ADL: YES
PATIENT'S MEMORY ADEQUATE TO SAFELY COMPLETE DAILY ACTIVITIES?: YES
LACK_OF_TRANSPORTATION: PATIENT DECLINED
HEARING - RIGHT EAR: FUNCTIONAL
DRESSING YOURSELF: INDEPENDENT
ADL_ASSISTANCE: INDEPENDENT
ADL_ASSISTANCE: INDEPENDENT
LACK_OF_TRANSPORTATION: PATIENT UNABLE TO ANSWER
ASSISTIVE_DEVICE: WALKER
LACK_OF_TRANSPORTATION: NO
JUDGMENT_ADEQUATE_SAFELY_COMPLETE_DAILY_ACTIVITIES: YES
LACK_OF_TRANSPORTATION: PATIENT UNABLE TO ANSWER
HEARING - LEFT EAR: FUNCTIONAL
BATHING_ASSISTANCE: MODERATE
GROOMING: INDEPENDENT
FEEDING YOURSELF: INDEPENDENT
BATHING: INDEPENDENT
TOILETING: INDEPENDENT

## 2025-04-08 ASSESSMENT — PAIN DESCRIPTION - LOCATION: LOCATION: SHOULDER

## 2025-04-08 ASSESSMENT — PAIN DESCRIPTION - ORIENTATION: ORIENTATION: LEFT

## 2025-04-08 ASSESSMENT — PATIENT HEALTH QUESTIONNAIRE - PHQ9
SUM OF ALL RESPONSES TO PHQ9 QUESTIONS 1 & 2: 0
2. FEELING DOWN, DEPRESSED OR HOPELESS: NOT AT ALL
1. LITTLE INTEREST OR PLEASURE IN DOING THINGS: NOT AT ALL

## 2025-04-08 NOTE — ASSESSMENT & PLAN NOTE
Continue home medications   Euvolemic by exam today   Echo 2023 with LVEF 30-35% and RV dysfunction

## 2025-04-08 NOTE — ASSESSMENT & PLAN NOTE
PT/OT consulted   Tentative plan to discharge to [facility] when medically cleared - awaiting Precert

## 2025-04-08 NOTE — CONSULTS
Reason For Consult  Bone metastases    History Of Present Illness  Jing Sanchez is a 74 y.o. female presenting with inability to take care of herself.  This patient recently broke her left shoulder.  This happened a few days ago when she was abruptly opening the lid of a garbage can.  She saw Dr. Sutton from orthopedics yesterday who wanted her to have MRI of her shoulder.  This was scheduled for today at the hospital.  For unclear reason the patient called an ambulance we did get her the ambulance to get her to the hospital to get her outpatient MRI.  Instead, the ambulance delivered her to the emergency room where she was evaluated by the ER staff.  After discussion with her son who was at the bedside it was felt the patient needs help in placement that she cannot care for self.  Attempts were made by the  for placement directly from the ER but apparently she had to be admitted as it was felt unsafe for her to be discharged to home.     Past Medical History  She has a past medical history of A-fib (Multi), Acute MI (Multi) (08/2016), ASHD (arteriosclerotic heart disease), CAD (coronary artery disease), CHF (congestive heart failure), DM (diabetes mellitus) (Multi), Dyslipidemia, H/O echocardiogram (04/2017), H/O echocardiogram (09/2018), History of nuclear stress test (02/2015), History of nuclear stress test (04/2016), HTN (hypertension), Obesity, Obstructive sleep apnea, Personal history of other specified conditions, S/P cardiac catheterization (06/2016), and SOB (shortness of breath).    Surgical History  She has a past surgical history that includes MR angio head wo IV contrast (04/06/2017); Cardiac catheterization (09/04/2013); Tumor removal; Colonoscopy (04/2015); Coronary artery bypass graft (10/2016); and Cholecystectomy (03/11/2021).     Social History  She reports that she has never smoked. She has never been exposed to tobacco smoke. She has never used smokeless tobacco. She reports  "that she does not currently use alcohol. She reports that she does not use drugs.    Family History  Family History   Problem Relation Name Age of Onset    Hypertension Mother      Cancer Mother      Diabetes Father      Stroke Father      Diabetes Sister      Heart disease Maternal Grandmother      Heart disease Maternal Grandfather      Heart disease Paternal Grandmother      Heart disease Paternal Grandfather          Allergies  Cetirizine    Review of Systems  Constitutional: Negative.    HENT: Negative.     Eyes: Negative.    Respiratory: Negative.     Cardiovascular: Negative.    Gastrointestinal: Negative.    Endocrine: Negative.    Genitourinary: Negative.    Musculoskeletal: Negative.    Skin: Negative.    Allergic/Immunologic: Negative.    Neurological: Negative.    Hematological: Negative.    Psychiatric/Behavioral: Negative.     All other systems reviewed and are negative.     Physical Exam  She is normocephalic/atraumatic EOMI PERRLA  Neck supple  Chest clear  Heart regular distant heart sounds  Abdomen soft nontender benign exam  Extremities no peripheral edema good pedal pulses left arm is in a sling  Neurologic examination gross motor sensory nonfocal she moves all 4 extremities equally except the left arm which is in a sling  Psych no psychosis     Last Recorded Vitals  Blood pressure 98/74, pulse 61, temperature 36.6 °C (97.9 °F), temperature source Temporal, resp. rate 17, height 1.651 m (5' 5\"), weight 77.2 kg (170 lb 4.8 oz), SpO2 96%.    Relevant Results  CT CAP IMPRESSION:  1. Lytic expansile lesion demonstrated within the right 10th rib  about the costotransverse junction as well as lytic lesion within the  right sacrum measuring 3.3 cm. Metastatic disease is in the  differential with other potential etiologies including multiple  myeloma a possibility. Correlate with patient's history and  laboratory values.      2. No other definite metastatic lesions within the chest, abdomen, " or  pelvis.     Assessment/Plan     1- Bone lytic lesions:    Ddx is broad which includes metastases from solid primary or multiple myeloma. Myeloma labs and tumor markers are ordered and it is suggestive of a solid tumor with mets to bones. FLC ratio is normal    It could be  breast ca. MMG or breast Us is recommended    Will need outpatient pet ct.    Recommend bone biopsy by IR    Will continue to follow in Op setting    I spent 90 minutes in the professional and overall care of this patient.      Sandrita Chambers MD

## 2025-04-08 NOTE — CONSULTS
Reason For Consult  Left proximal humerus fracture    History Of Present Illness  Jing Sanchez is a 74 y.o. female presenting with inability to care for herself.  Patient had a recent fall with left proximal humeral shaft fracture.  Was seen by Dr. Sutton as an outpatient.  He is concerned about possible pathologic fracture and recommended an MRI of her shoulder.  She was going for an outpatient MRI yesterday but did not want a ride from her friend therefore she called the squad.  Apparently there was some confusion in the squad brought her to the emergency room instead.  From there she was admitted for inability to care for self with rehab placement pending.  She has been using her sling at home.     Past Medical History  She has a past medical history of A-fib (Multi), Acute MI (Multi) (08/2016), ASHD (arteriosclerotic heart disease), CAD (coronary artery disease), CHF (congestive heart failure), DM (diabetes mellitus) (Multi), Dyslipidemia, H/O echocardiogram (04/2017), H/O echocardiogram (09/2018), History of nuclear stress test (02/2015), History of nuclear stress test (04/2016), HTN (hypertension), Obesity, Obstructive sleep apnea, Personal history of other specified conditions, S/P cardiac catheterization (06/2016), and SOB (shortness of breath).    Surgical History  She has a past surgical history that includes MR angio head wo IV contrast (04/06/2017); Cardiac catheterization (09/04/2013); Tumor removal; Colonoscopy (04/2015); Coronary artery bypass graft (10/2016); and Cholecystectomy (03/11/2021).     Social History  She reports that she has never smoked. She has never been exposed to tobacco smoke. She has never used smokeless tobacco. She reports that she does not currently use alcohol. She reports that she does not use drugs.    Family History  Family History   Problem Relation Name Age of Onset    Hypertension Mother      Cancer Mother      Diabetes Father      Stroke Father      Diabetes  "Sister      Heart disease Maternal Grandmother      Heart disease Maternal Grandfather      Heart disease Paternal Grandmother      Heart disease Paternal Grandfather          Allergies  Cetirizine    Review of Systems  As above     Physical Exam  Alert and oriented x 3  Left upper extremity: Mild swelling about the shoulder but skin is intact.  Active movement of her wrist hand and fingers is intact.  Sling is otherwise position normally.     Last Recorded Vitals  Blood pressure 97/66, pulse 88, temperature 36.3 °C (97.3 °F), temperature source Temporal, resp. rate 17, height 1.651 m (5' 5\"), weight 77.2 kg (170 lb 4.8 oz), SpO2 92%.    Relevant Results    I reviewed past imaging of her left shoulder showing a transverse fracture in the proximal humeral shaft.  Scheduled medications  carvedilol, 3.125 mg, oral, BID  empagliflozin, 10 mg, oral, Daily  famotidine, 20 mg, intravenous, Once  [Held by provider] furosemide, 40 mg, oral, Daily  insulin glargine, 25 Units, subcutaneous, q24h  insulin lispro, 0-10 Units, subcutaneous, TID AC  losartan, 25 mg, oral, Daily  pantoprazole, 40 mg, oral, Daily before breakfast  polyethylene glycol, 17 g, oral, Daily  warfarin, 2 mg, oral, Daily      Continuous medications     PRN medications  PRN medications: acetaminophen **OR** acetaminophen **OR** acetaminophen, dextrose, dextrose, glucagon, glucagon, oxyCODONE  Results for orders placed or performed during the hospital encounter of 04/07/25 (from the past 24 hours)   CBC and Auto Differential   Result Value Ref Range    WBC 8.6 4.4 - 11.3 x10*3/uL    nRBC 0.0 0.0 - 0.0 /100 WBCs    RBC 3.89 (L) 4.00 - 5.20 x10*6/uL    Hemoglobin 13.1 12.0 - 16.0 g/dL    Hematocrit 39.9 36.0 - 46.0 %     (H) 80 - 100 fL    MCH 33.7 26.0 - 34.0 pg    MCHC 32.8 32.0 - 36.0 g/dL    RDW 14.9 (H) 11.5 - 14.5 %    Platelets 242 150 - 450 x10*3/uL    Neutrophils % 78.4 40.0 - 80.0 %    Immature Granulocytes %, Automated 0.3 0.0 - 0.9 %    " Lymphocytes % 9.9 13.0 - 44.0 %    Monocytes % 7.1 2.0 - 10.0 %    Eosinophils % 3.5 0.0 - 6.0 %    Basophils % 0.8 0.0 - 2.0 %    Neutrophils Absolute 6.75 (H) 1.60 - 5.50 x10*3/uL    Immature Granulocytes Absolute, Automated 0.03 0.00 - 0.50 x10*3/uL    Lymphocytes Absolute 0.85 0.80 - 3.00 x10*3/uL    Monocytes Absolute 0.61 0.05 - 0.80 x10*3/uL    Eosinophils Absolute 0.30 0.00 - 0.40 x10*3/uL    Basophils Absolute 0.07 0.00 - 0.10 x10*3/uL   Comprehensive metabolic panel   Result Value Ref Range    Glucose 189 (H) 74 - 99 mg/dL    Sodium 139 136 - 145 mmol/L    Potassium 3.8 3.5 - 5.3 mmol/L    Chloride 101 98 - 107 mmol/L    Bicarbonate 24 21 - 32 mmol/L    Anion Gap 18 10 - 20 mmol/L    Urea Nitrogen 53 (H) 6 - 23 mg/dL    Creatinine 1.88 (H) 0.50 - 1.05 mg/dL    eGFR 28 (L) >60 mL/min/1.73m*2    Calcium 10.5 (H) 8.6 - 10.3 mg/dL    Albumin 4.5 3.4 - 5.0 g/dL    Alkaline Phosphatase 120 33 - 136 U/L    Total Protein 8.2 6.4 - 8.2 g/dL    AST 15 9 - 39 U/L    Bilirubin, Total 0.8 0.0 - 1.2 mg/dL    ALT 13 7 - 45 U/L   Troponin I, High Sensitivity, Initial   Result Value Ref Range    Troponin I, High Sensitivity 11 0 - 13 ng/L   Magnesium   Result Value Ref Range    Magnesium 2.15 1.60 - 2.40 mg/dL   Sars-CoV-2 and Influenza A/B PCR   Result Value Ref Range    Flu A Result Not Detected Not Detected    Flu B Result Not Detected Not Detected    Coronavirus 2019, PCR Not Detected Not Detected   ECG 12 lead   Result Value Ref Range    Ventricular Rate 104 BPM    Atrial Rate 107 BPM    QRS Duration 98 ms    QT Interval 358 ms    QTC Calculation(Bazett) 470 ms    R Axis -31 degrees    T Axis 102 degrees    QRS Count 17 beats    Q Onset 219 ms    T Offset 398 ms    QTC Fredericia 430 ms   Troponin, High Sensitivity, 1 Hour   Result Value Ref Range    Troponin I, High Sensitivity 12 0 - 13 ng/L   POCT GLUCOSE   Result Value Ref Range    POCT Glucose 158 (H) 74 - 99 mg/dL   Protime-INR   Result Value Ref Range     Protime 22.7 (H) 9.3 - 12.7 seconds    INR 2.2 (H) 0.9 - 1.2   CBC and Auto Differential   Result Value Ref Range    WBC 7.2 4.4 - 11.3 x10*3/uL    nRBC 0.0 0.0 - 0.0 /100 WBCs    RBC 3.49 (L) 4.00 - 5.20 x10*6/uL    Hemoglobin 11.6 (L) 12.0 - 16.0 g/dL    Hematocrit 36.2 36.0 - 46.0 %     (H) 80 - 100 fL    MCH 33.2 26.0 - 34.0 pg    MCHC 32.0 32.0 - 36.0 g/dL    RDW 14.6 (H) 11.5 - 14.5 %    Platelets 200 150 - 450 x10*3/uL    Neutrophils % 70.9 40.0 - 80.0 %    Immature Granulocytes %, Automated 0.3 0.0 - 0.9 %    Lymphocytes % 12.1 13.0 - 44.0 %    Monocytes % 10.7 2.0 - 10.0 %    Eosinophils % 5.0 0.0 - 6.0 %    Basophils % 1.0 0.0 - 2.0 %    Neutrophils Absolute 5.12 1.60 - 5.50 x10*3/uL    Immature Granulocytes Absolute, Automated 0.02 0.00 - 0.50 x10*3/uL    Lymphocytes Absolute 0.87 0.80 - 3.00 x10*3/uL    Monocytes Absolute 0.77 0.05 - 0.80 x10*3/uL    Eosinophils Absolute 0.36 0.00 - 0.40 x10*3/uL    Basophils Absolute 0.07 0.00 - 0.10 x10*3/uL   Comprehensive Metabolic Panel   Result Value Ref Range    Glucose 132 (H) 74 - 99 mg/dL    Sodium 139 136 - 145 mmol/L    Potassium 3.6 3.5 - 5.3 mmol/L    Chloride 106 98 - 107 mmol/L    Bicarbonate 23 21 - 32 mmol/L    Anion Gap 14 10 - 20 mmol/L    Urea Nitrogen 48 (H) 6 - 23 mg/dL    Creatinine 1.72 (H) 0.50 - 1.05 mg/dL    eGFR 31 (L) >60 mL/min/1.73m*2    Calcium 9.9 8.6 - 10.3 mg/dL    Albumin 3.7 3.4 - 5.0 g/dL    Alkaline Phosphatase 92 33 - 136 U/L    Total Protein 6.5 6.4 - 8.2 g/dL    AST 12 9 - 39 U/L    Bilirubin, Total 0.7 0.0 - 1.2 mg/dL    ALT 10 7 - 45 U/L   Hemoglobin A1C   Result Value Ref Range    Hemoglobin A1C 7.7 (H) See comment %    Estimated Average Glucose 174 Not Established mg/dL   Magnesium   Result Value Ref Range    Magnesium 1.94 1.60 - 2.40 mg/dL   Type And Screen   Result Value Ref Range    ABO TYPE A     Rh TYPE POS     ANTIBODY SCREEN NEG    VERIFY ABO/Rh Group Test   Result Value Ref Range    ABO TYPE A     Rh TYPE POS     POCT GLUCOSE   Result Value Ref Range    POCT Glucose 148 (H) 74 - 99 mg/dL     *Note: Due to a large number of results and/or encounters for the requested time period, some results have not been displayed. A complete set of results can be found in Results Review.        Assessment/Plan     Left proximal humerus fracture: Nonweightbearing sling for assistance up with PT OT assessment.  Seems like she will need rehab.  In regards to ongoing need for an MRI discussed with Dr. Sutton.  He will take it under consideration but no immediate orders for new MRI are necessary.  Please call with questions or concerns.  Follow-up with Dr. Sutton as an outpatient.    Hernandez Batista MD

## 2025-04-08 NOTE — PROGRESS NOTES
PT OT evals are pending at this time.   Met with patient at bedside to discuss discharge planning, patient confirms she is having difficulty managing her care at home and believes she would benefit from short stay at rehab.      Discussed preferences, patient states that [facility] is her FOC, she has already toured.      Awaiting PT evals at this time to determine discharge plan

## 2025-04-08 NOTE — H&P
History Of Present Illness  Jing Sanchez is a 74 y.o. female presenting with inability to take care of herself.  This patient recently broke her left shoulder.  This happened a few days ago when she was abruptly opening the lid of a garbage can.  She saw Dr. Sutton from orthopedics yesterday who wanted her to have MRI of her shoulder.  This was scheduled for today at the hospital.  For unclear reason the patient called an ambulance we did get her the ambulance to get her to the hospital to get her outpatient MRI.  Instead, the ambulance delivered her to the emergency room where she was evaluated by the ER staff.  After discussion with her son who was at the bedside it was felt the patient needs help in placement that she cannot care for self.  Attempts were made by the  for placement directly from the ER but apparently she had to be admitted as it was felt unsafe for her to be discharged to home  Past Medical History  She has a past medical history of A-fib (Multi), Acute MI (Multi) (08/2016), ASHD (arteriosclerotic heart disease), CAD (coronary artery disease), CHF (congestive heart failure), DM (diabetes mellitus) (Multi), Dyslipidemia, H/O echocardiogram (04/2017), H/O echocardiogram (09/2018), History of nuclear stress test (02/2015), History of nuclear stress test (04/2016), HTN (hypertension), Obesity, Obstructive sleep apnea, Personal history of other specified conditions, S/P cardiac catheterization (06/2016), and SOB (shortness of breath).  Reviewed  Surgical History  She has a past surgical history that includes MR angio head wo IV contrast (04/06/2017); Cardiac catheterization (09/04/2013); Tumor removal; Colonoscopy (04/2015); Coronary artery bypass graft (10/2016); and Cholecystectomy (03/11/2021).  Reviewed  Social History  She reports that she has never smoked. She has never been exposed to tobacco smoke. She has never used smokeless tobacco. She reports that she does not currently  use alcohol. She reports that she does not use drugs.  Reviewed  She is a recent  and still appears to be in acute grief  Family History  Family History   Problem Relation Name Age of Onset    Hypertension Mother      Cancer Mother      Diabetes Father      Stroke Father      Diabetes Sister      Heart disease Maternal Grandmother      Heart disease Maternal Grandfather      Heart disease Paternal Grandmother      Heart disease Paternal Grandfather          Allergies  Cetirizine    ROS  Review of Systems   Constitutional: Negative.    HENT: Negative.     Eyes: Negative.    Respiratory: Negative.     Cardiovascular: Negative.    Gastrointestinal: Negative.    Endocrine: Negative.    Genitourinary: Negative.    Musculoskeletal: Negative.    Skin: Negative.    Allergic/Immunologic: Negative.    Neurological: Negative.    Hematological: Negative.    Psychiatric/Behavioral: Negative.     All other systems reviewed and are negative.    Denies most of the symptoms but she is not a great historian either  Last Recorded Vitals  /64 (BP Location: Right arm, Patient Position: Lying)   Pulse 91   Temp 36.8 °C (98.2 °F) (Temporal)   Resp 16   Wt 80 kg (176 lb 5.9 oz)   SpO2 93%     Physical Exam  I says patient room 450.  This is a elderly  female who is lying flat in bed on room air no acute distress alert Lopez x 3 cooperative  She is normocephalic/atraumatic EOMI PERRLA  Neck supple  Chest clear  Heart regular distant heart sounds  Abdomen soft nontender benign exam  Extremities no peripheral edema good pedal pulses left arm is in a sling  Neurologic examination gross motor sensory nonfocal she moves all 4 extremities equally except the left arm which is in a sling  Psych no psychosis  Skin no rash no obvious bleeding ecchymosis or hematoma    Relevant Results  Labs from today reviewed    ASSESSMENT/PLAN  Assessment/Plan   Assessment & Plan  Other closed nondisplaced fracture of proximal end of left  humerus with routine healing, subsequent encounter    Asymptomatic coronary heart disease    Benign essential hypertension    Cardiomyopathy    Chronic atrial fibrillation (Multi)    Stage 3 chronic kidney disease (Multi)    Diabetes mellitus (Multi)    Atrial fibrillation (Multi)    Ambulatory dysfunction    April 7,     Multiple medical problems which appear fairly stable.  Will need to check INR last available INR is from a year ago.  Continue chronic medications.  Consult Ortho PT OT écsar Wu MD

## 2025-04-08 NOTE — PROGRESS NOTES
Occupational Therapy    Evaluation    Patient Name: Jing Sanchez  MRN: [id number]  Department: [address]  Room: [address] [place], [place] [address]  Today's Date: 4/8/2025  Time Calculation  Start Time: 0905  Stop Time: 0925  Time Calculation (min): 20 min    Assessment  IP OT Assessment  OT Assessment: 75 y/o female presenting with FTT after suffering a recent Lt humeral fx.  On eval she requires increased assist for xfers, mobility, and self care d/t decreased strength, balance, activity tolerance, and safety awareness. Skilled OT services are required to maximize safety/IND prior to DC.  Prognosis: Good  Barriers to Discharge Home: Caregiver assistance, Physical needs  Caregiver Assistance: Patient lives alone and/or does not have reliable caregiver assistance  Physical Needs: Intermittent mobility assistance needed, Intermittent ADL assistance needed, Weight bearing precautions unable to be safely maintained  Evaluation/Treatment Tolerance: Patient tolerated treatment well  Medical Staff Made Aware: Yes  End of Session Communication: Bedside nurse  End of Session Patient Position: Up in chair, Alarm on  Plan:  Treatment Interventions: ADL retraining, Functional transfer training, UE strengthening/ROM, Endurance training, Patient/family training, Equipment evaluation/education, Neuromuscular reeducation, Compensatory technique education  OT Frequency: 3 times per week  OT Discharge Recommendations: Moderate intensity level of continued care  Equipment Recommended upon Discharge: Other (comment) (TBD)  OT Recommended Transfer Status: Assist of 1  OT - OK to Discharge: Yes    Subjective   Current Problem:  1. Other closed nondisplaced fracture of proximal end of left humerus with routine healing, subsequent encounter          General:  General  Reason for Referral: Decreased ADLS, Lt humeral fx, FTT  Referred By: Dr Wu  Past Medical History Relevant to Rehab: CAD, MI, CABG, cardiac stent, a-fib, DM, HTN, RISHI, CKD 3,  neuropathy  Family/Caregiver Present: No  Co-Treatment: PT  Co-Treatment Reason: for safety  Prior to Session Communication: Bedside nurse  Patient Position Received: Bed, 3 rail up, Alarm on  Preferred Learning Style: verbal, visual  General Comment: Cleared by nsg, pt met in supine, agreeable to OT session    Precautions:  Hearing/Visual Limitations: + glasses  UE Weight Bearing Status: Left Non-Weight Bearing  Medical Precautions: Fall precautions  Precautions Comment: LUE sling on at all times      Pain:  Pain Assessment  Pain Assessment: 0-10  0-10 (Numeric) Pain Score: 7  Pain Type: Acute pain  Pain Location: Shoulder  Pain Orientation: Left  Pain Interventions: Repositioned  Response to Interventions: Content/relaxed    Objective   Cognition:  Overall Cognitive Status: Impaired  Orientation Level: Disoriented to time, Disoriented to situation  Following Commands: Follows multistep commands without difficulty  Cognition Comments: pleasant/cooperative. decreased situational insight and safety awareness at times.  talkative, frequently refers to the recent loss of her spouse, emotional at times. supportive listening provided.  Insight: Mild  Processing Speed: Delayed    Home Living:  Type of Home: House  Lives With: Alone  Home Adaptive Equipment: Walker rolling or standard, Reacher  Home Layout: Multi-level, Laundry in basement, Bed/bath upstairs, Able to live on main level with bedroom/bathroom, Stairs to alternate level with rails, Stairs to alternate level without rails  Alternate Level Stairs-Rails: Both  Alternate Level Stairs-Number of Steps: 13 steps up to primary bed, bath; flight down to basement laundry; each set w/ 2 rails.  Home Access: Stairs to enter with rails  Entrance Stairs-Rails: Both  Entrance Stairs-Number of Steps: 3  Bathroom Shower/Tub: Tub/shower unit  Bathroom Toilet: Handicapped height  Bathroom Equipment: Bedside commode, Grab bars in shower  Bathroom Accessibility: no bath on main,  using BSC; sponge bathing since fx? Pt unable to confirm  Home Living Comments: Pt has been sleeping in a recliner and using BSC on main floor since Lt shoulder fx.     Prior Function:  Level of [place]: Independent with ADLs and functional transfers, Independent with homemaking with ambulation  Receives Help From: Friends  ADL Assistance: Independent  Homemaking Assistance:  (reports gets Meals on Wheels, has a friend to do the shopping, assist for driving, had recent assist for cleaning.)  Ambulatory Assistance:  (occasional use of 2WW)  Hand Dominance: Right  Prior Function Comments: Pt does not drive, denies falls, reports losing spouse ~ 2 weeks ago, fractured Lt humerus almost a week ago, has been staying on the main floor, admits to difficulty managing since LUE fx.    ADL:  Eating Assistance: Stand by  Eating Deficit: Setup  Grooming Assistance: Stand by  Grooming Deficit: Setup  Bathing Assistance: Moderate  Bathing Deficit:  (anticipated)  UE Dressing Assistance: Maximal  UE Dressing Deficit:  (max A to doff pull over shirt, adithya gown and reposition LUE sling)  LE Dressing Assistance: Maximal  LE Dressing Deficit: Thread RLE into underwear, Thread LLE into underwear, Pull up over hips  Toileting Assistance with Device: Moderate  Toileting Deficit: Clothing management down, Clothing management up    Activity Tolerance:  Endurance: Decreased tolerance for upright activites  Activity Tolerance Comments: fair    Bed Mobility/Transfers:   Bed Mobility  Bed Mobility: Yes  Bed Mobility 1  Bed Mobility 1: Supine to sitting  Level of Assistance 1: Contact guard  Bed Mobility Comments 1: HOB significantly elevated. cued on technique.    Transfers  Transfer: Yes  Transfer 1  Technique 1: Stand to sit, Sit to stand  Transfer Level of Assistance 1: Minimum assistance, Hand held assistance  Trials/Comments 1: to/from EOB, toilet, and bedside chair with cues on RUE placement and safety techniques    Functional  Mobility:  Functional Mobility  Functional Mobility Performed: Yes  Functional Mobility 1  Surface 1: Level tile  Device 1: No device  Assistance 1: Minimum assistance, Hand held assistance  Comments 1: short household distance to/from restroom, no device, MIN A via HHA through RUE, occasionally reaches out for external supports    Sitting Balance:  Static Sitting Balance  Static Sitting-Balance Support: Feet supported, Bilateral upper extremity supported  Static Sitting-Level of Assistance: Close supervision  Static Sitting-Comment/Number of Minutes: EOB sitting    Vision: Vision - Basic Assessment  Current Vision: Wears glasses all the time  Sensation:  Light Touch: No apparent deficits (denies numbness/tingling in UES)  Sensation Comment: does report neuropathy in BLEs  Strength:  Strength Comments: RUE 4-/5 grossly, LUE 3+/5 , otherwise NT  Coordination:  Movements are Fluid and Coordinated: No  Upper Body Coordination: LUE impaired  Lower Body Coordination: impaired slightly   Hand Function:  Hand Function  Gross Grasp: Functional  Coordination: Functional  Extremities: RUE   RUE : Within Functional Limits and LUE   LUE: Exceptions to WFL    Outcome Measures: [facility] Daily Activity  Putting on and taking off regular lower body clothing: A lot  Bathing (including washing, rinsing, drying): A lot  Putting on and taking off regular upper body clothing: A lot  Toileting, which includes using toilet, bedpan or urinal: A lot  Taking care of personal grooming such as brushing teeth: A little  Eating Meals: A little  Daily Activity - Total Score: 14      Education Documentation  Body Mechanics, taught by Yannick Loyola OT at 4/8/2025 11:22 AM.  Learner: Patient  Readiness: Acceptance  Method: Explanation  Response: Needs Reinforcement  Comment: initiated ADL retraining. educated re: fall precautions, safety techniques, OT POC    Precautions, taught by Yannick Loyola OT at 4/8/2025 11:22 AM.  Learner:  Patient  Readiness: Acceptance  Method: Explanation  Response: Needs Reinforcement  Comment: initiated ADL retraining. educated re: fall precautions, safety techniques, OT POC    ADL Training, taught by Yannick Loyola OT at 4/8/2025 11:22 AM.  Learner: Patient  Readiness: Acceptance  Method: Explanation  Response: Needs Reinforcement  Comment: initiated ADL retraining. educated re: fall precautions, safety techniques, OT POC    Education Comments  No comments found.      Goals:   Encounter Problems       Encounter Problems (Active)       OT Goals       ADLS (Progressing)       Start:  04/08/25    Expected End:  04/22/25       Patient will complete ADL tasks, with stand by assist using AE as needed in order to increase patient's safety and independence with self-care tasks.           Functional Transfer (Progressing)       Start:  04/08/25    Expected End:  04/22/25       Patient will complete functional transfers with supervision using least restrictive device, in order to increase patient's safety and independence with daily tasks.           Activity Tolerance (Progressing)       Start:  04/08/25    Expected End:  04/22/25       Patient will demonstrate the ability to participate in functional activity at least >/= 20 minutes in order to increase patient's safety and independence with daily tasks.

## 2025-04-08 NOTE — PROGRESS NOTES
04/08/25 1143   Discharge Planning   Expected Discharge Disposition SNF   Does the patient need discharge transport arranged? Yes   RoundTrip coordination needed? Yes     PT OT confirm MOD level therapy recommendations.      Patients FOC [facility] has accepted.     Information sent to direct submit team to initiate auth at this time.     DC Plan NOT secure  Do NOT DC without speaking to care coordination, PRECERT PENDING FOR SNF   MD will need to sign/certify the [place] for skilled rehab prior to discharge

## 2025-04-08 NOTE — PROGRESS NOTES
"Jing Sanchez is a 74 y.o. female on day 0 of admission presenting with Other closed nondisplaced fracture of proximal end of left humerus with routine healing, subsequent encounter.    Subjective   Patient resting in bed. Denies chest pain, shortness of breath, abdominal pain, fevers, chills. We reviewed plan for imaging/oncology consult and patient is agreeable        Objective     Physical Exam  Constitutional:       Appearance: Normal appearance.   HENT:      Head: Normocephalic and atraumatic.      Mouth/Throat:      Mouth: Mucous membranes are moist.      Pharynx: Oropharynx is clear.   Eyes:      Extraocular Movements: Extraocular movements intact.      Conjunctiva/sclera: Conjunctivae normal.      Pupils: Pupils are equal, round, and reactive to light.   Cardiovascular:      Rate and Rhythm: Normal rate and regular rhythm.      Pulses: Normal pulses.      Heart sounds: Normal heart sounds.   Pulmonary:      Effort: Pulmonary effort is normal.      Breath sounds: Normal breath sounds.   Abdominal:      General: Bowel sounds are normal.      Palpations: Abdomen is soft.      Tenderness: There is no abdominal tenderness.   Musculoskeletal:         General: Normal range of motion.      Cervical back: Normal range of motion and neck supple.   Skin:     General: Skin is warm and dry.      Capillary Refill: Capillary refill takes less than 2 seconds.   Neurological:      General: No focal deficit present.      Mental Status: She is alert and oriented to person, place, and time.   Psychiatric:         Mood and Affect: Mood normal.         Behavior: Behavior normal.         Last Recorded Vitals  Blood pressure 98/74, pulse 61, temperature 36.6 °C (97.9 °F), temperature source Temporal, resp. rate 17, height 1.651 m (5' 5\"), weight 77.2 kg (170 lb 4.8 oz), SpO2 96%.  Intake/Output last 3 Shifts:  No intake/output data recorded.    Relevant Results  Results for orders placed or performed during the hospital encounter " of 04/07/25 (from the past 24 hours)   POCT GLUCOSE   Result Value Ref Range    POCT Glucose 158 (H) 74 - 99 mg/dL   Protime-INR   Result Value Ref Range    Protime 22.7 (H) 9.3 - 12.7 seconds    INR 2.2 (H) 0.9 - 1.2   CBC and Auto Differential   Result Value Ref Range    WBC 7.2 4.4 - 11.3 x10*3/uL    nRBC 0.0 0.0 - 0.0 /100 WBCs    RBC 3.49 (L) 4.00 - 5.20 x10*6/uL    Hemoglobin 11.6 (L) 12.0 - 16.0 g/dL    Hematocrit 36.2 36.0 - 46.0 %     (H) 80 - 100 fL    MCH 33.2 26.0 - 34.0 pg    MCHC 32.0 32.0 - 36.0 g/dL    RDW 14.6 (H) 11.5 - 14.5 %    Platelets 200 150 - 450 x10*3/uL    Neutrophils % 70.9 40.0 - 80.0 %    Immature Granulocytes %, Automated 0.3 0.0 - 0.9 %    Lymphocytes % 12.1 13.0 - 44.0 %    Monocytes % 10.7 2.0 - 10.0 %    Eosinophils % 5.0 0.0 - 6.0 %    Basophils % 1.0 0.0 - 2.0 %    Neutrophils Absolute 5.12 1.60 - 5.50 x10*3/uL    Immature Granulocytes Absolute, Automated 0.02 0.00 - 0.50 x10*3/uL    Lymphocytes Absolute 0.87 0.80 - 3.00 x10*3/uL    Monocytes Absolute 0.77 0.05 - 0.80 x10*3/uL    Eosinophils Absolute 0.36 0.00 - 0.40 x10*3/uL    Basophils Absolute 0.07 0.00 - 0.10 x10*3/uL   Comprehensive Metabolic Panel   Result Value Ref Range    Glucose 132 (H) 74 - 99 mg/dL    Sodium 139 136 - 145 mmol/L    Potassium 3.6 3.5 - 5.3 mmol/L    Chloride 106 98 - 107 mmol/L    Bicarbonate 23 21 - 32 mmol/L    Anion Gap 14 10 - 20 mmol/L    Urea Nitrogen 48 (H) 6 - 23 mg/dL    Creatinine 1.72 (H) 0.50 - 1.05 mg/dL    eGFR 31 (L) >60 mL/min/1.73m*2    Calcium 9.9 8.6 - 10.3 mg/dL    Albumin 3.7 3.4 - 5.0 g/dL    Alkaline Phosphatase 92 33 - 136 U/L    Total Protein 6.5 6.4 - 8.2 g/dL    AST 12 9 - 39 U/L    Bilirubin, Total 0.7 0.0 - 1.2 mg/dL    ALT 10 7 - 45 U/L   Hemoglobin A1C   Result Value Ref Range    Hemoglobin A1C 7.7 (H) See comment %    Estimated Average Glucose 174 Not Established mg/dL   Magnesium   Result Value Ref Range    Magnesium 1.94 1.60 - 2.40 mg/dL   Type And Screen    Result Value Ref Range    ABO TYPE A     Rh TYPE POS     ANTIBODY SCREEN NEG    VERIFY ABO/Rh Group Test   Result Value Ref Range    ABO TYPE A     Rh TYPE POS    POCT GLUCOSE   Result Value Ref Range    POCT Glucose 148 (H) 74 - 99 mg/dL   POCT GLUCOSE   Result Value Ref Range    POCT Glucose 235 (H) 74 - 99 mg/dL   POCT GLUCOSE   Result Value Ref Range    POCT Glucose 136 (H) 74 - 99 mg/dL   *Comprehensive Metabolic Panel   Result Value Ref Range    Glucose 149 (H) 74 - 99 mg/dL    Sodium 135 (L) 136 - 145 mmol/L    Potassium 4.0 3.5 - 5.3 mmol/L    Chloride 103 98 - 107 mmol/L    Bicarbonate 23 21 - 32 mmol/L    Anion Gap 13 10 - 20 mmol/L    Urea Nitrogen 44 (H) 6 - 23 mg/dL    Creatinine 1.51 (H) 0.50 - 1.05 mg/dL    eGFR 36 (L) >60 mL/min/1.73m*2    Calcium 9.4 8.6 - 10.3 mg/dL    Albumin 3.5 3.4 - 5.0 g/dL    Alkaline Phosphatase 89 33 - 136 U/L    Total Protein 6.4 6.4 - 8.2 g/dL    AST 11 9 - 39 U/L    Bilirubin, Total 0.7 0.0 - 1.2 mg/dL    ALT 9 7 - 45 U/L   Iron and TIBC   Result Value Ref Range    Iron 32 (L) 35 - 150 ug/dL    UIBC 208 110 - 370 ug/dL    TIBC 240 240 - 445 ug/dL    % Saturation 13 (L) 25 - 45 %   Ferritin   Result Value Ref Range    Ferritin 293 (H) 8 - 150 ng/mL   *CBC and Auto Differential   Result Value Ref Range    WBC 7.4 4.4 - 11.3 x10*3/uL    nRBC 0.0 0.0 - 0.0 /100 WBCs    RBC 3.34 (L) 4.00 - 5.20 x10*6/uL    Hemoglobin 11.2 (L) 12.0 - 16.0 g/dL    Hematocrit 34.2 (L) 36.0 - 46.0 %     (H) 80 - 100 fL    MCH 33.5 26.0 - 34.0 pg    MCHC 32.7 32.0 - 36.0 g/dL    RDW 14.6 (H) 11.5 - 14.5 %    Platelets 225 150 - 450 x10*3/uL    Neutrophils % 69.8 40.0 - 80.0 %    Immature Granulocytes %, Automated 0.4 0.0 - 0.9 %    Lymphocytes % 14.0 13.0 - 44.0 %    Monocytes % 9.9 2.0 - 10.0 %    Eosinophils % 5.1 0.0 - 6.0 %    Basophils % 0.8 0.0 - 2.0 %    Neutrophils Absolute 5.15 1.60 - 5.50 x10*3/uL    Immature Granulocytes Absolute, Automated 0.03 0.00 - 0.50 x10*3/uL     Lymphocytes Absolute 1.03 0.80 - 3.00 x10*3/uL    Monocytes Absolute 0.73 0.05 - 0.80 x10*3/uL    Eosinophils Absolute 0.38 0.00 - 0.40 x10*3/uL    Basophils Absolute 0.06 0.00 - 0.10 x10*3/uL   Sedimentation Rate   Result Value Ref Range    Sedimentation Rate 71 (H) 0 - 30 mm/h     *Note: Due to a large number of results and/or encounters for the requested time period, some results have not been displayed. A complete set of results can be found in Results Review.     CT chest abdomen pelvis wo IV contrast    Result Date: 4/8/2025  Interpreted By:  Emily Renteria, STUDY: CT CHEST ABDOMEN PELVIS WO CONTRAST; ;  4/8/2025 12:09 pm   INDICATION: Signs/Symptoms:rule out metastatic disease. Concern for pathologic fracture humerus     COMPARISON: 03/08/2021   ACCESSION NUMBER(S): [id number]   ORDERING CLINICIAN: MADELIN KRUEGER   TECHNIQUE: Serial axial unenhanced CT images obtained of the chest, abdomen, and pelvis. Images reformatted in the coronal and sagittal projection   All CT examinations are performed with 1 or more of the following dose reduction techniques: Automated exposure control, adjustment of mA and/or kv according to patient's size, or use of iterative reconstruction techniques.   FINDINGS: CT chest:   Mediastinum demonstrates no lymphadenopathy. There is no hilar lymphadenopathy. Esophagus is unremarkable   Heart and great vessels demonstrate ascending thoracic aorta measure 3.6 cm with mild dilatation. Mild vascular calcification of the thoracic aorta. Main pulmonary artery is enlarged measuring 3.9 cm with dilatation. Cardiomegaly demonstrated   Lung parenchyma demonstrates mild centrilobular emphysema. No focal infiltrate or effusion. No noncalcified pulmonary nodularity   Visualized osseous structures demonstrate lytic expansile lesion in the right posterior 10th rib about the costotransverse junction with lesion measuring 1.9 cm. Mild multilevel anterior osteophyte formation within the thoracic  spine. Mild multilevel endplate sclerosis. No compression deformity demonstrated.   CT abdomen:   Liver is unremarkable within limits of this unenhanced CT.   Status post cholecystectomy   Spleen and adrenal glands are unremarkable   Pancreas is unremarkable within limits of this unenhanced CT   Right kidney demonstrates no hydronephrosis or nephrolithiasis. Visualized course of the right ureter is unremarkable   Left kidney demonstrates no hydronephrosis or nephrolithiasis.   Retroperitoneum demonstrates diffuse vascular calcification of the abdominal aorta. Scattered subcentimeter aortocaval and para-aortic lymph nodes less conspicuous from prior imaging. No significant lymphadenopathy.   Loops of large bowel demonstrate gas and stool filled loops of large bowel. Appendix is seen and is unremarkable. Small bowel loops are nondilated. There is no lymphadenopathy in the mesentery. Stomach is distended with food contents.   CT pelvis:   Unopacified bladder is unremarkable. There is no pelvic lymphadenopathy. No free fluid demonstrated.   Enlarged fibroid uterus is demonstrated incompletely characterize with partially calcified fibroids. Findings are incompletely characterized. Adnexa are not well evaluated. These findings are stable from exam dated 03/08/2021.   Visualized osseous structures demonstrate a lytic lesion within the right sacrum measuring 3.3 cm also, there is a stress fracture within the right sacral ala component of which may be related to pathologic stress fracture given the lesion in the right sacrum. There is a degenerative discogenic changes in the lumbar spine with interbody osseous fusion L5/S1. Multilevel vacuum phenomenon and loss of disc space height.               1. Lytic expansile lesion demonstrated within the right 10th rib about the costotransverse junction as well as lytic lesion within the right sacrum measuring 3.3 cm. Metastatic disease is in the differential with other potential  etiologies including multiple myeloma a possibility. Correlate with patient's history and laboratory values.   2. No other definite metastatic lesions within the chest, abdomen, or pelvis.   3. Chronic appearing changes as described above.     MACRO: None   Signed by: Emily Renteria 4/8/2025 1:55 PM Dictation workstation:   [id number]    ECG 12 lead    Result Date: 4/8/2025  Atrial fibrillation with rapid ventricular response Left axis deviation Inferior infarct , age undetermined Anterolateral infarct , age undetermined Abnormal ECG No previous ECGs available    XR chest 1 view    Result Date: 4/7/2025  Interpreted By:  Paty Florian, STUDY: XR CHEST 1 VIEW 4/7/2025 4:26 pm   INDICATION: Signs/Symptoms:weakness   COMPARISON: 03/11/2023   ACCESSION NUMBER(S): [id number]   ORDERING CLINICIAN: TIAGO CUNNINGHAM   TECHNIQUE: AP semi-erect view of the chest   FINDINGS: There is postoperative change from median sternotomy and coronary revascularization with coronary artery stent grafts identified. The heart is enlarged.   The lungs are clear with no pleural abnormality seen.       Cardiomegaly and postoperative change from CABG with coronary artery stent graft placement.   No acute pulmonary pathology.   Signed by: Paty Florian 4/7/2025 4:51 PM Dictation workstation:   [id number]       Assessment/Plan   Assessment & Plan  Other closed nondisplaced fracture of proximal end of left humerus with routine healing, subsequent encounter  Failure to thrive at home after humerus fracture   Orthopedic surgery consulted - appreciate recs   Dr. Sorenson called today to request imaging be done while admitted and consult oncology for possible metastatic disease in the setting of humerus fracture from lifting a bag of garbage.   Asymptomatic coronary heart disease  Angina free   Troponin WNLs   Continue carvedilol   Benign essential hypertension  Continue home carvedilol, losartan  Vitals as ordered   Check AM labs    Cardiomyopathy  Continue home medications   Euvolemic by exam today   Echo 2023 with LVEF 30-35% and RV dysfunction   Chronic atrial fibrillation (Multi)  Warfarin for stroke prophylaxis   - Daily INR   Continue beta blocker   Stage 3 chronic kidney disease (Multi)  Creatinine baseline 1.5-1.8   At baseline at this time   Avoid nephrotoxic agents   CT scans done without contrast   Monitor labs  Diabetes mellitus (Multi)  Home Lantus dose and jardiance ordered   Glucose AC/HS with SSI coverage   Hypoglycemia protocol   Ambulatory dysfunction  PT/OT consulted   Tentative plan to discharge to [facility] when medically cleared - awaiting Precert   DVT prophylaxis   Warfarin     Lissette Arnold, APRN-CNP

## 2025-04-08 NOTE — CARE PLAN
The patient's goals for the shift include  safety, pain control    The clinical goals for the shift include safety. pain managment      Problem: Diabetes  Goal: Achieve decreasing blood glucose levels by end of shift  Outcome: Progressing  Goal: Increase stability of blood glucose readings by end of shift  Outcome: Progressing  Goal: Decrease in ketones present in urine by end of shift  Outcome: Progressing  Goal: Maintain electrolyte levels within acceptable range throughout shift  Outcome: Progressing  Goal: Maintain glucose levels >70mg/dl to <250mg/dl throughout shift  Outcome: Progressing  Goal: No changes in neurological exam by end of shift  Outcome: Progressing  Goal: Learn about and adhere to nutrition recommendations by end of shift  Outcome: Progressing  Goal: Vital signs within normal range for age by end of shift  Outcome: Progressing  Goal: Increase self care and/or family involovement by end of shift  Outcome: Progressing  Goal: Receive DSME education by end of shift  Outcome: Progressing

## 2025-04-08 NOTE — CARE PLAN
The patient's goals for the shift include      The clinical goals for the shift include      Problem: Diabetes  Goal: Achieve decreasing blood glucose levels by end of shift  Outcome: Progressing     Problem: Diabetes  Goal: Increase stability of blood glucose readings by end of shift  Outcome: Progressing     Problem: Diabetes  Goal: Decrease in ketones present in urine by end of shift  Outcome: Progressing     Problem: Diabetes  Goal: Maintain electrolyte levels within acceptable range throughout shift  Outcome: Progressing     Problem: Diabetes  Goal: Maintain glucose levels >70mg/dl to <250mg/dl throughout shift  Outcome: Progressing     Problem: Diabetes  Goal: No changes in neurological exam by end of shift  Outcome: Progressing     Problem: Diabetes  Goal: Learn about and adhere to nutrition recommendations by end of shift  Outcome: Progressing     Problem: Diabetes  Goal: Receive DSME education by end of shift  Outcome: Progressing     Problem: Diabetes  Goal: Increase self care and/or family involovement by end of shift  Outcome: Progressing     Problem: Diabetes  Goal: Vital signs within normal range for age by end of shift  Outcome: Progressing     Problem: Diabetes  Goal: Learn about and adhere to nutrition recommendations by end of shift  Outcome: Progressing

## 2025-04-08 NOTE — ASSESSMENT & PLAN NOTE
Creatinine baseline 1.5-1.8   At baseline at this time   Avoid nephrotoxic agents   CT scans done without contrast   Monitor labs

## 2025-04-08 NOTE — ASSESSMENT & PLAN NOTE
Failure to thrive at home after humerus fracture   Orthopedic surgery consulted - appreciate recs   Dr. Sorenson called today to request imaging be done while admitted and consult oncology for possible metastatic disease in the setting of humerus fracture from lifting a bag of garbage.

## 2025-04-08 NOTE — TELEPHONE ENCOUNTER
Patient is active with [facility], followed by Kathie Izaguirre NP. Admitted to  [facility] 4/7/25. Patient presented to ED 4/3/25 with a Lt  humerus fracture, placed in sling and discharged  home with Ortho referral. Was seen by Ortho and OP MRI was ordered. Patient called 911 thinking they would take her to radiology, came to ED instead. While in ED it was brought up that she can not function at home without using both arms. She is requesting admission to SNF. [facility] will monitor hospitalization and discharge plan.

## 2025-04-08 NOTE — PROGRESS NOTES
Physical Therapy    Physical Therapy Evaluation & Treatment    Patient Name: Jing Sanchez  MRN: [id number]  Department: [address]  Room: [address] [place] [address]  Today's Date: 4/8/2025   Time Calculation  Start Time: 0845  Stop Time: 0925  Time Calculation (min): 40 min    Assessment/Plan   PT Assessment  PT Assessment Results: Decreased strength, Decreased endurance, Impaired balance, Decreased mobility, Decreased coordination, Decreased safety awareness, Decreased cognition, Impaired vision, Impaired sensation, Orthopedic restrictions, Pain  Rehab Prognosis: Good  Barriers to Discharge Home: Caregiver assistance, Physical needs  Caregiver Assistance: Patient lives alone and/or does not have reliable caregiver assistance  Physical Needs: High falls risk due to function or environment, Intermittent mobility assistance needed, Intermittent ADL assistance needed, Stair navigation to access bed limited by function/safety, Stair navigation to access bath limited by function/safety, Stair navigation into home limited by function/safety  Evaluation/Treatment Tolerance: Patient limited by pain  Medical Staff Made Aware: Yes  Strengths: Premorbid level of function  Barriers to Participation: Comorbidities  Assessment Comment: Pt moving w/ min assist, challenged w/ managing having LUE in sling, unsteady on feet, unable to use 2WW at this time. Pt would benefit from continued PT services to progress safe functional mobility.  End of Session Patient Position: Up in chair, Alarm on   IP OR SWING BED PT PLAN  Inpatient or Swing Bed: Inpatient  PT Plan  Treatment/Interventions: Bed mobility, Transfer training, Gait training, Balance training, Strengthening, Endurance training, Therapeutic exercise, Therapeutic activity  PT Plan: Ongoing PT  PT Frequency: 4 times per week  PT Discharge Recommendations: Moderate intensity level of continued care  Equipment Recommended upon Discharge:  (TBD)  PT Recommended Transfer Status: Assist x1, Assistive  device  PT - OK to Discharge: Yes      Subjective     General Visit Information:  General  Reason for Referral: impaired mobility, Lt humerus fx  Referred By: Dr Wu  Past Medical History Relevant to Rehab: CAD, MI, CABG, cardiac stent, a-fib, DM, HTN, RISHI, CKD 3, neuropathy  Family/Caregiver Present: No  Co-Treatment: OT  Co-Treatment Reason: pt safety and tolerance  Prior to Session Communication: Bedside nurse  Patient Position Received: Bed, 3 rail up, Alarm on  Preferred Learning Style: verbal, visual  General Comment: Pt is a 74 y.o. female adm for inability to manage at home following Lt humeral fx 4/3/25. Pt is agreeable to PT eval.  Home Living:  Home Living  Type of Home: House  Lives With: Alone  Home Adaptive Equipment: Walker rolling or standard, Reacher  Home Layout: Multi-level, Laundry in basement, Bed/bath upstairs, Able to live on main level with bedroom/bathroom, Stairs to alternate level with rails, Stairs to alternate level without rails  Alternate Level Stairs-Rails: Both  Alternate Level Stairs-Number of Steps: 13 steps up to primary bed, bath; flight down to basement laundry; each set  w/ 2 rails.  Home Access: Stairs to enter with rails  Entrance Stairs-Rails: Both  Entrance Stairs-Number of Steps: 3  Bathroom Shower/Tub: Tub/shower unit  Bathroom Toilet: Handicapped height  Bathroom Equipment: Bedside commode, Grab bars in shower  Bathroom Accessibility: no bath on main, using BSC; sponge bathing since fx? Pt unable to confirm  Home Living Comments: Pt has been sleeping in a recliner and using BSC on main floor since Lt shoulder fx.  Prior Level of Function:  Prior Function Per Pt/Caregiver Report  Level of [place]: Independent with ADLs and functional transfers, Needs assistance with homemaking  Receives Help From: Friends  ADL Assistance: Independent  Homemaking Assistance:  (reports gets Meals on Wheels, has a friend to do the shopping, assist for driving, had recent assist  "for cleaning.)  Ambulatory Assistance:  (occasional use of 2WW)  Hand Dominance: Right  Prior Function Comments: Pt doesn not drive, denies falls, reports losing spouse ~ 2 weeks ago, fractured Lt humerus almost a week ago, has been staying on the main floor, admits to difficulty managing since LUE fx.  Precautions:  Precautions  Hearing/Visual Limitations: glasses (pt reports poor vision, able to see orientation board and clock)  UE Weight Bearing Status: Left Non-Weight Bearing, Other (Comment) (LUE to be in sling)  Medical Precautions: Fall precautions  Precautions Comment: LUE sling readjusted       Objective   Pain:  Pain Assessment  Pain Assessment: 0-10  0-10 (Numeric) Pain Score: 9  Pain Type: Acute pain  Pain Location: Shoulder  Pain Orientation: Left  Pain Interventions: Repositioned  Response to Interventions: Provider notified, Decrease in pain  Cognition:  Cognition  Overall Cognitive Status:  (Fair, grossly functional)  Orientation Level: Disoriented to time (\"March \")  Cognition Comments: pleasant and cooperative, slow to process, vagueon details of daily management over past couple of weeks, possible deficits w/ recent loss of spouse (2 weeks ago)  Insight: Mild  Processing Speed: Delayed    General Assessments:     Activity Tolerance  Endurance: Decreased tolerance for upright activites  Activity Tolerance Comments: Fair-    Sensation  Sensation Comment: reports neuropathy at feet    Strength  Strength Comments: BLEs 3+/5 or >  Coordination  Coordination Comment: decreased speed, challenged w/o use of LUE    Postural Control  Posture Comment: forward flexed in standing    Dynamic Standing Balance  Dynamic Standing-Balance Support: Right upper extremity supported  Dynamic Standing-Level of Assistance: Minimum assistance  Dynamic Standing-Balance: Turning (amb)  Dynamic Standing-Comments: Fair- (mild unsteadiness and need for at least intermittent RUE support, no actual LOB.)  Functional " Assessments:  Bed Mobility  Bed Mobility: Yes  Bed Mobility 1  Bed Mobility 1: Supine to sitting  Level of Assistance 1: Contact guard  Bed Mobility Comments 1: to Rt  EOB, HOB nearly fully elevated required increased time to complete, cues to not use LUE.    Transfers  Transfer: Yes  Transfer 1  Technique 1: Sit to stand, Stand to sit  Transfer Level of Assistance 1: Hand held assistance  Trials/Comments 1: From EOB (to/from commode w/ OT), to chair w/ arms; assist for balance, cues ofr safety, hand placement    Ambulation/Gait Training  Ambulation/Gait Training Performed: Yes  Ambulation/Gait Training 1  Surface 1: Level tile  Gait Support Devices: Other (Comment) (LUE in sling)  Assistance 1: Hand held assistance, Minimum assistance  Comments/Distance (ft) 1: Pt amb ~ 15 x 2, intermittent HHA Rt, slow pace, increased FRANKLYN and decreased step length, slightly forward flexed posture. Mild unsteadiness but no LOB.  Extremity/Trunk Assessments:  RLE   RLE : Within Functional Limits  LLE   LLE : Within Functional Limits  Treatments:  Ambulation/Gait Training  Ambulation/Gait Training Performed: Yes  Ambulation/Gait Training 1  Surface 1: Level tile  Gait Support Devices: Other (Comment) (LUE in sling)  Assistance 1: Hand held assistance, Minimum assistance  Comments/Distance (ft) 1: Pt amb ~ 15 x 2, intermittent HHA Rt, slow pace, increased FRANKLYN and decreased step length, slightly forward flexed posture. Mild unsteadiness but no LOB.  Transfers  Transfer: Yes  Transfer 1  Technique 1: Sit to stand, Stand to sit  Transfer Level of Assistance 1: Hand held assistance  Trials/Comments 1: From EOB (to/from commode w/ OT), to chair w/ arms; assist for balance, cues ofr safety, hand placement  Outcome Measures:  [facility] Basic Mobility  Turning from your back to your side while in a flat bed without using bedrails: A lot  Moving from lying on your back to sitting on the side of a flat bed without using bedrails: A lot  Moving to  and from bed to chair (including a wheelchair): A little  Standing up from a chair using your arms (e.g. wheelchair or bedside chair): A little  To walk in hospital room: A little  Climbing 3-5 steps with railing: A lot  Basic Mobility - Total Score: 15    Encounter Problems       Encounter Problems (Active)       Mobility       STG - Patient will ambulate 45' w/ LRAD prn and CGA (Progressing)       Start:  04/08/25    Expected End:  04/18/25            Pt will tolerate BLE exercises and/or therapeutic activities to promote functional balance, endurance, strength and safe mobility (Progressing)       Start:  04/08/25    Expected End:  04/18/25               PT Transfers       STG - Patient to transfer to and from sit to supine w/ distant supervision  (Progressing)       Start:  04/08/25    Expected End:  04/18/25            STG - Patient will transfer sit to and from stand w/ close supervision and safe technique (Progressing)       Start:  04/08/25    Expected End:  04/18/25                   Education Documentation  Precautions, taught by Dinorah Tsang, PT at 4/8/2025 10:41 AM.  Learner: Patient  Readiness: Acceptance  Method: Explanation, Demonstration  Response: Needs Reinforcement  Comment: safety and awareness w/ mobility    Mobility Training, taught by Dinorah Tsang, PT at 4/8/2025 10:41 AM.  Learner: Patient  Readiness: Acceptance  Method: Explanation, Demonstration  Response: Needs Reinforcement  Comment: safety and awareness w/ mobility    Education Comments  No comments found.

## 2025-04-08 NOTE — PROGRESS NOTES
04/08/25 0914   Discharge Planning   Living Arrangements Alone  (Friend Desmond is supposed to move in soon)   Support Systems Friends/neighbors   Assistance Needed A&OX4; independent with ADLs with walker; doesn't drive anymore; room air baseline and currently room air; PCP Dr Primo Rico; on Coumadin prior to hospitalization   Type of Residence Private residence   Number of Stairs to Enter Residence 8   Number of Stairs Within Residence 13  (13 up to bedroom)   Do you have animals or pets at home? No   Who is requesting discharge planning? Provider   Home or Post Acute Services In home services   Expected Discharge Disposition Home H  (DC dispo is pending workup and PT OT. If snf needed ([employer]) patient could go snf as observation or admit with insurance authorization)   Does the patient need discharge transport arranged? Yes   RoundTrip coordination needed? Yes   Has discharge transport been arranged? No   Financial Resource Strain   How hard is it for you to pay for the very basics like food, housing, medical care, and heating? Not hard   Housing Stability   In the last 12 months, was there a time when you were not able to pay the mortgage or rent on time? N   In the past 12 months, how many times have you moved where you were living? 0   At any time in the past 12 months, were you homeless or living in a shelter (including now)? N   Transportation Needs   In the past 12 months, has lack of transportation kept you from medical appointments or from getting medications? no   In the past 12 months, has lack of transportation kept you from meetings, work, or from getting things needed for daily living? No     04/08/2025 0916am  This TCC covering  [facility] remotely from [facility]. Spoke with patient via phone. At this time PT OT evals not completed. Patient stated she wasn't sure if she wanted snf or not. Possible dc with new [facility]?      04/08/25 0914   Discharge Planning   Living Arrangements  Alone  (Friend Desmond is supposed to move in soon)   Support Systems Friends/neighbors   Assistance Needed A&OX4; independent with ADLs with walker; doesn't drive anymore; room air baseline and currently room air; PCP Dr Primo Rico; on Coumadin prior to hospitalization   Type of Residence Private residence   Number of Stairs to Enter Residence 8   Number of Stairs Within Residence 13  (13 up to bedroom)   Do you have animals or pets at home? No   Who is requesting discharge planning? Provider   Home or Post Acute Services In home services   Expected Discharge Disposition SNF  (PT recommended snf and patient pref is [facility]. [facility] accepted and precert started by [facility] (initiated by Sherrill Herr TCC supervisor))   Does the patient need discharge transport arranged? Yes   RoundTrip coordination needed? Yes   Has discharge transport been arranged? No   Financial Resource Strain   How hard is it for you to pay for the very basics like food, housing, medical care, and heating? Not hard   Housing Stability   In the last 12 months, was there a time when you were not able to pay the mortgage or rent on time? N   In the past 12 months, how many times have you moved where you were living? 0   At any time in the past 12 months, were you homeless or living in a shelter (including now)? N   Transportation Needs   In the past 12 months, has lack of transportation kept you from medical appointments or from getting medications? no   In the past 12 months, has lack of transportation kept you from meetings, work, or from getting things needed for daily living? No

## 2025-04-09 ENCOUNTER — TELEPHONE (OUTPATIENT)
Dept: HEMATOLOGY/ONCOLOGY | Facility: CLINIC | Age: 75
End: 2025-04-09
Payer: MEDICARE

## 2025-04-09 ENCOUNTER — HOSPITAL ENCOUNTER (OUTPATIENT)
Dept: RADIOLOGY | Facility: HOSPITAL | Age: 75
Discharge: HOME | End: 2025-04-09
Payer: MEDICARE

## 2025-04-09 VITALS
OXYGEN SATURATION: 93 % | RESPIRATION RATE: 16 BRPM | SYSTOLIC BLOOD PRESSURE: 102 MMHG | HEART RATE: 87 BPM | DIASTOLIC BLOOD PRESSURE: 84 MMHG

## 2025-04-09 DIAGNOSIS — T14.8XXA FRACTURE: ICD-10-CM

## 2025-04-09 LAB
25(OH)D3 SERPL-MCNC: 77 NG/ML (ref 30–100)
ALBUMIN SERPL BCP-MCNC: 3.2 G/DL (ref 3.4–5)
ALP SERPL-CCNC: 78 U/L (ref 33–136)
ALT SERPL W P-5'-P-CCNC: 8 U/L (ref 7–45)
ANION GAP SERPL CALCULATED.3IONS-SCNC: 13 MMOL/L (ref 10–20)
AST SERPL W P-5'-P-CCNC: 11 U/L (ref 9–39)
ATRIAL RATE: 107 BPM
B2 MICROGLOB SERPL-MCNC: 9 MG/L (ref 0.7–2.2)
BASOPHILS # BLD AUTO: 0.04 X10*3/UL (ref 0–0.1)
BASOPHILS NFR BLD AUTO: 0.6 %
BILIRUB SERPL-MCNC: 0.6 MG/DL (ref 0–1.2)
BUN SERPL-MCNC: 44 MG/DL (ref 6–23)
CALCIUM SERPL-MCNC: 9.2 MG/DL (ref 8.6–10.3)
CANCER AG125 SERPL-ACNC: 241 U/ML (ref 0–30.2)
CANCER AG19-9 SERPL-ACNC: 78.1 U/ML
CANCER AG27-29 SERPL-ACNC: 75.4 U/ML (ref 0–38.6)
CHLORIDE SERPL-SCNC: 105 MMOL/L (ref 98–107)
CO2 SERPL-SCNC: 23 MMOL/L (ref 21–32)
CREAT SERPL-MCNC: 1.54 MG/DL (ref 0.5–1.05)
EGFRCR SERPLBLD CKD-EPI 2021: 35 ML/MIN/1.73M*2
EOSINOPHIL # BLD AUTO: 0.39 X10*3/UL (ref 0–0.4)
EOSINOPHIL NFR BLD AUTO: 5.7 %
ERYTHROCYTE [DISTWIDTH] IN BLOOD BY AUTOMATED COUNT: 14.3 % (ref 11.5–14.5)
FOLATE SERPL-MCNC: 7.7 NG/ML
GLUCOSE BLD MANUAL STRIP-MCNC: 116 MG/DL (ref 74–99)
GLUCOSE BLD MANUAL STRIP-MCNC: 134 MG/DL (ref 74–99)
GLUCOSE BLD MANUAL STRIP-MCNC: 139 MG/DL (ref 74–99)
GLUCOSE BLD MANUAL STRIP-MCNC: 169 MG/DL (ref 74–99)
GLUCOSE SERPL-MCNC: 127 MG/DL (ref 74–99)
HCT VFR BLD AUTO: 31.7 % (ref 36–46)
HETEROPH AB SER QL: 1.8 CP (ref 1.5–1.8)
HGB BLD-MCNC: 10.3 G/DL (ref 12–16)
IGA SERPL-MCNC: 247 MG/DL (ref 70–400)
IGG SERPL-MCNC: 763 MG/DL (ref 700–1600)
IGM SERPL-MCNC: 136 MG/DL (ref 40–230)
IMM GRANULOCYTES # BLD AUTO: 0.02 X10*3/UL (ref 0–0.5)
IMM GRANULOCYTES NFR BLD AUTO: 0.3 % (ref 0–0.9)
INR PPP: 2.3 (ref 0.9–1.2)
KAPPA LC SERPL-MCNC: 4.58 MG/DL (ref 0.33–1.94)
KAPPA LC/LAMBDA SER: 0.77 {RATIO} (ref 0.26–1.65)
LAMBDA LC SERPL-MCNC: 5.93 MG/DL (ref 0.57–2.63)
LYMPHOCYTES # BLD AUTO: 1.05 X10*3/UL (ref 0.8–3)
LYMPHOCYTES NFR BLD AUTO: 15.5 %
MCH RBC QN AUTO: 33.7 PG (ref 26–34)
MCHC RBC AUTO-ENTMCNC: 32.5 G/DL (ref 32–36)
MCV RBC AUTO: 104 FL (ref 80–100)
MONOCYTES # BLD AUTO: 0.79 X10*3/UL (ref 0.05–0.8)
MONOCYTES NFR BLD AUTO: 11.6 %
NEUTROPHILS # BLD AUTO: 4.5 X10*3/UL (ref 1.6–5.5)
NEUTROPHILS NFR BLD AUTO: 66.3 %
NRBC BLD-RTO: 0 /100 WBCS (ref 0–0)
PLATELET # BLD AUTO: 193 X10*3/UL (ref 150–450)
POTASSIUM SERPL-SCNC: 3.8 MMOL/L (ref 3.5–5.3)
PROT SERPL-MCNC: 5.8 G/DL (ref 6.4–8.2)
PROTHROMBIN TIME: 23.3 SECONDS (ref 9.3–12.7)
Q ONSET: 219 MS
QRS COUNT: 17 BEATS
QRS DURATION: 98 MS
QT INTERVAL: 358 MS
QTC CALCULATION(BAZETT): 470 MS
QTC FREDERICIA: 430 MS
R AXIS: -31 DEGREES
RBC # BLD AUTO: 3.06 X10*6/UL (ref 4–5.2)
SODIUM SERPL-SCNC: 137 MMOL/L (ref 136–145)
T AXIS: 102 DEGREES
T OFFSET: 398 MS
VENTRICULAR RATE: 104 BPM
VIT B12 SERPL-MCNC: 241 PG/ML (ref 211–911)
WBC # BLD AUTO: 6.8 X10*3/UL (ref 4.4–11.3)

## 2025-04-09 PROCEDURE — 2500000004 HC RX 250 GENERAL PHARMACY W/ HCPCS (ALT 636 FOR OP/ED): Performed by: INTERNAL MEDICINE

## 2025-04-09 PROCEDURE — 2720000007 HC OR 272 NO HCPCS

## 2025-04-09 PROCEDURE — 77012 CT SCAN FOR NEEDLE BIOPSY: CPT | Performed by: RADIOLOGY

## 2025-04-09 PROCEDURE — 2500000001 HC RX 250 WO HCPCS SELF ADMINISTERED DRUGS (ALT 637 FOR MEDICARE OP): Performed by: NURSE PRACTITIONER

## 2025-04-09 PROCEDURE — 82947 ASSAY GLUCOSE BLOOD QUANT: CPT

## 2025-04-09 PROCEDURE — 85610 PROTHROMBIN TIME: CPT | Performed by: INTERNAL MEDICINE

## 2025-04-09 PROCEDURE — 97110 THERAPEUTIC EXERCISES: CPT | Mod: GP

## 2025-04-09 PROCEDURE — 94760 N-INVAS EAR/PLS OXIMETRY 1: CPT

## 2025-04-09 PROCEDURE — 97535 SELF CARE MNGMENT TRAINING: CPT | Mod: GO,CO

## 2025-04-09 PROCEDURE — 2500000002 HC RX 250 W HCPCS SELF ADMINISTERED DRUGS (ALT 637 FOR MEDICARE OP, ALT 636 FOR OP/ED): Performed by: INTERNAL MEDICINE

## 2025-04-09 PROCEDURE — 7100000010 HC PHASE TWO TIME - EACH INCREMENTAL 1 MINUTE

## 2025-04-09 PROCEDURE — 85025 COMPLETE CBC W/AUTO DIFF WBC: CPT | Performed by: INTERNAL MEDICINE

## 2025-04-09 PROCEDURE — 97116 GAIT TRAINING THERAPY: CPT | Mod: GP

## 2025-04-09 PROCEDURE — 7100000009 HC PHASE TWO TIME - INITIAL BASE CHARGE

## 2025-04-09 PROCEDURE — 36415 COLL VENOUS BLD VENIPUNCTURE: CPT | Performed by: INTERNAL MEDICINE

## 2025-04-09 PROCEDURE — 20206 BIOPSY MUSCLE PERQ NEEDLE: CPT | Performed by: RADIOLOGY

## 2025-04-09 PROCEDURE — 2500000004 HC RX 250 GENERAL PHARMACY W/ HCPCS (ALT 636 FOR OP/ED): Performed by: RADIOLOGY

## 2025-04-09 PROCEDURE — 80053 COMPREHEN METABOLIC PANEL: CPT | Performed by: INTERNAL MEDICINE

## 2025-04-09 PROCEDURE — 10009 FNA BX W/CT GDN 1ST LES: CPT

## 2025-04-09 PROCEDURE — 99152 MOD SED SAME PHYS/QHP 5/>YRS: CPT

## 2025-04-09 PROCEDURE — 2500000001 HC RX 250 WO HCPCS SELF ADMINISTERED DRUGS (ALT 637 FOR MEDICARE OP): Performed by: INTERNAL MEDICINE

## 2025-04-09 PROCEDURE — 82947 ASSAY GLUCOSE BLOOD QUANT: CPT | Mod: 59

## 2025-04-09 PROCEDURE — G0378 HOSPITAL OBSERVATION PER HR: HCPCS

## 2025-04-09 PROCEDURE — 97110 THERAPEUTIC EXERCISES: CPT | Mod: GO,CO

## 2025-04-09 PROCEDURE — 77012 CT SCAN FOR NEEDLE BIOPSY: CPT

## 2025-04-09 RX ORDER — LIDOCAINE HYDROCHLORIDE 10 MG/ML
INJECTION, SOLUTION EPIDURAL; INFILTRATION; INTRACAUDAL; PERINEURAL
Status: COMPLETED | OUTPATIENT
Start: 2025-04-09 | End: 2025-04-09

## 2025-04-09 RX ORDER — MIDAZOLAM HYDROCHLORIDE 1 MG/ML
INJECTION, SOLUTION INTRAMUSCULAR; INTRAVENOUS
Status: COMPLETED | OUTPATIENT
Start: 2025-04-09 | End: 2025-04-09

## 2025-04-09 RX ORDER — FENTANYL CITRATE 50 UG/ML
INJECTION, SOLUTION INTRAMUSCULAR; INTRAVENOUS
Status: COMPLETED | OUTPATIENT
Start: 2025-04-09 | End: 2025-04-09

## 2025-04-09 RX ADMIN — INSULIN LISPRO 2 UNITS: 100 INJECTION, SOLUTION INTRAVENOUS; SUBCUTANEOUS at 12:02

## 2025-04-09 RX ADMIN — OXYCODONE HYDROCHLORIDE 5 MG: 5 TABLET ORAL at 01:13

## 2025-04-09 RX ADMIN — FENTANYL CITRATE 25 MCG: 0.05 INJECTION, SOLUTION INTRAMUSCULAR; INTRAVENOUS at 16:36

## 2025-04-09 RX ADMIN — ACETAMINOPHEN 650 MG: 325 TABLET ORAL at 01:13

## 2025-04-09 RX ADMIN — EMPAGLIFLOZIN 10 MG: 10 TABLET, FILM COATED ORAL at 09:06

## 2025-04-09 RX ADMIN — OXYCODONE HYDROCHLORIDE 5 MG: 5 TABLET ORAL at 21:33

## 2025-04-09 RX ADMIN — CARVEDILOL 3.12 MG: 3.12 TABLET, FILM COATED ORAL at 09:06

## 2025-04-09 RX ADMIN — NYSTATIN 1 APPLICATION: 100000 POWDER TOPICAL at 09:05

## 2025-04-09 RX ADMIN — NYSTATIN 1 APPLICATION: 100000 POWDER TOPICAL at 21:33

## 2025-04-09 RX ADMIN — WARFARIN SODIUM 2 MG: 2 TABLET ORAL at 18:20

## 2025-04-09 RX ADMIN — PANTOPRAZOLE SODIUM 40 MG: 40 TABLET, DELAYED RELEASE ORAL at 06:06

## 2025-04-09 RX ADMIN — POLYETHYLENE GLYCOL 3350 17 G: 17 POWDER, FOR SOLUTION ORAL at 09:06

## 2025-04-09 RX ADMIN — INSULIN GLARGINE 25 UNITS: 100 INJECTION, SOLUTION SUBCUTANEOUS at 21:33

## 2025-04-09 RX ADMIN — MIDAZOLAM 1 MG: 1 INJECTION INTRAMUSCULAR; INTRAVENOUS at 16:37

## 2025-04-09 RX ADMIN — LIDOCAINE HYDROCHLORIDE 10 ML: 10 INJECTION, SOLUTION EPIDURAL; INFILTRATION; INTRACAUDAL; PERINEURAL at 16:37

## 2025-04-09 RX ADMIN — CARVEDILOL 3.12 MG: 3.12 TABLET, FILM COATED ORAL at 21:33

## 2025-04-09 RX ADMIN — LOSARTAN POTASSIUM 25 MG: 25 TABLET, FILM COATED ORAL at 09:06

## 2025-04-09 ASSESSMENT — COGNITIVE AND FUNCTIONAL STATUS - GENERAL
TURNING FROM BACK TO SIDE WHILE IN FLAT BAD: A LOT
TOILETING: A LITTLE
HELP NEEDED FOR BATHING: A LITTLE
WALKING IN HOSPITAL ROOM: A LITTLE
TURNING FROM BACK TO SIDE WHILE IN FLAT BAD: A LITTLE
DRESSING REGULAR LOWER BODY CLOTHING: A LITTLE
TOILETING: A LOT
DAILY ACTIVITIY SCORE: 18
DAILY ACTIVITIY SCORE: 14
PERSONAL GROOMING: A LITTLE
MOVING TO AND FROM BED TO CHAIR: A LITTLE
CLIMB 3 TO 5 STEPS WITH RAILING: A LITTLE
MOBILITY SCORE: 18
STANDING UP FROM CHAIR USING ARMS: A LITTLE
EATING MEALS: A LITTLE
HELP NEEDED FOR BATHING: A LITTLE
MOVING FROM LYING ON BACK TO SITTING ON SIDE OF FLAT BED WITH BEDRAILS: A LITTLE
DRESSING REGULAR LOWER BODY CLOTHING: A LOT
CLIMB 3 TO 5 STEPS WITH RAILING: A LOT
STANDING UP FROM CHAIR USING ARMS: A LITTLE
MOBILITY SCORE: 18
DAILY ACTIVITIY SCORE: 18
DRESSING REGULAR UPPER BODY CLOTHING: A LITTLE
MOVING FROM LYING ON BACK TO SITTING ON SIDE OF FLAT BED WITH BEDRAILS: A LOT
HELP NEEDED FOR BATHING: A LOT
MOVING FROM LYING ON BACK TO SITTING ON SIDE OF FLAT BED WITH BEDRAILS: A LITTLE
TURNING FROM BACK TO SIDE WHILE IN FLAT BAD: A LITTLE
TOILETING: A LITTLE
WALKING IN HOSPITAL ROOM: A LITTLE
DRESSING REGULAR UPPER BODY CLOTHING: A LOT
CLIMB 3 TO 5 STEPS WITH RAILING: A LITTLE
PERSONAL GROOMING: A LITTLE
EATING MEALS: A LITTLE
PERSONAL GROOMING: A LITTLE
STANDING UP FROM CHAIR USING ARMS: A LITTLE
DRESSING REGULAR LOWER BODY CLOTHING: A LITTLE
DRESSING REGULAR UPPER BODY CLOTHING: A LITTLE
MOVING TO AND FROM BED TO CHAIR: A LITTLE
MOBILITY SCORE: 15
EATING MEALS: A LITTLE
WALKING IN HOSPITAL ROOM: A LITTLE
MOVING TO AND FROM BED TO CHAIR: A LITTLE

## 2025-04-09 ASSESSMENT — PAIN SCALES - GENERAL
PAINLEVEL_OUTOF10: 0 - NO PAIN
PAINLEVEL_OUTOF10: 9
PAINLEVEL_OUTOF10: 10 - WORST POSSIBLE PAIN
PAINLEVEL_OUTOF10: 0 - NO PAIN
PAINLEVEL_OUTOF10: 8
PAINLEVEL_OUTOF10: 0 - NO PAIN

## 2025-04-09 ASSESSMENT — PAIN DESCRIPTION - ORIENTATION
ORIENTATION: LEFT
ORIENTATION: LEFT

## 2025-04-09 ASSESSMENT — PAIN DESCRIPTION - LOCATION
LOCATION: SHOULDER
LOCATION: SHOULDER

## 2025-04-09 ASSESSMENT — PAIN - FUNCTIONAL ASSESSMENT
PAIN_FUNCTIONAL_ASSESSMENT: 0-10
PAIN_FUNCTIONAL_ASSESSMENT: UNABLE TO SELF-REPORT
PAIN_FUNCTIONAL_ASSESSMENT: 0-10
PAIN_FUNCTIONAL_ASSESSMENT: 0-10
PAIN_FUNCTIONAL_ASSESSMENT: UNABLE TO SELF-REPORT

## 2025-04-09 ASSESSMENT — ACTIVITIES OF DAILY LIVING (ADL): HOME_MANAGEMENT_TIME_ENTRY: 15

## 2025-04-09 NOTE — PROGRESS NOTES
04/09/25 0939   Discharge Planning   Expected Discharge Disposition SNF   Does the patient need discharge transport arranged? Yes   RoundTrip coordination needed? Yes   Has discharge transport been arranged? No     [facility] accepted and pre-cert is pending.     1:07 PM Patient is approved for admission to [facility] starting 4/8 through 4/14. Provider made aware of authorization. Care Transitions will continue to follow.

## 2025-04-09 NOTE — CARE PLAN
Problem: Diabetes  Goal: Achieve decreasing blood glucose levels by end of shift  Outcome: Progressing  Goal: Increase stability of blood glucose readings by end of shift  Outcome: Progressing  Goal: Decrease in ketones present in urine by end of shift  Outcome: Progressing  Goal: Maintain electrolyte levels within acceptable range throughout shift  Outcome: Progressing  Goal: Maintain glucose levels >70mg/dl to <250mg/dl throughout shift  Outcome: Progressing  Goal: No changes in neurological exam by end of shift  Outcome: Progressing  Goal: Learn about and adhere to nutrition recommendations by end of shift  Outcome: Progressing  Goal: Vital signs within normal range for age by end of shift  Outcome: Progressing  Goal: Increase self care and/or family involovement by end of shift  Outcome: Progressing  Goal: Receive DSME education by end of shift  Outcome: Progressing   The patient's goals for the shift include rest.      The clinical goals for the shift include pt safety

## 2025-04-09 NOTE — TREATMENT PLAN
Patient was transported to [facility] [place] for biopsy today   She was not in her room when I stopped to assess.     Chart reviewed   Results reviewed   Spoke with bedside nurse     Tentative plan for discharge to SNF tomorrow

## 2025-04-09 NOTE — PROGRESS NOTES
Physical Therapy    Physical Therapy Treatment    Patient Name: Jing Sanchez  MRN: [id number]  Department: [address]  Room: [address] [place] [address]  Today's Date: 4/9/2025  Time Calculation  Start Time: 1338  Stop Time: 1403  Time Calculation (min): 25 min         Assessment/Plan   PT Assessment  PT Assessment Results: Decreased strength, Decreased endurance, Impaired balance, Decreased mobility, Decreased coordination, Decreased safety awareness, Decreased cognition, Impaired vision, Impaired sensation, Orthopedic restrictions, Pain  Rehab Prognosis: Good  Barriers to Discharge Home: Caregiver assistance, Physical needs  Caregiver Assistance: Patient lives alone and/or does not have reliable caregiver assistance  Physical Needs: High falls risk due to function or environment, Intermittent mobility assistance needed, Intermittent ADL assistance needed, Stair navigation to access bed limited by function/safety, Stair navigation to access bath limited by function/safety, Stair navigation into home limited by function/safety  Evaluation/Treatment Tolerance: Patient limited by fatigue, Patient limited by pain  Medical Staff Made Aware: Yes  Strengths: Premorbid level of function, Support of extended family/friends  Barriers to Participation: Comorbidities  End of Session Communication: Bedside nurse  Assessment Comment: Pt gives effort as able, easily fatigued w/ high pain level. Trial of cane will take continued practice.  End of Session Patient Position: Bed, 3 rail up, Alarm on  PT Plan  Inpatient/Swing Bed or Outpatient: Inpatient  PT Plan  Treatment/Interventions: Bed mobility, Transfer training, Gait training, Balance training, Strengthening, Endurance training, Therapeutic exercise, Therapeutic activity  PT Plan: Ongoing PT  PT Frequency: 4 times per week  PT Discharge Recommendations: Moderate intensity level of continued care  Equipment Recommended upon Discharge: Other (comment) (TBD)  PT Recommended Transfer Status: Assist  x1, Assistive device  PT - OK to Discharge: Yes      General Visit Information:   PT  Visit  PT Received On: 04/09/25  General  Reason for Referral: impaired mobility, Lt humerus fx  Referred By: Dr Wu  Past Medical History Relevant to Rehab: CAD, MI, CABG, cardiac stent, a-fib, DM, HTN, RISHI, CKD 3, neuropathy  Patient Position Received: Up in chair, Alarm on  General Comment: Pt agreeable to PT session, wanting to get back to bed soon.    Subjective   Precautions:  Precautions  Hearing/Visual Limitations: + glasses  UE Weight Bearing Status: Left Non-Weight Bearing  Medical Precautions: Fall precautions  Precautions Comment: LUE sling on at all times (in place upon therapist's arrival)        Objective   Pain:  Pain Assessment  Pain Assessment: 0-10  0-10 (Numeric) Pain Score: 9  Pain Type: Acute pain  Pain Location: Shoulder  Pain Orientation: Left  Pain Interventions: Repositioned  Response to Interventions: No change in pain, Provider notified  Cognition:  Cognition  Overall Cognitive Status: Within Functional Limits (grossly)  Cognition Comments: pleasant, cooperative  Insight: Mild  Processing Speed: Delayed  Coordination:  Coordination Comment: decreased speed, challenged w/o use of LUE  Postural Control:  Dynamic Standing Balance  Dynamic Standing-Balance Support: Right upper extremity supported  Dynamic Standing-Level of Assistance: Minimum assistance  Dynamic Standing-Balance: Turning (amb)  Dynamic Standing-Comments: Fair- (unsteady w/o LOB)    Activity Tolerance:  Activity Tolerance  Endurance: Decreased tolerance for upright activites  Activity Tolerance Comments: Fair- (easily fatigued, high pain level)  Treatments:  Therapeutic Exercise  Therapeutic Exercise Performed: Yes  Therapeutic Exercise Activity 1: Pt performed supine bilat ankle pumps, quad sts, glute sets, heel slides, hip abd slides, SAQs x 15-20 reps each.    Bed Mobility  Bed Mobility: Yes  Bed Mobility 1  Bed Mobility 1: Sitting  to supine  Level of Assistance 1: Minimum assistance  Bed Mobility Comments 1: entered on pt's Rt side, assist for LEs into bed    Ambulation/Gait Training  Ambulation/Gait Training Performed: Yes  Ambulation/Gait Training 1  Surface 1: Level tile  Device 1: Single point cane  Gait Support Devices: Other (Comment), Gait belt (LUE in sling)  Assistance 1: Minimum assistance  Comments/Distance (ft) 1: Pt amb ~ 25' x 1, forward flexed posture, cane Rt hand w/ cues for consistent placement. Tends to perform step-to pattern, however, gets going quickly  and then has inconsistent placement. Unsteady but no LOB.  Transfers  Transfer: Yes  Transfer 1  Technique 1: Sit to stand, Stand to sit  Transfer Device 1: Cane, Gait belt  Transfer Level of Assistance 1: Minimum assistance, Moderate verbal cues  Trials/Comments 1: From chair w/ arms, used arm RT side for transfer, cane was then handed to pt. Cues for Rt hand placement w/ return to sit at EOB.    Outcome Measures:  [facility] Basic Mobility  Turning from your back to your side while in a flat bed without using bedrails: A lot  Moving from lying on your back to sitting on the side of a flat bed without using bedrails: A lot  Moving to and from bed to chair (including a wheelchair): A little  Standing up from a chair using your arms (e.g. wheelchair or bedside chair): A little  To walk in hospital room: A little  Climbing 3-5 steps with railing: A lot  Basic Mobility - Total Score: 15    Education Documentation  Precautions, taught by Dinorah Tsang PT at 4/9/2025  2:19 PM.  Learner: Patient  Readiness: Acceptance  Method: Explanation, Demonstration  Response: Needs Reinforcement  Comment: safety and technique    Home Exercise Program, taught by Dinorah Tsang PT at 4/9/2025  2:19 PM.  Learner: Patient  Readiness: Acceptance  Method: Explanation, Demonstration  Response: Needs Reinforcement  Comment: safety and technique    Mobility Training, taught by Dinorah Tsang PT at  4/9/2025  2:19 PM.  Learner: Patient  Readiness: Acceptance  Method: Explanation, Demonstration  Response: Needs Reinforcement  Comment: safety and technique    Education Comments  No comments found.        OP EDUCATION:       Encounter Problems       Encounter Problems (Active)       Mobility       STG - Patient will ambulate 45' w/ LRAD prn and CGA (Progressing)       Start:  04/08/25    Expected End:  04/18/25            Pt will tolerate BLE exercises and/or therapeutic activities to promote functional balance, endurance, strength and safe mobility (Progressing)       Start:  04/08/25    Expected End:  04/18/25               PT Transfers       STG - Patient to transfer to and from sit to supine w/ distant supervision  (Progressing)       Start:  04/08/25    Expected End:  04/18/25            STG - Patient will transfer sit to and from stand w/ close supervision and safe technique (Progressing)       Start:  04/08/25    Expected End:  04/18/25

## 2025-04-09 NOTE — PROGRESS NOTES
Spiritual Care Visit  Spiritual Care Request    Reason for Visit:  Routine Visit: Introduction     Request Received From:       Focus of Care:  Visited With: Patient         Refer to :          Spiritual Care Assessment    Spiritual Assessment:                      Care Provided:  Intended Effects: Establish rapport and connectedness, Build relationship of care and support, Demonstrate caring and concern  Methods: Offer emotional support  Interventions: Explain  role    Sense of Community and or Scientologist Affiliation:  Pentecostal         Addressed Needs/Concerns and/or Boris Through:  Scientologist Encounters  Scientologist Needs: Prayer       Outcome:  Outcome of Spiritual Care Visit: Identifying spiritual/emotional issues, Comfort/healing presence     Advance Directives:         Spiritual Care Annotation        Annotation:  provided patient support while rounding the Unit.  introduced  services of [facility].    explained the role of the  in providing emotional and spiritual support for patient's and family while in admitted to the hospital.     Patient had expressed appreciation for the visit today. Patient shared that she has an arm injury and is waiting for more information on her medical needs. Patient also shared that she was grieving the death of spouse two weeks ago.  offered condolence and grief support.     No spiritual or Yazidism needs were expressed. Spiritual care will remain available for support as requested.

## 2025-04-09 NOTE — TELEPHONE ENCOUNTER
Oncology consulted, bone lytic lesions noted. Per Oncology consult: Ddx is broad which includes metastases from solid primary or multiple myeloma. Myeloma labs and tumor markers are ordered and it is suggestive of a solid tumor with mets to bones.

## 2025-04-09 NOTE — PROGRESS NOTES
Occupational Therapy    OT Treatment    Patient Name: Jing Sanchez  MRN: [id number]  Department: [address]  Room: [address] [place], [place] [address]  Today's Date: 4/9/2025  Time Calculation  Start Time: 1309  Stop Time: 1333  Time Calculation (min): 24 min        Assessment:  OT Assessment: pt making good progress with goals, puts forth good effort with encouragement, however, limited this session due to fatigue and RUE pain during movement.  Prognosis: Good  Barriers to Discharge Home: Caregiver assistance, Physical needs  Caregiver Assistance: Patient lives alone and/or does not have reliable caregiver assistance  Physical Needs: Intermittent mobility assistance needed, Intermittent ADL assistance needed, Weight bearing precautions unable to be safely maintained  Evaluation/Treatment Tolerance: Patient tolerated treatment well  End of Session Communication: Bedside nurse  End of Session Patient Position: Up in chair, Alarm on  Prognosis: Good  Evaluation/Treatment Tolerance: Patient tolerated treatment well  Strengths: Premorbid level of function  Barriers to Participation: Comorbidities  Plan:  Treatment Interventions: ADL retraining, Functional transfer training, UE strengthening/ROM, Endurance training, Patient/family training, Equipment evaluation/education, Neuromuscular reeducation, Compensatory technique education  OT Frequency: 3 times per week  OT Discharge Recommendations: Moderate intensity level of continued care  Equipment Recommended upon Discharge: Other (comment) (TBD)  OT Recommended Transfer Status: Minimal assist, Assist of 1  OT - OK to Discharge: Yes  Treatment Interventions: ADL retraining, Functional transfer training, UE strengthening/ROM, Endurance training, Patient/family training, Equipment evaluation/education, Neuromuscular reeducation, Compensatory technique education    Subjective   Previous Visit Info:  OT Last Visit  OT Received On: 04/09/25  General:  General  Reason for Referral: Decreased ADLS, Lt  humeral fx, FTT  Referred By: Dr Wu  Past Medical History Relevant to Rehab: CAD, MI, CABG, cardiac stent, a-fib, DM, HTN, RISHI, CKD 3, neuropathy  Prior to Session Communication: Bedside nurse  Patient Position Received: Bed, 3 rail up, Up in chair, Alarm on  General Comment: agreeable to OT tx  Precautions:  Hearing/Visual Limitations: + glasses  UE Weight Bearing Status: Left Non-Weight Bearing  Medical Precautions: Fall precautions  Precautions Comment: LUE sling on at all times    Pain:  Pain Assessment  0-10 (Numeric) Pain Score: 0 - No pain    Objective    Cognition:  Cognition  Orientation Level: Oriented X4  Following Commands: Follows multistep commands without difficulty  Insight: Mild  Coordination:  Upper Body Coordination: LUE impaired  Activities of Daily Living: Grooming  Grooming Level of Assistance: Contact guard  Grooming Where Assessed: Standing sinkside  Grooming Comments: completed hand hygiene with encouragement    Toileting  Toileting Level of Assistance: Minimum assistance  Where Assessed: Toilet  Toileting Comments: completed clothing management with Min A and encouragement. pt declined completing malia hygiene.       Bed Mobility/Transfers: Transfer 1  Transfer From 1: Chair with arms to  Transfer to 1: Stand  Technique 1: Sit to stand  Transfer Level of Assistance 1: Hand held assistance, Contact guard  Trials/Comments 1: verbal cues for safe transfer techniques while NWB on RUE  Transfers 2  Transfer From 2: Stand to  Transfer to 2: Chair with arms  Technique 2: Stand to sit  Transfer Level of Assistance 2: Contact guard    Toilet Transfers  Toilet Transfer From: Chair  Toilet Transfer Type: To and from  Toilet Transfer to: Standard toilet  Toilet Transfer Technique: Ambulating  Toilet Transfers: Contact guard  Toilet Transfers Comments: cues for safe hand placement    Functional Mobility:  Functional Mobility 1  Surface 1: Level tile  Device 1: No device  Assistance 1: Minimum  assistance, Hand held assistance  Comments 1: short household distance, pt frequently reaches out for external supports       Therapy/Activity: Therapeutic Exercise  Therapeutic Exercise Performed: Yes  Therapeutic Exercise Activity 1: bicep curls  Therapeutic Exercise Activity 2: shoulder internal/external rotation  Therapeutic Exercise Activity 3: RUE only, 1 set x 15 reps with use of 1lb wt. pt declined doing more UE exercises, stated her RUE was fatigued.      Outcome Measures:[facility] Daily Activity  Putting on and taking off regular lower body clothing: A lot  Bathing (including washing, rinsing, drying): A lot  Putting on and taking off regular upper body clothing: A lot  Toileting, which includes using toilet, bedpan or urinal: A lot  Taking care of personal grooming such as brushing teeth: A little  Eating Meals: A little  Daily Activity - Total Score: 14        Education Documentation  Home Exercise Program, taught by MANUEL Piña at 4/9/2025  2:26 PM.  Learner: Patient  Readiness: Acceptance  Method: Explanation, Demonstration  Response: Verbalizes Understanding, Needs Reinforcement, Demonstrated Understanding  Comment: instructed in safe transfers while NWB on LUE, purpose/benefits of UE therex on R side only    ADL Training, taught by MANUEL Piña at 4/9/2025  2:26 PM.  Learner: Patient  Readiness: Acceptance  Method: Explanation, Demonstration  Response: Verbalizes Understanding, Needs Reinforcement, Demonstrated Understanding  Comment: instructed in safe transfers while NWB on LUE, purpose/benefits of UE therex on R side only    Education Comments  No comments found.         Goals:  Encounter Problems       Encounter Problems (Active)       OT Goals       ADLS (Progressing)       Start:  04/08/25    Expected End:  04/22/25       Patient will complete ADL tasks, with stand by assist using AE as needed in order to increase patient's safety and independence with self-care tasks.            Functional Transfer (Progressing)       Start:  04/08/25    Expected End:  04/22/25       Patient will complete functional transfers with supervision using least restrictive device, in order to increase patient's safety and independence with daily tasks.           Activity Tolerance (Progressing)       Start:  04/08/25    Expected End:  04/22/25       Patient will demonstrate the ability to participate in functional activity at least >/= 20 minutes in order to increase patient's safety and independence with daily tasks.                MANUEL NIEVES DAVENPORT/L  [id number]

## 2025-04-09 NOTE — CARE PLAN
The patient's goals for the shift include      The clinical goals for the shift include pt safety      Problem: Diabetes  Goal: Achieve decreasing blood glucose levels by end of shift  Outcome: Progressing  Goal: Increase stability of blood glucose readings by end of shift  Outcome: Progressing  Goal: Decrease in ketones present in urine by end of shift  Outcome: Progressing  Goal: Maintain electrolyte levels within acceptable range throughout shift  Outcome: Progressing  Goal: Maintain glucose levels >70mg/dl to <250mg/dl throughout shift  Outcome: Progressing  Goal: No changes in neurological exam by end of shift  Outcome: Progressing  Goal: Learn about and adhere to nutrition recommendations by end of shift  Outcome: Progressing  Goal: Vital signs within normal range for age by end of shift  Outcome: Progressing  Goal: Increase self care and/or family involovement by end of shift  Outcome: Progressing  Goal: Receive DSME education by end of shift  Outcome: Progressing

## 2025-04-10 ENCOUNTER — APPOINTMENT (OUTPATIENT)
Dept: RADIOLOGY | Facility: HOSPITAL | Age: 75
End: 2025-04-10
Payer: MEDICARE

## 2025-04-10 VITALS
DIASTOLIC BLOOD PRESSURE: 73 MMHG | WEIGHT: 170.3 LBS | BODY MASS INDEX: 28.37 KG/M2 | OXYGEN SATURATION: 95 % | TEMPERATURE: 97.2 F | SYSTOLIC BLOOD PRESSURE: 131 MMHG | HEIGHT: 65 IN | RESPIRATION RATE: 18 BRPM | HEART RATE: 86 BPM

## 2025-04-10 LAB
ALBUMIN SERPL BCP-MCNC: 3.5 G/DL (ref 3.4–5)
ALP SERPL-CCNC: 80 U/L (ref 33–136)
ALT SERPL W P-5'-P-CCNC: 8 U/L (ref 7–45)
ANION GAP SERPL CALCULATED.3IONS-SCNC: 14 MMOL/L
AST SERPL W P-5'-P-CCNC: 11 U/L (ref 9–39)
BASOPHILS # BLD AUTO: 0.06 X10*3/UL (ref 0–0.1)
BASOPHILS NFR BLD AUTO: 0.8 %
BILIRUB SERPL-MCNC: 0.7 MG/DL (ref 0–1.2)
BUN SERPL-MCNC: 40 MG/DL (ref 6–23)
CALCIUM SERPL-MCNC: 9.3 MG/DL (ref 8.6–10.3)
CHLORIDE SERPL-SCNC: 105 MMOL/L (ref 98–107)
CO2 SERPL-SCNC: 23 MMOL/L (ref 21–32)
CREAT SERPL-MCNC: 1.4 MG/DL (ref 0.5–1.05)
EGFRCR SERPLBLD CKD-EPI 2021: 40 ML/MIN/1.73M*2
EOSINOPHIL # BLD AUTO: 0.4 X10*3/UL (ref 0–0.4)
EOSINOPHIL NFR BLD AUTO: 5.6 %
ERYTHROCYTE [DISTWIDTH] IN BLOOD BY AUTOMATED COUNT: 14.1 % (ref 11.5–14.5)
GLUCOSE BLD MANUAL STRIP-MCNC: 123 MG/DL (ref 74–99)
GLUCOSE BLD MANUAL STRIP-MCNC: 187 MG/DL (ref 74–99)
GLUCOSE SERPL-MCNC: 104 MG/DL (ref 74–99)
HCT VFR BLD AUTO: 34.4 % (ref 36–46)
HGB BLD-MCNC: 11.1 G/DL (ref 12–16)
IMM GRANULOCYTES # BLD AUTO: 0.01 X10*3/UL (ref 0–0.5)
IMM GRANULOCYTES NFR BLD AUTO: 0.1 % (ref 0–0.9)
INR PPP: 2.1 (ref 0.9–1.2)
LYMPHOCYTES # BLD AUTO: 0.88 X10*3/UL (ref 0.8–3)
LYMPHOCYTES NFR BLD AUTO: 12.3 %
MCH RBC QN AUTO: 33 PG (ref 26–34)
MCHC RBC AUTO-ENTMCNC: 32.3 G/DL (ref 32–36)
MCV RBC AUTO: 102 FL (ref 80–100)
MONOCYTES # BLD AUTO: 0.85 X10*3/UL (ref 0.05–0.8)
MONOCYTES NFR BLD AUTO: 11.9 %
NEUTROPHILS # BLD AUTO: 4.96 X10*3/UL (ref 1.6–5.5)
NEUTROPHILS NFR BLD AUTO: 69.3 %
NRBC BLD-RTO: 0 /100 WBCS (ref 0–0)
PLATELET # BLD AUTO: 203 X10*3/UL (ref 150–450)
POTASSIUM SERPL-SCNC: 3.9 MMOL/L (ref 3.5–5.3)
PROT SERPL-MCNC: 6.2 G/DL (ref 6.4–8.2)
PROTHROMBIN TIME: 21 SECONDS (ref 9.3–12.7)
RBC # BLD AUTO: 3.36 X10*6/UL (ref 4–5.2)
SODIUM SERPL-SCNC: 138 MMOL/L (ref 136–145)
WBC # BLD AUTO: 7.2 X10*3/UL (ref 4.4–11.3)

## 2025-04-10 PROCEDURE — 85610 PROTHROMBIN TIME: CPT | Performed by: NURSE PRACTITIONER

## 2025-04-10 PROCEDURE — 36415 COLL VENOUS BLD VENIPUNCTURE: CPT | Performed by: NURSE PRACTITIONER

## 2025-04-10 PROCEDURE — 2500000004 HC RX 250 GENERAL PHARMACY W/ HCPCS (ALT 636 FOR OP/ED): Performed by: INTERNAL MEDICINE

## 2025-04-10 PROCEDURE — 2500000001 HC RX 250 WO HCPCS SELF ADMINISTERED DRUGS (ALT 637 FOR MEDICARE OP): Performed by: INTERNAL MEDICINE

## 2025-04-10 PROCEDURE — 82947 ASSAY GLUCOSE BLOOD QUANT: CPT

## 2025-04-10 PROCEDURE — 2500000001 HC RX 250 WO HCPCS SELF ADMINISTERED DRUGS (ALT 637 FOR MEDICARE OP): Performed by: NURSE PRACTITIONER

## 2025-04-10 PROCEDURE — 99239 HOSP IP/OBS DSCHRG MGMT >30: CPT | Performed by: NURSE PRACTITIONER

## 2025-04-10 PROCEDURE — 80053 COMPREHEN METABOLIC PANEL: CPT | Performed by: NURSE PRACTITIONER

## 2025-04-10 PROCEDURE — G0378 HOSPITAL OBSERVATION PER HR: HCPCS

## 2025-04-10 PROCEDURE — 85025 COMPLETE CBC W/AUTO DIFF WBC: CPT | Performed by: NURSE PRACTITIONER

## 2025-04-10 PROCEDURE — 2500000002 HC RX 250 W HCPCS SELF ADMINISTERED DRUGS (ALT 637 FOR MEDICARE OP, ALT 636 FOR OP/ED): Performed by: INTERNAL MEDICINE

## 2025-04-10 RX ORDER — ACETAMINOPHEN 325 MG/1
650 TABLET ORAL EVERY 4 HOURS PRN
Start: 2025-04-10

## 2025-04-10 RX ORDER — NYSTATIN 100000 [USP'U]/G
1 POWDER TOPICAL 2 TIMES DAILY
Start: 2025-04-10

## 2025-04-10 RX ORDER — POLYETHYLENE GLYCOL 3350 17 G/17G
17 POWDER, FOR SOLUTION ORAL DAILY
Start: 2025-04-11

## 2025-04-10 RX ORDER — OXYCODONE HYDROCHLORIDE 5 MG/1
5 TABLET ORAL EVERY 6 HOURS PRN
Qty: 12 TABLET | Refills: 0 | Status: SHIPPED | OUTPATIENT
Start: 2025-04-10 | End: 2025-04-10

## 2025-04-10 RX ORDER — OXYCODONE HYDROCHLORIDE 5 MG/1
5 TABLET ORAL EVERY 6 HOURS PRN
Qty: 12 TABLET | Refills: 0 | Status: SHIPPED | OUTPATIENT
Start: 2025-04-10

## 2025-04-10 RX ADMIN — EMPAGLIFLOZIN 10 MG: 10 TABLET, FILM COATED ORAL at 08:32

## 2025-04-10 RX ADMIN — PANTOPRAZOLE SODIUM 40 MG: 40 TABLET, DELAYED RELEASE ORAL at 06:27

## 2025-04-10 RX ADMIN — POLYETHYLENE GLYCOL 3350 17 G: 17 POWDER, FOR SOLUTION ORAL at 08:32

## 2025-04-10 RX ADMIN — NYSTATIN 1 APPLICATION: 100000 POWDER TOPICAL at 08:33

## 2025-04-10 RX ADMIN — LOSARTAN POTASSIUM 25 MG: 25 TABLET, FILM COATED ORAL at 08:31

## 2025-04-10 RX ADMIN — CARVEDILOL 3.12 MG: 3.12 TABLET, FILM COATED ORAL at 08:31

## 2025-04-10 RX ADMIN — INSULIN LISPRO 2 UNITS: 100 INJECTION, SOLUTION INTRAVENOUS; SUBCUTANEOUS at 12:17

## 2025-04-10 ASSESSMENT — COGNITIVE AND FUNCTIONAL STATUS - GENERAL
STANDING UP FROM CHAIR USING ARMS: A LITTLE
MOBILITY SCORE: 18
DAILY ACTIVITIY SCORE: 18
CLIMB 3 TO 5 STEPS WITH RAILING: A LITTLE
HELP NEEDED FOR BATHING: A LITTLE
MOVING TO AND FROM BED TO CHAIR: A LITTLE
TOILETING: A LITTLE
DRESSING REGULAR UPPER BODY CLOTHING: A LITTLE
WALKING IN HOSPITAL ROOM: A LITTLE
TURNING FROM BACK TO SIDE WHILE IN FLAT BAD: A LITTLE
PERSONAL GROOMING: A LITTLE
DRESSING REGULAR LOWER BODY CLOTHING: A LITTLE
MOVING FROM LYING ON BACK TO SITTING ON SIDE OF FLAT BED WITH BEDRAILS: A LITTLE
EATING MEALS: A LITTLE

## 2025-04-10 ASSESSMENT — PAIN SCALES - GENERAL: PAINLEVEL_OUTOF10: 0 - NO PAIN

## 2025-04-10 NOTE — CARE PLAN
The patient's goals for the shift include      The clinical goals for the shift include safety, pain management.    Problem: Diabetes  Goal: Achieve decreasing blood glucose levels by end of shift  Outcome: Progressing  Goal: Increase stability of blood glucose readings by end of shift  Outcome: Progressing  Goal: Decrease in ketones present in urine by end of shift  Outcome: Progressing  Goal: Maintain electrolyte levels within acceptable range throughout shift  Outcome: Progressing  Goal: Maintain glucose levels >70mg/dl to <250mg/dl throughout shift  Outcome: Progressing  Goal: No changes in neurological exam by end of shift  Outcome: Progressing  Goal: Learn about and adhere to nutrition recommendations by end of shift  Outcome: Progressing  Goal: Vital signs within normal range for age by end of shift  Outcome: Progressing  Goal: Increase self care and/or family involovement by end of shift  Outcome: Progressing  Goal: Receive DSME education by end of shift  Outcome: Progressing

## 2025-04-10 NOTE — CARE PLAN
Problem: Diabetes  Goal: Achieve decreasing blood glucose levels by end of shift  Outcome: Progressing  Goal: Increase stability of blood glucose readings by end of shift  Outcome: Progressing  Goal: Decrease in ketones present in urine by end of shift  Outcome: Progressing  Goal: Maintain electrolyte levels within acceptable range throughout shift  Outcome: Progressing  Goal: Maintain glucose levels >70mg/dl to <250mg/dl throughout shift  Outcome: Progressing  Goal: No changes in neurological exam by end of shift  Outcome: Progressing  Goal: Learn about and adhere to nutrition recommendations by end of shift  Outcome: Progressing  Goal: Vital signs within normal range for age by end of shift  Outcome: Progressing  Goal: Increase self care and/or family involovement by end of shift  Outcome: Progressing  Goal: Receive DSME education by end of shift  Outcome: Progressing   The patient's goals for the shift include pain control.      The clinical goals for the shift include safety, pain management.

## 2025-04-10 NOTE — PROGRESS NOTES
04/10/25 0951   Discharge Planning   Expected Discharge Disposition SNF   Does the patient need discharge transport arranged? Yes   RoundTrip coordination needed? Yes   Has discharge transport been arranged? No     Patient is accepted, and approved admission to [facility]. Authorization is good through 4/14. Patient updated to the same.     11:37 AM  [facility] and AVS sent to [facility] for their records. PAS completed/submitted via [facility].     11:57 AM  Transportation arranged for pick-up at 12:45 PM. Nurse, and facility notified.

## 2025-04-10 NOTE — DISCHARGE SUMMARY
"Discharge Diagnosis  Other closed nondisplaced fracture of proximal end of left humerus with routine healing, subsequent encounter    Issues Requiring Follow-Up  Mammogram and PET scan as ordered     Discharge Meds     Medication List      START taking these medications     acetaminophen 325 mg tablet; Commonly known as: Tylenol; Take 2 tablets   (650 mg) by mouth every 4 hours if needed for mild pain (1 - 3) or fever   (temp greater than 38.0 C).   glucagon 0.5 mg/0.1 mL auto-injector; Inject 1 mg into the muscle every   15 minutes if needed (< 40).   glucagon 1 mg injection; Commonly known as: Glucagen; Inject 1 mg into   the muscle every 15 minutes if needed for low blood sugar - see comments   (41-70).   nystatin 100,000 unit/gram powder; Commonly known as: Mycostatin; Apply   1 Application topically 2 times a day.   oxyCODONE 5 mg immediate release tablet; Commonly known as: Roxicodone;   Take 1 tablet (5 mg) by mouth every 6 hours if needed for severe pain (7 -   10).   polyethylene glycol 17 gram packet; Commonly known as: Glycolax,   Miralax; Take 17 g by mouth once daily.; Start taking on: April 11, 2025     CONTINUE taking these medications     Accu-Chek Guide test strips; Generic drug: blood sugar diagnostic; 1   strip 3 times a day.   BD Ultra-Fine Short Pen Needle 31 gauge x 5/16\" needle; Generic drug:   pen needle, diabetic   blood-glucose meter misc; Commonly known as: Accu-Chek Guide Glucose   Meter; Test TID   carvedilol 3.125 mg tablet; Commonly known as: Coreg; Take 1 tablet   (3.125 mg) by mouth 2 times a day.   empagliflozin 10 mg tablet; Commonly known as: Jardiance; Take 1 tablet   (10 mg) by mouth once daily.   ergocalciferol 1250 mcg (50,000 units) capsule; Commonly known as:   Vitamin D-2; 1 CAP BY MOUTH WEEKLY ON SUNDAY   fish oil 60- mg capsule; Commonly known as: Omega-3; Take 2   capsules (1,000 mg) by mouth once daily.   insulin lispro 100 unit/mL pen; Commonly known as: HumaLOG; " INJECT SUBQ   PER SLIDING SCALE WITH MEALS (MAX DAILY DOSE OF 80 UNITS)   lancets misc; Accucheck lancets   Lantus Solostar U-100 Insulin 100 unit/mL (3 mL) pen; Generic drug:   insulin glargine; 25 UNITS SUBQ DAILY AT BEDTIME - TAKE AS DIRECTED PER   INSULIN INSTRUCTIONS   losartan 25 mg tablet; Commonly known as: Cozaar; Take 1 tablet (25 mg)   by mouth once daily.   pantoprazole 40 mg EC tablet; Commonly known as: ProtoNix; Take 1 tablet   (40 mg) by mouth once daily in the morning. Take before meals.   warfarin 2 mg tablet; Commonly known as: Coumadin; Take as directed. If   you are unsure how to take this medication, talk to your nurse or doctor.;   Original instructions: TAKE 1 TABLET BY MOUTH EVERY DAY     STOP taking these medications     furosemide 40 mg tablet; Commonly known as: Lasix   spironolactone 25 mg tablet; Commonly known as: Aldactone       Test Results Pending At Discharge  Pending Labs       Order Current Status    Myeloid Malignancies Panel - Myeloid NGS In process            Hospital Course   Presented to ER 4/7 due to failure to thrive at home after recent humerus fracture. PT/OT consulted, recommending moderate intensity rehab and patient agreeable to SNF placement. Orthopedic surgery consulted. Dr. Sutton requests imaging and oncology consult in the setting of arm fracture from lifting garbage bag. CT guided biopsy of bone completed 4/9. Plan for outpatient mammogram and PET scan. Cleared for discharge with close follow up by Dr. Sutton and Dr. Chambers. Encourage patient to schedule appointment with Dr. Sutton after SNF discharge     Pertinent Physical Exam At Time of Discharge  Physical Exam  Constitutional:       Appearance: Normal appearance.   HENT:      Head: Normocephalic and atraumatic.      Mouth/Throat:      Mouth: Mucous membranes are moist.      Pharynx: Oropharynx is clear.   Eyes:      Extraocular Movements: Extraocular movements intact.      Conjunctiva/sclera:  Conjunctivae normal.      Pupils: Pupils are equal, round, and reactive to light.   Cardiovascular:      Rate and Rhythm: Normal rate and regular rhythm.      Pulses: Normal pulses.      Heart sounds: Normal heart sounds.   Pulmonary:      Effort: Pulmonary effort is normal.      Breath sounds: Normal breath sounds.   Abdominal:      General: Bowel sounds are normal.      Palpations: Abdomen is soft.      Tenderness: There is no abdominal tenderness.   Musculoskeletal:      Cervical back: Normal range of motion and neck supple.      Comments: Left arm sling intact    Skin:     General: Skin is warm and dry.      Capillary Refill: Capillary refill takes less than 2 seconds.   Neurological:      General: No focal deficit present.      Mental Status: She is alert and oriented to person, place, and time.   Psychiatric:         Mood and Affect: Mood normal.         Behavior: Behavior normal.         Outpatient Follow-Up  Future Appointments   Date Time Provider Department Center   4/14/2025  2:30 PM Kathie Selby APRN-CNP [id number] [facility]   4/23/2025  8:15 AM [facility] ADMIN ROOM PET W[facility]ntPET [facility]   4/23/2025  9:15 AM [facility] PET [facility]PET [facility]   4/30/2025  3:15 PM Sandrita Chambers MD [id number] [facility]         Lissette Arnold APRN-CNP

## 2025-04-10 NOTE — NURSING NOTE
Called [facility] to give report, they said they didn't have this patient coming there, spoke with Nancy with [facility], she said facility is aware that patient is coming  [facility] here to transport patient at this time

## 2025-04-11 LAB
ELECTRONICALLY SIGNED BY: NORMAL
MYELOID NGS RESULTS: NORMAL

## 2025-04-14 ENCOUNTER — APPOINTMENT (OUTPATIENT)
Dept: PRIMARY CARE | Facility: CLINIC | Age: 75
End: 2025-04-14
Payer: MEDICARE

## 2025-04-17 DIAGNOSIS — I48.11 LONGSTANDING PERSISTENT ATRIAL FIBRILLATION (MULTI): ICD-10-CM

## 2025-04-21 ENCOUNTER — TELEPHONE (OUTPATIENT)
Dept: PRIMARY CARE | Facility: CLINIC | Age: 75
End: 2025-04-21
Payer: MEDICARE

## 2025-04-21 ENCOUNTER — TELEPHONE (OUTPATIENT)
Dept: HEMATOLOGY/ONCOLOGY | Facility: CLINIC | Age: 75
End: 2025-04-21
Payer: MEDICARE

## 2025-04-21 ENCOUNTER — DOCUMENTATION (OUTPATIENT)
Dept: OPERATING ROOM | Facility: HOSPITAL | Age: 75
End: 2025-04-21
Payer: MEDICARE

## 2025-04-21 DIAGNOSIS — C55 MALIGNANT NEOPLASM OF UTERUS, UNSPECIFIED SITE (MULTI): ICD-10-CM

## 2025-04-21 LAB
LAB AP ASR DISCLAIMER: NORMAL
LABORATORY COMMENT REPORT: NORMAL
PATH REPORT.COMMENTS IMP SPEC: NORMAL
PATH REPORT.FINAL DX SPEC: NORMAL
PATH REPORT.GROSS SPEC: NORMAL
PATH REPORT.RELEVANT HX SPEC: NORMAL
PATH REPORT.TOTAL CANCER: NORMAL

## 2025-04-21 NOTE — TELEPHONE ENCOUNTER
Audrey calling today to inquire if provider will follow for [facility], Skilled Nursing, PT and OT, patient arrived home on 4/19/2025.  Please Advise.

## 2025-04-21 NOTE — TELEPHONE ENCOUNTER
Dr. Sutton leaving message on office voicemail requesting to have Kathie Selby call to speak with him regarding patient.

## 2025-04-21 NOTE — TELEPHONE ENCOUNTER
Spoke with Dr. Sutton. He is going to refer patient to oncology at [facility] due to abnormal findings.

## 2025-04-21 NOTE — TELEPHONE ENCOUNTER
Phoned patient to offer to schedule a [facility] post SNF visit, no answer, unable to leave a message at this time.

## 2025-04-21 NOTE — TELEPHONE ENCOUNTER
Reason for Conversation  Appt cancellations and new referral     Multiple calls to pt to alert her that her PET, mammogram and ultrasound as well as follow up with Dr Remy have been cnaceled as she has a new referral to Dr Gaines . No answer and not able to leave a message.  Letter with appt changes and information mailed to her.     Disposition   No disposition on file.

## 2025-04-22 ENCOUNTER — TELEPHONE (OUTPATIENT)
Dept: HEMATOLOGY/ONCOLOGY | Facility: CLINIC | Age: 75
End: 2025-04-22
Payer: MEDICARE

## 2025-04-22 ENCOUNTER — TELEPHONE (OUTPATIENT)
Dept: PRIMARY CARE | Facility: CLINIC | Age: 75
End: 2025-04-22
Payer: MEDICARE

## 2025-04-22 DIAGNOSIS — I50.22 CHRONIC SYSTOLIC CONGESTIVE HEART FAILURE: Primary | ICD-10-CM

## 2025-04-22 RX ORDER — TORSEMIDE 20 MG/1
20 TABLET ORAL DAILY
Qty: 30 TABLET | Refills: 11 | Status: SHIPPED | OUTPATIENT
Start: 2025-04-22

## 2025-04-22 NOTE — TELEPHONE ENCOUNTER
Reason for Conversation  Canceled tests, and new referral to Dr Gaines     Background TCT to Jing to alert her that she no longer needs to follow up with Dr Remy . She was informed that her PET/US/Mammogram was canceled as she needs to see new Md Dr gaines for suspected uterine cancer.  Pt initially said she couldn't come and requested the MD to come to her home.  Pt aware of importance of appt and need to be seen in office. Pt told numerous times appt day, time and address. She stated she would obtain a ride from Friend [employer]. She was also informed that appt time and day is mailed to her. She voiced appreciation.       Disposition teach back method done. Pt aware of importance of new MD appt and stated she will obtain ride and come to office.

## 2025-04-22 NOTE — TELEPHONE ENCOUNTER
Call returned to pharmacy, this nurse speaking with Matt, pharmacist, information relayed as per provider has written, Matt states an understanding and conversation ended.

## 2025-04-22 NOTE — TELEPHONE ENCOUNTER
Pharmacy calling for clarification of torsemide sent yesterday asn patient is also on furosemide, should she be on both? Please advise.

## 2025-04-22 NOTE — TELEPHONE ENCOUNTER
Patient discharged home 4/21/25 from [facility]. Patient was visited today by [facility] CM Laura Jean-Baptiste RN and notified of [facility] visit with Kathie Selby NP 4/24/25 at 11:30 am.

## 2025-04-22 NOTE — TELEPHONE ENCOUNTER
"Patient called, just got home from rehab, feet are swollen and unable to walk. She said she has been wearing hospital socks for the last week. I see lasix and spironolactone was discontinued on discharge from hospital. Spironolactone may have been ordered for temporary treatment, looking at the end date of 4/7/25. I did not see a notation as why they were discontinued. Pt has CHF. Pt was unaware they were discontinued or why? Pt states \"they sent me home with a whole bunch of pills\". She stated they were on the TV, and she is now in recliner so can't get up to review medications. Pt states she ate some pasta, roll and whiteside today. Denies having ham yesterday or anything high in sodium. Told pt to keep feet elevated. Pt stated she \"thinks\" she still has lasix at home. Informed if feet are still swollen in morning to take one lasix tab. Pt is available for a follow up visit from SNF at anytime.   "

## 2025-04-22 NOTE — TELEPHONE ENCOUNTER
Pt reports worsening leg edema. Will start Torsemide 20mg daily. She should also restart Spironolactone 12.5mg daily.

## 2025-04-23 ENCOUNTER — TELEPHONE (OUTPATIENT)
Dept: PRIMARY CARE | Facility: CLINIC | Age: 75
End: 2025-04-23
Payer: MEDICARE

## 2025-04-23 ENCOUNTER — APPOINTMENT (OUTPATIENT)
Dept: RADIOLOGY | Facility: CLINIC | Age: 75
End: 2025-04-23
Payer: MEDICARE

## 2025-04-23 NOTE — TELEPHONE ENCOUNTER
JOHNNY. Received call from Dr. Sutton's office. Office staff called [facility] regarding a referral, but it was unclear whether [facility] understood. Offered to explain information to [facility]. Appointment has been scheduled with Dr. Yong Galvez at [facility] on 5/1/25 at 1 pm. (Same day as gyn oncology appointment). Information given to [facility] via phone. MARCUS from [facility] was at the home and wrote information for [facility]. Also phoned [facility]. They have been unable to reach [facility] for scheduling. SW will call with [facility] this morning to schedule.

## 2025-04-24 ENCOUNTER — OFFICE VISIT (OUTPATIENT)
Dept: PRIMARY CARE | Facility: CLINIC | Age: 75
End: 2025-04-24
Payer: MEDICARE

## 2025-04-24 ENCOUNTER — DOCUMENTATION (OUTPATIENT)
Dept: PRIMARY CARE | Facility: CLINIC | Age: 75
End: 2025-04-24
Payer: MEDICARE

## 2025-04-24 VITALS
DIASTOLIC BLOOD PRESSURE: 60 MMHG | HEART RATE: 94 BPM | TEMPERATURE: 97.1 F | RESPIRATION RATE: 16 BRPM | HEIGHT: 65 IN | BODY MASS INDEX: 28.32 KG/M2 | WEIGHT: 170 LBS | SYSTOLIC BLOOD PRESSURE: 118 MMHG | OXYGEN SATURATION: 97 %

## 2025-04-24 DIAGNOSIS — S42.295D OTHER CLOSED NONDISPLACED FRACTURE OF PROXIMAL END OF LEFT HUMERUS WITH ROUTINE HEALING, SUBSEQUENT ENCOUNTER: Primary | ICD-10-CM

## 2025-04-24 DIAGNOSIS — Z79.4 TYPE 2 DIABETES MELLITUS WITH DIABETIC NEPHROPATHY, WITH LONG-TERM CURRENT USE OF INSULIN: ICD-10-CM

## 2025-04-24 DIAGNOSIS — M85.812 OTHER SPECIFIED DISORDERS OF BONE DENSITY AND STRUCTURE, LEFT SHOULDER: ICD-10-CM

## 2025-04-24 DIAGNOSIS — I50.22 CHRONIC SYSTOLIC CONGESTIVE HEART FAILURE: ICD-10-CM

## 2025-04-24 DIAGNOSIS — I48.20 CHRONIC ATRIAL FIBRILLATION (MULTI): ICD-10-CM

## 2025-04-24 DIAGNOSIS — E11.21 TYPE 2 DIABETES MELLITUS WITH DIABETIC NEPHROPATHY, WITH LONG-TERM CURRENT USE OF INSULIN: ICD-10-CM

## 2025-04-24 DIAGNOSIS — R26.81 UNSTEADY GAIT: ICD-10-CM

## 2025-04-24 DIAGNOSIS — Z91.148 NONADHERENCE TO MEDICATION: ICD-10-CM

## 2025-04-24 PROCEDURE — 1159F MED LIST DOCD IN RCRD: CPT | Performed by: NURSE PRACTITIONER

## 2025-04-24 PROCEDURE — 3074F SYST BP LT 130 MM HG: CPT | Performed by: NURSE PRACTITIONER

## 2025-04-24 PROCEDURE — 1125F AMNT PAIN NOTED PAIN PRSNT: CPT | Performed by: NURSE PRACTITIONER

## 2025-04-24 PROCEDURE — 3051F HG A1C>EQUAL 7.0%<8.0%: CPT | Performed by: NURSE PRACTITIONER

## 2025-04-24 PROCEDURE — 3078F DIAST BP <80 MM HG: CPT | Performed by: NURSE PRACTITIONER

## 2025-04-24 PROCEDURE — 99350 HOME/RES VST EST HIGH MDM 60: CPT | Performed by: NURSE PRACTITIONER

## 2025-04-24 PROCEDURE — 3008F BODY MASS INDEX DOCD: CPT | Performed by: NURSE PRACTITIONER

## 2025-04-24 PROCEDURE — 4010F ACE/ARB THERAPY RXD/TAKEN: CPT | Performed by: NURSE PRACTITIONER

## 2025-04-24 PROCEDURE — 1160F RVW MEDS BY RX/DR IN RCRD: CPT | Performed by: NURSE PRACTITIONER

## 2025-04-24 ASSESSMENT — ENCOUNTER SYMPTOMS
WHEEZING: 0
SLEEP DISTURBANCE: 1
APPETITE CHANGE: 0
FATIGUE: 1
FEVER: 0
CHEST TIGHTNESS: 0
ARTHRALGIAS: 1
DIARRHEA: 0
HEMATURIA: 0
CONSTIPATION: 1
ABDOMINAL PAIN: 0
PALPITATIONS: 0
TROUBLE SWALLOWING: 0
NERVOUS/ANXIOUS: 0
CHILLS: 0
COUGH: 0
BRUISES/BLEEDS EASILY: 1
DYSPHORIC MOOD: 1
SHORTNESS OF BREATH: 0
NUMBNESS: 1
DYSURIA: 0
WEAKNESS: 1
VOMITING: 0
DIZZINESS: 0
NAUSEA: 0

## 2025-04-24 ASSESSMENT — PAIN SCALES - GENERAL: PAINLEVEL_OUTOF10: 8

## 2025-04-24 NOTE — PROGRESS NOTES
Follow up in home visit for med management. Has received Torsemide from pharmacy. Pill box updated with Torsemide added and Lasix removed. Scheduled for start of care with [facility] today. Stated her friend will provide transportation to appointments next week.

## 2025-04-24 NOTE — PROGRESS NOTES
Subjective   Patient ID: Jing Sanchez is a 74 y.o. female who presents for Follow-up (Hospital follow up).    Visit for 73 y/o female seen today in private home, alone for post acute follow up. Pt was hospitalized at [facility] from 4/7-4/10 with left humerus fracture. Pt was initially found to have the fracture on 4/3 when she went to the ED for evaluation of left shoulder pain, and reports that she was having difficulty caring for herself so she went back to the emergency department.     Hospital Course: Presented to ER 4/7 due to failure to thrive at home after recent humerus fracture. PT/OT consulted, recommending moderate intensity rehab and patient agreeable to SNF placement. Orthopedic surgery consulted. Dr. Sutton requests imaging and oncology consult in the setting of arm fracture from lifting garbage bag. CT guided biopsy of bone completed 4/9. Plan for outpatient mammogram and PET scan. Cleared for discharge with close follow up by Dr. Sutton and Dr. Chambers. Encourage patient to schedule appointment with Dr. Sutton after SNF discharge .     Pt was discharged to [facility] in [place] for rehab services and discharged back home on 4/21/24. She contacted house calls office reporting that her legs were swollen, she was unable to walk, didn't know what medications she was supposed to take. House calls KAREN Mantillaria did go to patients home and filled a pill box for her but pt tells me that she has not taken any medications in 2 days and has not taken any insulin since returning home. Pt is sitting on couch this afternoon. She is alert, answering most questions without issue. Pt lives alone. Her spouse Matt passed away last month. Pt reports that Jims friend Killian has been helping her and will be moving in later today. Patients house is very unkept, smells strong of urine, garbage all throughout the living room and kitchen. She is mostly sitting on the couch. Reports using the bedside commode.  "She has a walker and will use it to help stand from the couch. Pt denies recent falls. She is scheduled to start services with [facility] today. Pt continues to receive all medications through [facility]. Her CHF medications were stopped upon discharge from hospital to SNF. There was no documentation from cardiology or an indication for the medications to be discontinued. Uncertain if this was done in error but patient had worsening leg swelling at SNF and upon return home. She was advised to restart her Furosemide and Spironolactone but a new prescription for Torsemide was sent to pharmacy due to renal impairment and the medication was delivered today. House calls KAREN Laura will be coming to the house this afternoon to update her pill box and pt was advised that the Torsemide will replace her previous order for Furosemide. Pt reports that the  from [facility] came to the house yesterday and she will be restarting meals on wheels this week. She denies appetite changes, abdominal pain, nausea, vomiting. Denies bowel or bladder concerns but admits to urinary incontinence as it takes longer to pull her pants down. Pt has DM. She was previously monitoring her glucose levels but admits that she has only checked her sugar once since returning home. Pt tells me that it is hard to do anything because she is only using 1 arm but she does not want to stay in an assisted living setting and feels that she is managing \"ok\" at home.     Home Visit:          Medically necessary due to: Illness or condition that results in activity lmitation or restriction that impacts the ability to leave home such as:, shortness of breath with minimal exertion, unsteady gait/poor balance         Current Outpatient Medications:     acetaminophen (Tylenol) 325 mg tablet, Take 2 tablets (650 mg) by mouth every 4 hours if needed for mild pain (1 - 3) or fever (temp greater than 38.0 C)., Disp: , Rfl:     blood sugar " "diagnostic (Accu-Chek Guide test strips) strip, 1 strip 3 times a day., Disp: 100 strip, Rfl: 11    blood-glucose meter (Accu-Chek Guide Glucose Meter) misc, Test TID, Disp: 1 each, Rfl: 0    carvedilol (Coreg) 3.125 mg tablet, Take 1 tablet (3.125 mg) by mouth 2 times a day., Disp: 180 tablet, Rfl: 2    empagliflozin (Jardiance) 10 mg, Take 1 tablet (10 mg) by mouth once daily., Disp: 30 tablet, Rfl: 11    ergocalciferol (Vitamin D-2) 1.25 MG (15435 UT) capsule, 1 CAP BY MOUTH WEEKLY ON SUNDAY, Disp: 4 capsule, Rfl: 11    fish oil (Omega-3) 60- mg capsule, Take 2 capsules (1,000 mg) by mouth once daily., Disp: 60 capsule, Rfl: 11    glucagon 0.5 mg/0.1 mL auto-injector, Inject 1 mg into the muscle every 15 minutes if needed (< 40)., Disp: 0.2 mL, Rfl: 12    glucagon (Glucagen) 1 mg injection, Inject 1 mg into the muscle every 15 minutes if needed for low blood sugar - see comments (41-70)., Disp: 1 each, Rfl: 12    insulin lispro (HumaLOG) 100 unit/mL injection, INJECT SUBQ PER SLIDING SCALE WITH MEALS (MAX DAILY DOSE OF 80 UNITS), Disp: 30 mL, Rfl: 10    lancets misc, Accucheck lancets, Disp: 100 each, Rfl: 11    Lantus Solostar U-100 Insulin 100 unit/mL (3 mL) pen, 25 UNITS SUBQ DAILY AT BEDTIME - TAKE AS DIRECTED PER INSULIN INSTRUCTIONS, Disp: 9 mL, Rfl: 11    losartan (Cozaar) 25 mg tablet, Take 1 tablet (25 mg) by mouth once daily., Disp: 90 tablet, Rfl: 2    nystatin (Mycostatin) 100,000 unit/gram powder, Apply 1 Application topically 2 times a day., Disp: , Rfl:     oxyCODONE (Roxicodone) 5 mg immediate release tablet, Take 1 tablet (5 mg) by mouth every 6 hours if needed for severe pain (7 - 10)., Disp: 12 tablet, Rfl: 0    pantoprazole (ProtoNix) 40 mg EC tablet, Take 1 tablet (40 mg) by mouth once daily in the morning. Take before meals., Disp: 90 tablet, Rfl: 2    pen needle, diabetic (BD Ultra-Fine Short Pen Needle) 31 gauge x 5/16\" needle, Use and discard 4 pen needles per day subcutaneously Dx: " "E11.65 for 90 days, Disp: , Rfl:     polyethylene glycol (Glycolax, Miralax) 17 gram packet, Take 17 g by mouth once daily., Disp: , Rfl:     torsemide (Demadex) 20 mg tablet, Take 1 tablet (20 mg) by mouth once daily., Disp: 30 tablet, Rfl: 11    warfarin (Coumadin) 2 mg tablet, TAKE 1 TABLET BY MOUTH EVERY DAY, Disp: 30 tablet, Rfl: 11     Review of Systems   Constitutional:  Positive for fatigue. Negative for appetite change, chills and fever.        Positive for weight loss   HENT:  Negative for hearing loss and trouble swallowing.    Eyes:  Negative for visual disturbance.        Wears corrective lenses    Respiratory:  Negative for cough, chest tightness, shortness of breath and wheezing.    Cardiovascular:  Positive for leg swelling (taking diuretic again, edema improving, mostly pedal, legs elevated). Negative for chest pain and palpitations.   Gastrointestinal:  Positive for constipation. Negative for abdominal pain, diarrhea, nausea and vomiting.   Endocrine:        Positive for diabetes, uncontrolled    Genitourinary:  Negative for dysuria and hematuria.        Positive for urinary incontinence, using bedside commode now   Musculoskeletal:  Positive for arthralgias (left shoulder) and gait problem (using walker).   Neurological:  Positive for weakness and numbness (bilateral legs). Negative for dizziness.   Hematological:  Bruises/bleeds easily.   Psychiatric/Behavioral:  Positive for dysphoric mood and sleep disturbance. The patient is not nervous/anxious.         Tearful at times, reports missing spouse who recently passed away, worries about her health     Objective   /60 (BP Location: Right arm, Patient Position: Sitting, BP Cuff Size: Adult)   Pulse 94   Temp 36.2 °C (97.1 °F) (Temporal)   Resp 16   Ht 1.651 m (5' 5\")   Wt 77.1 kg (170 lb)   SpO2 97%   BMI 28.29 kg/m²     Physical Exam  Constitutional:       General: She is not in acute distress.     Appearance: Normal appearance.      " Comments: Alert. Obese. Sitting in chair, legs elevated.   HENT:      Head: Normocephalic and atraumatic.      Comments: poor dentition, missing teeth     Nose: Nose normal.      Mouth/Throat:      Mouth: Mucous membranes are moist.      Pharynx: Oropharynx is clear.   Eyes:      Pupils: Pupils are equal, round, and reactive to light.      Comments: wearing glasses   Cardiovascular:      Rate and Rhythm: Normal rate. Regular rhythm.      Heart sounds: No murmur heard.     No friction rub. No gallop.      1+ pedal edema bilaterally   Pulmonary:      Effort: Pulmonary effort is normal. No respiratory distress.      Breath sounds: Normal breath sounds. No wheezing, rhonchi or rales.   Abdominal:      General: Bowel sounds are normal. There is no distension.      Palpations: Abdomen is soft.      Tenderness: There is no abdominal tenderness.   Musculoskeletal:         General: Normal range of motion.      Cervical back: Neck supple.      Left arm sling intact.   Skin:     General: Skin is warm and dry.   Neurological:      General: No focal deficit present.      Mental Status: She is alert and oriented to person, place, and time.      Motor: weakness present      Gait: abnormal  Psychiatric:         Mood and Affect: Mood normal.         Behavior: Behavior normal.      Poor insight and judgement regarding health/home environment.      Tearful at times throughout exam/conversation today    Assessment/Plan   Diagnoses and all orders for this visit:  Other closed nondisplaced fracture of proximal end of left humerus with routine healing, subsequent encounter  -     XR DEXA bone density; Future  -pt to establish care with Dr. Galvez next week, referred via Dr. Sorenson   -continue to wear left arm sling   Other specified disorders of bone density and structure, left shoulder  -     XR DEXA bone density; Future    Nonadherence to medication  -continues to be a concern, discussed importance of taking all medications  including insulin as prescribed     Chronic systolic congestive heart failure  -chronic, LE edema noted  -pt to take spironolactone and Torsemide as prescribed   -monitor daily weight if able  -limit sodium intake    Chronic atrial fibrillation (Multi)  -chronic, stable, managed with Coumadin   -pt denies any increased bruising or bleeding concerns     Type 2 diabetes mellitus with diabetic nephropathy, with long-term current use of   -chronic, poorly controlled, last A1c 7.7  -discussed importance of insulin  -continue Jardiance, Lantus, Humalog with [facility]    Unsteady gait  -chronic, has walker in home, scheduled to start with [facility] today, will need home PT/OT evaluation    Patient delicately stable. Will follow up with pt in 2 weeks as she is high risk for rehospitalization. Pt will be having friend Killian move into the home this afternoon. I do think this will be beneficial as pt is not able to properly care for herself, and this will at least have someone present in the home to make sure patient is safe. Home is very unkept. Pt reports that Killian will be cleaning and getting the home in better conditions. Discussed medication compliance with pt, advised her that it is very important to take all medications as prescribed. Advised pt to contact house calls office with any acute concerns or medication needs     Total of 65 minutes spent face to face with pt with greater than 50% spent on counseling, reviewing medical records and coordination of care        MARTÍN Tan-CNP

## 2025-04-30 ENCOUNTER — DOCUMENTATION (OUTPATIENT)
Dept: PRIMARY CARE | Facility: CLINIC | Age: 75
End: 2025-04-30
Payer: MEDICARE

## 2025-04-30 ENCOUNTER — APPOINTMENT (OUTPATIENT)
Dept: HEMATOLOGY/ONCOLOGY | Facility: CLINIC | Age: 75
End: 2025-04-30
Payer: MEDICARE

## 2025-04-30 NOTE — PROGRESS NOTES
Follow up in home visit. Sitting up in recliner. Needs assist to get to standing position. Left arm remains in sling. Taking medications as prescribed. Pill box refilled for week.  this morning. Friend, Desmond, present. Desmond is assisting with meal prep, some housekeeping, and will provide transportation to appointments tomorrow along with another friend.   Currently receiving PT/OT through [facility]. Had visit from  from [facility] as well as [facility]. Patient expressed she needs to consider assisted living. Will follow up in one week.

## 2025-05-01 ENCOUNTER — HOSPITAL ENCOUNTER (INPATIENT)
Facility: HOSPITAL | Age: 75
End: 2025-05-01
Attending: EMERGENCY MEDICINE | Admitting: INTERNAL MEDICINE
Payer: MEDICARE

## 2025-05-01 ENCOUNTER — APPOINTMENT (OUTPATIENT)
Dept: RADIOLOGY | Facility: HOSPITAL | Age: 75
DRG: 948 | End: 2025-05-01
Payer: MEDICARE

## 2025-05-01 ENCOUNTER — OFFICE VISIT (OUTPATIENT)
Dept: GYNECOLOGIC ONCOLOGY | Facility: CLINIC | Age: 75
End: 2025-05-01
Payer: MEDICARE

## 2025-05-01 ENCOUNTER — APPOINTMENT (OUTPATIENT)
Dept: CARDIOLOGY | Facility: HOSPITAL | Age: 75
DRG: 948 | End: 2025-05-01
Payer: MEDICARE

## 2025-05-01 ENCOUNTER — APPOINTMENT (OUTPATIENT)
Dept: ORTHOPEDIC SURGERY | Facility: CLINIC | Age: 75
End: 2025-05-01
Payer: MEDICARE

## 2025-05-01 VITALS
RESPIRATION RATE: 18 BRPM | HEART RATE: 97 BPM | WEIGHT: 173.28 LBS | TEMPERATURE: 98.2 F | OXYGEN SATURATION: 94 % | DIASTOLIC BLOOD PRESSURE: 59 MMHG | BODY MASS INDEX: 28.84 KG/M2 | SYSTOLIC BLOOD PRESSURE: 100 MMHG

## 2025-05-01 DIAGNOSIS — R53.81 MALAISE AND FATIGUE: Primary | ICD-10-CM

## 2025-05-01 DIAGNOSIS — Z79.4 TYPE 2 DIABETES MELLITUS WITH DIABETIC NEPHROPATHY, WITH LONG-TERM CURRENT USE OF INSULIN: ICD-10-CM

## 2025-05-01 DIAGNOSIS — E87.8 ELECTROLYTE IMBALANCE: ICD-10-CM

## 2025-05-01 DIAGNOSIS — C55 MALIGNANT NEOPLASM OF UTERUS, UNSPECIFIED SITE (MULTI): ICD-10-CM

## 2025-05-01 DIAGNOSIS — N18.9 ACUTE ON CHRONIC RENAL INSUFFICIENCY: ICD-10-CM

## 2025-05-01 DIAGNOSIS — C55 MALIGNANT NEOPLASM OF UTERUS, UNSPECIFIED SITE (MULTI): Primary | ICD-10-CM

## 2025-05-01 DIAGNOSIS — N28.9 ACUTE ON CHRONIC RENAL INSUFFICIENCY: ICD-10-CM

## 2025-05-01 DIAGNOSIS — E11.21 TYPE 2 DIABETES MELLITUS WITH DIABETIC NEPHROPATHY, WITH LONG-TERM CURRENT USE OF INSULIN: ICD-10-CM

## 2025-05-01 DIAGNOSIS — T40.2X5A CONSTIPATION DUE TO OPIOID THERAPY: ICD-10-CM

## 2025-05-01 DIAGNOSIS — K59.03 CONSTIPATION DUE TO OPIOID THERAPY: ICD-10-CM

## 2025-05-01 DIAGNOSIS — D64.9 ANEMIA, UNSPECIFIED TYPE: ICD-10-CM

## 2025-05-01 DIAGNOSIS — G89.3 CANCER RELATED PAIN: ICD-10-CM

## 2025-05-01 DIAGNOSIS — R53.83 MALAISE AND FATIGUE: Primary | ICD-10-CM

## 2025-05-01 DIAGNOSIS — N39.0 URINARY TRACT INFECTION WITHOUT HEMATURIA, SITE UNSPECIFIED: ICD-10-CM

## 2025-05-01 DIAGNOSIS — G89.3 CHRONIC PAIN DUE TO NEOPLASM: ICD-10-CM

## 2025-05-01 DIAGNOSIS — K59.00 CONSTIPATION, UNSPECIFIED CONSTIPATION TYPE: ICD-10-CM

## 2025-05-01 DIAGNOSIS — I48.19 PERSISTENT ATRIAL FIBRILLATION (MULTI): ICD-10-CM

## 2025-05-01 DIAGNOSIS — C79.51 SECONDARY MALIGNANT NEOPLASM OF BONE (MULTI): ICD-10-CM

## 2025-05-01 DIAGNOSIS — R07.9 CHEST PAIN, UNSPECIFIED TYPE: ICD-10-CM

## 2025-05-01 PROBLEM — R29.898 LEFT ARM WEAKNESS: Status: ACTIVE | Noted: 2025-05-01

## 2025-05-01 PROBLEM — I10 BENIGN ESSENTIAL HYPERTENSION: Status: RESOLVED | Noted: 2023-09-07 | Resolved: 2025-05-01

## 2025-05-01 PROBLEM — N30.00 ACUTE CYSTITIS WITHOUT HEMATURIA: Status: ACTIVE | Noted: 2025-05-01

## 2025-05-01 PROBLEM — N17.9 AKI (ACUTE KIDNEY INJURY): Status: ACTIVE | Noted: 2025-05-01

## 2025-05-01 LAB
ALBUMIN SERPL BCP-MCNC: 4.2 G/DL (ref 3.4–5)
ALP SERPL-CCNC: 118 U/L (ref 33–136)
ALT SERPL W P-5'-P-CCNC: 8 U/L (ref 7–45)
ANION GAP SERPL CALCULATED.3IONS-SCNC: 18 MMOL/L (ref 10–20)
APPEARANCE UR: CLEAR
AST SERPL W P-5'-P-CCNC: 15 U/L (ref 9–39)
BACTERIA #/AREA URNS AUTO: ABNORMAL /HPF
BASOPHILS # BLD AUTO: 0.08 X10*3/UL (ref 0–0.1)
BASOPHILS NFR BLD AUTO: 0.8 %
BILIRUB SERPL-MCNC: 1 MG/DL (ref 0–1.2)
BILIRUB UR STRIP.AUTO-MCNC: NEGATIVE MG/DL
BUN SERPL-MCNC: 37 MG/DL (ref 6–23)
CALCIUM SERPL-MCNC: 10 MG/DL (ref 8.6–10.3)
CARDIAC TROPONIN I PNL SERPL HS: 11 NG/L (ref 0–13)
CARDIAC TROPONIN I PNL SERPL HS: 11 NG/L (ref 0–13)
CHLORIDE SERPL-SCNC: 96 MMOL/L (ref 98–107)
CO2 SERPL-SCNC: 27 MMOL/L (ref 21–32)
COLOR UR: YELLOW
CREAT SERPL-MCNC: 1.81 MG/DL (ref 0.5–1.05)
EGFRCR SERPLBLD CKD-EPI 2021: 29 ML/MIN/1.73M*2
EOSINOPHIL # BLD AUTO: 0.26 X10*3/UL (ref 0–0.4)
EOSINOPHIL NFR BLD AUTO: 2.7 %
ERYTHROCYTE [DISTWIDTH] IN BLOOD BY AUTOMATED COUNT: 14.4 % (ref 11.5–14.5)
FLUAV RNA RESP QL NAA+PROBE: NOT DETECTED
FLUBV RNA RESP QL NAA+PROBE: NOT DETECTED
GLUCOSE SERPL-MCNC: 303 MG/DL (ref 74–99)
GLUCOSE UR STRIP.AUTO-MCNC: ABNORMAL MG/DL
HCT VFR BLD AUTO: 37 % (ref 36–46)
HGB BLD-MCNC: 11.9 G/DL (ref 12–16)
HOLD SPECIMEN: 293
IMM GRANULOCYTES # BLD AUTO: 0.04 X10*3/UL (ref 0–0.5)
IMM GRANULOCYTES NFR BLD AUTO: 0.4 % (ref 0–0.9)
INR PPP: 2 (ref 0.9–1.2)
KETONES UR STRIP.AUTO-MCNC: NEGATIVE MG/DL
LACTATE SERPL-SCNC: 2.3 MMOL/L (ref 0.4–2)
LEUKOCYTE ESTERASE UR QL STRIP.AUTO: ABNORMAL
LIPASE SERPL-CCNC: 55 U/L (ref 9–82)
LYMPHOCYTES # BLD AUTO: 0.8 X10*3/UL (ref 0.8–3)
LYMPHOCYTES NFR BLD AUTO: 8.3 %
MAGNESIUM SERPL-MCNC: 1.9 MG/DL (ref 1.6–2.4)
MCH RBC QN AUTO: 33.5 PG (ref 26–34)
MCHC RBC AUTO-ENTMCNC: 32.2 G/DL (ref 32–36)
MCV RBC AUTO: 104 FL (ref 80–100)
MONOCYTES # BLD AUTO: 0.63 X10*3/UL (ref 0.05–0.8)
MONOCYTES NFR BLD AUTO: 6.6 %
MUCOUS THREADS #/AREA URNS AUTO: ABNORMAL /LPF
NEUTROPHILS # BLD AUTO: 7.78 X10*3/UL (ref 1.6–5.5)
NEUTROPHILS NFR BLD AUTO: 81.2 %
NITRITE UR QL STRIP.AUTO: POSITIVE
NRBC BLD-RTO: 0 /100 WBCS (ref 0–0)
PH UR STRIP.AUTO: 5.5 [PH]
PLATELET # BLD AUTO: 258 X10*3/UL (ref 150–450)
POTASSIUM SERPL-SCNC: 3.6 MMOL/L (ref 3.5–5.3)
PROT SERPL-MCNC: 7.4 G/DL (ref 6.4–8.2)
PROT UR STRIP.AUTO-MCNC: NEGATIVE MG/DL
PROTHROMBIN TIME: 20.6 SECONDS (ref 9.3–12.7)
RBC # BLD AUTO: 3.55 X10*6/UL (ref 4–5.2)
RBC # UR STRIP.AUTO: ABNORMAL MG/DL
RBC #/AREA URNS AUTO: ABNORMAL /HPF
RSV RNA RESP QL NAA+PROBE: NOT DETECTED
SARS-COV-2 RNA RESP QL NAA+PROBE: NOT DETECTED
SODIUM SERPL-SCNC: 137 MMOL/L (ref 136–145)
SP GR UR STRIP.AUTO: <=1.005
TEST COMMENT - SURGICAL SENDOUT REQUEST: NORMAL
UROBILINOGEN UR STRIP.AUTO-MCNC: 0.2 MG/DL
WBC # BLD AUTO: 9.6 X10*3/UL (ref 4.4–11.3)
WBC #/AREA URNS AUTO: ABNORMAL /HPF

## 2025-05-01 PROCEDURE — 1036F TOBACCO NON-USER: CPT | Performed by: STUDENT IN AN ORGANIZED HEALTH CARE EDUCATION/TRAINING PROGRAM

## 2025-05-01 PROCEDURE — 84484 ASSAY OF TROPONIN QUANT: CPT

## 2025-05-01 PROCEDURE — 99285 EMERGENCY DEPT VISIT HI MDM: CPT | Mod: 25 | Performed by: EMERGENCY MEDICINE

## 2025-05-01 PROCEDURE — 2500000004 HC RX 250 GENERAL PHARMACY W/ HCPCS (ALT 636 FOR OP/ED): Mod: JZ | Performed by: NURSE PRACTITIONER

## 2025-05-01 PROCEDURE — 93010 ELECTROCARDIOGRAM REPORT: CPT | Performed by: INTERNAL MEDICINE

## 2025-05-01 PROCEDURE — 83735 ASSAY OF MAGNESIUM: CPT

## 2025-05-01 PROCEDURE — 83690 ASSAY OF LIPASE: CPT

## 2025-05-01 PROCEDURE — 2500000001 HC RX 250 WO HCPCS SELF ADMINISTERED DRUGS (ALT 637 FOR MEDICARE OP)

## 2025-05-01 PROCEDURE — 87040 BLOOD CULTURE FOR BACTERIA: CPT | Mod: TRILAB

## 2025-05-01 PROCEDURE — 3074F SYST BP LT 130 MM HG: CPT | Performed by: STUDENT IN AN ORGANIZED HEALTH CARE EDUCATION/TRAINING PROGRAM

## 2025-05-01 PROCEDURE — 81001 URINALYSIS AUTO W/SCOPE: CPT

## 2025-05-01 PROCEDURE — 81003 URINALYSIS AUTO W/O SCOPE: CPT

## 2025-05-01 PROCEDURE — 99215 OFFICE O/P EST HI 40 MIN: CPT | Performed by: STUDENT IN AN ORGANIZED HEALTH CARE EDUCATION/TRAINING PROGRAM

## 2025-05-01 PROCEDURE — 2500000004 HC RX 250 GENERAL PHARMACY W/ HCPCS (ALT 636 FOR OP/ED): Mod: JZ

## 2025-05-01 PROCEDURE — 71045 X-RAY EXAM CHEST 1 VIEW: CPT

## 2025-05-01 PROCEDURE — 85610 PROTHROMBIN TIME: CPT | Performed by: NURSE PRACTITIONER

## 2025-05-01 PROCEDURE — 1200000002 HC GENERAL ROOM WITH TELEMETRY DAILY

## 2025-05-01 PROCEDURE — 73030 X-RAY EXAM OF SHOULDER: CPT | Mod: LEFT SIDE | Performed by: RADIOLOGY

## 2025-05-01 PROCEDURE — 96365 THER/PROPH/DIAG IV INF INIT: CPT

## 2025-05-01 PROCEDURE — 73030 X-RAY EXAM OF SHOULDER: CPT | Mod: LT

## 2025-05-01 PROCEDURE — 70450 CT HEAD/BRAIN W/O DYE: CPT | Performed by: RADIOLOGY

## 2025-05-01 PROCEDURE — 96361 HYDRATE IV INFUSION ADD-ON: CPT

## 2025-05-01 PROCEDURE — 96375 TX/PRO/DX INJ NEW DRUG ADDON: CPT

## 2025-05-01 PROCEDURE — 74176 CT ABD & PELVIS W/O CONTRAST: CPT

## 2025-05-01 PROCEDURE — 99205 OFFICE O/P NEW HI 60 MIN: CPT | Performed by: STUDENT IN AN ORGANIZED HEALTH CARE EDUCATION/TRAINING PROGRAM

## 2025-05-01 PROCEDURE — 85025 COMPLETE CBC W/AUTO DIFF WBC: CPT

## 2025-05-01 PROCEDURE — 36415 COLL VENOUS BLD VENIPUNCTURE: CPT

## 2025-05-01 PROCEDURE — 71045 X-RAY EXAM CHEST 1 VIEW: CPT | Mod: FOREIGN READ | Performed by: RADIOLOGY

## 2025-05-01 PROCEDURE — 1160F RVW MEDS BY RX/DR IN RCRD: CPT | Performed by: STUDENT IN AN ORGANIZED HEALTH CARE EDUCATION/TRAINING PROGRAM

## 2025-05-01 PROCEDURE — 70450 CT HEAD/BRAIN W/O DYE: CPT

## 2025-05-01 PROCEDURE — 99223 1ST HOSP IP/OBS HIGH 75: CPT | Performed by: NURSE PRACTITIONER

## 2025-05-01 PROCEDURE — 83605 ASSAY OF LACTIC ACID: CPT

## 2025-05-01 PROCEDURE — 1125F AMNT PAIN NOTED PAIN PRSNT: CPT | Performed by: STUDENT IN AN ORGANIZED HEALTH CARE EDUCATION/TRAINING PROGRAM

## 2025-05-01 PROCEDURE — 3051F HG A1C>EQUAL 7.0%<8.0%: CPT | Performed by: STUDENT IN AN ORGANIZED HEALTH CARE EDUCATION/TRAINING PROGRAM

## 2025-05-01 PROCEDURE — 87086 URINE CULTURE/COLONY COUNT: CPT | Mod: TRILAB

## 2025-05-01 PROCEDURE — 87637 SARSCOV2&INF A&B&RSV AMP PRB: CPT

## 2025-05-01 PROCEDURE — 1159F MED LIST DOCD IN RCRD: CPT | Performed by: STUDENT IN AN ORGANIZED HEALTH CARE EDUCATION/TRAINING PROGRAM

## 2025-05-01 PROCEDURE — 93005 ELECTROCARDIOGRAM TRACING: CPT

## 2025-05-01 PROCEDURE — 74176 CT ABD & PELVIS W/O CONTRAST: CPT | Mod: FOREIGN READ | Performed by: RADIOLOGY

## 2025-05-01 PROCEDURE — 80053 COMPREHEN METABOLIC PANEL: CPT

## 2025-05-01 PROCEDURE — 3078F DIAST BP <80 MM HG: CPT | Performed by: STUDENT IN AN ORGANIZED HEALTH CARE EDUCATION/TRAINING PROGRAM

## 2025-05-01 PROCEDURE — 4010F ACE/ARB THERAPY RXD/TAKEN: CPT | Performed by: STUDENT IN AN ORGANIZED HEALTH CARE EDUCATION/TRAINING PROGRAM

## 2025-05-01 RX ORDER — CARVEDILOL 3.12 MG/1
3.12 TABLET ORAL 2 TIMES DAILY
Status: DISPENSED | OUTPATIENT
Start: 2025-05-01

## 2025-05-01 RX ORDER — PROCHLORPERAZINE 25 MG/1
25 SUPPOSITORY RECTAL EVERY 12 HOURS PRN
Status: ACTIVE | OUTPATIENT
Start: 2025-05-01

## 2025-05-01 RX ORDER — NYSTATIN 100000 [USP'U]/G
1 POWDER TOPICAL 2 TIMES DAILY
Status: DISPENSED | OUTPATIENT
Start: 2025-05-01

## 2025-05-01 RX ORDER — PROCHLORPERAZINE MALEATE 10 MG
10 TABLET ORAL EVERY 6 HOURS PRN
Status: ACTIVE | OUTPATIENT
Start: 2025-05-01

## 2025-05-01 RX ORDER — BISACODYL 5 MG
10 TABLET, DELAYED RELEASE (ENTERIC COATED) ORAL DAILY PRN
Status: ACTIVE | OUTPATIENT
Start: 2025-05-01

## 2025-05-01 RX ORDER — CEFTRIAXONE 1 G/50ML
1 INJECTION, SOLUTION INTRAVENOUS EVERY 24 HOURS
Status: DISPENSED | OUTPATIENT
Start: 2025-05-02

## 2025-05-01 RX ORDER — ACETAMINOPHEN 325 MG/1
975 TABLET ORAL ONCE
Status: COMPLETED | OUTPATIENT
Start: 2025-05-01 | End: 2025-05-01

## 2025-05-01 RX ORDER — INSULIN LISPRO 100 [IU]/ML
0-10 INJECTION, SOLUTION INTRAVENOUS; SUBCUTANEOUS
Status: DISCONTINUED | OUTPATIENT
Start: 2025-05-02 | End: 2025-05-01

## 2025-05-01 RX ORDER — SODIUM CHLORIDE 9 MG/ML
100 INJECTION, SOLUTION INTRAVENOUS CONTINUOUS
Status: ACTIVE | OUTPATIENT
Start: 2025-05-01 | End: 2025-05-02

## 2025-05-01 RX ORDER — PANTOPRAZOLE SODIUM 40 MG/1
40 TABLET, DELAYED RELEASE ORAL
Status: DISPENSED | OUTPATIENT
Start: 2025-05-02

## 2025-05-01 RX ORDER — LOSARTAN POTASSIUM 25 MG/1
25 TABLET ORAL DAILY
Status: DISPENSED | OUTPATIENT
Start: 2025-05-02

## 2025-05-01 RX ORDER — POLYETHYLENE GLYCOL 3350 17 G/17G
17 POWDER, FOR SOLUTION ORAL DAILY PRN
Status: ACTIVE | OUTPATIENT
Start: 2025-05-01

## 2025-05-01 RX ORDER — ONDANSETRON HYDROCHLORIDE 2 MG/ML
4 INJECTION, SOLUTION INTRAVENOUS EVERY 8 HOURS PRN
Status: ACTIVE | OUTPATIENT
Start: 2025-05-01

## 2025-05-01 RX ORDER — PROCHLORPERAZINE EDISYLATE 5 MG/ML
10 INJECTION INTRAMUSCULAR; INTRAVENOUS EVERY 6 HOURS PRN
Status: ACTIVE | OUTPATIENT
Start: 2025-05-01

## 2025-05-01 RX ORDER — INSULIN LISPRO 100 [IU]/ML
0-10 INJECTION, SOLUTION INTRAVENOUS; SUBCUTANEOUS
Status: DISPENSED | OUTPATIENT
Start: 2025-05-02

## 2025-05-01 RX ORDER — CEFTRIAXONE 1 G/50ML
1 INJECTION, SOLUTION INTRAVENOUS ONCE
Status: ACTIVE | OUTPATIENT
Start: 2025-05-01

## 2025-05-01 RX ORDER — TRAMADOL HYDROCHLORIDE 50 MG/1
50 TABLET ORAL EVERY 6 HOURS PRN
Qty: 20 TABLET | Refills: 0 | Status: ON HOLD | OUTPATIENT
Start: 2025-05-01 | End: 2025-05-06

## 2025-05-01 RX ORDER — ONDANSETRON HYDROCHLORIDE 2 MG/ML
4 INJECTION, SOLUTION INTRAVENOUS ONCE
Status: COMPLETED | OUTPATIENT
Start: 2025-05-01 | End: 2025-05-01

## 2025-05-01 RX ORDER — ONDANSETRON 4 MG/1
4 TABLET, ORALLY DISINTEGRATING ORAL EVERY 8 HOURS PRN
Status: ACTIVE | OUTPATIENT
Start: 2025-05-01

## 2025-05-01 RX ORDER — ACETAMINOPHEN 650 MG/1
650 SUPPOSITORY RECTAL EVERY 4 HOURS PRN
Status: ACTIVE | OUTPATIENT
Start: 2025-05-01

## 2025-05-01 RX ORDER — DEXTROSE 50 % IN WATER (D50W) INTRAVENOUS SYRINGE
25
Status: ACTIVE | OUTPATIENT
Start: 2025-05-01

## 2025-05-01 RX ORDER — CEFTRIAXONE 1 G/50ML
1 INJECTION, SOLUTION INTRAVENOUS ONCE
Status: COMPLETED | OUTPATIENT
Start: 2025-05-01 | End: 2025-05-01

## 2025-05-01 RX ORDER — DEXTROSE 50 % IN WATER (D50W) INTRAVENOUS SYRINGE
12.5
Status: ACTIVE | OUTPATIENT
Start: 2025-05-01

## 2025-05-01 RX ORDER — ACETAMINOPHEN 325 MG/1
650 TABLET ORAL EVERY 4 HOURS PRN
Status: DISPENSED | OUTPATIENT
Start: 2025-05-01

## 2025-05-01 RX ORDER — ACETAMINOPHEN 160 MG/5ML
650 SOLUTION ORAL EVERY 4 HOURS PRN
Status: ACTIVE | OUTPATIENT
Start: 2025-05-01

## 2025-05-01 RX ADMIN — SODIUM CHLORIDE 1000 ML: 900 INJECTION, SOLUTION INTRAVENOUS at 16:39

## 2025-05-01 RX ADMIN — ONDANSETRON 4 MG: 2 INJECTION, SOLUTION INTRAMUSCULAR; INTRAVENOUS at 16:39

## 2025-05-01 RX ADMIN — CEFTRIAXONE 1 G: 1 INJECTION, SOLUTION INTRAVENOUS at 18:53

## 2025-05-01 RX ADMIN — SODIUM CHLORIDE 100 ML/HR: 900 INJECTION, SOLUTION INTRAVENOUS at 23:20

## 2025-05-01 RX ADMIN — ACETAMINOPHEN 975 MG: 325 TABLET ORAL at 18:17

## 2025-05-01 SDOH — ECONOMIC STABILITY: HOUSING INSECURITY: IN THE LAST 12 MONTHS, WAS THERE A TIME WHEN YOU WERE NOT ABLE TO PAY THE MORTGAGE OR RENT ON TIME?: NO

## 2025-05-01 SDOH — ECONOMIC STABILITY: INCOME INSECURITY: IN THE PAST 12 MONTHS HAS THE ELECTRIC, GAS, OIL, OR WATER COMPANY THREATENED TO SHUT OFF SERVICES IN YOUR HOME?: NO

## 2025-05-01 SDOH — ECONOMIC STABILITY: FOOD INSECURITY: WITHIN THE PAST 12 MONTHS, YOU WORRIED THAT YOUR FOOD WOULD RUN OUT BEFORE YOU GOT THE MONEY TO BUY MORE.: NEVER TRUE

## 2025-05-01 SDOH — ECONOMIC STABILITY: FOOD INSECURITY: WITHIN THE PAST 12 MONTHS, THE FOOD YOU BOUGHT JUST DIDN'T LAST AND YOU DIDN'T HAVE MONEY TO GET MORE.: NEVER TRUE

## 2025-05-01 SDOH — SOCIAL STABILITY: SOCIAL INSECURITY: HAVE YOU HAD ANY THOUGHTS OF HARMING ANYONE ELSE?: NO

## 2025-05-01 SDOH — ECONOMIC STABILITY: HOUSING INSECURITY: IN THE PAST 12 MONTHS, HOW MANY TIMES HAVE YOU MOVED WHERE YOU WERE LIVING?: 0

## 2025-05-01 SDOH — SOCIAL STABILITY: SOCIAL INSECURITY: WITHIN THE LAST YEAR, HAVE YOU BEEN HUMILIATED OR EMOTIONALLY ABUSED IN OTHER WAYS BY YOUR PARTNER OR EX-PARTNER?: NO

## 2025-05-01 SDOH — ECONOMIC STABILITY: HOUSING INSECURITY: AT ANY TIME IN THE PAST 12 MONTHS, WERE YOU HOMELESS OR LIVING IN A SHELTER (INCLUDING NOW)?: NO

## 2025-05-01 SDOH — ECONOMIC STABILITY: FOOD INSECURITY: HOW HARD IS IT FOR YOU TO PAY FOR THE VERY BASICS LIKE FOOD, HOUSING, MEDICAL CARE, AND HEATING?: NOT HARD AT ALL

## 2025-05-01 SDOH — SOCIAL STABILITY: SOCIAL INSECURITY: HAS ANYONE EVER THREATENED TO HURT YOUR FAMILY OR YOUR PETS?: NO

## 2025-05-01 SDOH — SOCIAL STABILITY: SOCIAL INSECURITY: ARE THERE ANY APPARENT SIGNS OF INJURIES/BEHAVIORS THAT COULD BE RELATED TO ABUSE/NEGLECT?: NO

## 2025-05-01 SDOH — SOCIAL STABILITY: SOCIAL INSECURITY: WERE YOU ABLE TO COMPLETE ALL THE BEHAVIORAL HEALTH SCREENINGS?: YES

## 2025-05-01 SDOH — ECONOMIC STABILITY: TRANSPORTATION INSECURITY: IN THE PAST 12 MONTHS, HAS LACK OF TRANSPORTATION KEPT YOU FROM MEDICAL APPOINTMENTS OR FROM GETTING MEDICATIONS?: NO

## 2025-05-01 SDOH — SOCIAL STABILITY: SOCIAL INSECURITY: ARE YOU OR HAVE YOU BEEN THREATENED OR ABUSED PHYSICALLY, EMOTIONALLY, OR SEXUALLY BY ANYONE?: NO

## 2025-05-01 SDOH — SOCIAL STABILITY: SOCIAL INSECURITY: HAVE YOU HAD THOUGHTS OF HARMING ANYONE ELSE?: NO

## 2025-05-01 SDOH — SOCIAL STABILITY: SOCIAL INSECURITY: DOES ANYONE TRY TO KEEP YOU FROM HAVING/CONTACTING OTHER FRIENDS OR DOING THINGS OUTSIDE YOUR HOME?: NO

## 2025-05-01 SDOH — SOCIAL STABILITY: SOCIAL INSECURITY: WITHIN THE LAST YEAR, HAVE YOU BEEN AFRAID OF YOUR PARTNER OR EX-PARTNER?: NO

## 2025-05-01 SDOH — SOCIAL STABILITY: SOCIAL INSECURITY: DO YOU FEEL UNSAFE GOING BACK TO THE PLACE WHERE YOU ARE LIVING?: NO

## 2025-05-01 SDOH — SOCIAL STABILITY: SOCIAL INSECURITY: ABUSE: ADULT

## 2025-05-01 SDOH — SOCIAL STABILITY: SOCIAL INSECURITY: DO YOU FEEL ANYONE HAS EXPLOITED OR TAKEN ADVANTAGE OF YOU FINANCIALLY OR OF YOUR PERSONAL PROPERTY?: NO

## 2025-05-01 ASSESSMENT — LIFESTYLE VARIABLES
AUDIT-C TOTAL SCORE: 0
SKIP TO QUESTIONS 9-10: 1
AUDIT-C TOTAL SCORE: 0
HOW OFTEN DO YOU HAVE 6 OR MORE DRINKS ON ONE OCCASION: NEVER
HOW MANY STANDARD DRINKS CONTAINING ALCOHOL DO YOU HAVE ON A TYPICAL DAY: PATIENT DOES NOT DRINK
HOW OFTEN DO YOU HAVE A DRINK CONTAINING ALCOHOL: NEVER

## 2025-05-01 ASSESSMENT — COGNITIVE AND FUNCTIONAL STATUS - GENERAL
MOVING FROM LYING ON BACK TO SITTING ON SIDE OF FLAT BED WITH BEDRAILS: A LITTLE
TURNING FROM BACK TO SIDE WHILE IN FLAT BAD: A LITTLE
DAILY ACTIVITIY SCORE: 18
HELP NEEDED FOR BATHING: A LITTLE
WALKING IN HOSPITAL ROOM: A LITTLE
CLIMB 3 TO 5 STEPS WITH RAILING: A LITTLE
MOVING TO AND FROM BED TO CHAIR: A LITTLE
EATING MEALS: A LITTLE
MOBILITY SCORE: 18
PATIENT BASELINE BEDBOUND: NO
DRESSING REGULAR UPPER BODY CLOTHING: A LITTLE
DRESSING REGULAR LOWER BODY CLOTHING: A LITTLE
STANDING UP FROM CHAIR USING ARMS: A LITTLE
TOILETING: A LITTLE
PERSONAL GROOMING: A LITTLE

## 2025-05-01 ASSESSMENT — ENCOUNTER SYMPTOMS
SORE THROAT: 0
CONSTIPATION: 0
ACTIVITY CHANGE: 1
MUSCULOSKELETAL NEGATIVE: 1
EYES NEGATIVE: 1
APPETITE CHANGE: 1
COUGH: 0
FEVER: 0
DIARRHEA: 0
VOMITING: 0
PALPITATIONS: 0
TROUBLE SWALLOWING: 0
RHINORRHEA: 0
ABDOMINAL PAIN: 1
PSYCHIATRIC NEGATIVE: 1
DYSURIA: 1
FATIGUE: 1
SPEECH DIFFICULTY: 0
SHORTNESS OF BREATH: 0
DIZZINESS: 0
NAUSEA: 1
DIFFICULTY URINATING: 0
VOICE CHANGE: 0

## 2025-05-01 ASSESSMENT — PATIENT HEALTH QUESTIONNAIRE - PHQ9
SUM OF ALL RESPONSES TO PHQ9 QUESTIONS 1 & 2: 0
1. LITTLE INTEREST OR PLEASURE IN DOING THINGS: NOT AT ALL
2. FEELING DOWN, DEPRESSED OR HOPELESS: NOT AT ALL

## 2025-05-01 ASSESSMENT — ACTIVITIES OF DAILY LIVING (ADL)
LACK_OF_TRANSPORTATION: NO
BATHING: NEEDS ASSISTANCE
HEARING - LEFT EAR: FUNCTIONAL
JUDGMENT_ADEQUATE_SAFELY_COMPLETE_DAILY_ACTIVITIES: YES
TOILETING: NEEDS ASSISTANCE
ASSISTIVE_DEVICE: EYEGLASSES;WALKER
GROOMING: NEEDS ASSISTANCE
LACK_OF_TRANSPORTATION: NO
PATIENT'S MEMORY ADEQUATE TO SAFELY COMPLETE DAILY ACTIVITIES?: YES
DRESSING YOURSELF: NEEDS ASSISTANCE
HEARING - RIGHT EAR: FUNCTIONAL
ADEQUATE_TO_COMPLETE_ADL: YES
FEEDING YOURSELF: INDEPENDENT
WALKS IN HOME: NEEDS ASSISTANCE

## 2025-05-01 ASSESSMENT — PAIN - FUNCTIONAL ASSESSMENT
PAIN_FUNCTIONAL_ASSESSMENT: 0-10

## 2025-05-01 ASSESSMENT — PAIN SCALES - GENERAL
PAINLEVEL_OUTOF10: 0 - NO PAIN
PAINLEVEL_OUTOF10: 8
PAINLEVEL_OUTOF10: 2
PAINLEVEL_OUTOF10: 7

## 2025-05-01 ASSESSMENT — PAIN DESCRIPTION - PROGRESSION: CLINICAL_PROGRESSION: NOT CHANGED

## 2025-05-01 NOTE — PROGRESS NOTES
Gynecologic Oncology Initial Consultation    Patient ID: Jing Sanchez is a 74 y.o. female.  Referring Physician: Sandrita Chambers MD  [address]  [place] [address]  Primary Care Provider: MARTÍN Tan-CNP      Reason for Consultation: endometrial cancer     Subjective    73yo G0 presenting in GYN Oncology consultation regarding newly diagnosed metastatic cancer c/w GYN Primary. Underwent recent biopsy after pathology fracture of left arm demonstrating adenocarcinoma of mullerian origin. Reports episode of heavy bleeding 1 year ago, attributed to rectal bleeding. No further episodes of bleeding since. Denies vaginal bleeding or spotting. At present reports left arm and lower back pain for which Tylenol is helping PRN, previously requiring Oxycodone 1-2x/day. Isolated emesis of nausea/vomiting with loose stools on Monday that has since resolved - no nausea/vomiting. Having regular formed stools. No dizziness, lightheadedness, chest pain or shortness of breath. Generalized weakness in setting of recent hospitalizations without recent falls since she's been home. Friends have been helping her get around her house as she has been unable to navigate to 2nd floor of her home.       recently . Lives alone with support resources at home - primary support person, Killian: friend of her former spouse. Ambulates with cane and walker at baseline; difficulty with mobility after recent injury.     A comprehensive review of systems was performed and otherwise negative.    Objective   BSA: 1.9 meters squared  /59 (BP Location: Right arm, Patient Position: Sitting, BP Cuff Size: Adult long)   Pulse 97   Temp 36.8 °C (98.2 °F) (Temporal)   Resp 18   Wt 78.6 kg (173 lb 4.5 oz)   SpO2 94%   BMI 28.84 kg/m²      Medical History[1]  - HFrEF (EF 30-35%)   - Chronic Afib on Coumadin   - T2DM (poorly controled; IDDM) with mild neuropathy   - HTN  - Hx CABG -   - RISHI  - Stage 3 CKD   -  Hx TIA   - COPD    Surgical History[2]      Family History[3]    OB/GYN History: G0; s/p tubal ligation (elective)  - Menopausal status: postmenopausal since mid 50s, reports intermittent postmenopausal bleeding but did not have medical evaluation.   - Last Pap: uncertain, denies prior abnormal     Health Maintenance:   - Last mammogram: uncertain   - Colonoscopy: 2016 - hx polyps; for q5 year repeat     Jing Sanchez  reports that she has never smoked. She has never been exposed to tobacco smoke. She has never used smokeless tobacco.  She  reports that she does not currently use alcohol.  She  reports no history of drug use.   recently  (Matt); has two friends who are primary support. Lives alone.     Physical Exam  Vitals and nursing note reviewed.   Constitutional:       General: She is not in acute distress.     Appearance: Normal appearance.   HENT:      Head: Normocephalic and atraumatic.      Mouth/Throat:      Mouth: Mucous membranes are moist.      Pharynx: Oropharynx is clear.   Eyes:      Extraocular Movements: Extraocular movements intact.      Conjunctiva/sclera: Conjunctivae normal.      Pupils: Pupils are equal, round, and reactive to light.   Cardiovascular:      Rate and Rhythm: Normal rate. Rhythm irregular.      Pulses: Normal pulses.   Pulmonary:      Effort: Pulmonary effort is normal.      Breath sounds: Normal breath sounds. No wheezing, rhonchi or rales.   Abdominal:      General: There is no distension.      Palpations: Abdomen is soft. There is no mass.      Tenderness: There is no abdominal tenderness.   Genitourinary:     Comments: Deferred  Musculoskeletal:         General: Signs of injury present. No swelling. Normal range of motion.      Cervical back: Normal range of motion and neck supple.      Comments: LUE in splint   Skin:     General: Skin is warm.      Findings: No rash.   Neurological:      General: No focal deficit present.      Mental Status: She is alert  and oriented to person, place, and time.   Psychiatric:         Mood and Affect: Mood normal.         Behavior: Behavior normal.       Pathology:  4/9/25  FINAL DIAGNOSIS   Bone, Left Humerus, Biopsy:  Metastatic carcinoma of Mullerian origin.   Electronically signed by Brenda Mejia MD on 4/21/2025 at 1000 EDT   By the signature on this report, the individual or group listed as making the Final Interpretation/Diagnosis certifies that they have reviewed this case.    The biopsy is comprised of a sample of fibroadipose tissue with diffuse involvement by nests of malignant epithelioid cells arranged throughout the largest biopsy core.  The constituent cells are columnar with enlarged, crowded nuclei and frequent intracytoplasmic vacuoles.  The cells demonstrate diffuse expression of CK7, PAX8, and estrogen receptor with patchy to diffuse p16 expression and occasional, nonspecific p63 expression.  P53 nuclear expression is wild-type.  There is no immunohistochemical expression of CK5/6, CK20, CDX2, D2-40, TTF-1 (8G7G3 and SPT24) or GATA3.  These findings are compatible with metastatic carcinoma with morphology and immunophenotype consistent with endometrioid type.  Dr. Parks has reviewed the H&E slides and relevant immunohistochemical stains and concurs with this diagnosis.     Recent Imaging:   CT C/A/P (noncon) - 4/8/25  IMPRESSION:  1. Lytic expansile lesion demonstrated within the right 10th rib about the costotransverse junction as well as lytic lesion within the  right sacrum measuring 3.3 cm. Metastatic disease is in the differential with other potential etiologies including multiple  myeloma a possibility. Correlate with patient's history and laboratory values.  2. No other definite metastatic lesions within the chest, abdomen, or pelvis.  3. Chronic appearing changes as described above.    MRI Humerus - Left 4/8/25  IMPRESSION:  1. Pathologic fracture of the proximal humeral diaphysis with intramedullary  lesion measuring 4.4 cm. There is soft tissue  component of mass lesion about the fracture measuring a least 2.9 cm. Differential possibilities include metastatic disease with the  possibility of myeloma or even lymphoma in the differential. Correlate with prior workup.    2. Postinflammatory change and component of hematoma at the level of the fracture line  3. Other intramedullary lesions are demonstrated within the mid to distal humeral diaphysis.      Performance Status:  Symptomatic; in bed <50% of the day    Assessment/Plan    75yo with newly diagnosed suspected stage IVC metastatic endometrial cancer for GYN Oncology consultation. Recent pathologic fracture of LUE; limited mobility in setting of recent fall and hospitalization. Comorbidities: HFrEF, chronic Afib on Coumadin, stage 3 CKD; limited social support and food insecurity.   Oncology History   Uterine cancer (Multi)   4/9/2025 Cancer Staged    Staging form: Corpus Uteri - Carcinoma and Carcinosarcoma, AJCC 8th Edition and FIGO 2023, Clinical stage from 4/9/2025: FIGO Stage IVC, calculated as Stage IVB (pM1) - Signed by Melva Gaines MD on 5/2/2025 5/1/2025 Initial Diagnosis    Uterine cancer (Multi)        Diagnoses and all orders for this visit:  Malignant neoplasm of uterus, unspecified site (Multi)  Secondary malignant neoplasm of bone (Multi)  - Pathology and imaging results reviewed with patient and her friend consistent with metastatic endometrial cancer. We had a long discussion regarding need for further characterization of her pathology to confirm suspected endometrial cancer based on clinical history.   - She was counseled regarding the suspected advanced stage of her malignancy and palliative intent of cancer-directed therapy. Patient reports arm and lower back pain she attributes to sitting but likely 2/2 osseous metastatic disease with potential role for palliative RT in addition to systemic therapy.   - Recommend PET/CT for  assessment of disease burden and distribution given limited assessment by noncon scan  - Follow up NGS testing  - Return pending imaging with treatment planning; patient undecided on systemic therapy due to recent loss of her spouse in light of clinical prognosis. Support and reassurance offered.   Cancer related pain  -     traMADol (Ultram) 50 mg tablet; Take 1 tablet (50 mg) by mouth every 6 hours if needed for severe pain (7 - 10) for up to 5 days.  -     Referral to Supportive/Palliative Oncology; Future    Treatment Plans       No treatment plans exist          Melva Gaines MD         [1]   Past Medical History:  Diagnosis Date    A-fib (Multi)     Acute MI (Multi) 08/2016    cath by [facility], wire in RCA and clot migrated to PDA and no PTCA.    ASHD (arteriosclerotic heart disease)     CAD (coronary artery disease)     CHF (congestive heart failure)     DM (diabetes mellitus) (Multi)     Dyslipidemia     H/O echocardiogram 04/2017    EF 35%    H/O echocardiogram 09/2018    EF20%    History of nuclear stress test 02/2015    no ischemia and apex scar.    History of nuclear stress test 04/2016    apical scar and inf ischemia    HTN (hypertension)     Obesity     Obstructive sleep apnea     Personal history of other specified conditions     Acute AW MI / 9/4/13, cath LM-nl, , CX irreg, RCA irreg, LV ant hypo, PTCA with 2.75 RENETTA to LAD    S/P cardiac catheterization 06/2016    severe diffuse ASHD and recommended OHS    SOB (shortness of breath)    [2]   Past Surgical History:  Procedure Laterality Date    CARDIAC CATHETERIZATION  09/04/2013    with RENETTA    CHOLECYSTECTOMY  03/11/2021    COLONOSCOPY  04/2015    hemorrhoid removed    CORONARY ARTERY BYPASS GRAFT  10/2016    LIMA-LAD, SVG-PDA, SVG-Diag, SVG-M1 and M@    MR HEAD ANGIO WO IV CONTRAST  04/06/2017    MR HEAD ANGIO WO IV CONTRAST [facility]    TUMOR REMOVAL      Benign rectal tumor removed   [3]   Family History  Problem Relation  Name Age of Onset    Hypertension Mother      Cancer Mother      Diabetes Father      Stroke Father      Diabetes Sister      Heart disease Maternal Grandmother      Heart disease Maternal Grandfather      Heart disease Paternal Grandmother      Heart disease Paternal Grandfather

## 2025-05-01 NOTE — PROGRESS NOTES
Attestation/Supervisory note for RITA Coates      The patient is a 74-year-old female presenting to the emergency department for evaluation of generalized malaise, fatigue and overall just not feeling well.  She states that she has had symptoms for the past day.  She states that she has been having a little bit of left-sided chest pain.  She states it started several hours prior to arrival.  It is a dull aching pain.  No better or worse.  No radiation.  She states that she also has had 1 loose stool but it was not diarrhea.  She states that she does not know of any sick contacts or recent travel.  She denies any headache or visual changes.  No fever or chills.  No focal weakness or numbness.  No abdominal pain.  No nausea or vomiting.  No urinary complaints.  No vaginal discharge.  All pertinent positives and negatives are recorded above.  All other systems reviewed and otherwise negative.  Vital signs within normal limits.  Physical exam with a well-nourished well-developed female in no acute distress.  HEENT exam with dry mucous membranes but otherwise unremarkable.  She does not have any evidence of airway compromise or respiratory distress on exam.  Abdominal exam is benign.  She does not have any gross motor, neurologic or vascular deficits on exam.  Pulses are equal bilaterally.  NIH stroke scale score of 0.      EKG with atrial fibrillation at 93 bpm, occasional PVCs, normal axis, normal voltage, normal ST segment, and a slight diffuse flattening of the T waves      IV Zofran and IV fluids were ordered prior to my evaluation of the patient.      Diagnostic labs with mild renal insufficiency, mild electrolyte imbalance, hyperglycemia, and mild anemia but otherwise unremarkable.      Initial troponin 11.        UA and repeat troponin results were pending at the time of my departure.      CT abdomen pelvis wo IV contrast   Final Result   1.No acute process.   2.Cardiomegaly with coronary artery calcifications.    3.Enlarged fibroid uterus again noted. Overall appearance is   unchanged.   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  As   previously suggested, metastatic disease should be considered.    Consider further evaluation with MRI or pathologic sampling.   Signed by Juan Early MD      XR chest 1 view    (Results Pending)        The patient does not have any evidence of airway compromise or respiratory distress on exam.  She does not have any gross motor, neurologic or vascular deficits on exam.  Pulses are equal bilaterally.  No evidence of a STEMI on EKG or cardiac enzymes.  Repeat troponin was pending at the time of my departure.  No other events on telemetry.  Chest x-ray results were pending at the time of my departure as were the results of the urinalysis.  CT abdomen pelvis shows no acute process.  No evidence of acute appendicitis, pancreatitis, bowel obstruction, mass, diverticulitis or cholecystitis.  There are some stable lytic lesions in the right hemisacrum and right 10th rib unchanged from prior imaging.  The patient was made aware of these.      RITA Coates will continue to manage the patient primarily.  Anticipate disposition based on the results of the urinalysis, repeat troponin and chest x-ray.  If these results do not show indication for admission and/or transfer anticipate that the patient can be discharged to home with close outpatient follow-up with her primary care physician for further management of her symptoms and further surveillance/evaluation of the lytic lesion seen on CT imaging.      Impression/diagnosis:  Malaise and fatigue  Left-sided chest pain  Acute on chronic renal insufficiency  Electrolyte imbalance  Anemia, unspecified      I personally saw the patient and made/approve the management plan and take responsibility for the patient management.      I independently interpreted the following  study (S) EKG and diagnostic labs      I personally discussed the patient's management with the      I reviewed the results of the diagnostic labs and diagnostic imaging.  Formal radiology read was completed by the radiologist.      Queenie Rudolph MD

## 2025-05-02 ENCOUNTER — APPOINTMENT (OUTPATIENT)
Dept: CARDIOLOGY | Facility: HOSPITAL | Age: 75
DRG: 948 | End: 2025-05-02
Payer: MEDICARE

## 2025-05-02 ENCOUNTER — TELEPHONE (OUTPATIENT)
Dept: PRIMARY CARE | Facility: CLINIC | Age: 75
End: 2025-05-02

## 2025-05-02 ENCOUNTER — APPOINTMENT (OUTPATIENT)
Dept: RADIOLOGY | Facility: HOSPITAL | Age: 75
End: 2025-05-02
Payer: MEDICARE

## 2025-05-02 ENCOUNTER — DOCUMENTATION (OUTPATIENT)
Dept: PALLIATIVE MEDICINE | Facility: HOSPITAL | Age: 75
End: 2025-05-02

## 2025-05-02 LAB
ALBUMIN SERPL BCP-MCNC: 3.4 G/DL (ref 3.4–5)
ALP SERPL-CCNC: 93 U/L (ref 33–136)
ALT SERPL W P-5'-P-CCNC: 5 U/L (ref 7–45)
ANION GAP SERPL CALCULATED.3IONS-SCNC: 13 MMOL/L (ref 10–20)
AST SERPL W P-5'-P-CCNC: 9 U/L (ref 9–39)
BILIRUB SERPL-MCNC: 0.6 MG/DL (ref 0–1.2)
BUN SERPL-MCNC: 34 MG/DL (ref 6–23)
CALCIUM SERPL-MCNC: 9.1 MG/DL (ref 8.6–10.3)
CHLORIDE SERPL-SCNC: 105 MMOL/L (ref 98–107)
CO2 SERPL-SCNC: 27 MMOL/L (ref 21–32)
CREAT SERPL-MCNC: 1.66 MG/DL (ref 0.5–1.05)
EGFRCR SERPLBLD CKD-EPI 2021: 32 ML/MIN/1.73M*2
EJECTION FRACTION APICAL 4 CHAMBER: 33.4
EJECTION FRACTION: 33 %
ERYTHROCYTE [DISTWIDTH] IN BLOOD BY AUTOMATED COUNT: 14.2 % (ref 11.5–14.5)
GLUCOSE BLD MANUAL STRIP-MCNC: 130 MG/DL (ref 74–99)
GLUCOSE BLD MANUAL STRIP-MCNC: 151 MG/DL (ref 74–99)
GLUCOSE BLD MANUAL STRIP-MCNC: 183 MG/DL (ref 74–99)
GLUCOSE SERPL-MCNC: 134 MG/DL (ref 74–99)
HCT VFR BLD AUTO: 32.8 % (ref 36–46)
HGB BLD-MCNC: 10.4 G/DL (ref 12–16)
INR PPP: 2 (ref 0.9–1.2)
LEFT ATRIUM VOLUME AREA LENGTH INDEX BSA: 51.8 ML/M2
LEFT VENTRICLE INTERNAL DIMENSION DIASTOLE: 4.45 CM (ref 3.5–6)
LV EJECTION FRACTION BIPLANE: 38 %
MCH RBC QN AUTO: 33.4 PG (ref 26–34)
MCHC RBC AUTO-ENTMCNC: 31.7 G/DL (ref 32–36)
MCV RBC AUTO: 106 FL (ref 80–100)
NRBC BLD-RTO: 0 /100 WBCS (ref 0–0)
PLATELET # BLD AUTO: 208 X10*3/UL (ref 150–450)
POTASSIUM SERPL-SCNC: 3.3 MMOL/L (ref 3.5–5.3)
PROT SERPL-MCNC: 6.1 G/DL (ref 6.4–8.2)
PROTHROMBIN TIME: 20.5 SECONDS (ref 9.3–12.7)
Q ONSET: 214 MS
QRS COUNT: 15 BEATS
QRS DURATION: 94 MS
QT INTERVAL: 400 MS
QTC CALCULATION(BAZETT): 497 MS
QTC FREDERICIA: 463 MS
R AXIS: -25 DEGREES
RBC # BLD AUTO: 3.11 X10*6/UL (ref 4–5.2)
RIGHT VENTRICLE FREE WALL PEAK S': 6.42 CM/S
RIGHT VENTRICLE PEAK SYSTOLIC PRESSURE: 32.2 MMHG
SODIUM SERPL-SCNC: 142 MMOL/L (ref 136–145)
T AXIS: 119 DEGREES
T OFFSET: 414 MS
TRICUSPID ANNULAR PLANE SYSTOLIC EXCURSION: 1.5 CM
VENTRICULAR RATE: 93 BPM
WBC # BLD AUTO: 7.3 X10*3/UL (ref 4.4–11.3)

## 2025-05-02 PROCEDURE — 82947 ASSAY GLUCOSE BLOOD QUANT: CPT

## 2025-05-02 PROCEDURE — 36415 COLL VENOUS BLD VENIPUNCTURE: CPT | Performed by: NURSE PRACTITIONER

## 2025-05-02 PROCEDURE — 97535 SELF CARE MNGMENT TRAINING: CPT | Mod: GO

## 2025-05-02 PROCEDURE — 97162 PT EVAL MOD COMPLEX 30 MIN: CPT | Mod: GP

## 2025-05-02 PROCEDURE — 99232 SBSQ HOSP IP/OBS MODERATE 35: CPT | Performed by: NURSE PRACTITIONER

## 2025-05-02 PROCEDURE — 99223 1ST HOSP IP/OBS HIGH 75: CPT | Performed by: NURSE PRACTITIONER

## 2025-05-02 PROCEDURE — 99223 1ST HOSP IP/OBS HIGH 75: CPT | Performed by: INTERNAL MEDICINE

## 2025-05-02 PROCEDURE — 1200000002 HC GENERAL ROOM WITH TELEMETRY DAILY

## 2025-05-02 PROCEDURE — 2500000002 HC RX 250 W HCPCS SELF ADMINISTERED DRUGS (ALT 637 FOR MEDICARE OP, ALT 636 FOR OP/ED)

## 2025-05-02 PROCEDURE — 2500000002 HC RX 250 W HCPCS SELF ADMINISTERED DRUGS (ALT 637 FOR MEDICARE OP, ALT 636 FOR OP/ED): Performed by: NURSE PRACTITIONER

## 2025-05-02 PROCEDURE — 97165 OT EVAL LOW COMPLEX 30 MIN: CPT | Mod: GO

## 2025-05-02 PROCEDURE — 93308 TTE F-UP OR LMTD: CPT | Performed by: INTERNAL MEDICINE

## 2025-05-02 PROCEDURE — 85610 PROTHROMBIN TIME: CPT | Performed by: NURSE PRACTITIONER

## 2025-05-02 PROCEDURE — 99497 ADVNCD CARE PLAN 30 MIN: CPT | Performed by: NURSE PRACTITIONER

## 2025-05-02 PROCEDURE — C8924 2D TTE W OR W/O FOL W/CON,FU: HCPCS

## 2025-05-02 PROCEDURE — 93005 ELECTROCARDIOGRAM TRACING: CPT

## 2025-05-02 PROCEDURE — 80053 COMPREHEN METABOLIC PANEL: CPT | Performed by: NURSE PRACTITIONER

## 2025-05-02 PROCEDURE — 2500000001 HC RX 250 WO HCPCS SELF ADMINISTERED DRUGS (ALT 637 FOR MEDICARE OP): Performed by: NURSE PRACTITIONER

## 2025-05-02 PROCEDURE — 2500000004 HC RX 250 GENERAL PHARMACY W/ HCPCS (ALT 636 FOR OP/ED): Mod: JZ | Performed by: NURSE PRACTITIONER

## 2025-05-02 PROCEDURE — 2500000004 HC RX 250 GENERAL PHARMACY W/ HCPCS (ALT 636 FOR OP/ED): Performed by: NURSE PRACTITIONER

## 2025-05-02 PROCEDURE — 85027 COMPLETE CBC AUTOMATED: CPT | Performed by: NURSE PRACTITIONER

## 2025-05-02 RX ORDER — TORSEMIDE 20 MG/1
20 TABLET ORAL DAILY
Status: ACTIVE | OUTPATIENT
Start: 2025-05-02

## 2025-05-02 RX ORDER — WARFARIN 2 MG/1
2 TABLET ORAL DAILY
Status: DISPENSED | OUTPATIENT
Start: 2025-05-02

## 2025-05-02 RX ORDER — OXYCODONE HYDROCHLORIDE 5 MG/1
5 TABLET ORAL EVERY 6 HOURS PRN
Refills: 0 | Status: DISPENSED | OUTPATIENT
Start: 2025-05-02

## 2025-05-02 RX ORDER — POTASSIUM CHLORIDE 20 MEQ/1
40 TABLET, EXTENDED RELEASE ORAL ONCE
Status: COMPLETED | OUTPATIENT
Start: 2025-05-02 | End: 2025-05-02

## 2025-05-02 RX ADMIN — PANTOPRAZOLE SODIUM 40 MG: 40 TABLET, DELAYED RELEASE ORAL at 05:38

## 2025-05-02 RX ADMIN — SODIUM CHLORIDE 100 ML/HR: 900 INJECTION, SOLUTION INTRAVENOUS at 08:42

## 2025-05-02 RX ADMIN — WARFARIN SODIUM 2 MG: 2 TABLET ORAL at 18:16

## 2025-05-02 RX ADMIN — SODIUM CHLORIDE 100 ML/HR: 900 INJECTION, SOLUTION INTRAVENOUS at 19:00

## 2025-05-02 RX ADMIN — INSULIN LISPRO 2 UNITS: 100 INJECTION, SOLUTION INTRAVENOUS; SUBCUTANEOUS at 18:17

## 2025-05-02 RX ADMIN — CEFTRIAXONE 1 G: 1 INJECTION, SOLUTION INTRAVENOUS at 08:43

## 2025-05-02 RX ADMIN — CARVEDILOL 3.12 MG: 3.12 TABLET, FILM COATED ORAL at 22:06

## 2025-05-02 RX ADMIN — PERFLUTREN 2 ML OF DILUTION: 6.52 INJECTION, SUSPENSION INTRAVENOUS at 10:36

## 2025-05-02 RX ADMIN — ACETAMINOPHEN 650 MG: 325 TABLET ORAL at 08:43

## 2025-05-02 RX ADMIN — NYSTATIN 1 APPLICATION: 100000 POWDER TOPICAL at 22:07

## 2025-05-02 RX ADMIN — EMPAGLIFLOZIN 10 MG: 10 TABLET, FILM COATED ORAL at 08:43

## 2025-05-02 RX ADMIN — POTASSIUM CHLORIDE 40 MEQ: 1500 TABLET, EXTENDED RELEASE ORAL at 08:42

## 2025-05-02 SDOH — ECONOMIC STABILITY: HOUSING INSECURITY: IN THE LAST 12 MONTHS, WAS THERE A TIME WHEN YOU WERE NOT ABLE TO PAY THE MORTGAGE OR RENT ON TIME?: NO

## 2025-05-02 SDOH — SOCIAL STABILITY: SOCIAL INSECURITY: WITHIN THE LAST YEAR, HAVE YOU BEEN AFRAID OF YOUR PARTNER OR EX-PARTNER?: NO

## 2025-05-02 SDOH — ECONOMIC STABILITY: INCOME INSECURITY: IN THE PAST 12 MONTHS HAS THE ELECTRIC, GAS, OIL, OR WATER COMPANY THREATENED TO SHUT OFF SERVICES IN YOUR HOME?: NO

## 2025-05-02 SDOH — SOCIAL STABILITY: SOCIAL INSECURITY: WITHIN THE LAST YEAR, HAVE YOU BEEN HUMILIATED OR EMOTIONALLY ABUSED IN OTHER WAYS BY YOUR PARTNER OR EX-PARTNER?: NO

## 2025-05-02 SDOH — ECONOMIC STABILITY: FOOD INSECURITY: WITHIN THE PAST 12 MONTHS, THE FOOD YOU BOUGHT JUST DIDN'T LAST AND YOU DIDN'T HAVE MONEY TO GET MORE.: NEVER TRUE

## 2025-05-02 SDOH — ECONOMIC STABILITY: FOOD INSECURITY: WITHIN THE PAST 12 MONTHS, YOU WORRIED THAT YOUR FOOD WOULD RUN OUT BEFORE YOU GOT THE MONEY TO BUY MORE.: NEVER TRUE

## 2025-05-02 SDOH — ECONOMIC STABILITY: HOUSING INSECURITY: AT ANY TIME IN THE PAST 12 MONTHS, WERE YOU HOMELESS OR LIVING IN A SHELTER (INCLUDING NOW)?: NO

## 2025-05-02 SDOH — ECONOMIC STABILITY: TRANSPORTATION INSECURITY: IN THE PAST 12 MONTHS, HAS LACK OF TRANSPORTATION KEPT YOU FROM MEDICAL APPOINTMENTS OR FROM GETTING MEDICATIONS?: NO

## 2025-05-02 SDOH — ECONOMIC STABILITY: FOOD INSECURITY: HOW HARD IS IT FOR YOU TO PAY FOR THE VERY BASICS LIKE FOOD, HOUSING, MEDICAL CARE, AND HEATING?: NOT HARD AT ALL

## 2025-05-02 SDOH — HEALTH STABILITY: PHYSICAL HEALTH
HOW OFTEN DO YOU NEED TO HAVE SOMEONE HELP YOU WHEN YOU READ INSTRUCTIONS, PAMPHLETS, OR OTHER WRITTEN MATERIAL FROM YOUR DOCTOR OR PHARMACY?: NEVER

## 2025-05-02 SDOH — ECONOMIC STABILITY: HOUSING INSECURITY: IN THE PAST 12 MONTHS, HOW MANY TIMES HAVE YOU MOVED WHERE YOU WERE LIVING?: 0

## 2025-05-02 ASSESSMENT — COGNITIVE AND FUNCTIONAL STATUS - GENERAL
PERSONAL GROOMING: A LITTLE
MOBILITY SCORE: 15
MOVING TO AND FROM BED TO CHAIR: A LITTLE
DAILY ACTIVITIY SCORE: 15
DRESSING REGULAR UPPER BODY CLOTHING: A LOT
HELP NEEDED FOR BATHING: A LOT
DRESSING REGULAR UPPER BODY CLOTHING: A LOT
STANDING UP FROM CHAIR USING ARMS: A LITTLE
PERSONAL GROOMING: A LOT
TOILETING: A LOT
MOBILITY SCORE: 13
EATING MEALS: A LITTLE
WALKING IN HOSPITAL ROOM: A LOT
EATING MEALS: A LITTLE
CLIMB 3 TO 5 STEPS WITH RAILING: A LOT
TURNING FROM BACK TO SIDE WHILE IN FLAT BAD: A LITTLE
TURNING FROM BACK TO SIDE WHILE IN FLAT BAD: A LOT
DRESSING REGULAR LOWER BODY CLOTHING: A LOT
DRESSING REGULAR LOWER BODY CLOTHING: A LOT
MOVING TO AND FROM BED TO CHAIR: A LOT
CLIMB 3 TO 5 STEPS WITH RAILING: A LOT
MOVING TO AND FROM BED TO CHAIR: A LITTLE
STANDING UP FROM CHAIR USING ARMS: A LOT
CLIMB 3 TO 5 STEPS WITH RAILING: TOTAL
DAILY ACTIVITIY SCORE: 14
MOVING FROM LYING ON BACK TO SITTING ON SIDE OF FLAT BED WITH BEDRAILS: A LITTLE
HELP NEEDED FOR BATHING: A LITTLE
TOILETING: A LOT
STANDING UP FROM CHAIR USING ARMS: A LOT
MOVING FROM LYING ON BACK TO SITTING ON SIDE OF FLAT BED WITH BEDRAILS: A LITTLE
TURNING FROM BACK TO SIDE WHILE IN FLAT BAD: A LITTLE
MOVING FROM LYING ON BACK TO SITTING ON SIDE OF FLAT BED WITH BEDRAILS: A LITTLE
MOBILITY SCORE: 15
WALKING IN HOSPITAL ROOM: A LOT
WALKING IN HOSPITAL ROOM: A LOT
DRESSING REGULAR UPPER BODY CLOTHING: A LOT
HELP NEEDED FOR BATHING: A LOT
DAILY ACTIVITIY SCORE: 13
EATING MEALS: A LITTLE
TOILETING: A LOT
PERSONAL GROOMING: A LITTLE
DRESSING REGULAR LOWER BODY CLOTHING: A LOT

## 2025-05-02 ASSESSMENT — PAIN - FUNCTIONAL ASSESSMENT
PAIN_FUNCTIONAL_ASSESSMENT: UNABLE TO SELF-REPORT
PAIN_FUNCTIONAL_ASSESSMENT: 0-10

## 2025-05-02 ASSESSMENT — ENCOUNTER SYMPTOMS
FREQUENCY: 0
BRUISES/BLEEDS EASILY: 1
FATIGUE: 1
SLEEP DISTURBANCE: 0
ABDOMINAL DISTENTION: 0
POLYDIPSIA: 0
SEIZURES: 0
WEAKNESS: 1
NAUSEA: 0
WOUND: 0
TREMORS: 0
ABDOMINAL PAIN: 0
CONFUSION: 0
ACTIVITY CHANGE: 1
EYE PAIN: 0
CHILLS: 0
VOMITING: 0
POLYPHAGIA: 0
UNEXPECTED WEIGHT CHANGE: 1
GASTROINTESTINAL NEGATIVE: 1
COUGH: 0
UNEXPECTED WEIGHT CHANGE: 0
APPETITE CHANGE: 0
COLOR CHANGE: 0
HEMATURIA: 0
DIFFICULTY URINATING: 0
DIARRHEA: 0
PALPITATIONS: 0
NERVOUS/ANXIOUS: 0
FLANK PAIN: 0
MYALGIAS: 0
MYALGIAS: 1
CONSTIPATION: 0
SORE THROAT: 0
SHORTNESS OF BREATH: 0
FEVER: 0
ANAL BLEEDING: 0
DIZZINESS: 0
ARTHRALGIAS: 0
HEADACHES: 0
SINUS PAIN: 0

## 2025-05-02 ASSESSMENT — ACTIVITIES OF DAILY LIVING (ADL)
LACK_OF_TRANSPORTATION: NO
HOME_MANAGEMENT_TIME_ENTRY: 10
BATHING_ASSISTANCE: MODERATE
LACK_OF_TRANSPORTATION: NO

## 2025-05-02 ASSESSMENT — PAIN SCALES - GENERAL
PAINLEVEL_OUTOF10: 3
PAINLEVEL_OUTOF10: 5 - MODERATE PAIN
PAINLEVEL_OUTOF10: 5 - MODERATE PAIN
PAINLEVEL_OUTOF10: 8
PAINLEVEL_OUTOF10: 0 - NO PAIN

## 2025-05-02 ASSESSMENT — PAIN DESCRIPTION - LOCATION: LOCATION: SHOULDER

## 2025-05-02 ASSESSMENT — PAIN DESCRIPTION - ORIENTATION: ORIENTATION: LEFT

## 2025-05-02 NOTE — CONSULTS
Inpatient consult to Palliative Care  Consult performed by: FARHANA Prabhakar  Consult ordered by: FARHANA Langston  Reason for consult: Goals of Care          Reason For Consult  Reason for Consult: communication / medical decision making     History Of Present Illness  Jing Sanchez is a 74 y.o. female  with history of uterine cancer right arm weakness, systolic heart failure EF 30-35 %, diabetes type 2, hypertension, A-fib on Coumadin.   Patient presented with generalized malaise fatigue and overall just not feeling well.  She was complaining of abdominal pain and nausea but no vomiting.  Last BM 4/30.  No blood in the stools or urine.  Hospitalized 4/7-4/10, for a left humeral fracture, found to have what appeared to be bony metastases at that time & biopsy completed with results indicating uterine cancer.  This admission patient presents from home where she lives with a close friend, followed by \[facility]\"" team outpatient and APS.  Initial ER workup indicates hyperglycemia, CHHAYA with a creatinine of 1.81 BUN of 37 lactate elevated 2.3, WBC 9.6, hemoglobin 11.9, flu and COVID-negative, urinalysis with moderate amounts of blood glucose positive for nitrites and leukocyte esterase, chest x-ray with no acute findings.  Patient was started on IV antibiotics and gentle IV fluids for hydration, oncology and palliative medicine were consulted.  Patient is currently complaining of 8 out of 10 left tailbone pain while sitting, pain is alleviated with pressure relief of the area.  She has been utilizing Tylenol at home.  She does complain of some aching mild pain in the left shoulder, which is currently still in a sling.  She denies numbness or tingling to her arm.  She has no complaints of nausea abdominal pain, no cough shortness of breath.    Symptoms (0 - 10, Best to Worst)  Mobile Symptom Assessment System  0-10 (Numeric) Pain Score: 5 - Moderate pain    BM in last 48 hours?  unknown    Emotional/Psychological/Spiritual Needs  Over the past two weeks, how often has the patient been bothered by having little interest or pleasure in doing things?  occurrence (last two weeks): several days    Over the past two weeks, how often has the patient been bothered by feeling down, depressed, or hopeless?  occurrence (last two weeks): more than half the days    Screening for spiritual needs?  No    Serious Illness Conversation  What is your understanding now of where you are with your illness: Patient has a very simplistic understanding of her current health conditions, her friend Desmond is helping her manage.  How much information about what is likely to be ahead with your illness  would you like from me: Please fully disclose information to patient and her close friend Desmond, utilize simple terminology and clear concise statements  What are your most important goals if your health situation worsens: Patient values quality of life and safety.  What are your biggest fears and worries about the future with your health: She does not want to suffer  What gives you strength as you think about the future with your illness: The support of her friend Killian has been critical as she has no other support services  What abilities are so critical to your life that you can’t imagine living without them: She wants to maintain her cognition, she would like to regain some functional capacity  If you become sicker, how much are you willing to go through for the possibility of gaining more time: No heroic or life support measures  How much does your family know about your priorities and wishes: Patient is providing discussion her friend Desmond is present for discussion    Personal/Social History    She reports that she has never smoked. She has never been exposed to tobacco smoke. She has never used smokeless tobacco. She reports that she does not currently use alcohol. She reports that she does not use  drugs.    Functional Status  Function is very limited currently as her left arm is in a sling she cannot utilize her walker, she cannot manage the stairs at home.  There are no bathrooms on the first floor.  Unsafe to discharge home    Caregiving/Caregiver Support  Does the patient require assistance in some or all components of his care, including coordination of medical care? Yes  If Yes, which person serves that role?  friend   Caregiver emotional or practical needs:      Past Medical History  She has a past medical history of A-fib (Multi), Acute MI (Multi) (08/2016), ASHD (arteriosclerotic heart disease), CAD (coronary artery disease), CHF (congestive heart failure), DM (diabetes mellitus) (Multi), Dyslipidemia, H/O echocardiogram (04/2017), H/O echocardiogram (09/2018), History of nuclear stress test (02/2015), History of nuclear stress test (04/2016), HTN (hypertension), Obesity, Obstructive sleep apnea, Personal history of other specified conditions, S/P cardiac catheterization (06/2016), and SOB (shortness of breath).    Surgical History  She has a past surgical history that includes MR angio head wo IV contrast (04/06/2017); Cardiac catheterization (09/04/2013); Tumor removal; Colonoscopy (04/2015); Coronary artery bypass graft (10/2016); and Cholecystectomy (03/11/2021).     Family History  Family History[1]  Allergies  Cetirizine    Review of Systems   Constitutional:  Positive for activity change and fatigue. Negative for appetite change, chills, fever and unexpected weight change.   HENT:  Negative for mouth sores, sinus pain and sore throat.    Eyes:  Negative for pain.   Respiratory:  Negative for cough and shortness of breath.    Cardiovascular:  Negative for chest pain, palpitations and leg swelling.   Gastrointestinal:  Negative for abdominal distention, abdominal pain, anal bleeding, constipation, diarrhea, nausea and vomiting.   Endocrine: Negative for polydipsia, polyphagia and polyuria.    Genitourinary:  Negative for difficulty urinating, flank pain, frequency, hematuria, urgency, vaginal bleeding and vaginal discharge.   Musculoskeletal:  Negative for arthralgias, gait problem and myalgias.        Tailbone pain, left shoulder pain, recent loss of function as her arm is in a sling   Skin:  Negative for color change, rash and wound.   Neurological:  Positive for weakness. Negative for dizziness, tremors, seizures and headaches.   Hematological:  Bruises/bleeds easily.   Psychiatric/Behavioral:  Negative for confusion and sleep disturbance. The patient is not nervous/anxious.         Physical Exam  Vitals and nursing note reviewed.   Constitutional:       General: She is not in acute distress.     Appearance: She is ill-appearing.   HENT:      Head: Normocephalic and atraumatic.      Comments: Facial hair present, mild amblyopia wears glasses     Nose: Nose normal.      Mouth/Throat:      Mouth: Mucous membranes are moist.      Pharynx: Oropharynx is clear.   Eyes:      Extraocular Movements: Extraocular movements intact.      Pupils: Pupils are equal, round, and reactive to light.   Cardiovascular:      Rate and Rhythm: Normal rate. Rhythm irregular.      Pulses: Normal pulses.      Heart sounds: No murmur heard.  Pulmonary:      Effort: Pulmonary effort is normal. No respiratory distress.      Breath sounds: Normal breath sounds.   Abdominal:      General: Abdomen is flat. Bowel sounds are normal. There is no distension.      Palpations: Abdomen is soft.      Tenderness: There is no abdominal tenderness.   Musculoskeletal:         General: No swelling, tenderness, deformity or signs of injury. Normal range of motion.      Cervical back: Normal range of motion and neck supple.      Comments: Pain with palpation over right side of the tailbone, no other spinal pain or pelvic pain noted with palpation  Left arm is in a sling, left deltoid area is tender with light touch.  Neurovascular status of the  "left upper extremity is intact   Skin:     General: Skin is warm and dry.      Capillary Refill: Capillary refill takes 2 to 3 seconds.   Neurological:      General: No focal deficit present.      Mental Status: She is alert and oriented to person, place, and time.      Motor: Weakness present.   Psychiatric:         Mood and Affect: Mood normal.         Last Recorded Vitals  Blood pressure 127/66, pulse 86, temperature 36.7 °C (98.1 °F), temperature source Temporal, resp. rate 18, height 1.651 m (5' 5\"), weight 78.5 kg (173 lb), SpO2 99%.    Relevant Results  Results for orders placed or performed during the hospital encounter of 05/01/25 (from the past 24 hours)   CBC and Auto Differential   Result Value Ref Range    WBC 9.6 4.4 - 11.3 x10*3/uL    nRBC 0.0 0.0 - 0.0 /100 WBCs    RBC 3.55 (L) 4.00 - 5.20 x10*6/uL    Hemoglobin 11.9 (L) 12.0 - 16.0 g/dL    Hematocrit 37.0 36.0 - 46.0 %     (H) 80 - 100 fL    MCH 33.5 26.0 - 34.0 pg    MCHC 32.2 32.0 - 36.0 g/dL    RDW 14.4 11.5 - 14.5 %    Platelets 258 150 - 450 x10*3/uL    Neutrophils % 81.2 40.0 - 80.0 %    Immature Granulocytes %, Automated 0.4 0.0 - 0.9 %    Lymphocytes % 8.3 13.0 - 44.0 %    Monocytes % 6.6 2.0 - 10.0 %    Eosinophils % 2.7 0.0 - 6.0 %    Basophils % 0.8 0.0 - 2.0 %    Neutrophils Absolute 7.78 (H) 1.60 - 5.50 x10*3/uL    Immature Granulocytes Absolute, Automated 0.04 0.00 - 0.50 x10*3/uL    Lymphocytes Absolute 0.80 0.80 - 3.00 x10*3/uL    Monocytes Absolute 0.63 0.05 - 0.80 x10*3/uL    Eosinophils Absolute 0.26 0.00 - 0.40 x10*3/uL    Basophils Absolute 0.08 0.00 - 0.10 x10*3/uL   Comprehensive metabolic panel   Result Value Ref Range    Glucose 303 (H) 74 - 99 mg/dL    Sodium 137 136 - 145 mmol/L    Potassium 3.6 3.5 - 5.3 mmol/L    Chloride 96 (L) 98 - 107 mmol/L    Bicarbonate 27 21 - 32 mmol/L    Anion Gap 18 10 - 20 mmol/L    Urea Nitrogen 37 (H) 6 - 23 mg/dL    Creatinine 1.81 (H) 0.50 - 1.05 mg/dL    eGFR 29 (L) >60 " mL/min/1.73m*2    Calcium 10.0 8.6 - 10.3 mg/dL    Albumin 4.2 3.4 - 5.0 g/dL    Alkaline Phosphatase 118 33 - 136 U/L    Total Protein 7.4 6.4 - 8.2 g/dL    AST 15 9 - 39 U/L    Bilirubin, Total 1.0 0.0 - 1.2 mg/dL    ALT 8 7 - 45 U/L   Magnesium   Result Value Ref Range    Magnesium 1.90 1.60 - 2.40 mg/dL   Lipase   Result Value Ref Range    Lipase 55 9 - 82 U/L   Sars-CoV-2, Influenza A/B and RSV PCR   Result Value Ref Range    Coronavirus 2019, PCR Not Detected Not Detected    Flu A Result Not Detected Not Detected    Flu B Result Not Detected Not Detected    RSV PCR Not Detected Not Detected   Troponin I, High Sensitivity, Initial   Result Value Ref Range    Troponin I, High Sensitivity 11 0 - 13 ng/L   ECG 12 lead   Result Value Ref Range    Ventricular Rate 93 BPM    QRS Duration 94 ms    QT Interval 400 ms    QTC Calculation(Bazett) 497 ms    R Axis -25 degrees    T Axis 119 degrees    QRS Count 15 beats    Q Onset 214 ms    T Offset 414 ms    QTC Fredericia 463 ms   Troponin, High Sensitivity, 1 Hour   Result Value Ref Range    Troponin I, High Sensitivity 11 0 - 13 ng/L   Urinalysis with Reflex Culture and Microscopic   Result Value Ref Range    Color, Urine Yellow Straw, Yellow    Appearance, Urine Clear Clear    Specific Gravity, Urine <=1.005 (N) 1.005 - 1.035    pH, Urine 5.5 5.0, 5.5, 6.0, 6.5, 7.0, 7.5, 8.0    Protein, Urine NEGATIVE NEGATIVE, TRACE mg/dL    Glucose, Urine >=1000 (4+) (A) NEGATIVE mg/dL    Blood, Urine MODERATE (2+) (A) NEGATIVE mg/dL    Ketones, Urine NEGATIVE NEGATIVE mg/dL    Bilirubin, Urine NEGATIVE NEGATIVE mg/dL    Urobilinogen, Urine 0.2 0.2, 1.0 mg/dL    Nitrite, Urine POSITIVE (A) NEGATIVE    Leukocyte Esterase, Urine SMALL (1+) (A) NEGATIVE   Extra Urine Gray Tube   Result Value Ref Range    Extra Tube 293    Microscopic Only, Urine   Result Value Ref Range    WBC, Urine 21-50 (A) 1-5, NONE /HPF    RBC, Urine 3-5 NONE, 1-2, 3-5 /HPF    Bacteria, Urine 1+ (A) NONE SEEN /HPF     Mucus, Urine FEW Reference range not established. /LPF   Lactate   Result Value Ref Range    Lactate 2.3 (H) 0.4 - 2.0 mmol/L   Blood Culture    Specimen: Peripheral Venipuncture; Blood culture   Result Value Ref Range    Blood Culture Loaded on Instrument - Culture in progress    Blood Culture    Specimen: Peripheral Venipuncture; Blood culture   Result Value Ref Range    Blood Culture Loaded on Instrument - Culture in progress    Protime-INR   Result Value Ref Range    Protime 20.6 (H) 9.3 - 12.7 seconds    INR 2.0 (H) 0.9 - 1.2   CBC   Result Value Ref Range    WBC 7.3 4.4 - 11.3 x10*3/uL    nRBC 0.0 0.0 - 0.0 /100 WBCs    RBC 3.11 (L) 4.00 - 5.20 x10*6/uL    Hemoglobin 10.4 (L) 12.0 - 16.0 g/dL    Hematocrit 32.8 (L) 36.0 - 46.0 %     (H) 80 - 100 fL    MCH 33.4 26.0 - 34.0 pg    MCHC 31.7 (L) 32.0 - 36.0 g/dL    RDW 14.2 11.5 - 14.5 %    Platelets 208 150 - 450 x10*3/uL   Comprehensive metabolic panel   Result Value Ref Range    Glucose 134 (H) 74 - 99 mg/dL    Sodium 142 136 - 145 mmol/L    Potassium 3.3 (L) 3.5 - 5.3 mmol/L    Chloride 105 98 - 107 mmol/L    Bicarbonate 27 21 - 32 mmol/L    Anion Gap 13 10 - 20 mmol/L    Urea Nitrogen 34 (H) 6 - 23 mg/dL    Creatinine 1.66 (H) 0.50 - 1.05 mg/dL    eGFR 32 (L) >60 mL/min/1.73m*2    Calcium 9.1 8.6 - 10.3 mg/dL    Albumin 3.4 3.4 - 5.0 g/dL    Alkaline Phosphatase 93 33 - 136 U/L    Total Protein 6.1 (L) 6.4 - 8.2 g/dL    AST 9 9 - 39 U/L    Bilirubin, Total 0.6 0.0 - 1.2 mg/dL    ALT 5 (L) 7 - 45 U/L   Protime-INR   Result Value Ref Range    Protime 20.5 (H) 9.3 - 12.7 seconds    INR 2.0 (H) 0.9 - 1.2   POCT GLUCOSE   Result Value Ref Range    POCT Glucose 130 (H) 74 - 99 mg/dL   Transthoracic Echo (TTE) Limited   Result Value Ref Range    Tricuspid annular plane systolic excursion 1.5 cm    LV Biplane EF 38 %    LA vol index A/L 51.8 ml/m2    LV EF 33 %    RV free wall pk S' 6.42 cm/s    RVSP 32.2 mmHg    LVIDd 4.45 cm    LV A4C EF 33.4      *Note:  Due to a large number of results and/or encounters for the requested time period, some results have not been displayed. A complete set of results can be found in Results Review.    Transthoracic Echo (TTE) Limited  Result Date: 5/2/2025            [facility] [address] , [place] [address]             Phone [phone] TRANSTHORACIC ECHOCARDIOGRAM REPORT Patient Name:       MARTI BLAKE JACQUELYN      Reading Physician:    [id number] Joie Robison MD Study Date:         5/2/2025             Ordering Provider:    [id number] JEANINE BELLO MRN/PID:            [id number]             Fellow: Accession#:         [id number]         Nurse: Date of Birth/Age:  1950 / 74 years Sonographer:          Cari Ocampo RDCS Gender Assigned at  F                    Additional Staff: Birth: Height:             165.10 cm            Admit Date: Weight:             78.47 kg             Admission Status:     Inpatient -                                                                Routine BSA / BMI:          1.86 m2 / 28.79      Department Location:  [facility]                     kg/m2 Blood Pressure: 100 /68 mmHg Study Type:    TRANSTHORACIC ECHO (TTE) LIMITED Diagnosis/ICD: Chest pain, unspecified-R07.9 Indication:    dypsnea, chest pain CPT Codes:     Echo Limited-26164; Color Doppler-68962 Patient History: Pertinent History: Cardiomyopathy, AFIb, CHF, TIA, STEMI, HLD, acute MI, CKD,                    DM. Study Detail: The following Echo studies were performed: 2D, M-Mode, Doppler and               color flow. Technically challenging study due to patient lying in               supine position and body habitus. Definity used as a contrast               agent for endocardial border definition. Total contrast used for                this procedure was 3 mL via IV push.  Critical Event Critical Event: Test was completed as per department protocol. Critical Finding: Possible mass towards apex of LV. Notify Enriqueta through epic chat. Time Test was Completed: 10:30:00 AM Notified: Enriqueta. Attending notification time: 10:34:49 AM  PHYSICIAN INTERPRETATION: Left Ventricle: The left ventricular systolic function is moderately decreased, with a visually estimated ejection fraction of 30-35%. The patient is in atrial fibrillation which may influence the estimate of left ventricular function and transvalvular flows. Wall motion is abnormal. The left ventricular cavity size is normal. There is normal septal and mildly increased posterior left ventricular wall thickness. There is left ventricular concentric remodeling. Abnormal (paradoxical) septal motion, consistent with an intraventricular conduction delay. Left ventricular diastolic filling was indeterminate due to atrial fibrillation/flutter. The apical wall is akinetic with a large thrombus seen. Left Atrium: The left atrial size is mildly dilated. Right Ventricle: The right ventricle is moderately enlarged. There is reduced right ventricular systolic function. Right Atrium: The right atrial size is moderately dilated. Aortic Valve: The aortic valve appears bicuspid. There is no evidence of aortic valve regurgitation. The aortic valve off axis. Possible bicuspid aortic valve. No evidence of aortic valve stenosis. No regurgitation. Mitral Valve: The mitral valve is normal in structure. There is trace mitral valve regurgitation. Tricuspid Valve: The tricuspid valve is structurally normal. There is trace tricuspid regurgitation. The Doppler estimated RVSP is slightly elevated right ventricular systolic pressure at 32.2 mmHg. Pulmonic Valve: The pulmonic valve is structurally normal. There is physiologic pulmonic valve regurgitation. Pericardium: Small pericardial effusion. Aorta: The aortic root is normal.  Systemic Veins: The inferior vena cava appears normal in size, with IVC inspiratory collapse greater than 50%.  CONCLUSIONS:  1. The left ventricular systolic function is moderately decreased, with a visually estimated ejection fraction of 30-35%.  2. Abnormal wall motion.  3. Left ventricular diastolic filling was indeterminate due to atrial fibrillation/flutter.  4. The apical wall is akinetic with a large thrombus seen.  5. There is reduced right ventricular systolic function.  6. Moderately enlarged right ventricle.  7. The right atrial size is moderately dilated.  8. Trace mitral valve regurgitation.  9. Slightly elevated right ventricular systolic pressure. 10. Trace tricuspid regurgitation is visualized. 11. The aortic valve appears bicuspid. 12. The patient is in atrial fibrillation which may influence the estimate of left ventricular function and transvalvular flows. QUANTITATIVE DATA SUMMARY:  2D MEASUREMENTS:             Normal Ranges: LAs:             3.80 cm     (2.7-4.0cm) IVSd:            0.78 cm     (0.6-1.1cm) LVPWd:           1.17 cm     (0.6-1.1cm) LVIDd:           4.44 cm     (3.9-5.9cm) LVIDs:           3.88 cm LV Mass Index:   83.3 g/m2 LVEDV Index:     77.45 ml/m2 LV % FS          12.8 %  LEFT ATRIUM:                  Normal Ranges: LA Vol A4C:        90.2 ml    (22+/-6mL/m2) LA Vol A2C:        102.4 ml LA Vol BP:         96.3 ml LA Vol Index A4C:  48.5ml/m2 LA Vol Index A2C:  55.1 ml/m2 LA Vol Index BP:   51.8 ml/m2 LA Area A4C:       28.2 cm2 LA Area A2C:       30.1 cm2 LA Major Axis A4C: 7.5 cm LA Major Axis A2C: 7.5 cm LA Volume Index:   50.4 ml/m2 LA Vol A4C:        88.1 ml LA Vol A2C:        99.6 ml LA Vol Index BSA:  50.5 ml/m2  RIGHT ATRIUM:                 Normal Ranges: RA Vol A4C:        96.9 ml    (8.3-19.5ml) RA Vol Index A4C:  52.1 ml/m2 RA Area A4C:       29.0 cm2 RA Major Axis A4C: 7.4 cm  M-MODE MEASUREMENTS:         Normal Ranges: Ao Root:             2.50 cm (2.0-3.7cm) LAs:                  3.20 cm (2.7-4.0cm)  LV SYSTOLIC FUNCTION:                      Normal Ranges: EF-A4C View:    33 % (>=55%) EF-A2C View:    42 % EF-Biplane:     38 % EF-Visual:      33 % LV EF Reported: 33 %  LV DIASTOLIC FUNCTION:           Normal Ranges: MV Peak E:             1.15 m/s  (0.7-1.2 m/s) MV e'                  0.066 m/s (>8.0) MV lateral e'          0.07 m/s MV medial e'           0.06 m/s E/e' Ratio:            17.48     (<8.0)  MITRAL VALVE:          Normal Ranges: MV DT:        164 msec (150-240msec)  RIGHT VENTRICLE: RV Basal 4.60 cm RV Mid   4.04 cm RV Major 7.8 cm TAPSE:   15.2 mm RV s'    0.06 m/s  TRICUSPID VALVE/RVSP:          Normal Ranges: Peak TR Velocity:     2.70 m/s RV Syst Pressure:     32 mmHg  (< 30mmHg) IVC Diam:             1.73 cm  PULMONIC VALVE:          Normal Ranges: PV Accel Time:  87 msec  (>120ms) PV Max Tristan:     1.1 m/s  (0.6-0.9m/s) PV Max P.6 mmHg  [id number] Joie Robison MD Electronically signed on 2025 at 11:33:48 AM  ** Final **     ECG 12 lead  Result Date: 2025  Atrial fibrillation with premature ventricular or aberrantly conducted complexes Inferior infarct (cited on or before 2025) Anterolateral infarct (cited on or before 2025) QTcB >= 480 msec Abnormal ECG When compared with ECG of 2025 15:53, No significant change was found    XR shoulder left 2+ views  Result Date: 2025  Interpreted By:  Ptay Florian, STUDY: XR SHOULDER LEFT 2+ VIEWS 2025 10:56 pm   INDICATION: Signs/Symptoms:pain and weakness. Follow-up fracture.   COMPARISON: 2025   ACCESSION NUMBER(S): [id number]   ORDERING CLINICIAN: JEANINE BELLO   TECHNIQUE: Three views of the left shoulder   FINDINGS: There is a healing mildly displaced fracture of the proximal humeral diaphysis noted with the distal fracture fragment displaced medially by a distance of 4 mm. There is now some periosteal reaction about the fracture site.   There is postoperative change  from CABG with coronary artery stent graft seen.       Healing mildly displaced fracture of the proximal humeral diaphysis.   Signed by: Paty Florian 5/2/2025 7:23 AM Dictation workstation:   [id number]    CT head wo IV contrast  Result Date: 5/1/2025  Interpreted By:  Jose Manuel Garcia, STUDY: CT HEAD WO IV CONTRAST;  5/1/2025 11:08 pm   INDICATION: Signs/Symptoms:confusion.   COMPARISON: None   ACCESSION NUMBER(S): [id number]   ORDERING CLINICIAN: JEANINE BELLO   TECHNIQUE: Contiguous axial images of the head were obtained without intravenous contrast.   FINDINGS: BRAIN PARENCHYMA:  There is cerebral atrophy and chronic periventricular white matter small vessel ischemic change. The gray white matter differentiation is preserved.  No mass effect or midline shift.   HEMORRHAGE:  No evidence of acute intracranial hemorrhage. VENTRICLES AND EXTRA-AXIAL SPACES:  The ventricles are within normal limits in size for brain volume.  No evidence of abnormal extraaxial fluid collection. EXTRACRANIAL SOFT TISSUES:  Within normal limits. PARANASAL SINUSES/MASTOIDS:  Mucosal thickening with mucosal retention cyst or polyp left maxillary sinus. CALVARIUM:  No evidence of depressed calvarial fracture.   OTHER FINDINGS:  None       Cerebral atrophy and chronic periventricular white matter small vessel ischemic change.   No evidence of acute intracranial hemorrhage.   Mucosal thickening with mucosal retention cyst or polyp in left maxillary sinus.   MACRO: None   Signed by: Jose Manuel Garcia 5/1/2025 11:41 PM Dictation workstation:   [id number]    XR chest 1 view  Result Date: 5/1/2025  STUDY: Chest Radiograph;  05/01/2025 5:23 PM INDICATION: Generalized weakness. COMPARISON: XR chest 04/07/2025, 03/11/2023, 03/11/2021. ACCESSION NUMBER(S): [id number] ORDERING CLINICIAN: ELLE TURNER TECHNIQUE:  Frontal chest was obtained at 17:23 hours. FINDINGS: CARDIOMEDIASTINAL SILHOUETTE: There is mild cardiomegaly.  Sternotomy wires present.   LUNGS: Lungs are clear.  ABDOMEN: No remarkable upper abdominal findings.  BONES: No acute osseous changes.    No acute pulmonary abnormality. Signed by Brennan Hartman MD    CT abdomen pelvis wo IV contrast  Result Date: 5/1/2025  STUDY: CT Abdomen and Pelvis without IV Contrast; 05/01/2025, 4:25 PM. INDICATION: Nausea, diarrhea, generalized weakness. COMPARISON: CT CAP: 04/08/25; US pelvis: 03/16/23. ACCESSION NUMBER(S): [id number] ORDERING CLINICIAN: ELLE TURNER TECHNIQUE: CT of the abdomen and pelvis was performed.  Contiguous axial images were obtained at 3 mm slice thickness through the abdomen and pelvis. Coronal and sagittal reconstructions at 3 mm slice thickness were performed. No intravenous contrast was administered.  Automated mA/kV exposure control was utilized and patient examination was performed in strict accordance with principles of ALARA. FINDINGS: Please note that the evaluation of vessels, lymph nodes and organs is limited without intravenous contrast.  LOWER CHEST: Heart is enlarged with coronary artery calcifications  No pericardial effusion.  Lung bases are clear.  ABDOMEN:  LIVER: No hepatomegaly.  Smooth surface contour.  Normal attenuation.  BILE DUCTS: No intrahepatic or extrahepatic biliary ductal dilatation.  GALLBLADDER: The gallbladder is absent. STOMACH: No abnormalities identified.  PANCREAS: No masses or ductal dilatation.  SPLEEN: No splenomegaly or focal splenic lesion.  ADRENAL GLANDS: No thickening or nodules.  KIDNEYS AND URETERS: Kidneys are normal in size and location.  No renal or ureteral calculi.  PELVIS:  BLADDER: No abnormalities identified.  REPRODUCTIVE ORGANS: Enlarged fibroid uterus again noted.  Overall appearance is unchanged.  BOWEL: No abnormalities identified.  Appendix is normal.  VESSELS: No abnormalities identified.  Abdominal aorta is normal in caliber. Moderate to severe plaque along the distal aorta.  PERITONEUM/RETROPERITONEUM/LYMPH NODES: No free  fluid.  No pneumoperitoneum.  Small fat-containing umbilical hernia. No lymphadenopathy.  ABDOMINAL WALL: No abnormalities identified. SOFT TISSUES: No abnormalities identified.  BONES: Stable lytic lesions in the right hemisacrum and right 10th rib adjacent to the costotransverse junction unchanged from prior    1.No acute process. 2.Cardiomegaly with coronary artery calcifications. 3.Enlarged fibroid uterus again noted. Overall appearance is unchanged. 4.Stable lytic lesions in the right hemisacrum and right 10th rib adjacent to the costotransverse junction unchanged from prior.  As previously suggested, metastatic disease should be considered. Consider further evaluation with MRI or pathologic sampling. Signed by Juan Early MD    CT guided fine needle aspiration biopsy  Result Date: 4/10/2025  Interpreted By:  Denton Sanders, STUDY: CT GUIDED FINE NEEDLE ASPIRATION BIOPSY; 4/9/2025 5:33 pm   INDICATION: Left humeral pathologic fracture with underlying bone lesion with soft tissue extension.   COMPARISON: None.   ACCESSION NUMBER(S): [id number]   ORDERING CLINICIAN: ANIA ZHANG   TECHNIQUE: Conscious sedation: 20 minutes. CT-guided soft tissue biopsy (left arm/musculoskeletal)   FINDINGS: Informed consent obtained. Patient positioned supine and connected to physiologic monitoring. Conscious sedation provided Versed and fentanyl. Skin prepped, draped and anesthetized. Under CT guidance, 17 gauge trocar cannula advanced to soft tissue along the medial aspect of left upper humerus fracture. 18 gauge biopsy instrument advanced coaxially and 2 core biopsies obtained. Patient tolerated procedure well.       CT-guided biopsy of abnormal soft tissue around sign of pathologic fracture left humerus.   Signed by: Denton Sanders 4/10/2025 12:32 PM Dictation workstation:   [id number]    ECG 12 lead  Result Date: 4/9/2025  Atrial fibrillation with rapid ventricular response Left axis deviation Inferior infarct , age  undetermined Anterolateral infarct , age undetermined Nonspecific ST-T changes No previous ECGs available Confirmed by Mynor Garza ([id number]) on 4/9/2025 9:21:20 AM    MR humerus left w and wo IV contrast  Result Date: 4/9/2025  Interpreted By:  Emily Renteria, STUDY: MR HUMERUS LEFT W AND WO IV CONTRAST; ;  4/8/2025 8:50 pm   INDICATION: Signs/Symptoms:arm fracture.     COMPARISON: X-ray 04/03/2025   ACCESSION NUMBER(S): [id number]   ORDERING CLINICIAN: MADELIN KRUEGER   TECHNIQUE: Multiplanar multisequence pre and post gadolinium imaging obtained of the left humerus. Post gadolinium imaging obtained following intravenous administration of the 16 mL of Dotarem.   FINDINGS: There is a fracture through the proximal left humeral diaphysis with medial angulation and medial displacement with displacement measuring 8 mm. There is corresponding reactive marrow edema about the level of the fracture. However, findings are superimposed on the intramedullary lesion with sequela of pathologic fracture with generalized hypointense T1 weighted lesion within the proximal humeral diaphysis measuring 4.4 cm. Surrounding soft tissue edema about the fracture with more focal rim enhancing fluid collection along the posterior margin of the fracture line with component of hematoma measuring 1.4 cm. Diffuse edema seen tracking in the inter fascial planes about the level of the fracture with interstitial feathery appearing muscle edema within the distal deltoid as well as the triceps musculature. Post gadolinium imaging demonstrates enhancement of the intramedullary lesion. Also, there is component of soft tissue enhancement about the level of the pathologic fracture unclear if this simply relates to the adjacent postinflammatory change with soft tissue extension of the lesion of concern. For example, more focal area of intermediate signal intensity and enhancement along the medial aspect of the humerus measuring 2.9 cm in the AP  dimension.   Humeral shafts distally demonstrates a few other intramedullary lesions. For example, in the distal humeral diaphysis with lesion identified measuring 2.7 cm.   Remainder of the visualized musculature about the shoulder is otherwise unremarkable. No glenohumeral joint effusion. Elbow joint visualized portions unremarkable.               1. Pathologic fracture of the proximal humeral diaphysis with intramedullary lesion measuring 4.4 cm. There is soft tissue component of mass lesion about the fracture measuring a least 2.9 cm. Differential possibilities include metastatic disease with the possibility of myeloma or even lymphoma in the differential. Correlate with prior workup   2. Postinflammatory change and component of hematoma at the level of the fracture line   3. Other intramedullary lesions are demonstrated within the mid to distal humeral diaphysis.     MACRO: None   Signed by: Emily Renteria 4/9/2025 8:40 AM Dictation workstation:   [id number]    CT chest abdomen pelvis wo IV contrast  Result Date: 4/8/2025  Interpreted By:  Emily Renteria, STUDY: CT CHEST ABDOMEN PELVIS WO CONTRAST; ;  4/8/2025 12:09 pm   INDICATION: Signs/Symptoms:rule out metastatic disease. Concern for pathologic fracture humerus     COMPARISON: 03/08/2021   ACCESSION NUMBER(S): [id number]   ORDERING CLINICIAN: MADELIN KRUEGER   TECHNIQUE: Serial axial unenhanced CT images obtained of the chest, abdomen, and pelvis. Images reformatted in the coronal and sagittal projection   All CT examinations are performed with 1 or more of the following dose reduction techniques: Automated exposure control, adjustment of mA and/or kv according to patient's size, or use of iterative reconstruction techniques.   FINDINGS: CT chest:   Mediastinum demonstrates no lymphadenopathy. There is no hilar lymphadenopathy. Esophagus is unremarkable   Heart and great vessels demonstrate ascending thoracic aorta measure 3.6 cm with mild  dilatation. Mild vascular calcification of the thoracic aorta. Main pulmonary artery is enlarged measuring 3.9 cm with dilatation. Cardiomegaly demonstrated   Lung parenchyma demonstrates mild centrilobular emphysema. No focal infiltrate or effusion. No noncalcified pulmonary nodularity   Visualized osseous structures demonstrate lytic expansile lesion in the right posterior 10th rib about the costotransverse junction with lesion measuring 1.9 cm. Mild multilevel anterior osteophyte formation within the thoracic spine. Mild multilevel endplate sclerosis. No compression deformity demonstrated.   CT abdomen:   Liver is unremarkable within limits of this unenhanced CT.   Status post cholecystectomy   Spleen and adrenal glands are unremarkable   Pancreas is unremarkable within limits of this unenhanced CT   Right kidney demonstrates no hydronephrosis or nephrolithiasis. Visualized course of the right ureter is unremarkable   Left kidney demonstrates no hydronephrosis or nephrolithiasis.   Retroperitoneum demonstrates diffuse vascular calcification of the abdominal aorta. Scattered subcentimeter aortocaval and para-aortic lymph nodes less conspicuous from prior imaging. No significant lymphadenopathy.   Loops of large bowel demonstrate gas and stool filled loops of large bowel. Appendix is seen and is unremarkable. Small bowel loops are nondilated. There is no lymphadenopathy in the mesentery. Stomach is distended with food contents.   CT pelvis:   Unopacified bladder is unremarkable. There is no pelvic lymphadenopathy. No free fluid demonstrated.   Enlarged fibroid uterus is demonstrated incompletely characterize with partially calcified fibroids. Findings are incompletely characterized. Adnexa are not well evaluated. These findings are stable from exam dated 03/08/2021.   Visualized osseous structures demonstrate a lytic lesion within the right sacrum measuring 3.3 cm also, there is a stress fracture within the right  sacral ala component of which may be related to pathologic stress fracture given the lesion in the right sacrum. There is a degenerative discogenic changes in the lumbar spine with interbody osseous fusion L5/S1. Multilevel vacuum phenomenon and loss of disc space height.               1. Lytic expansile lesion demonstrated within the right 10th rib about the costotransverse junction as well as lytic lesion within the right sacrum measuring 3.3 cm. Metastatic disease is in the differential with other potential etiologies including multiple myeloma a possibility. Correlate with patient's history and laboratory values.   2. No other definite metastatic lesions within the chest, abdomen, or pelvis.   3. Chronic appearing changes as described above.     MACRO: None   Signed by: Emily Renteria 4/8/2025 1:55 PM Dictation workstation:   [id number]    XR chest 1 view  Result Date: 4/7/2025  Interpreted By:  Paty Florian, STUDY: XR CHEST 1 VIEW 4/7/2025 4:26 pm   INDICATION: Signs/Symptoms:weakness   COMPARISON: 03/11/2023   ACCESSION NUMBER(S): [id number]   ORDERING CLINICIAN: TIAGO CUNNINGHAM   TECHNIQUE: AP semi-erect view of the chest   FINDINGS: There is postoperative change from median sternotomy and coronary revascularization with coronary artery stent grafts identified. The heart is enlarged.   The lungs are clear with no pleural abnormality seen.       Cardiomegaly and postoperative change from CABG with coronary artery stent graft placement.   No acute pulmonary pathology.   Signed by: Paty Florian 4/7/2025 4:51 PM Dictation workstation:   [id number]    XR shoulder left 2+ views  Result Date: 4/3/2025  Interpreted By:  Finkelstein, Evan, STUDY: XR SHOULDER LEFT 2+ VIEWS;  4/3/2025 6:08 pm three views left shoulder   INDICATION: Signs/Symptoms:pain.   COMPARISON: None.   ACCESSION NUMBER(S): [id number]   ORDERING CLINICIAN: MICHAEL CHAVEZ   FINDINGS: Median sternotomy wires are present. Acute fracture through  the proximal humeral diaphysis with 5 mm medial displacement of the distal fracture fragment and lateral angular displacement of the distal fracture fragment. Glenohumeral joint is located. Moderate acromioclavicular joint space narrowing and osteophytic spurring. Bones are demineralized.       Displaced fracture involving the proximal humeral diaphysis as above. Moderate acromioclavicular joint osteoarthrosis.     MACRO: None.   Signed by: Evan Finkelstein 4/3/2025 6:17 PM Dictation workstation:   [id number]        Assessment/Plan   IMP:    CHHAYA on CKD 3  Dehydration  UTI  Metastatic uterine cancer with pathologic fracture of the left humerus-saw GYN oncology yesterday, needs outpatient PET scan.  Patient would like to pursue treatment options if there are available options.  Systolic heart failure  Atrial fibrillation on Coumadin, echo indicating apical wall thrombus present, continue anticoagulation  COPD  Palliative care    DNR CCA/DNI  Capable  No advanced directives, currently her legal next of kin would be her niece Danita.  Patient trusts her friend Desmond and his brother to make decisions for her should she be confused.  Copy of advance directives explained and left at bedside.    Family meeting today with patient and close friend Killian who lives with patient and attempts to help her.  After much discussion with friend and [facility] team as well as patient, not safe to discharge home at this time as she is left alone for periods of time, there is no bathroom on the first floor and she cannot navigate stairs.  PT OT has recommended moderate intensity, and [facility] is planning for rehab when medically stable.  Education provided to patient and close friend about process for working up cancer and current hospitalization including diagnoses and treatment plan.  Patient is very much okay with receiving IV fluids and antibiotics, she is also interested in learning if there are treatment options available for her  newly diagnosed cancer.  She tells me that she lost her  2 months ago and she knows he is waiting on the other side for her and she is not afraid but she does want to live if there is quality of life.  She personally experienced her  being on a ventilator, and she tells me that she has no desire for CPR or intubation should she arrest.  She is interested in filling out healthcare power of  and a living will.    Treatment model of care within the boundaries of the [facility] DO NOT RESUSCITATE Comfort Care arrest/DO NOT INTUBATE.  Plan for rehab when medically stable.     Patient prefers clear simple and concise communication, she becomes easily overwhelmed when too much information is provided to her.    Per [facility] team: Patient has an active  through Adult Protective Services [place] [phone], she also has a  through the [employer] on aging that is attempting to get Medicaid and passport set up however not safe for patient to discharge home in her current debilitated condition without a bathroom.  Patient is not opposed to discussing long-term care or assisted living through the Medicaid waiver program.    Symptom Management  Pain: Uncontrolled  Medications recommended for pain?  Yes  Tiredness: Mild  Nausea: None  Depression: Appropriate grief to loss of her spouse 2 months ago  Anxiety: None  Drowsiness: None  Appetite: Excellent  Wellbeing: Recent decline  Dyspnea: None except when attempting stairs  Intervention recommended for dyspnea?  no  Other: N/A  Intervention recommended for constipation?  No    I spent 90 minutes in the professional and overall care of this patient.  Spent over 30 minutes in ACP discussion with the patient and her friend Desmond.      Justyna Diana, APRN-CNP         [1]   Family History  Problem Relation Name Age of Onset    Hypertension Mother      Cancer Mother      Diabetes Father      Stroke Father      Diabetes Sister       Heart disease Maternal Grandmother      Heart disease Maternal Grandfather      Heart disease Paternal Grandmother      Heart disease Paternal Grandfather

## 2025-05-02 NOTE — ASSESSMENT & PLAN NOTE
Creatinine up from the baseline 1.8-1 patient's looks dry may be due to dehydration  Start on gentle IV fluid 100 cc/h

## 2025-05-02 NOTE — HOSPITAL COURSE
Jing Sanchez is a 74 y.o. female with history of uterine cancer right arm weakness, systolic heart failure EF 30-35 %, diabetes type 2, hypertension, A-fib on Coumadin.   Patient presented with generalized malaise fatigue.  Admitted with UTI, uterine cancer 2 to 3 weeks, pathologic left shoulder fracture/weakness 2 to 3 weeks.  Ortho surgeon oncology and palliative wer consulted .  Patient on Coumadin INR daily for A-fib.,

## 2025-05-02 NOTE — CARE PLAN
Problem: Skin  Goal: Participates in plan/prevention/treatment measures  Outcome: Progressing  Goal: Prevent/minimize sheer/friction injuries  Outcome: Progressing  Goal: Promote/optimize nutrition  Outcome: Progressing     Problem: Fall/Injury  Goal: Not fall by end of shift  Outcome: Progressing  Goal: Be free from injury by end of the shift  Outcome: Progressing     Problem: Pain  Goal: Takes deep breaths with improved pain control throughout the shift  Outcome: Progressing  Goal: Turns in bed with improved pain control throughout the shift  Outcome: Progressing   The patient's goals for the shift include      The clinical goals for the shift include Comfort

## 2025-05-02 NOTE — ED PROVIDER NOTES
HPI   Chief Complaint   Patient presents with    Weakness, Gen     Pt bib EMS from home for general weakness and malaise for the past few days, pt recently diagnosed with cancer, unsure of which kind, is seen by [facility], has not started chemo or radiation treatments yet. Vitals stable.        HPI  74-year-old female with history of uterine cancer presents brought in by EMS from home for evaluation of generalized weakness ongoing for the past few days.  She states that she has some nausea and did have an episode of diarrhea a few days ago as well as some vague abdominal pain.  She denies vomiting or blood in the stool.  Endorses some burning with urination as well as urinary frequency and incontinence.  She states she has had a difficult time making it to the bathroom due to her weakness.  She denies chest pain, shortness of breath, fevers or chills, headache or vision changes, extremity numbness.      Patient History   Medical History[1]  Surgical History[2]  Family History[3]  Social History[4]    Physical Exam   ED Triage Vitals   Temp Heart Rate Resp BP   05/01/25 1556 05/01/25 1556 05/01/25 1556 05/01/25 1717   36.8 °C (98.3 °F) 99 18 98/73      SpO2 Temp Source Heart Rate Source Patient Position   05/01/25 1556 05/01/25 1556 05/01/25 1556 05/01/25 2105   96 % Tympanic Monitor Lying      BP Location FiO2 (%)     05/01/25 1717 --     Left arm        Physical Exam  Vitals and nursing note reviewed.   Constitutional:       General: She is not in acute distress.     Appearance: She is well-developed.      Comments: Disheveled but no acute distress   HENT:      Head: Normocephalic and atraumatic.   Eyes:      Conjunctiva/sclera: Conjunctivae normal.   Cardiovascular:      Rate and Rhythm: Normal rate and regular rhythm.      Heart sounds: No murmur heard.  Pulmonary:      Effort: Pulmonary effort is normal. No respiratory distress.      Breath sounds: Normal breath sounds.   Abdominal:      Palpations:  Abdomen is soft.      Tenderness: There is no abdominal tenderness.      Comments: Soft nontender nondistended, evidence of likely candidal infection in the pannus   Musculoskeletal:         General: No swelling.      Cervical back: Neck supple.   Skin:     General: Skin is warm and dry.      Capillary Refill: Capillary refill takes less than 2 seconds.   Neurological:      Mental Status: She is alert.   Psychiatric:         Mood and Affect: Mood normal.           ED Course & MDM   ED Course as of 05/02/25 1518   Thu May 01, 2025   1909 Cultures were drawn upon arrival to ED, cultures were drawn prior to antibiotic administration. [JJ]      ED Course User Index  [JJ] Rafaela Coates PA-C         Diagnoses as of 05/02/25 1518   Malaise and fatigue   Chest pain, unspecified type   Anemia, unspecified type   Acute on chronic renal insufficiency   Electrolyte imbalance   Urinary tract infection without hematuria, site unspecified                 No data recorded                                 Medical Decision Making  Parts of this chart have been completed using voice recognition software. Please excuse any errors of transcription.  My thought process and reason for plan has been formulated from the time that I saw the patient until the time of disposition and is not specific to one specific moment during their visit and furthermore my MDM encompasses this entire chart and not only this text box.      HPI: Detailed above.    Exam: A medically appropriate exam performed, outlined above, given the known history and presentation.    History obtained from: Patient    EKG: Interpreted by attending physician and reviewed by me    Social Determinants of Health considered during this visit: Lives independently        Medications given during visit:  Medications   cefTRIAXone (Rocephin) 1 g in dextrose (iso) IV 50 mL ( intravenous Canceled Entry 5/1/25 1743)   acetaminophen (Tylenol) tablet 650 mg (650 mg oral Given 5/2/25 6433)      Or   acetaminophen (Tylenol) oral liquid 650 mg ( oral See Alternative 5/2/25 0843)     Or   acetaminophen (Tylenol) suppository 650 mg ( rectal See Alternative 5/2/25 0843)   prochlorperazine (Compazine) tablet 10 mg (has no administration in time range)     Or   prochlorperazine (Compazine) injection 10 mg (has no administration in time range)     Or   prochlorperazine (Compazine) suppository 25 mg (has no administration in time range)   polyethylene glycol (Glycolax, Miralax) packet 17 g (has no administration in time range)   bisacodyl (Dulcolax) EC tablet 10 mg (has no administration in time range)   benzocaine-menthol (Cepastat Sore Throat) lozenge 1 lozenge (has no administration in time range)   ondansetron ODT (Zofran-ODT) disintegrating tablet 4 mg (has no administration in time range)     Or   ondansetron (Zofran) injection 4 mg (has no administration in time range)   carvedilol (Coreg) tablet 3.125 mg ( oral Unheld by provider 5/2/25 0920)   empagliflozin (Jardiance) tablet 10 mg (10 mg oral Given 5/2/25 0843)   losartan (Cozaar) tablet 25 mg ( oral Dose Auto Held 5/6/25 0900)   nystatin (Mycostatin) 100,000 unit/gram powder 1 Application (1 Application Topical Not Given 5/2/25 1116)   pantoprazole (ProtoNix) EC tablet 40 mg (40 mg oral Given 5/2/25 0538)   glucagon (Glucagen) injection 1 mg (has no administration in time range)   dextrose 50 % injection 25 g (has no administration in time range)   glucagon (Glucagen) injection 1 mg (has no administration in time range)   dextrose 50 % injection 12.5 g (has no administration in time range)   insulin lispro injection 0-10 Units ( subcutaneous Not Given 5/2/25 1515)   sodium chloride 0.9% infusion (100 mL/hr intravenous Rate/Dose Verify 5/2/25 1317)   cefTRIAXone (Rocephin) 1 g in dextrose (iso) IV 50 mL (0 g intravenous Stopped 5/2/25 1154)   torsemide (Demadex) tablet 20 mg ( oral Dose Auto Held 5/6/25 0900)   warfarin (Coumadin) tablet 2 mg (has no  administration in time range)   oxyCODONE (Roxicodone) immediate release tablet 5 mg (has no administration in time range)   sodium chloride 0.9 % bolus 1,000 mL (0 mL intravenous Stopped 5/1/25 1718)   ondansetron (Zofran) injection 4 mg (4 mg intravenous Given 5/1/25 1639)   acetaminophen (Tylenol) tablet 975 mg (975 mg oral Given 5/1/25 1817)   cefTRIAXone (Rocephin) 1 g in dextrose (iso) IV 50 mL (0 g intravenous Stopped 5/1/25 2020)   perflutren lipid microspheres (Definity) injection 0.5-10 mL of dilution (2 mL of dilution intravenous Given 5/2/25 1036)   potassium chloride CR (Klor-Con M20) ER tablet 40 mEq (40 mEq oral Given 5/2/25 0842)        Diagnostic/tests  Labs Reviewed   CBC WITH AUTO DIFFERENTIAL - Abnormal       Result Value    WBC 9.6      nRBC 0.0      RBC 3.55 (*)     Hemoglobin 11.9 (*)     Hematocrit 37.0       (*)     MCH 33.5      MCHC 32.2      RDW 14.4      Platelets 258      Neutrophils % 81.2      Immature Granulocytes %, Automated 0.4      Lymphocytes % 8.3      Monocytes % 6.6      Eosinophils % 2.7      Basophils % 0.8      Neutrophils Absolute 7.78 (*)     Immature Granulocytes Absolute, Automated 0.04      Lymphocytes Absolute 0.80      Monocytes Absolute 0.63      Eosinophils Absolute 0.26      Basophils Absolute 0.08     COMPREHENSIVE METABOLIC PANEL - Abnormal    Glucose 303 (*)     Sodium 137      Potassium 3.6      Chloride 96 (*)     Bicarbonate 27      Anion Gap 18      Urea Nitrogen 37 (*)     Creatinine 1.81 (*)     eGFR 29 (*)     Calcium 10.0      Albumin 4.2      Alkaline Phosphatase 118      Total Protein 7.4      AST 15      Bilirubin, Total 1.0      ALT 8     URINALYSIS WITH REFLEX CULTURE AND MICROSCOPIC - Abnormal    Color, Urine Yellow      Appearance, Urine Clear      Specific Gravity, Urine <=1.005 (*)     pH, Urine 5.5      Protein, Urine NEGATIVE      Glucose, Urine >=1000 (4+) (*)     Blood, Urine MODERATE (2+) (*)     Ketones, Urine NEGATIVE       Bilirubin, Urine NEGATIVE      Urobilinogen, Urine 0.2      Nitrite, Urine POSITIVE (*)     Leukocyte Esterase, Urine SMALL (1+) (*)    MICROSCOPIC ONLY, URINE - Abnormal    WBC, Urine 21-50 (*)     RBC, Urine 3-5      Bacteria, Urine 1+ (*)     Mucus, Urine FEW     LACTATE - Abnormal    Lactate 2.3 (*)     Narrative:     Venipuncture immediately after or during the administration of Metamizole may lead to falsely low results. Testing should be performed immediately prior to Metamizole dosing.   CBC - Abnormal    WBC 7.3      nRBC 0.0      RBC 3.11 (*)     Hemoglobin 10.4 (*)     Hematocrit 32.8 (*)      (*)     MCH 33.4      MCHC 31.7 (*)     RDW 14.2      Platelets 208     COMPREHENSIVE METABOLIC PANEL - Abnormal    Glucose 134 (*)     Sodium 142      Potassium 3.3 (*)     Chloride 105      Bicarbonate 27      Anion Gap 13      Urea Nitrogen 34 (*)     Creatinine 1.66 (*)     eGFR 32 (*)     Calcium 9.1      Albumin 3.4      Alkaline Phosphatase 93      Total Protein 6.1 (*)     AST 9      Bilirubin, Total 0.6      ALT 5 (*)    PROTIME-INR - Abnormal    Protime 20.6 (*)     INR 2.0 (*)     Narrative:     INR Therapeutic Range: 2.0-3.5   PROTIME-INR - Abnormal    Protime 20.5 (*)     INR 2.0 (*)     Narrative:     INR Therapeutic Range: 2.0-3.5  Patient is on COUMADIN/WARFARIN.   POCT GLUCOSE - Abnormal    POCT Glucose 130 (*)    BLOOD CULTURE - Normal    Blood Culture Loaded on Instrument - Culture in progress     BLOOD CULTURE - Normal    Blood Culture Loaded on Instrument - Culture in progress     MAGNESIUM - Normal    Magnesium 1.90     LIPASE - Normal    Lipase 55      Narrative:     Venipuncture immediately after or during the administration of Metamizole may lead to falsely low results. Testing should be performed immediately prior to Metamizole dosing.   SARS-COV-2, INFLUENZA A/B AND RSV PCR - Normal    Coronavirus 2019, PCR Not Detected      Flu A Result Not Detected      Flu B Result Not Detected       RSV PCR Not Detected      Narrative:     This assay is an FDA-cleared, in vitro diagnostic nucleic acid amplification test for the qualitative detection and differentiation of SARS CoV-2/ Influenza A/B/ RSV from nasopharyngeal specimens collected from individuals with signs and symptoms of respiratory tract infections, and has been validated for use at [facility]. Negative results do not preclude COVID-19/ Influenza A/B/ RSV infections and should not be used as the sole basis for diagnosis, treatment, or other management decisions. Testing for SARS CoV-2 is recommended only for patients who meet current clinical and/or epidemiological criteria defined by federal, state, or local public health directives.   SERIAL TROPONIN-INITIAL - Normal    Troponin I, High Sensitivity 11      Narrative:     Less than 99th percentile of normal range cutoff-  Female and children under 18 years old <14 ng/L; Male <21 ng/L: Negative  Repeat testing should be performed if clinically indicated.     Female and children under 18 years old 14-50 ng/L; Male 21-50 ng/L:  Consistent with possible cardiac damage and possible increased clinical   risk. Serial measurements may help to assess extent of myocardial damage.     >50 ng/L: Consistent with cardiac damage, increased clinical risk and  myocardial infarction. Serial measurements may help assess extent of   myocardial damage.      NOTE: Children less than 1 year old may have higher baseline troponin   levels and results should be interpreted in conjunction with the overall   clinical context.     NOTE: Troponin I testing is performed using a different   testing methodology at [facility] than at other   [facility]. Direct result comparisons should only   be made within the same method.   SERIAL TROPONIN, 1 HOUR - Normal    Troponin I, High Sensitivity 11      Narrative:     Less than 99th percentile of normal range cutoff-  Female and children  under 18 years old <14 ng/L; Male <21 ng/L: Negative  Repeat testing should be performed if clinically indicated.     Female and children under 18 years old 14-50 ng/L; Male 21-50 ng/L:  Consistent with possible cardiac damage and possible increased clinical   risk. Serial measurements may help to assess extent of myocardial damage.     >50 ng/L: Consistent with cardiac damage, increased clinical risk and  myocardial infarction. Serial measurements may help assess extent of   myocardial damage.      NOTE: Children less than 1 year old may have higher baseline troponin   levels and results should be interpreted in conjunction with the overall   clinical context.     NOTE: Troponin I testing is performed using a different   testing methodology at [facility] than at other   [facility]. Direct result comparisons should only   be made within the same method.   URINE CULTURE   URINALYSIS WITH REFLEX CULTURE AND MICROSCOPIC    Narrative:     The following orders were created for panel order Urinalysis with Reflex Culture and Microscopic.  Procedure                               Abnormality         Status                     ---------                               -----------         ------                     Urinalysis with Reflex C...[[id number]]  Abnormal            Final result               Extra Urine Gray Tube[[id number]]                            Final result                 Please view results for these tests on the individual orders.   TROPONIN SERIES- (INITIAL, 1 HR)    Narrative:     The following orders were created for panel order Troponin I Series, High Sensitivity (0, 1 HR).  Procedure                               Abnormality         Status                     ---------                               -----------         ------                     Troponin I, High Sensiti...[[id number]]  Normal              Final result               Troponin, High Sensitivi...[[id number]]  Normal               Final result                 Please view results for these tests on the individual orders.   EXTRA URINE GRAY TUBE    Extra Tube 293     LACTATE      Transthoracic Echo (TTE) Limited   Final Result      CT head wo IV contrast   Final Result   Cerebral atrophy and chronic periventricular white matter small   vessel ischemic change.        No evidence of acute intracranial hemorrhage.        Mucosal thickening with mucosal retention cyst or polyp in left   maxillary sinus.        MACRO:   None        Signed by: Jose Manuel Garcia 5/1/2025 11:41 PM   Dictation workstation:   [id number]      XR shoulder left 2+ views   Final Result   Healing mildly displaced fracture of the proximal humeral diaphysis.        Signed by: Paty Florian 5/2/2025 7:23 AM   Dictation workstation:   [id number]      XR chest 1 view   Final Result   No acute pulmonary abnormality.   Signed by Brennan Hartman MD      CT abdomen pelvis wo IV contrast   Final Result   1.No acute process.   2.Cardiomegaly with coronary artery calcifications.   3.Enlarged fibroid uterus again noted. Overall appearance is   unchanged.   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  As   previously suggested, metastatic disease should be considered.    Consider further evaluation with MRI or pathologic sampling.   Signed by Juan Early MD           Considerations/further MDM:  Patient is a 74-year-old female presenting for evaluation of generalized weakness    I saw this patient in conjunction with Dr. Rudolph    Patient awake alert disheveled and somewhat malodorous but in no apparent distress during the ER visit.  Her vital signs are within normal limits during the visit.  She does have good strength of her extremities bilaterally and no focal neurologic deficits on exam.  Abdomen soft nontender.  There is evidence of what appears to be a candidal infection of the pannus.  Laboratory  workup without leukocytosis but does identify mild CHHAYA.  Urinalysis is consistent with urinary tract infection.  Initial lactate is elevated at 2.3.  Lactate and blood cultures were drawn and ceftriaxone was provided for therapy.  Workup is not suggestive of ACS, pneumonia, CHF or other acute process.  CT abdomen pelvis without acute intra-abdominal process.  Exam not suggestive of pyelonephritis.  Provided patient's complicated urinary tract infection she is admitted to hospitalist service for further management.  She is agreeable with this plan of care.      Procedure  Procedures       [1]   Past Medical History:  Diagnosis Date    A-fib (Multi)     Acute MI (Multi) 08/2016    cath by [facility], wire in RCA and clot migrated to PDA and no PTCA.    ASHD (arteriosclerotic heart disease)     CAD (coronary artery disease)     CHF (congestive heart failure)     DM (diabetes mellitus) (Multi)     Dyslipidemia     H/O echocardiogram 04/2017    EF 35%    H/O echocardiogram 09/2018    EF20%    History of nuclear stress test 02/2015    no ischemia and apex scar.    History of nuclear stress test 04/2016    apical scar and inf ischemia    HTN (hypertension)     Obesity     Obstructive sleep apnea     Personal history of other specified conditions     Acute AW MI / 9/4/13, cath LM-nl, , CX irreg, RCA irreg, LV ant hypo, PTCA with 2.75 RENETTA to LAD    S/P cardiac catheterization 06/2016    severe diffuse ASHD and recommended OHS    SOB (shortness of breath)    [2]   Past Surgical History:  Procedure Laterality Date    CARDIAC CATHETERIZATION  09/04/2013    with RENETTA    CHOLECYSTECTOMY  03/11/2021    COLONOSCOPY  04/2015    hemorrhoid removed    CORONARY ARTERY BYPASS GRAFT  10/2016    LIMA-LAD, SVG-PDA, SVG-Diag, SVG-M1 and M@    MR HEAD ANGIO WO IV CONTRAST  04/06/2017    MR HEAD ANGIO WO IV CONTRAST [facility]    TUMOR REMOVAL      Benign rectal tumor removed   [3]   Family History  Problem Relation Name Age  of Onset    Hypertension Mother      Cancer Mother      Diabetes Father      Stroke Father      Diabetes Sister      Heart disease Maternal Grandmother      Heart disease Maternal Grandfather      Heart disease Paternal Grandmother      Heart disease Paternal Grandfather     [4]   Social History  Tobacco Use    Smoking status: Never     Passive exposure: Never    Smokeless tobacco: Never   Substance Use Topics    Alcohol use: Not Currently    Drug use: Never        Rafaela Coates PA-C  05/02/25 151

## 2025-05-02 NOTE — PROGRESS NOTES
"   05/02/25 1003   Discharge Planning   Living Arrangements Friends   Support Systems Friends/neighbors   Assistance Needed Patient reports she has been managing at home but that it is difficult.   Type of Residence Private residence   Who is requesting discharge planning? Provider   Home or Post Acute Services None   Expected Discharge Disposition [facility]  (Needs undetermined at this time, patient wants to see how she does with therapy.)   Does the patient need discharge transport arranged? No   Financial Resource Strain   How hard is it for you to pay for the very basics like food, housing, medical care, and heating? Not hard   Housing Stability   In the last 12 months, was there a time when you were not able to pay the mortgage or rent on time? N   In the past 12 months, how many times have you moved where you were living? 0   At any time in the past 12 months, were you homeless or living in a shelter (including now)? N   Transportation Needs   In the past 12 months, has lack of transportation kept you from medical appointments or from getting medications? no   In the past 12 months, has lack of transportation kept you from meetings, work, or from getting things needed for daily living? No   Patient Choice   Provider Choice list and CMS website (https://medicare.gov/care-compare#search) for post-acute Quality and Resource Measure Data were provided and reviewed with: Patient   Patient / Family choosing to utilize agency / facility established prior to hospitalization Yes   Stroke Family Assessment   Stroke Family Assessment Needed No   Intensity of Service   Intensity of Service 0-30 min     MSW met with patient at bedside. She reports she has a friend living with her who assists at home. Reports she has been \"managing\" at home, but reports difficulty due to her left arm being in a sling. Patient was at [facility] but has been home, roughly, since Easter. Patient utilizes a can at home, and not a walker due to the " aforementioned arm. Patient is uncertain of discharge needs at this time. She is open to SNF if recommended, but states it is too soon to decide. Will await PT and OT recommendations.    2:32 PM  MSW met with patient and her friends, Emilee Linares [phone] and Kei Tovar [phone]. Patient is agreeable to SNF and possible transition to long-term care. Discussed SNF and provided freedom of choice. They are requesting 1) [facility], or 2) [facility]. Referrals submitted to the same.     Patient also interested in completing POA[facility]/Living Will papers but not yet ready to do so as she needs to speak with her friend to determine if he will be her agent. If not both Kei and Emilee are agreeable.     3:12 PM [facility] has accepted the patient and are first choice. Attending updated to the same. Patient will need prior authorization prior to discharge. Care Transitions will continue to follow.

## 2025-05-02 NOTE — ASSESSMENT & PLAN NOTE
Patient said that she went to bone Dr. No intervention wants her to follow-up with her  Symptoms started 3 weeks ago , possible  pathologic left shoulder fracture from bone cancer     Order x-ray of left shoulder   I consult Ortho surgeon

## 2025-05-02 NOTE — ASSESSMENT & PLAN NOTE
a new diagnosis for patient 2-3 weeks ago  Labs shows that patient has a cancer  Consult oncology

## 2025-05-02 NOTE — TELEPHONE ENCOUNTER
Patient is active with [facility], followed by Kathie Selby NP. Patient admitted to  [facility] 5/1/25. Presented with generalized malaise fatigue and overall just not feeling well. CHHAYA noted, gentle IV hydration ordered. Recent diagnosis of malignant neoplasm of uterus and bone, Lt humerus fracture. Oncology and Pall Med consulted. [facility] will monitor hospitalization and discharge plan.

## 2025-05-02 NOTE — PROGRESS NOTES
Occupational Therapy    Evaluation & Treatment    Patient Name: Jing Sanchez  MRN: [id number]  Department: [address]  Room: [address] [place] [address]-A  Today's Date: 5/2/2025  Time Calculation  Start Time: 0737  Stop Time: 0800  Time Calculation (min): 23 min    Assessment  IP OT Assessment  OT Assessment: 73 y/o female presenting with generalized malaise, fatigue, abdominal pain. On eval, she requires increased assist for xfers, mobility, and self care d/t decreased strength, balance, activity tolerance. Skilled OT services are required to maximize safety/IND prior to DC.  Prognosis: Good  Barriers to Discharge Home: Caregiver assistance, Physical needs  Caregiver Assistance: Caregiver assistance needed per identified barriers - however, level of patient's required assistance exceeds assistance available at home  Physical Needs: 24hr mobility assistance needed, 24hr ADL assistance needed, High falls risk due to function or environment  Evaluation/Treatment Tolerance: Patient tolerated treatment well  Medical Staff Made Aware: Yes  End of Session Communication: Bedside nurse  End of Session Patient Position: Up in chair, Alarm on  Plan:  Treatment Interventions: ADL retraining, Functional transfer training, UE strengthening/ROM, Endurance training, Equipment evaluation/education, Patient/family training, Neuromuscular reeducation, Compensatory technique education  OT Frequency: 3 times per week  OT Discharge Recommendations: Moderate intensity level of continued care  Equipment Recommended upon Discharge:  (TBD)  OT Recommended Transfer Status: Assist of 1  OT - OK to Discharge: Yes    Subjective   Current Problem:  1. Malaise and fatigue        2. Chest pain, unspecified type  Transthoracic Echo (TTE) Limited    Transthoracic Echo (TTE) Limited      3. Anemia, unspecified type        4. Acute on chronic renal insufficiency        5. Electrolyte imbalance        6. Urinary tract infection without hematuria, site unspecified           General:  General  Reason for Referral: Decreased ADLS, generalized malaise, abdominal pain, nausea  Referred By: Dr. Wu  Past Medical History Relevant to Rehab: uterine cancer, recent Lt humeral fx, afib, MI, HTN, RISHI, ASHD, CAD, CHF, DM  Family/Caregiver Present: No  Prior to Session Communication: Bedside nurse  Patient Position Received: Bed, 3 rail up, Alarm on  Preferred Learning Style: verbal, visual  General Comment: Cleared by nsg, pt met in supine, agreeable to OT assessment.  Precautions:  Hearing/Visual Limitations: Glasses. Hearing WFL  UE Weight Bearing Status: Left Non-Weight Bearing  Medical Precautions: Fall precautions  Precautions Comment: LUE in sling    Pain:  Pain Assessment  Pain Assessment: 0-10  0-10 (Numeric) Pain Score: 5 - Moderate pain  Pain Type: Chronic pain  Pain Location: Generalized  Pain Interventions: Repositioned  Response to Interventions: Content/relaxed    Objective   Cognition:  Overall Cognitive Status: Within Functional Limits  Orientation Level: Oriented X4  Following Commands: Follows multistep commands without difficulty  Cognition Comments: pleasant/cooperative    Home Living:  Type of Home: House  Lives With: Alone  Home Adaptive Equipment: Walker rolling or standard, Cane  Home Layout: Able to live on main level with bedroom/bathroom, Multi-level, Laundry in basement, Stairs to alternate level with rails  Alternate Level Stairs-Rails: Both  Alternate Level Stairs-Number of Steps: full flight upstairs or down to basement where laundry is  Home Access: Stairs to enter with rails  Entrance Stairs-Rails: Both  Entrance Stairs-Number of Steps: 3 RICKI  Bathroom Shower/Tub: Tub/shower unit  Bathroom Toilet: Handicapped height  Bathroom Equipment: Grab bars in shower, Bedside commode, Shower chair with back  Bathroom Accessibility: has been using BSC on first floor  Home Living Comments: her friend, Desmond, has been staying with her for the last 2 days but he works  "nights     Prior Function:  Level of [place]: Independent with ADLs and functional transfers, Needs assistance with homemaking  Receives Help From: Friends  ADL Assistance:  (using BSC since DC home from SNF)  Homemaking Assistance:  (friend, Desmond, has been assisting)  Ambulatory Assistance:  (using a cane primarily, DC'd from SNF \"just starting to work on stairs\")  Vocational: Retired  Hand Dominance: Right  Prior Function Comments: pt has been home from SNF for ~2 days per her report.  Her friend, Desmond, has been staying with her but he works nights.    ADL:  Eating Assistance: Stand by  Eating Deficit: Setup (assist to open containers and cut up small items)  Grooming Assistance: Stand by  Grooming Deficit: Setup  Bathing Assistance: Moderate  Bathing Deficit:  (for LB thoroughness)  UE Dressing Assistance: Moderate  UE Dressing Deficit:  (gown and sling mgmt)  LE Dressing Assistance: Maximal  LE Dressing Deficit: Don/doff R sock, Don/doff L sock  Toileting Assistance with Device: Moderate  Toileting Deficit: Clothing management down, Clothing management up (on BSC at bedside)  ADL Comments: pt requires assist for stability and adherence to LUE NWB status while completing bedside ADLS.  Cued on sequencing and body positioning throughout    Activity Tolerance:  Endurance: Decreased tolerance for upright activites  Activity Tolerance Comments: fair, limited by pain at times    Bed Mobility/Transfers:   Bed Mobility  Bed Mobility: Yes  Bed Mobility 1  Bed Mobility 1: Supine to sitting  Level of Assistance 1: Moderate assistance  Bed Mobility Comments 1: cued on body positioning, technique, MOD A for trunk up    Transfers  Transfer: Yes  Transfer 1  Transfer From 1: Bed to  Transfer to 1: Stand  Technique 1: Sit to stand  Transfer Level of Assistance 1: Minimum assistance, Arm in arm assistance  Trials/Comments 1: STS from EOB with MIN A for stability with arm in arm assist.  Cued on weight shift, RUE placement, " LE positioning  Transfers 2  Transfer From 2: Bed to  Transfer to 2: Chair with arms  Technique 2: Stand pivot  Transfer Level of Assistance 2: Minimum assistance, Arm in arm assistance  Trials/Comments 2: slowed lovely, effortful.  cued on body positioning and RUE hand placement prior to descent  Transfers 3  Transfer From 3: Chair with arms to  Transfer to 3: Commode-standard  Technique 3: Stand pivot  Transfer Level of Assistance 3: Minimum assistance, Arm in arm assistance  Trials/Comments 3: stand pivot to/from [facility] with MIN A for stability with cues on body positioning, hand placement, and task sequencing    Functional Mobility:  Functional Mobility  Functional Mobility Performed: No    Vision: Vision - Basic Assessment  Current Vision: Wears glasses all the time  Sensation:  Light Touch: No apparent deficits (denies numbness/tingling)  Strength:  Strength Comments: LUE NT, RUE WFL, observed functionally  Coordination:  Movements are Fluid and Coordinated: No  Upper Body Coordination: LUE impaired  Lower Body Coordination: slightly impaired   Hand Function:  Hand Function  Gross Grasp: Functional  Coordination: Impaired  Extremities: RUE   RUE : Within Functional Limits and LUE   LUE: Exceptions to WFL (LUE NWB)    Outcome Measures: [facility] Daily Activity  Putting on and taking off regular lower body clothing: A lot  Bathing (including washing, rinsing, drying): A lot  Putting on and taking off regular upper body clothing: A lot  Toileting, which includes using toilet, bedpan or urinal: A lot  Taking care of personal grooming such as brushing teeth: A little  Eating Meals: A little  Daily Activity - Total Score: 14      Education Documentation  Body Mechanics, taught by Yannick Loyola OT at 5/2/2025  9:05 AM.  Learner: Patient  Readiness: Acceptance  Method: Explanation  Response: Needs Reinforcement  Comment: initiated ADL retraining. educated re: fall precautions, safety techniques, OT POC    Precautions,  taught by Yannick Loyola OT at 5/2/2025  9:05 AM.  Learner: Patient  Readiness: Acceptance  Method: Explanation  Response: Needs Reinforcement  Comment: initiated ADL retraining. educated re: fall precautions, safety techniques, OT POC    ADL Training, taught by Yannick Loyola OT at 5/2/2025  9:05 AM.  Learner: Patient  Readiness: Acceptance  Method: Explanation  Response: Needs Reinforcement  Comment: initiated ADL retraining. educated re: fall precautions, safety techniques, OT POC    Education Comments  No comments found.      Goals:   Encounter Problems       Encounter Problems (Active)       OT Goals       ADLs (Progressing)       Start:  05/02/25    Expected End:  05/16/25       Patient will complete ADL tasks, with supervision using AE as needed in order to increase patient's safety and independence with self-care tasks.           Functional Transfers (Progressing)       Start:  05/02/25    Expected End:  05/16/25       Patient will complete functional transfers with supervision using least restrictive device, in order to increase patient's safety and independence with daily tasks.           Activity Tolerance (Progressing)       Start:  05/02/25    Expected End:  05/16/25       Patient will demonstrate the ability to participate in functional activity at least >/= 20 minutes in order to increase patient's safety and independence with daily tasks.

## 2025-05-02 NOTE — PROGRESS NOTES
Received a Supportive Oncology referral from Dr. Gaines. Patient currently in the Hospital. Will schedule upon discharge

## 2025-05-02 NOTE — H&P
History Of Present Illness  Jing Sanchez is a 74 y.o. female with history of uterine cancer right arm weakness, systolic heart failure EF 30-35 %, diabetes type 2, hypertension, A-fib on Coumadin.   Patient presented with generalized malaise fatigue and overall just not feeling well.  She was complaining of abdominal pain and nausea but no vomiting.  Last BM yesterday.  No blood in the stools or urine.  Feeling dysuria dysuria .  Patient recently diagnosis with uterine cancer and left arm weakness 2 to 3 weeks ago.  She saw Ortho  Told her to follow-up with her no intervention.  She saw her GYN doctor suggest surgery but did not decide yet.  Denies any vaginal discharge.  No fever or chills.  She was able to move all her extremities except left arm.  Denies any chest pain or shortness of breath on room air.  She denies any fall or injury.            Past Medical History  Medical History[1]    Surgical History  Surgical History[2]     Social History  She reports that she has never smoked. She has never been exposed to tobacco smoke. She has never used smokeless tobacco. She reports that she does not currently use alcohol. She reports that she does not use drugs.    Family History  Family History[3]     Allergies  Cetirizine    Review of Systems   Constitutional:  Positive for activity change, appetite change and fatigue. Negative for fever.   HENT:  Negative for congestion, rhinorrhea, sore throat, trouble swallowing and voice change.    Eyes: Negative.    Respiratory:  Negative for cough and shortness of breath.    Cardiovascular:  Negative for chest pain, palpitations and leg swelling.   Gastrointestinal:  Positive for abdominal pain and nausea. Negative for constipation, diarrhea and vomiting.   Genitourinary:  Positive for dysuria. Negative for difficulty urinating.   Musculoskeletal: Negative.    Skin: Negative.    Neurological:  Negative for dizziness and speech difficulty.   Psychiatric/Behavioral:  "Negative.          Physical Exam  Vitals and nursing note reviewed.   Constitutional:       Appearance: She is ill-appearing.   Musculoskeletal:      Cervical back: Normal range of motion and neck supple.   Neurological:      Mental Status: She is alert.          Last Recorded Vitals  Blood pressure 93/57, pulse 96, temperature 37.7 °C (99.9 °F), temperature source Temporal, resp. rate 20, height 1.651 m (5' 5\"), weight 78.5 kg (173 lb), SpO2 95%.    Relevant Results  Medications Ordered Prior to Encounter[4]  Results for orders placed or performed during the hospital encounter of 05/01/25 (from the past 24 hours)   CBC and Auto Differential   Result Value Ref Range    WBC 9.6 4.4 - 11.3 x10*3/uL    nRBC 0.0 0.0 - 0.0 /100 WBCs    RBC 3.55 (L) 4.00 - 5.20 x10*6/uL    Hemoglobin 11.9 (L) 12.0 - 16.0 g/dL    Hematocrit 37.0 36.0 - 46.0 %     (H) 80 - 100 fL    MCH 33.5 26.0 - 34.0 pg    MCHC 32.2 32.0 - 36.0 g/dL    RDW 14.4 11.5 - 14.5 %    Platelets 258 150 - 450 x10*3/uL    Neutrophils % 81.2 40.0 - 80.0 %    Immature Granulocytes %, Automated 0.4 0.0 - 0.9 %    Lymphocytes % 8.3 13.0 - 44.0 %    Monocytes % 6.6 2.0 - 10.0 %    Eosinophils % 2.7 0.0 - 6.0 %    Basophils % 0.8 0.0 - 2.0 %    Neutrophils Absolute 7.78 (H) 1.60 - 5.50 x10*3/uL    Immature Granulocytes Absolute, Automated 0.04 0.00 - 0.50 x10*3/uL    Lymphocytes Absolute 0.80 0.80 - 3.00 x10*3/uL    Monocytes Absolute 0.63 0.05 - 0.80 x10*3/uL    Eosinophils Absolute 0.26 0.00 - 0.40 x10*3/uL    Basophils Absolute 0.08 0.00 - 0.10 x10*3/uL   Comprehensive metabolic panel   Result Value Ref Range    Glucose 303 (H) 74 - 99 mg/dL    Sodium 137 136 - 145 mmol/L    Potassium 3.6 3.5 - 5.3 mmol/L    Chloride 96 (L) 98 - 107 mmol/L    Bicarbonate 27 21 - 32 mmol/L    Anion Gap 18 10 - 20 mmol/L    Urea Nitrogen 37 (H) 6 - 23 mg/dL    Creatinine 1.81 (H) 0.50 - 1.05 mg/dL    eGFR 29 (L) >60 mL/min/1.73m*2    Calcium 10.0 8.6 - 10.3 mg/dL    Albumin 4.2 " 3.4 - 5.0 g/dL    Alkaline Phosphatase 118 33 - 136 U/L    Total Protein 7.4 6.4 - 8.2 g/dL    AST 15 9 - 39 U/L    Bilirubin, Total 1.0 0.0 - 1.2 mg/dL    ALT 8 7 - 45 U/L   Magnesium   Result Value Ref Range    Magnesium 1.90 1.60 - 2.40 mg/dL   Lipase   Result Value Ref Range    Lipase 55 9 - 82 U/L   Sars-CoV-2, Influenza A/B and RSV PCR   Result Value Ref Range    Coronavirus 2019, PCR Not Detected Not Detected    Flu A Result Not Detected Not Detected    Flu B Result Not Detected Not Detected    RSV PCR Not Detected Not Detected   Troponin I, High Sensitivity, Initial   Result Value Ref Range    Troponin I, High Sensitivity 11 0 - 13 ng/L   Troponin, High Sensitivity, 1 Hour   Result Value Ref Range    Troponin I, High Sensitivity 11 0 - 13 ng/L   Urinalysis with Reflex Culture and Microscopic   Result Value Ref Range    Color, Urine Yellow Straw, Yellow    Appearance, Urine Clear Clear    Specific Gravity, Urine <=1.005 (N) 1.005 - 1.035    pH, Urine 5.5 5.0, 5.5, 6.0, 6.5, 7.0, 7.5, 8.0    Protein, Urine NEGATIVE NEGATIVE, TRACE mg/dL    Glucose, Urine >=1000 (4+) (A) NEGATIVE mg/dL    Blood, Urine MODERATE (2+) (A) NEGATIVE mg/dL    Ketones, Urine NEGATIVE NEGATIVE mg/dL    Bilirubin, Urine NEGATIVE NEGATIVE mg/dL    Urobilinogen, Urine 0.2 0.2, 1.0 mg/dL    Nitrite, Urine POSITIVE (A) NEGATIVE    Leukocyte Esterase, Urine SMALL (1+) (A) NEGATIVE   Extra Urine Gray Tube   Result Value Ref Range    Extra Tube 293    Microscopic Only, Urine   Result Value Ref Range    WBC, Urine 21-50 (A) 1-5, NONE /HPF    RBC, Urine 3-5 NONE, 1-2, 3-5 /HPF    Bacteria, Urine 1+ (A) NONE SEEN /HPF    Mucus, Urine FEW Reference range not established. /LPF   Lactate   Result Value Ref Range    Lactate 2.3 (H) 0.4 - 2.0 mmol/L       XR chest 1 view  Result Date: 5/1/2025  STUDY: Chest Radiograph;  05/01/2025 5:23 PM INDICATION: Generalized weakness. COMPARISON: XR chest 04/07/2025, 03/11/2023, 03/11/2021. ACCESSION NUMBER(S):  [id number] ORDERING CLINICIAN: ELLE TURNER TECHNIQUE:  Frontal chest was obtained at 17:23 hours. FINDINGS: CARDIOMEDIASTINAL SILHOUETTE: There is mild cardiomegaly.  Sternotomy wires present.  LUNGS: Lungs are clear.  ABDOMEN: No remarkable upper abdominal findings.  BONES: No acute osseous changes.    No acute pulmonary abnormality. Signed by Brennan Hartman MD    CT abdomen pelvis wo IV contrast  Result Date: 5/1/2025  STUDY: CT Abdomen and Pelvis without IV Contrast; 05/01/2025, 4:25 PM. INDICATION: Nausea, diarrhea, generalized weakness. COMPARISON: CT CAP: 04/08/25; US pelvis: 03/16/23. ACCESSION NUMBER(S): [id number] ORDERING CLINICIAN: ELLE TURNER TECHNIQUE: CT of the abdomen and pelvis was performed.  Contiguous axial images were obtained at 3 mm slice thickness through the abdomen and pelvis. Coronal and sagittal reconstructions at 3 mm slice thickness were performed. No intravenous contrast was administered.  Automated mA/kV exposure control was utilized and patient examination was performed in strict accordance with principles of ALARA. FINDINGS: Please note that the evaluation of vessels, lymph nodes and organs is limited without intravenous contrast.  LOWER CHEST: Heart is enlarged with coronary artery calcifications  No pericardial effusion.  Lung bases are clear.  ABDOMEN:  LIVER: No hepatomegaly.  Smooth surface contour.  Normal attenuation.  BILE DUCTS: No intrahepatic or extrahepatic biliary ductal dilatation.  GALLBLADDER: The gallbladder is absent. STOMACH: No abnormalities identified.  PANCREAS: No masses or ductal dilatation.  SPLEEN: No splenomegaly or focal splenic lesion.  ADRENAL GLANDS: No thickening or nodules.  KIDNEYS AND URETERS: Kidneys are normal in size and location.  No renal or ureteral calculi.  PELVIS:  BLADDER: No abnormalities identified.  REPRODUCTIVE ORGANS: Enlarged fibroid uterus again noted.  Overall appearance is unchanged.  BOWEL: No abnormalities identified.   Appendix is normal.  VESSELS: No abnormalities identified.  Abdominal aorta is normal in caliber. Moderate to severe plaque along the distal aorta.  PERITONEUM/RETROPERITONEUM/LYMPH NODES: No free fluid.  No pneumoperitoneum.  Small fat-containing umbilical hernia. No lymphadenopathy.  ABDOMINAL WALL: No abnormalities identified. SOFT TISSUES: No abnormalities identified.  BONES: Stable lytic lesions in the right hemisacrum and right 10th rib adjacent to the costotransverse junction unchanged from prior    1.No acute process. 2.Cardiomegaly with coronary artery calcifications. 3.Enlarged fibroid uterus again noted. Overall appearance is unchanged. 4.Stable lytic lesions in the right hemisacrum and right 10th rib adjacent to the costotransverse junction unchanged from prior.  As previously suggested, metastatic disease should be considered. Consider further evaluation with MRI or pathologic sampling. Signed by Juan Early MD         Assessment & Plan  Malaise and fatigue  From multiple disease  Patient is declining  Consult palliative care  Order CT brain Stat   Asthenia  Fall precautions  PT OT  Chronic atrial fibrillation (Multi)  Order PT/INR start patient on Coumadin at home  On telemetry  Heart rate is under control  Acute cystitis without hematuria  Order Rocephin   Follow-up with cultures  Uterine cancer (Multi)   a new diagnosis for patient 2-3 weeks ago  Labs shows that patient has a cancer  Consult oncology    Left arm weakness  Patient said that she went to bone Dr. No intervention wants her to follow-up with her  Symptoms started 3 weeks ago , possible  pathologic left shoulder fracture from bone cancer     Order x-ray of left shoulder   I consult Ortho surgeon  Hypothyroidism  Continue  with home meds  CHHAYA (acute kidney injury)  Creatinine up from the baseline 1.8-1 patient's looks dry may be due to dehydration  Start on gentle IV fluid 100 cc/h  Chronic systolic heart failure  Last echo was done on  3/13/ 2023 with EF 30 to 35%  Order echo  Consider  cardiology if needed    Waiting for the INR to resume her Coumadin      MARTÍN Langston-CNP         [1]   Past Medical History:  Diagnosis Date    A-fib (Multi)     Acute MI (Multi) 08/2016    cath by [facility], wire in RCA and clot migrated to PDA and no PTCA.    ASHD (arteriosclerotic heart disease)     CAD (coronary artery disease)     CHF (congestive heart failure)     DM (diabetes mellitus) (Multi)     Dyslipidemia     H/O echocardiogram 04/2017    EF 35%    H/O echocardiogram 09/2018    EF20%    History of nuclear stress test 02/2015    no ischemia and apex scar.    History of nuclear stress test 04/2016    apical scar and inf ischemia    HTN (hypertension)     Obesity     Obstructive sleep apnea     Personal history of other specified conditions     Acute AW MI / 9/4/13, cath LM-nl, , CX irreg, RCA irreg, LV ant hypo, PTCA with 2.75 RENETTA to LAD    S/P cardiac catheterization 06/2016    severe diffuse ASHD and recommended OHS    SOB (shortness of breath)    [2]   Past Surgical History:  Procedure Laterality Date    CARDIAC CATHETERIZATION  09/04/2013    with RENETTA    CHOLECYSTECTOMY  03/11/2021    COLONOSCOPY  04/2015    hemorrhoid removed    CORONARY ARTERY BYPASS GRAFT  10/2016    LIMA-LAD, SVG-PDA, SVG-Diag, SVG-M1 and M@    MR HEAD ANGIO WO IV CONTRAST  04/06/2017    MR HEAD ANGIO WO IV CONTRAST [facility]    TUMOR REMOVAL      Benign rectal tumor removed   [3]   Family History  Problem Relation Name Age of Onset    Hypertension Mother      Cancer Mother      Diabetes Father      Stroke Father      Diabetes Sister      Heart disease Maternal Grandmother      Heart disease Maternal Grandfather      Heart disease Paternal Grandmother      Heart disease Paternal Grandfather     [4]   No current facility-administered medications on file prior to encounter.     Current Outpatient Medications on File Prior to Encounter   Medication Sig  "Dispense Refill    carvedilol (Coreg) 3.125 mg tablet Take 1 tablet (3.125 mg) by mouth 2 times a day. 180 tablet 2    empagliflozin (Jardiance) 10 mg Take 1 tablet (10 mg) by mouth once daily. 30 tablet 11    ergocalciferol (Vitamin D-2) 1.25 MG (55395 UT) capsule 1 CAP BY MOUTH WEEKLY ON SUNDAY 4 capsule 11    fish oil (Omega-3) 60- mg capsule Take 2 capsules (1,000 mg) by mouth once daily. 60 capsule 11    insulin lispro (HumaLOG) 100 unit/mL injection INJECT SUBQ PER SLIDING SCALE WITH MEALS (MAX DAILY DOSE OF 80 UNITS) 30 mL 10    losartan (Cozaar) 25 mg tablet Take 1 tablet (25 mg) by mouth once daily. 90 tablet 2    nystatin (Mycostatin) 100,000 unit/gram powder Apply 1 Application topically 2 times a day.      pantoprazole (ProtoNix) 40 mg EC tablet Take 1 tablet (40 mg) by mouth once daily in the morning. Take before meals. 90 tablet 2    torsemide (Demadex) 20 mg tablet Take 1 tablet (20 mg) by mouth once daily. 30 tablet 11    traMADol (Ultram) 50 mg tablet Take 1 tablet (50 mg) by mouth every 6 hours if needed for severe pain (7 - 10) for up to 5 days. 20 tablet 0    warfarin (Coumadin) 2 mg tablet TAKE 1 TABLET BY MOUTH EVERY DAY 30 tablet 11    blood-glucose meter (Accu-Chek Guide Glucose Meter) misc Test TID 1 each 0    glucagon (Glucagen) 1 mg injection Inject 1 mg into the muscle every 15 minutes if needed for low blood sugar - see comments (41-70). 1 each 12    glucagon 0.5 mg/0.1 mL auto-injector Inject 1 mg into the muscle every 15 minutes if needed (< 40). 0.2 mL 12    lancets misc Accucheck lancets 100 each 11    Lantus Solostar U-100 Insulin 100 unit/mL (3 mL) pen 25 UNITS SUBQ DAILY AT BEDTIME - TAKE AS DIRECTED PER INSULIN INSTRUCTIONS 9 mL 11    pen needle, diabetic (BD Ultra-Fine Short Pen Needle) 31 gauge x 5/16\" needle Use and discard 4 pen needles per day subcutaneously Dx: E11.65 for 90 days      [DISCONTINUED] acetaminophen (Tylenol) 325 mg tablet Take 2 tablets (650 mg) by " mouth every 4 hours if needed for mild pain (1 - 3) or fever (temp greater than 38.0 C).      [DISCONTINUED] blood sugar diagnostic (Accu-Chek Guide test strips) strip 1 strip 3 times a day. 100 strip 11    [DISCONTINUED] oxyCODONE (Roxicodone) 5 mg immediate release tablet Take 1 tablet (5 mg) by mouth every 6 hours if needed for severe pain (7 - 10). (Patient not taking: Reported on 5/1/2025) 12 tablet 0    [DISCONTINUED] polyethylene glycol (Glycolax, Miralax) 17 gram packet Take 17 g by mouth once daily. (Patient not taking: Reported on 5/1/2025)

## 2025-05-02 NOTE — PROGRESS NOTES
Physical Therapy Evaluation    Patient Name: Jing Sanchez  MRN: [id number]  Department: [address]  Room: [facility]44-  Today's Date: 5/2/2025   Time Calculation  Start Time: 1132  Stop Time: 1152  Time Calculation (min): 20 min    Assessment/Plan   PT Assessment  PT Assessment Results: Decreased strength, Decreased range of motion, Decreased endurance, Impaired balance, Decreased mobility, Decreased coordination, Impaired judgement, Decreased safety awareness, Impaired vision, Decreased skin integrity, Pain  Rehab Prognosis: Good  Barriers to Discharge Home: Caregiver assistance, Physical needs, Cognition needs  Caregiver Assistance: Patient lives alone and/or does not have reliable caregiver assistance  Cognition Needs: Insight of patient limited regarding functional ability/needs, Recollection or understanding of precautions/restrictions limited  Physical Needs: 24hr mobility assistance needed, 24hr ADL assistance needed  Evaluation/Treatment Tolerance: Patient tolerated treatment well  Medical Staff Made Aware: Yes  Strengths: Premorbid level of function  Barriers to Participation: Comorbidities  End of Session Communication: Bedside nurse  Assessment Comment: 74 year old female admits with malaise and fatigue, asthenia, chronic afib, acute cystitis, hypothyroidism, CHHAYA, and chronic HF. Recent L shoulder Fx. On eval, she demonstrates impaired mobility needing assist with transfers and gait. Deficits warrant skilled PT care. At DC, moderate intensity rehab is recommended.  End of Session Patient Position: Up in chair, Alarm on  IP OR SWING BED PT PLAN  Inpatient or Swing Bed: Inpatient  PT Plan  Treatment/Interventions: Bed mobility, Transfer training, Gait training, Balance training, Strengthening, Endurance training, Therapeutic exercise, Therapeutic activity, Home exercise program, Positioning, Postural re-education  PT Plan: Ongoing PT  PT Frequency: 4 times per week  PT Discharge Recommendations: Moderate  "intensity level of continued care  Equipment Recommended upon Discharge:  (Quad cane/Douglas walker)  PT Recommended Transfer Status: Assist x1, Assistive device  PT - OK to Discharge: Yes    Subjective   General Visit Information:  General  Reason for Referral: Impaired Mobility-Malaise and fatigue  Referred By: Dr. Wu  Past Medical History Relevant to Rehab: uterine cancer, recent Lt humeral fx, afib, MI, HTN, RISHI, ASHD, CAD, CHF, DM  Family/Caregiver Present: No  Prior to Session Communication: Bedside nurse  Patient Position Received: Bed, 3 rail up, Alarm on  Preferred Learning Style: verbal, visual  General Comment: 74 year old female admits with malaise and fatigue, asthenia, chronic afib, acute cystitis, hypothyroidism, CHHAYA, and chronic HF. Recent L shoulder Fx.  Home Living:  Home Living  Type of Home: House  Lives With: Alone  Home Adaptive Equipment: Walker rolling or standard, Cane  Home Layout: Able to live on main level with bedroom/bathroom, Multi-level, Laundry in basement, Stairs to alternate level with rails  Alternate Level Stairs-Rails: Both  Alternate Level Stairs-Number of Steps: 12  Home Access: Stairs to enter with rails  Entrance Stairs-Rails: Both  Entrance Stairs-Number of Steps: 3  Bathroom Shower/Tub: Tub/shower unit  Bathroom Toilet: Handicapped height  Bathroom Equipment: Grab bars in shower, Bedside commode, Shower chair with back  Bathroom Accessibility: has been using BSC on first floor  Home Living Comments: her friend, Desmond, has been staying with her for the last 2 days but he works nights  Prior Level of Function:  Prior Function Per Pt/Caregiver Report  Level of [place]: Independent with ADLs and functional transfers, Independent with homemaking with ambulation  Receives Help From: Friends  ADL Assistance:  (using BSC since DC home from SNF)  Homemaking Assistance:  (friend, Desmond, has been assisting)  Ambulatory Assistance:  (using a cane primarily, DC'd from SNF \"just " "starting to work on stairs\")  Prior Function Comments: pt has been home from SNF for ~2 days per her report.  Her friend, Desmond, has been staying with her but he works nights. Denies falls since home from SNF but feels weak is unsure how much she can stand/ambulate today  Precautions:  Precautions  Hearing/Visual Limitations: Glasses. Hearing WFL  UE Weight Bearing Status: Left Non-Weight Bearing  Medical Precautions: Fall precautions  Precautions Comment: LUE in sling     Objective   Pain:  Pain Assessment  Pain Assessment: 0-10  0-10 (Numeric) Pain Score: 3  Pain Type: Chronic pain  Pain Location: Shoulder  Pain Orientation: Left  Pain Interventions: Ambulation/increased activity  Response to Interventions: No change in pain  Cognition:  Cognition  Overall Cognitive Status: Within Functional Limits  Orientation Level: Oriented X4    General Assessments:  Activity Tolerance  Endurance: Decreased tolerance for upright activites    Sensation  Light Touch: No apparent deficits    Strength  Strength Comments: 3+/5 BLEs  Functional Assessments:  Bed Mobility  Bed Mobility: Yes  Bed Mobility 1  Bed Mobility 1: Supine to sitting  Level of Assistance 1: Moderate assistance  Bed Mobility Comments 1: assist for BLEs out of bed and trunk up with HOB elevated. Cues on technique but unable to self complete    Transfers  Transfer: Yes  Transfer 1  Transfer From 1: Bed to  Transfer to 1: Stand  Technique 1: Sit to stand, Stand to sit  Transfer Device 1: Quad cane  Transfer Level of Assistance 1: Minimum assistance  Trials/Comments 1: assist to steady  Transfers 2  Transfer From 2: Toilet to  Transfer to 2: Stand  Technique 2: Stand to sit, Sit to stand  Transfer Device 2: Quad cane  Transfer Level of Assistance 2: Moderate assistance  Trials/Comments 2: assist for uprighting. Cues on hand placement    Ambulation/Gait Training  Ambulation/Gait Training Performed: Yes  Ambulation/Gait Training 1  Surface 1: Level tile  Device 1: " Small base quad cane  Assistance 1: Minimum assistance  Comments/Distance (ft) 1: 10'x2 with assist to steady at all times: step to guarded patttern with some shuffling noted. Would benefit from walker use but unable to with UE injury at this time    Outcome Measures:  [facility] Basic Mobility  Turning from your back to your side while in a flat bed without using bedrails: A little  Moving from lying on your back to sitting on the side of a flat bed without using bedrails: A little  Moving to and from bed to chair (including a wheelchair): A little  Standing up from a chair using your arms (e.g. wheelchair or bedside chair): A little  To walk in hospital room: A lot  Climbing 3-5 steps with railing: Total  Basic Mobility - Total Score: 15    Encounter Problems       Encounter Problems (Active)       Balance       Standing Balance (Progressing)       Start:  05/02/25    Expected End:  05/16/25       Pt will demonstrate good static standing balance to promote safe participation with out of bed activity, transfers, and mobility              Mobility       Ambulation (Progressing)       Start:  05/02/25    Expected End:  05/16/25       Pt will ambulate 50' modified independent assist with LRD to promote safe home mobility              PT Transfers       Supine to sit (Progressing)       Start:  05/02/25    Expected End:  05/16/25       Pt will transfer supine to sitting at edge of bed with modified independent assist to promote acute care out of bed activity           Sit to stand (Progressing)       Start:  05/02/25    Expected End:  05/16/25       Pt will transfer sit to standing position with modified independent assist and LRD to promote safe out of bed activity           Bed to chair (Progressing)       Start:  05/02/25    Expected End:  05/16/25       Pt will transfer from sitting edge of bed to the chair with modified independent assist and LRD to promote out of bed activity and reduce the risks of prolonged acute  care bedrest              Pain - Adult              Education Documentation  Mobility Training, taught by Seun Oliva, PT at 5/2/2025  1:15 PM.  Learner: Patient  Readiness: Acceptance  Method: Explanation, Demonstration  Response: Needs Reinforcement  Comment: safe mobility techniques, importance of OOB activity, sling use    Education Comments  No comments found.

## 2025-05-02 NOTE — CARE PLAN
The patient's goals for the shift include  pain control, comfort, and see providers.    The clinical goals for the shift include pain management, maintain safety, encourage OOB to chair, work with PT/OT, IV antibiotics/fluids, NWB to LUE, maintain LUE in sling, and monitor labs/VS.     Problem: Diabetes  Goal: Achieve decreasing blood glucose levels by end of shift  Outcome: Progressing  Goal: Increase stability of blood glucose readings by end of shift  Outcome: Progressing  Goal: Decrease in ketones present in urine by end of shift  Outcome: Progressing  Goal: Maintain electrolyte levels within acceptable range throughout shift  Outcome: Progressing  Goal: Maintain glucose levels >70mg/dl to <250mg/dl throughout shift  Outcome: Progressing  Goal: No changes in neurological exam by end of shift  Outcome: Progressing  Goal: Learn about and adhere to nutrition recommendations by end of shift  Outcome: Progressing  Goal: Vital signs within normal range for age by end of shift  Outcome: Progressing  Goal: Increase self care and/or family involovement by end of shift  Outcome: Progressing  Goal: Receive DSME education by end of shift  Outcome: Progressing     Problem: Pain - Adult  Goal: Verbalizes/displays adequate comfort level or baseline comfort level  Outcome: Progressing     Problem: Safety - Adult  Goal: Free from fall injury  Outcome: Progressing     Problem: Discharge Planning  Goal: Discharge to home or other facility with appropriate resources  Outcome: Progressing     Problem: Chronic Conditions and Co-morbidities  Goal: Patient's chronic conditions and co-morbidity symptoms are monitored and maintained or improved  Outcome: Progressing     Problem: Nutrition  Goal: Nutrient intake appropriate for maintaining nutritional needs  Outcome: Progressing     Problem: Skin  Goal: Decreased wound size/increased tissue granulation at next dressing change  Outcome: Progressing  Goal: Participates in  plan/prevention/treatment measures  Outcome: Progressing  Goal: Prevent/manage excess moisture  Outcome: Progressing  Goal: Prevent/minimize sheer/friction injuries  Outcome: Progressing  Goal: Promote/optimize nutrition  Outcome: Progressing  Goal: Promote skin healing  Outcome: Progressing     Problem: Fall/Injury  Goal: Not fall by end of shift  Outcome: Progressing  Goal: Be free from injury by end of the shift  Outcome: Progressing  Goal: Verbalize understanding of personal risk factors for fall in the hospital  Outcome: Progressing  Goal: Verbalize understanding of risk factor reduction measures to prevent injury from fall in the home  Outcome: Progressing  Goal: Use assistive devices by end of the shift  Outcome: Progressing  Goal: Pace activities to prevent fatigue by end of the shift  Outcome: Progressing     Problem: Pain  Goal: Takes deep breaths with improved pain control throughout the shift  Outcome: Progressing  Goal: Turns in bed with improved pain control throughout the shift  Outcome: Progressing  Goal: Walks with improved pain control throughout the shift  Outcome: Progressing  Goal: Performs ADL's with improved pain control throughout shift  Outcome: Progressing  Goal: Participates in PT with improved pain control throughout the shift  Outcome: Progressing  Goal: Free from opioid side effects throughout the shift  Outcome: Progressing  Goal: Free from acute confusion related to pain meds throughout the shift  Outcome: Progressing     Problem: Deep Vein Thrombosis  Goal: I will remain free from complications of deep vein thrombosis and maintain current level of mobility  Outcome: Progressing

## 2025-05-02 NOTE — CONSULTS
"Consults    Reason For Consult  Pathologic fracture left humerus.    History Of Present Illness  Jing Sanchez is a 74 y.o. female presenting with fracture of the left humerus few weeks ago.  Patient was followed up with Dr. Sutton who recommended an MRI.  It was noted to be pathologic.  Referral was made for orth[facility] oncology at [facility].  Patient recently admitted for medical reasons.  I was asked to see her for further treatment recommendations.     Past Medical History  She has a past medical history of A-fib (Multi), Acute MI (Multi) (08/2016), ASHD (arteriosclerotic heart disease), CAD (coronary artery disease), CHF (congestive heart failure), DM (diabetes mellitus) (Multi), Dyslipidemia, H/O echocardiogram (04/2017), H/O echocardiogram (09/2018), History of nuclear stress test (02/2015), History of nuclear stress test (04/2016), HTN (hypertension), Obesity, Obstructive sleep apnea, Personal history of other specified conditions, S/P cardiac catheterization (06/2016), and SOB (shortness of breath).    Surgical History  She has a past surgical history that includes MR angio head wo IV contrast (04/06/2017); Cardiac catheterization (09/04/2013); Tumor removal; Colonoscopy (04/2015); Coronary artery bypass graft (10/2016); and Cholecystectomy (03/11/2021).     Social History  She reports that she has never smoked. She has never been exposed to tobacco smoke. She has never used smokeless tobacco. She reports that she does not currently use alcohol. She reports that she does not use drugs.    Family History  Family History[1]     Allergies  Cetirizine    Review of Systems     Physical Exam     Last Recorded Vitals  Blood pressure 127/66, pulse 86, temperature 36.7 °C (98.1 °F), temperature source Temporal, resp. rate 18, height 1.651 m (5' 5\"), weight 78.5 kg (173 lb), SpO2 99%.    Relevant Results      Scheduled medications  Scheduled Medications[2]  Continuous medications  Continuous " Medications[3]  PRN medications  PRN Medications[4]  Results for orders placed or performed during the hospital encounter of 05/01/25 (from the past 24 hours)   CBC and Auto Differential   Result Value Ref Range    WBC 9.6 4.4 - 11.3 x10*3/uL    nRBC 0.0 0.0 - 0.0 /100 WBCs    RBC 3.55 (L) 4.00 - 5.20 x10*6/uL    Hemoglobin 11.9 (L) 12.0 - 16.0 g/dL    Hematocrit 37.0 36.0 - 46.0 %     (H) 80 - 100 fL    MCH 33.5 26.0 - 34.0 pg    MCHC 32.2 32.0 - 36.0 g/dL    RDW 14.4 11.5 - 14.5 %    Platelets 258 150 - 450 x10*3/uL    Neutrophils % 81.2 40.0 - 80.0 %    Immature Granulocytes %, Automated 0.4 0.0 - 0.9 %    Lymphocytes % 8.3 13.0 - 44.0 %    Monocytes % 6.6 2.0 - 10.0 %    Eosinophils % 2.7 0.0 - 6.0 %    Basophils % 0.8 0.0 - 2.0 %    Neutrophils Absolute 7.78 (H) 1.60 - 5.50 x10*3/uL    Immature Granulocytes Absolute, Automated 0.04 0.00 - 0.50 x10*3/uL    Lymphocytes Absolute 0.80 0.80 - 3.00 x10*3/uL    Monocytes Absolute 0.63 0.05 - 0.80 x10*3/uL    Eosinophils Absolute 0.26 0.00 - 0.40 x10*3/uL    Basophils Absolute 0.08 0.00 - 0.10 x10*3/uL   Comprehensive metabolic panel   Result Value Ref Range    Glucose 303 (H) 74 - 99 mg/dL    Sodium 137 136 - 145 mmol/L    Potassium 3.6 3.5 - 5.3 mmol/L    Chloride 96 (L) 98 - 107 mmol/L    Bicarbonate 27 21 - 32 mmol/L    Anion Gap 18 10 - 20 mmol/L    Urea Nitrogen 37 (H) 6 - 23 mg/dL    Creatinine 1.81 (H) 0.50 - 1.05 mg/dL    eGFR 29 (L) >60 mL/min/1.73m*2    Calcium 10.0 8.6 - 10.3 mg/dL    Albumin 4.2 3.4 - 5.0 g/dL    Alkaline Phosphatase 118 33 - 136 U/L    Total Protein 7.4 6.4 - 8.2 g/dL    AST 15 9 - 39 U/L    Bilirubin, Total 1.0 0.0 - 1.2 mg/dL    ALT 8 7 - 45 U/L   Magnesium   Result Value Ref Range    Magnesium 1.90 1.60 - 2.40 mg/dL   Lipase   Result Value Ref Range    Lipase 55 9 - 82 U/L   Sars-CoV-2, Influenza A/B and RSV PCR   Result Value Ref Range    Coronavirus 2019, PCR Not Detected Not Detected    Flu A Result Not Detected Not Detected     Flu B Result Not Detected Not Detected    RSV PCR Not Detected Not Detected   Troponin I, High Sensitivity, Initial   Result Value Ref Range    Troponin I, High Sensitivity 11 0 - 13 ng/L   ECG 12 lead   Result Value Ref Range    Ventricular Rate 93 BPM    QRS Duration 94 ms    QT Interval 400 ms    QTC Calculation(Bazett) 497 ms    R Axis -25 degrees    T Axis 119 degrees    QRS Count 15 beats    Q Onset 214 ms    T Offset 414 ms    QTC Fredericia 463 ms   Troponin, High Sensitivity, 1 Hour   Result Value Ref Range    Troponin I, High Sensitivity 11 0 - 13 ng/L   Urinalysis with Reflex Culture and Microscopic   Result Value Ref Range    Color, Urine Yellow Straw, Yellow    Appearance, Urine Clear Clear    Specific Gravity, Urine <=1.005 (N) 1.005 - 1.035    pH, Urine 5.5 5.0, 5.5, 6.0, 6.5, 7.0, 7.5, 8.0    Protein, Urine NEGATIVE NEGATIVE, TRACE mg/dL    Glucose, Urine >=1000 (4+) (A) NEGATIVE mg/dL    Blood, Urine MODERATE (2+) (A) NEGATIVE mg/dL    Ketones, Urine NEGATIVE NEGATIVE mg/dL    Bilirubin, Urine NEGATIVE NEGATIVE mg/dL    Urobilinogen, Urine 0.2 0.2, 1.0 mg/dL    Nitrite, Urine POSITIVE (A) NEGATIVE    Leukocyte Esterase, Urine SMALL (1+) (A) NEGATIVE   Extra Urine Gray Tube   Result Value Ref Range    Extra Tube 293    Microscopic Only, Urine   Result Value Ref Range    WBC, Urine 21-50 (A) 1-5, NONE /HPF    RBC, Urine 3-5 NONE, 1-2, 3-5 /HPF    Bacteria, Urine 1+ (A) NONE SEEN /HPF    Mucus, Urine FEW Reference range not established. /LPF   Lactate   Result Value Ref Range    Lactate 2.3 (H) 0.4 - 2.0 mmol/L   Blood Culture    Specimen: Peripheral Venipuncture; Blood culture   Result Value Ref Range    Blood Culture Loaded on Instrument - Culture in progress    Blood Culture    Specimen: Peripheral Venipuncture; Blood culture   Result Value Ref Range    Blood Culture Loaded on Instrument - Culture in progress    Protime-INR   Result Value Ref Range    Protime 20.6 (H) 9.3 - 12.7 seconds    INR  2.0 (H) 0.9 - 1.2   CBC   Result Value Ref Range    WBC 7.3 4.4 - 11.3 x10*3/uL    nRBC 0.0 0.0 - 0.0 /100 WBCs    RBC 3.11 (L) 4.00 - 5.20 x10*6/uL    Hemoglobin 10.4 (L) 12.0 - 16.0 g/dL    Hematocrit 32.8 (L) 36.0 - 46.0 %     (H) 80 - 100 fL    MCH 33.4 26.0 - 34.0 pg    MCHC 31.7 (L) 32.0 - 36.0 g/dL    RDW 14.2 11.5 - 14.5 %    Platelets 208 150 - 450 x10*3/uL   Comprehensive metabolic panel   Result Value Ref Range    Glucose 134 (H) 74 - 99 mg/dL    Sodium 142 136 - 145 mmol/L    Potassium 3.3 (L) 3.5 - 5.3 mmol/L    Chloride 105 98 - 107 mmol/L    Bicarbonate 27 21 - 32 mmol/L    Anion Gap 13 10 - 20 mmol/L    Urea Nitrogen 34 (H) 6 - 23 mg/dL    Creatinine 1.66 (H) 0.50 - 1.05 mg/dL    eGFR 32 (L) >60 mL/min/1.73m*2    Calcium 9.1 8.6 - 10.3 mg/dL    Albumin 3.4 3.4 - 5.0 g/dL    Alkaline Phosphatase 93 33 - 136 U/L    Total Protein 6.1 (L) 6.4 - 8.2 g/dL    AST 9 9 - 39 U/L    Bilirubin, Total 0.6 0.0 - 1.2 mg/dL    ALT 5 (L) 7 - 45 U/L   Protime-INR   Result Value Ref Range    Protime 20.5 (H) 9.3 - 12.7 seconds    INR 2.0 (H) 0.9 - 1.2   POCT GLUCOSE   Result Value Ref Range    POCT Glucose 130 (H) 74 - 99 mg/dL   Transthoracic Echo (TTE) Limited   Result Value Ref Range    Tricuspid annular plane systolic excursion 1.5 cm    LV Biplane EF 38 %    LA vol index A/L 51.8 ml/m2    LV EF 33 %    RV free wall pk S' 6.42 cm/s    RVSP 32.2 mmHg    LVIDd 4.45 cm    LV A4C EF 33.4      *Note: Due to a large number of results and/or encounters for the requested time period, some results have not been displayed. A complete set of results can be found in Results Review.        Assessment/Plan     Pathologic fracture left humeral shaft.    Patient's already had an appointment with orthopedic oncology, Dr. Galvez.  Continue left arm sling.  Follow-up with Dr. Galvez.    Yury Shea MD               [1]   Family History  Problem Relation Name Age of Onset    Hypertension Mother      Cancer Mother       Diabetes Father      Stroke Father      Diabetes Sister      Heart disease Maternal Grandmother      Heart disease Maternal Grandfather      Heart disease Paternal Grandmother      Heart disease Paternal Grandfather     [2] carvedilol, 3.125 mg, oral, BID  cefTRIAXone, 1 g, intravenous, Once  cefTRIAXone, 1 g, intravenous, q24h  empagliflozin, 10 mg, oral, Daily  insulin lispro, 0-10 Units, subcutaneous, TID  [Held by provider] losartan, 25 mg, oral, Daily  nystatin, 1 Application, Topical, BID  pantoprazole, 40 mg, oral, Daily before breakfast  [Held by provider] torsemide, 20 mg, oral, Daily  warfarin, 2 mg, oral, Daily    [3] sodium chloride 0.9%, 100 mL/hr, Last Rate: 100 mL/hr (05/02/25 1317)    [4] PRN medications: acetaminophen **OR** acetaminophen **OR** acetaminophen, benzocaine-menthol, bisacodyl, dextrose, dextrose, glucagon, glucagon, ondansetron ODT **OR** ondansetron, oxyCODONE, polyethylene glycol, prochlorperazine **OR** prochlorperazine **OR** prochlorperazine

## 2025-05-02 NOTE — PROGRESS NOTES
Jing Sanchez is a 74 y.o. female on day 1 of admission presenting with Malaise and fatigue.      Subjective   Patient seen and examined. Resting in bed. States she feels a little better. Denies any current abd pain, does state she is having some vaginal bleeding from time to time. She has some left arm pain, controlled with tylenol. Admits to poor appetite lately. Afebrile.        Objective     Last Recorded Vitals  /72 (BP Location: Right arm, Patient Position: Lying)   Pulse 93   Temp 36.6 °C (97.9 °F) (Temporal)   Resp 19   Wt 78.5 kg (173 lb)   SpO2 96%   Intake/Output last 3 Shifts:    Intake/Output Summary (Last 24 hours) at 5/2/2025 0912  Last data filed at 5/2/2025 0850  Gross per 24 hour   Intake 899.35 ml   Output 200 ml   Net 699.35 ml       Admission Weight  Weight: 78.5 kg (173 lb) (05/01/25 1556)    Daily Weight  05/01/25 : 78.5 kg (173 lb)    Image Results  ECG 12 lead  Atrial fibrillation with premature ventricular or aberrantly conducted complexes  Inferior infarct (cited on or before 07-APR-2025)  Anterolateral infarct (cited on or before 07-APR-2025)  QTcB >= 480 msec  Abnormal ECG  When compared with ECG of 07-APR-2025 15:53,  No significant change was found  XR shoulder left 2+ views  Narrative: Interpreted By:  Paty Florian,   STUDY:  XR SHOULDER LEFT 2+ VIEWS 5/1/2025 10:56 pm      INDICATION:  Signs/Symptoms:pain and weakness. Follow-up fracture.      COMPARISON:  04/03/2025      ACCESSION NUMBER(S):  [id number]      ORDERING CLINICIAN:  JEANINE BELLO      TECHNIQUE:  Three views of the left shoulder      FINDINGS:  There is a healing mildly displaced fracture of the proximal humeral  diaphysis noted with the distal fracture fragment displaced medially  by a distance of 4 mm. There is now some periosteal reaction about  the fracture site.      There is postoperative change from CABG with coronary artery stent  graft seen.      Impression: Healing mildly displaced fracture of  the proximal humeral diaphysis.      Signed by: Paty Florian 5/2/2025 7:23 AM  Dictation workstation:   [id number]      Physical Exam    General: Alert and oriented x3, pleasant.   Cardiac: Irregular rate and rhythm, S1/S2 , no murmur.   Pulmonary: Clear to auscultation on room air.   Abdomen: Soft, round, nontender. BS +x4.   Extremities: No edema. LUE in sling.   Skin: No rashes or lesions.      Relevant Results    Scheduled medications  Scheduled Medications[1]  Continuous medications  Continuous Medications[2]  PRN medications  PRN Medications[3]     Results for orders placed or performed during the hospital encounter of 05/01/25 (from the past 24 hours)   CBC and Auto Differential   Result Value Ref Range    WBC 9.6 4.4 - 11.3 x10*3/uL    nRBC 0.0 0.0 - 0.0 /100 WBCs    RBC 3.55 (L) 4.00 - 5.20 x10*6/uL    Hemoglobin 11.9 (L) 12.0 - 16.0 g/dL    Hematocrit 37.0 36.0 - 46.0 %     (H) 80 - 100 fL    MCH 33.5 26.0 - 34.0 pg    MCHC 32.2 32.0 - 36.0 g/dL    RDW 14.4 11.5 - 14.5 %    Platelets 258 150 - 450 x10*3/uL    Neutrophils % 81.2 40.0 - 80.0 %    Immature Granulocytes %, Automated 0.4 0.0 - 0.9 %    Lymphocytes % 8.3 13.0 - 44.0 %    Monocytes % 6.6 2.0 - 10.0 %    Eosinophils % 2.7 0.0 - 6.0 %    Basophils % 0.8 0.0 - 2.0 %    Neutrophils Absolute 7.78 (H) 1.60 - 5.50 x10*3/uL    Immature Granulocytes Absolute, Automated 0.04 0.00 - 0.50 x10*3/uL    Lymphocytes Absolute 0.80 0.80 - 3.00 x10*3/uL    Monocytes Absolute 0.63 0.05 - 0.80 x10*3/uL    Eosinophils Absolute 0.26 0.00 - 0.40 x10*3/uL    Basophils Absolute 0.08 0.00 - 0.10 x10*3/uL   Comprehensive metabolic panel   Result Value Ref Range    Glucose 303 (H) 74 - 99 mg/dL    Sodium 137 136 - 145 mmol/L    Potassium 3.6 3.5 - 5.3 mmol/L    Chloride 96 (L) 98 - 107 mmol/L    Bicarbonate 27 21 - 32 mmol/L    Anion Gap 18 10 - 20 mmol/L    Urea Nitrogen 37 (H) 6 - 23 mg/dL    Creatinine 1.81 (H) 0.50 - 1.05 mg/dL    eGFR 29 (L) >60 mL/min/1.73m*2     Calcium 10.0 8.6 - 10.3 mg/dL    Albumin 4.2 3.4 - 5.0 g/dL    Alkaline Phosphatase 118 33 - 136 U/L    Total Protein 7.4 6.4 - 8.2 g/dL    AST 15 9 - 39 U/L    Bilirubin, Total 1.0 0.0 - 1.2 mg/dL    ALT 8 7 - 45 U/L   Magnesium   Result Value Ref Range    Magnesium 1.90 1.60 - 2.40 mg/dL   Lipase   Result Value Ref Range    Lipase 55 9 - 82 U/L   Sars-CoV-2, Influenza A/B and RSV PCR   Result Value Ref Range    Coronavirus 2019, PCR Not Detected Not Detected    Flu A Result Not Detected Not Detected    Flu B Result Not Detected Not Detected    RSV PCR Not Detected Not Detected   Troponin I, High Sensitivity, Initial   Result Value Ref Range    Troponin I, High Sensitivity 11 0 - 13 ng/L   ECG 12 lead   Result Value Ref Range    Ventricular Rate 93 BPM    QRS Duration 94 ms    QT Interval 400 ms    QTC Calculation(Bazett) 497 ms    R Axis -25 degrees    T Axis 119 degrees    QRS Count 15 beats    Q Onset 214 ms    T Offset 414 ms    QTC Fredericia 463 ms   Troponin, High Sensitivity, 1 Hour   Result Value Ref Range    Troponin I, High Sensitivity 11 0 - 13 ng/L   Urinalysis with Reflex Culture and Microscopic   Result Value Ref Range    Color, Urine Yellow Straw, Yellow    Appearance, Urine Clear Clear    Specific Gravity, Urine <=1.005 (N) 1.005 - 1.035    pH, Urine 5.5 5.0, 5.5, 6.0, 6.5, 7.0, 7.5, 8.0    Protein, Urine NEGATIVE NEGATIVE, TRACE mg/dL    Glucose, Urine >=1000 (4+) (A) NEGATIVE mg/dL    Blood, Urine MODERATE (2+) (A) NEGATIVE mg/dL    Ketones, Urine NEGATIVE NEGATIVE mg/dL    Bilirubin, Urine NEGATIVE NEGATIVE mg/dL    Urobilinogen, Urine 0.2 0.2, 1.0 mg/dL    Nitrite, Urine POSITIVE (A) NEGATIVE    Leukocyte Esterase, Urine SMALL (1+) (A) NEGATIVE   Extra Urine Gray Tube   Result Value Ref Range    Extra Tube 293    Microscopic Only, Urine   Result Value Ref Range    WBC, Urine 21-50 (A) 1-5, NONE /HPF    RBC, Urine 3-5 NONE, 1-2, 3-5 /HPF    Bacteria, Urine 1+ (A) NONE SEEN /HPF    Mucus, Urine  FEW Reference range not established. /LPF   Lactate   Result Value Ref Range    Lactate 2.3 (H) 0.4 - 2.0 mmol/L   Blood Culture    Specimen: Peripheral Venipuncture; Blood culture   Result Value Ref Range    Blood Culture Loaded on Instrument - Culture in progress    Blood Culture    Specimen: Peripheral Venipuncture; Blood culture   Result Value Ref Range    Blood Culture Loaded on Instrument - Culture in progress    Protime-INR   Result Value Ref Range    Protime 20.6 (H) 9.3 - 12.7 seconds    INR 2.0 (H) 0.9 - 1.2   CBC   Result Value Ref Range    WBC 7.3 4.4 - 11.3 x10*3/uL    nRBC 0.0 0.0 - 0.0 /100 WBCs    RBC 3.11 (L) 4.00 - 5.20 x10*6/uL    Hemoglobin 10.4 (L) 12.0 - 16.0 g/dL    Hematocrit 32.8 (L) 36.0 - 46.0 %     (H) 80 - 100 fL    MCH 33.4 26.0 - 34.0 pg    MCHC 31.7 (L) 32.0 - 36.0 g/dL    RDW 14.2 11.5 - 14.5 %    Platelets 208 150 - 450 x10*3/uL   Comprehensive metabolic panel   Result Value Ref Range    Glucose 134 (H) 74 - 99 mg/dL    Sodium 142 136 - 145 mmol/L    Potassium 3.3 (L) 3.5 - 5.3 mmol/L    Chloride 105 98 - 107 mmol/L    Bicarbonate 27 21 - 32 mmol/L    Anion Gap 13 10 - 20 mmol/L    Urea Nitrogen 34 (H) 6 - 23 mg/dL    Creatinine 1.66 (H) 0.50 - 1.05 mg/dL    eGFR 32 (L) >60 mL/min/1.73m*2    Calcium 9.1 8.6 - 10.3 mg/dL    Albumin 3.4 3.4 - 5.0 g/dL    Alkaline Phosphatase 93 33 - 136 U/L    Total Protein 6.1 (L) 6.4 - 8.2 g/dL    AST 9 9 - 39 U/L    Bilirubin, Total 0.6 0.0 - 1.2 mg/dL    ALT 5 (L) 7 - 45 U/L   Protime-INR   Result Value Ref Range    Protime 20.5 (H) 9.3 - 12.7 seconds    INR 2.0 (H) 0.9 - 1.2   POCT GLUCOSE   Result Value Ref Range    POCT Glucose 130 (H) 74 - 99 mg/dL     *Note: Due to a large number of results and/or encounters for the requested time period, some results have not been displayed. A complete set of results can be found in Results Review.            Assessment and Plan    Malaise / Fatigue / Weakness  -Likely multifactorial from UTI, mild  CHHAYA, recently found cancer.   -PT/OT evals.   -Monitor with treating underlying issues. States she feels a little better this AM.   -Fall precautions.     UTI  -UA suggests UTI. Urine culture is pending.   -Continue IV ceftriaxone.     CHHAYA on CKD  -Looks as though her baseline creat ~1.5-1.6, she was 1.8 on admission.   -Creat down 1.66 this AM. Monitor.   -Gentle IVF hydration today.   -Holding home dose torsemide, losartan for now.     Uterine Cancer  -New diagnosis, just saw Oncology outpatient yesterday, no definitive treatment plan as of yet per patient.   -Oncology consulted.   -She is having some intermittent vaginal bleeding.   -Likely bone metastasis.     Left Humerus Fracture  -Ortho consulted.   -She saw Dr. Sorneson outpatient.   -Has sling in place.   -Pain control.   -PT/OT evals.     Atrial Fibrillation   -On warfarin- INR therapeutic.   -Continue BB.   -Monitor on telemetry.     DM 2  -HgA1c 7.7.   -Continue SSI and Jardiance.   -Monitor blood sugars.    Chronic Systolic CHF  -Last EF 30-35%, will get updated echo.   -Continue her BB.   -Holding ARB and diuretic for now with the mild CHHAYA and soft BP.   -Appears euvolemic at this time.  -Monitor volume status closely.     DVT Risk  -On Warfarin, continue. Monitor INR.     Plan  Oncology, Ortho and Palliative Care to see.   Continue IV abx for UTI, await urine culture.   IVF hydration today, continue to hold torsemide and ARB for now.   Maintain sling to the LUE and PRN med for pain.  Replaced potassium.   Echo to be done.   PT/OT evals, may need skilled rehab.       MARTÍN Tapia-CNP           [1] [Held by provider] carvedilol, 3.125 mg, oral, BID  cefTRIAXone, 1 g, intravenous, Once  cefTRIAXone, 1 g, intravenous, q24h  empagliflozin, 10 mg, oral, Daily  insulin lispro, 0-10 Units, subcutaneous, TID  [Held by provider] losartan, 25 mg, oral, Daily  nystatin, 1 Application, Topical, BID  pantoprazole, 40 mg, oral, Daily before  breakfast  [Held by provider] torsemide, 20 mg, oral, Daily  warfarin, 2 mg, oral, Daily  [2] sodium chloride 0.9%, 100 mL/hr, Last Rate: 100 mL/hr (05/02/25 0842)  [3] PRN medications: acetaminophen **OR** acetaminophen **OR** acetaminophen, benzocaine-menthol, bisacodyl, dextrose, dextrose, glucagon, glucagon, ondansetron ODT **OR** ondansetron, polyethylene glycol, prochlorperazine **OR** prochlorperazine **OR** prochlorperazine

## 2025-05-02 NOTE — ASSESSMENT & PLAN NOTE
Last echo was done on 3/13/ 2023 with EF 30 to 35%  Order echo  Consider  cardiology if needed   Quality 402: Tobacco Use And Help With Quitting Among Adolescents: Patient screened for tobacco and never smoked Detail Level: Detailed Quality 431: Preventive Care And Screening: Unhealthy Alcohol Use - Screening: Patient not identified as an unhealthy alcohol user when screened for unhealthy alcohol use using a systematic screening method Quality 110: Preventive Care And Screening: Influenza Immunization: Influenza immunization was not ordered or administered, reason not given Quality 130: Documentation Of Current Medications In The Medical Record: Current Medications Documented

## 2025-05-03 LAB
ANION GAP SERPL CALCULATED.3IONS-SCNC: 13 MMOL/L (ref 10–20)
APPEARANCE UR: ABNORMAL
BACTERIA #/AREA URNS AUTO: ABNORMAL /HPF
BACTERIA UR CULT: NORMAL
BILIRUB UR STRIP.AUTO-MCNC: NEGATIVE MG/DL
BUN SERPL-MCNC: 26 MG/DL (ref 6–23)
CALCIUM SERPL-MCNC: 8.8 MG/DL (ref 8.6–10.3)
CHLORIDE SERPL-SCNC: 106 MMOL/L (ref 98–107)
CO2 SERPL-SCNC: 27 MMOL/L (ref 21–32)
COLOR UR: ABNORMAL
CREAT SERPL-MCNC: 1.34 MG/DL (ref 0.5–1.05)
EGFRCR SERPLBLD CKD-EPI 2021: 42 ML/MIN/1.73M*2
ERYTHROCYTE [DISTWIDTH] IN BLOOD BY AUTOMATED COUNT: 14.3 % (ref 11.5–14.5)
GLUCOSE BLD MANUAL STRIP-MCNC: 142 MG/DL (ref 74–99)
GLUCOSE BLD MANUAL STRIP-MCNC: 143 MG/DL (ref 74–99)
GLUCOSE BLD MANUAL STRIP-MCNC: 198 MG/DL (ref 74–99)
GLUCOSE BLD MANUAL STRIP-MCNC: 309 MG/DL (ref 74–99)
GLUCOSE SERPL-MCNC: 134 MG/DL (ref 74–99)
GLUCOSE UR STRIP.AUTO-MCNC: ABNORMAL MG/DL
HCT VFR BLD AUTO: 30.9 % (ref 36–46)
HGB BLD-MCNC: 9.8 G/DL (ref 12–16)
HOLD SPECIMEN: NORMAL
INR PPP: 2 (ref 0.9–1.2)
KETONES UR STRIP.AUTO-MCNC: NEGATIVE MG/DL
LEUKOCYTE ESTERASE UR QL STRIP.AUTO: ABNORMAL
MCH RBC QN AUTO: 33.6 PG (ref 26–34)
MCHC RBC AUTO-ENTMCNC: 31.7 G/DL (ref 32–36)
MCV RBC AUTO: 106 FL (ref 80–100)
NITRITE UR QL STRIP.AUTO: NEGATIVE
NRBC BLD-RTO: 0 /100 WBCS (ref 0–0)
PH UR STRIP.AUTO: 5.5 [PH]
PLATELET # BLD AUTO: 202 X10*3/UL (ref 150–450)
POTASSIUM SERPL-SCNC: 3.7 MMOL/L (ref 3.5–5.3)
PROT UR STRIP.AUTO-MCNC: ABNORMAL MG/DL
PROTHROMBIN TIME: 20.3 SECONDS (ref 9.3–12.7)
RBC # BLD AUTO: 2.92 X10*6/UL (ref 4–5.2)
RBC # UR STRIP.AUTO: ABNORMAL MG/DL
RBC #/AREA URNS AUTO: >20 /HPF
SODIUM SERPL-SCNC: 142 MMOL/L (ref 136–145)
SP GR UR STRIP.AUTO: 1.02
SQUAMOUS #/AREA URNS AUTO: ABNORMAL /HPF
UROBILINOGEN UR STRIP.AUTO-MCNC: NORMAL MG/DL
WBC # BLD AUTO: 8 X10*3/UL (ref 4.4–11.3)
WBC #/AREA URNS AUTO: >50 /HPF

## 2025-05-03 PROCEDURE — 36415 COLL VENOUS BLD VENIPUNCTURE: CPT | Performed by: NURSE PRACTITIONER

## 2025-05-03 PROCEDURE — 85610 PROTHROMBIN TIME: CPT | Performed by: NURSE PRACTITIONER

## 2025-05-03 PROCEDURE — 80048 BASIC METABOLIC PNL TOTAL CA: CPT | Performed by: NURSE PRACTITIONER

## 2025-05-03 PROCEDURE — 99232 SBSQ HOSP IP/OBS MODERATE 35: CPT | Performed by: NURSE PRACTITIONER

## 2025-05-03 PROCEDURE — 81001 URINALYSIS AUTO W/SCOPE: CPT | Performed by: NURSE PRACTITIONER

## 2025-05-03 PROCEDURE — 85027 COMPLETE CBC AUTOMATED: CPT | Performed by: NURSE PRACTITIONER

## 2025-05-03 PROCEDURE — 2500000001 HC RX 250 WO HCPCS SELF ADMINISTERED DRUGS (ALT 637 FOR MEDICARE OP): Performed by: NURSE PRACTITIONER

## 2025-05-03 PROCEDURE — 99233 SBSQ HOSP IP/OBS HIGH 50: CPT | Performed by: NURSE PRACTITIONER

## 2025-05-03 PROCEDURE — 99498 ADVNCD CARE PLAN ADDL 30 MIN: CPT | Performed by: NURSE PRACTITIONER

## 2025-05-03 PROCEDURE — 1200000002 HC GENERAL ROOM WITH TELEMETRY DAILY

## 2025-05-03 PROCEDURE — 2500000004 HC RX 250 GENERAL PHARMACY W/ HCPCS (ALT 636 FOR OP/ED): Mod: JZ | Performed by: NURSE PRACTITIONER

## 2025-05-03 PROCEDURE — 99497 ADVNCD CARE PLAN 30 MIN: CPT | Performed by: NURSE PRACTITIONER

## 2025-05-03 PROCEDURE — 82947 ASSAY GLUCOSE BLOOD QUANT: CPT

## 2025-05-03 PROCEDURE — 82374 ASSAY BLOOD CARBON DIOXIDE: CPT | Performed by: NURSE PRACTITIONER

## 2025-05-03 PROCEDURE — 87086 URINE CULTURE/COLONY COUNT: CPT | Mod: TRILAB | Performed by: NURSE PRACTITIONER

## 2025-05-03 PROCEDURE — 2500000002 HC RX 250 W HCPCS SELF ADMINISTERED DRUGS (ALT 637 FOR MEDICARE OP, ALT 636 FOR OP/ED)

## 2025-05-03 RX ORDER — DIPHENHYDRAMINE HCL 25 MG
25 TABLET ORAL EVERY 6 HOURS PRN
Status: DISPENSED | OUTPATIENT
Start: 2025-05-03

## 2025-05-03 RX ADMIN — INSULIN LISPRO 2 UNITS: 100 INJECTION, SOLUTION INTRAVENOUS; SUBCUTANEOUS at 16:26

## 2025-05-03 RX ADMIN — WARFARIN SODIUM 2 MG: 2 TABLET ORAL at 17:13

## 2025-05-03 RX ADMIN — CARVEDILOL 3.12 MG: 3.12 TABLET, FILM COATED ORAL at 20:27

## 2025-05-03 RX ADMIN — EMPAGLIFLOZIN 10 MG: 10 TABLET, FILM COATED ORAL at 08:53

## 2025-05-03 RX ADMIN — ACETAMINOPHEN 650 MG: 325 TABLET ORAL at 06:03

## 2025-05-03 RX ADMIN — NYSTATIN 1 APPLICATION: 100000 POWDER TOPICAL at 20:27

## 2025-05-03 RX ADMIN — OXYCODONE 5 MG: 5 TABLET ORAL at 09:12

## 2025-05-03 RX ADMIN — ACETAMINOPHEN 650 MG: 325 TABLET ORAL at 20:27

## 2025-05-03 RX ADMIN — DIPHENHYDRAMINE HYDROCHLORIDE 25 MG: 25 TABLET ORAL at 15:47

## 2025-05-03 RX ADMIN — CARVEDILOL 3.12 MG: 3.12 TABLET, FILM COATED ORAL at 08:53

## 2025-05-03 RX ADMIN — CEFTRIAXONE 1 G: 1 INJECTION, SOLUTION INTRAVENOUS at 08:53

## 2025-05-03 RX ADMIN — PANTOPRAZOLE SODIUM 40 MG: 40 TABLET, DELAYED RELEASE ORAL at 06:03

## 2025-05-03 RX ADMIN — NYSTATIN 1 APPLICATION: 100000 POWDER TOPICAL at 08:53

## 2025-05-03 RX ADMIN — INSULIN LISPRO 8 UNITS: 100 INJECTION, SOLUTION INTRAVENOUS; SUBCUTANEOUS at 12:35

## 2025-05-03 ASSESSMENT — PAIN DESCRIPTION - LOCATION
LOCATION: SHOULDER
LOCATION: ARM

## 2025-05-03 ASSESSMENT — PAIN - FUNCTIONAL ASSESSMENT
PAIN_FUNCTIONAL_ASSESSMENT: 0-10
PAIN_FUNCTIONAL_ASSESSMENT: UNABLE TO SELF-REPORT
PAIN_FUNCTIONAL_ASSESSMENT: 0-10
PAIN_FUNCTIONAL_ASSESSMENT: UNABLE TO SELF-REPORT

## 2025-05-03 ASSESSMENT — PAIN DESCRIPTION - ORIENTATION
ORIENTATION: LEFT
ORIENTATION: LEFT

## 2025-05-03 ASSESSMENT — PAIN SCALES - GENERAL
PAINLEVEL_OUTOF10: 3
PAINLEVEL_OUTOF10: 8
PAINLEVEL_OUTOF10: 8
PAINLEVEL_OUTOF10: 3

## 2025-05-03 ASSESSMENT — PAIN DESCRIPTION - DESCRIPTORS
DESCRIPTORS: SHARP
DESCRIPTORS: SHARP

## 2025-05-03 NOTE — NURSING NOTE
Patient complaining of being itchy, there was no rash but couldn't tolerate it. Reached out to RICHARD Lucas and she placed orders for benadryl.

## 2025-05-03 NOTE — PROGRESS NOTES
Jing Sanchez is a 74 y.o. female on day 2 of admission presenting with Malaise and fatigue.      Subjective   Patient seen and examined. Resting in bed. States she feels a little better today. Pain medication effective. She denies any nausea or diarrhea. Afebrile.        Objective     Last Recorded Vitals  /65 (BP Location: Right arm, Patient Position: Lying)   Pulse 87   Temp 36.4 °C (97.5 °F) (Temporal)   Resp 16   Wt 78.5 kg (173 lb)   SpO2 98%   Intake/Output last 3 Shifts:    Intake/Output Summary (Last 24 hours) at 5/3/2025 0950  Last data filed at 5/3/2025 0945  Gross per 24 hour   Intake 4190 ml   Output --   Net 4190 ml       Admission Weight  Weight: 78.5 kg (173 lb) (05/01/25 1556)    Daily Weight  05/01/25 : 78.5 kg (173 lb)    Image Results  ECG 12 lead  Atrial fibrillation with premature ventricular or aberrantly conducted complexes  Inferior infarct (cited on or before 07-APR-2025)  Anterolateral infarct (cited on or before 07-APR-2025)  QTcB >= 480 msec  Abnormal ECG  When compared with ECG of 07-APR-2025 15:53,  No significant change was found  Confirmed by Yury Bajwa ([id number]) on 5/2/2025 2:28:41 PM  Transthoracic Echo (TTE) [place] [id number]              Phone [phone]    TRANSTHORACIC ECHOCARDIOGRAM REPORT    Patient Name:       JING SANCHEZ      Reading Physician:    [id number] Joie Robison MD  Study Date:         5/2/2025             Ordering Provider:    [id number] JEANINE BELLO  MRN/PID:            [id number]             Fellow:  Accession#:         [id number]         Nurse:  Date of Birth/Age:  1950 / 74 years Sonographer:          Cari Ocampo RDCS  Gender Assigned at  F                    Additional  Staff:  Birth:  Height:             165.10 cm            Admit Date:  Weight:             78.47 kg             Admission Status:     Inpatient -                                                                 Routine  BSA / BMI:          1.86 m2 / 28.79      Department Location:  [facility]                      kg/m2  Blood Pressure: 100 /68 mmHg    Study Type:    TRANSTHORACIC ECHO (TTE) LIMITED  Diagnosis/ICD: Chest pain, unspecified-R07.9  Indication:    dypsnea, chest pain  CPT Codes:     Echo Limited-42608; Color Doppler-75439    Patient History:  Pertinent History: Cardiomyopathy, AFIb, CHF, TIA, STEMI, HLD, acute MI, CKD,                     DM.    Study Detail: The following Echo studies were performed: 2D, M-Mode, Doppler and                color flow. Technically challenging study due to patient lying in                supine position and body habitus. Definity used as a contrast                agent for endocardial border definition. Total contrast used for                this procedure was 3 mL via IV push.       Critical Event  Critical Event: Test was completed as per department protocol.  Critical Finding: Possible mass towards apex of LV. Notify Rigoif through epic chat.  Time Test was Completed: 10:30:00 AM  Notified: Enriqueta.  Attending notification time: 10:34:49 AM     PHYSICIAN INTERPRETATION:  Left Ventricle: The left ventricular systolic function is moderately decreased, with a visually estimated ejection fraction of 30-35%. The patient is in atrial fibrillation which may influence the estimate of left ventricular function and transvalvular flows. Wall motion is abnormal. The left ventricular cavity size is normal. There is normal septal and mildly increased posterior left ventricular wall thickness. There is left ventricular concentric remodeling. Abnormal (paradoxical) septal motion, consistent with an intraventricular conduction delay. Left ventricular diastolic filling was indeterminate  due to atrial fibrillation/flutter. The apical wall is akinetic with a large thrombus seen.  Left Atrium: The left atrial size is mildly dilated.  Right Ventricle: The right ventricle is moderately enlarged. There is reduced right ventricular systolic function.  Right Atrium: The right atrial size is moderately dilated.  Aortic Valve: The aortic valve appears bicuspid. There is no evidence of aortic valve regurgitation. The aortic valve off axis. Possible bicuspid aortic valve. No evidence of aortic valve stenosis. No regurgitation.  Mitral Valve: The mitral valve is normal in structure. There is trace mitral valve regurgitation.  Tricuspid Valve: The tricuspid valve is structurally normal. There is trace tricuspid regurgitation. The Doppler estimated RVSP is slightly elevated right ventricular systolic pressure at 32.2 mmHg.  Pulmonic Valve: The pulmonic valve is structurally normal. There is physiologic pulmonic valve regurgitation.  Pericardium: Small pericardial effusion.  Aorta: The aortic root is normal.  Systemic Veins: The inferior vena cava appears normal in size, with IVC inspiratory collapse greater than 50%.       CONCLUSIONS:   1. The left ventricular systolic function is moderately decreased, with a visually estimated ejection fraction of 30-35%.   2. Abnormal wall motion.   3. Left ventricular diastolic filling was indeterminate due to atrial fibrillation/flutter.   4. The apical wall is akinetic with a large thrombus seen.   5. There is reduced right ventricular systolic function.   6. Moderately enlarged right ventricle.   7. The right atrial size is moderately dilated.   8. Trace mitral valve regurgitation.   9. Slightly elevated right ventricular systolic pressure.  10. Trace tricuspid regurgitation is visualized.  11. The aortic valve appears bicuspid.  12. The patient is in atrial fibrillation which may influence the estimate of left ventricular function and transvalvular flows.    QUANTITATIVE  DATA SUMMARY:     2D MEASUREMENTS:             Normal Ranges:  LAs:             3.80 cm     (2.7-4.0cm)  IVSd:            0.78 cm     (0.6-1.1cm)  LVPWd:           1.17 cm     (0.6-1.1cm)  LVIDd:           4.44 cm     (3.9-5.9cm)  LVIDs:           3.88 cm  LV Mass Index:   83.3 g/m2  LVEDV Index:     77.45 ml/m2  LV % FS          12.8 %       LEFT ATRIUM:                  Normal Ranges:  LA Vol A4C:        90.2 ml    (22+/-6mL/m2)  LA Vol A2C:        102.4 ml  LA Vol BP:         96.3 ml  LA Vol Index A4C:  48.5ml/m2  LA Vol Index A2C:  55.1 ml/m2  LA Vol Index BP:   51.8 ml/m2  LA Area A4C:       28.2 cm2  LA Area A2C:       30.1 cm2  LA Major Axis A4C: 7.5 cm  LA Major Axis A2C: 7.5 cm  LA Volume Index:   50.4 ml/m2  LA Vol A4C:        88.1 ml  LA Vol A2C:        99.6 ml  LA Vol Index BSA:  50.5 ml/m2       RIGHT ATRIUM:                 Normal Ranges:  RA Vol A4C:        96.9 ml    (8.3-19.5ml)  RA Vol Index A4C:  52.1 ml/m2  RA Area A4C:       29.0 cm2  RA Major Axis A4C: 7.4 cm       M-MODE MEASUREMENTS:         Normal Ranges:  Ao Root:             2.50 cm (2.0-3.7cm)  LAs:                 3.20 cm (2.7-4.0cm)       LV SYSTOLIC FUNCTION:                       Normal Ranges:  EF-A4C View:    33 % (>=55%)  EF-A2C View:    42 %  EF-Biplane:     38 %  EF-Visual:      33 %  LV EF Reported: 33 %       LV DIASTOLIC FUNCTION:           Normal Ranges:  MV Peak E:             1.15 m/s  (0.7-1.2 m/s)  MV e'                  0.066 m/s (>8.0)  MV lateral e'          0.07 m/s  MV medial e'           0.06 m/s  E/e' Ratio:            17.48     (<8.0)       MITRAL VALVE:          Normal Ranges:  MV DT:        164 msec (150-240msec)       RIGHT VENTRICLE:  RV Basal 4.60 cm  RV Mid   4.04 cm  RV Major 7.8 cm  TAPSE:   15.2 mm  RV s'    0.06 m/s       TRICUSPID VALVE/RVSP:          Normal Ranges:  Peak TR Velocity:     2.70 m/s  RV Syst Pressure:     32 mmHg  (< 30mmHg)  IVC Diam:             1.73 cm       PULMONIC VALVE:           Normal Ranges:  PV Accel Time:  87 msec  (>120ms)  PV Max Tristan:     1.1 m/s  (0.6-0.9m/s)  PV Max P.6 mmHg       [id number] Joie Robison MD  Electronically signed on 2025 at 11:33:48 AM       ** Final **  XR shoulder left 2+ views  Narrative: Interpreted By:  Paty Florian,   STUDY:  XR SHOULDER LEFT 2+ VIEWS 2025 10:56 pm      INDICATION:  Signs/Symptoms:pain and weakness. Follow-up fracture.      COMPARISON:  2025      ACCESSION NUMBER(S):  [id number]      ORDERING CLINICIAN:  JEANINE BELLO      TECHNIQUE:  Three views of the left shoulder      FINDINGS:  There is a healing mildly displaced fracture of the proximal humeral  diaphysis noted with the distal fracture fragment displaced medially  by a distance of 4 mm. There is now some periosteal reaction about  the fracture site.      There is postoperative change from CABG with coronary artery stent  graft seen.      Impression: Healing mildly displaced fracture of the proximal humeral diaphysis.      Signed by: Paty Florian 2025 7:23 AM  Dictation workstation:   [id number]      Physical Exam    General: Alert and oriented x3, pleasant.   Cardiac: Irregular rate and rhythm, S1/S2 , no murmur.   Pulmonary: Clear to auscultation on room air.   Abdomen: Soft, round, nontender. BS +x4.   Extremities: No edema. LUE in sling.   Skin: No rashes or lesions.    Relevant Results    Scheduled medications  Scheduled Medications[1]  Continuous medications  Continuous Medications[2]  PRN medications  PRN Medications[3]     Results for orders placed or performed during the hospital encounter of 25 (from the past 24 hours)   Transthoracic Echo (TTE) Limited   Result Value Ref Range    Tricuspid annular plane systolic excursion 1.5 cm    LV Biplane EF 38 %    LA vol index A/L 51.8 ml/m2    LV EF 33 %    RV free wall pk S' 6.42 cm/s    RVSP 32.2 mmHg    LVIDd 4.45 cm    LV A4C EF 33.4    POCT GLUCOSE   Result Value Ref Range    POCT Glucose 183 (H) 74 - 99  mg/dL   POCT GLUCOSE   Result Value Ref Range    POCT Glucose 151 (H) 74 - 99 mg/dL   CBC   Result Value Ref Range    WBC 8.0 4.4 - 11.3 x10*3/uL    nRBC 0.0 0.0 - 0.0 /100 WBCs    RBC 2.92 (L) 4.00 - 5.20 x10*6/uL    Hemoglobin 9.8 (L) 12.0 - 16.0 g/dL    Hematocrit 30.9 (L) 36.0 - 46.0 %     (H) 80 - 100 fL    MCH 33.6 26.0 - 34.0 pg    MCHC 31.7 (L) 32.0 - 36.0 g/dL    RDW 14.3 11.5 - 14.5 %    Platelets 202 150 - 450 x10*3/uL   Protime-INR   Result Value Ref Range    Protime 20.3 (H) 9.3 - 12.7 seconds    INR 2.0 (H) 0.9 - 1.2   Basic Metabolic Panel   Result Value Ref Range    Glucose 134 (H) 74 - 99 mg/dL    Sodium 142 136 - 145 mmol/L    Potassium 3.7 3.5 - 5.3 mmol/L    Chloride 106 98 - 107 mmol/L    Bicarbonate 27 21 - 32 mmol/L    Anion Gap 13 10 - 20 mmol/L    Urea Nitrogen 26 (H) 6 - 23 mg/dL    Creatinine 1.34 (H) 0.50 - 1.05 mg/dL    eGFR 42 (L) >60 mL/min/1.73m*2    Calcium 8.8 8.6 - 10.3 mg/dL   POCT GLUCOSE   Result Value Ref Range    POCT Glucose 142 (H) 74 - 99 mg/dL     *Note: Due to a large number of results and/or encounters for the requested time period, some results have not been displayed. A complete set of results can be found in Results Review.            Assessment and Plan     Malaise / Fatigue / Weakness  -Likely multifactorial from UTI, mild CHHAYA, recently found cancer.   -PT/OT recommending moderate intensity therapy.   -Monitor with treating underlying issues. Feeling some improvement.   -Fall precautions.      UTI  -UA suggests UTI. Urine culture pending.   -Continue IV ceftriaxone.      CHHAYA on CKD  -Looks as though her baseline creat ~1.5-1.6, she was 1.8 on admission.   -Creat down 1.34 this AM. Monitor.   -Gentle IVF hydration given, will hold off on any further fluid, she is eating and drinking.   -Holding home dose torsemide, losartan for now.      Endometrial Cancer with Bone Mets  -New diagnosis, just saw Oncology outpatient, no definitive treatment plan as of yet per  patient. She does have PET scan scheduled for 5/7.   -Oncology saw here in the hospital, no inpatient needs at this time.    -She is having some intermittent vaginal bleeding.   -She also has appt with ortho oncology for the bone mets.      Left Humerus Fracture  -Ortho saw, no intervention needed.    -Maintain sling and will need follow up with Dr. Galvez- ortho onc.   -Pain control.      Atrial Fibrillation   -On warfarin- INR therapeutic.   -Continue BB.   -Monitor on telemetry.      DM 2  -HgA1c 7.7.   -Continue SSI and Jardiance.   -Monitor blood sugars.     Chronic Systolic CHF  -Echo done which shows low EF 30-35% with apical wall thrombus. Discussed with Dr. Robison, he states they do not need to see unless she develops symptoms of chest pain or SOB. He recommends continuing her warfarin which she has therapeutic INR.    -Continue her BB.   -Holding ARB and diuretic for now with the mild CHHAYA and soft BP.   -Appears euvolemic at this time.  -Monitor volume status closely.      DVT Risk  -On Warfarin, continue. Monitor INR.      Plan  Oncology, Ortho saw- no acute interventions needed inpatient.   Palliative care follows.   Continue IV abx for UTI, await urine culture.   Will monitor now off IVF and continue to hold ARB and diuretic.   Maintain sling to the LUE and PRN med for pain.  PT/OT recommends moderate intensity therapy. Plan will be for SNF.   She will need to follow up with GYN onc and ortho onc outpatient and has PET scan already scheduled.        Carlotta Kent, MARTÍN-CNP           [1] carvedilol, 3.125 mg, oral, BID  cefTRIAXone, 1 g, intravenous, Once  cefTRIAXone, 1 g, intravenous, q24h  empagliflozin, 10 mg, oral, Daily  insulin lispro, 0-10 Units, subcutaneous, TID  [Held by provider] losartan, 25 mg, oral, Daily  nystatin, 1 Application, Topical, BID  pantoprazole, 40 mg, oral, Daily before breakfast  [Held by provider] torsemide, 20 mg, oral, Daily  warfarin, 2 mg, oral, Daily  [2]    [3] PRN  medications: acetaminophen **OR** acetaminophen **OR** acetaminophen, benzocaine-menthol, bisacodyl, dextrose, dextrose, glucagon, glucagon, ondansetron ODT **OR** ondansetron, oxyCODONE, polyethylene glycol, prochlorperazine **OR** prochlorperazine **OR** prochlorperazine

## 2025-05-03 NOTE — CARE PLAN
The patient's goals for the shift include comfort    The clinical goals for the shift include Safety, monitor labs and vitals    Problem: Skin  Goal: Decreased wound size/increased tissue granulation at next dressing change  Outcome: Progressing  Goal: Participates in plan/prevention/treatment measures  Outcome: Progressing  Flowsheets (Taken 5/3/2025 1136)  Participates in plan/prevention/treatment measures:   Elevate heels   Increase activity/out of bed for meals  Goal: Prevent/manage excess moisture  Outcome: Progressing  Goal: Prevent/minimize sheer/friction injuries  Outcome: Progressing  Goal: Promote/optimize nutrition  Outcome: Progressing  Goal: Promote skin healing  Outcome: Progressing     Problem: Fall/Injury  Goal: Not fall by end of shift  Outcome: Progressing  Goal: Be free from injury by end of the shift  Outcome: Progressing  Goal: Verbalize understanding of personal risk factors for fall in the hospital  Outcome: Progressing  Goal: Verbalize understanding of risk factor reduction measures to prevent injury from fall in the home  Outcome: Progressing  Goal: Use assistive devices by end of the shift  Outcome: Progressing  Goal: Pace activities to prevent fatigue by end of the shift  Outcome: Progressing     Problem: Pain  Goal: Takes deep breaths with improved pain control throughout the shift  Outcome: Progressing  Goal: Turns in bed with improved pain control throughout the shift  Outcome: Progressing  Goal: Walks with improved pain control throughout the shift  Outcome: Progressing  Goal: Performs ADL's with improved pain control throughout shift  Outcome: Progressing  Goal: Participates in PT with improved pain control throughout the shift  Outcome: Progressing  Goal: Free from opioid side effects throughout the shift  Outcome: Progressing  Goal: Free from acute confusion related to pain meds throughout the shift  Outcome: Progressing

## 2025-05-03 NOTE — CARE PLAN
The patient's goals for the shift include comfort    The clinical goals for the shift include ATB

## 2025-05-03 NOTE — PROGRESS NOTES
Jing Sanchez is a 74 y.o. female on day 2 of admission presenting with Malaise and fatigue.    Subjective   Symptoms (0 - 10, Best to Worst)  [place] Symptom Assessment System  0-10 (Numeric) Pain Score: 8  Resting in bed. patient with ongoing tailbone pain, was able to get some rest overnight pain between 0 and 3 overnight but increased this morning, feels her shoulder sling was not positioned right.  Took Tylenol and oxycodone this morning. Also itching on back where velcro of sling touches sling. Pain and itching relieved when I placed towel under velcro/sling Not currently on bowel regimen, discussed risk for constipation and use of as needed meds.  Has Dulcolax as needed. vital signs remained stable no fever no need for oxygen.   Was seen by oncology last night, she will need outpatient PET and CT which is scheduled for the seventh.  Therefore hopes for discharge by then.  Oncology considering palliative radiation for bone pain and will likely be candidate for systemic therapy which patient wants to pursue.  On lab review responding well to IV hydration creatinine down to 1.34.    Objective     Physical Exam    Constitutional:       General: Patient is not in acute distress.  HENT:      Head: Normocephalic.      Mouth: Mucous membranes are moist. Mild strabismus.  Eyes:      Conjunctiva/sclera: Conjunctivae clear, sclerae white. No discharge.     Pupils: Pupils are equal, round, and reactive to light.   Neck:      Vascular: No carotid bruit.   Cardiovascular:      Rate and Rhythm: Normal rate and irregular rhythm.      Heart sounds: No murmur heard.  Pulmonary:      Effort: No respiratory distress.      Breath sounds: Clear to auscultation  Abdominal:      General: There is no distension.      Tenderness: There is no abdominal tenderness. There is no guarding.   Urology:     Genitalia: normal genitalia    Urine: urine yellow and clear  Musculoskeletal:         General: Known left humerus fracture arm in  "sling.  Baseline right arm weakness at baseline.  Skin:     Coloration: Skin is not jaundiced.   Neurological:      General: No focal deficit present.      Mental Status: pt is oriented to person, place, and time.   Psychiatric:         Behavior: Behavior normal. Behavior is cooperative.         Last Recorded Vitals  Blood pressure 112/65, pulse 87, temperature 36.4 °C (97.5 °F), temperature source Temporal, resp. rate 16, height 1.651 m (5' 5\"), weight 78.5 kg (173 lb), SpO2 98%.  Intake/Output last 3 Shifts:  I/O last 3 completed shifts:  In: 4789.4 (61 mL/kg) [P.O.:1370; I.V.:2720 (34.7 mL/kg); IV Piggyback:699.4]  Out: 200 (2.5 mL/kg) [Urine:200 (0.1 mL/kg/hr)]  Weight: 78.5 kg     Relevant Results  Results for orders placed or performed during the hospital encounter of 05/01/25 (from the past 24 hours)   Transthoracic Echo (TTE) Limited   Result Value Ref Range    Tricuspid annular plane systolic excursion 1.5 cm    LV Biplane EF 38 %    LA vol index A/L 51.8 ml/m2    LV EF 33 %    RV free wall pk S' 6.42 cm/s    RVSP 32.2 mmHg    LVIDd 4.45 cm    LV A4C EF 33.4    POCT GLUCOSE   Result Value Ref Range    POCT Glucose 183 (H) 74 - 99 mg/dL   POCT GLUCOSE   Result Value Ref Range    POCT Glucose 151 (H) 74 - 99 mg/dL   CBC   Result Value Ref Range    WBC 8.0 4.4 - 11.3 x10*3/uL    nRBC 0.0 0.0 - 0.0 /100 WBCs    RBC 2.92 (L) 4.00 - 5.20 x10*6/uL    Hemoglobin 9.8 (L) 12.0 - 16.0 g/dL    Hematocrit 30.9 (L) 36.0 - 46.0 %     (H) 80 - 100 fL    MCH 33.6 26.0 - 34.0 pg    MCHC 31.7 (L) 32.0 - 36.0 g/dL    RDW 14.3 11.5 - 14.5 %    Platelets 202 150 - 450 x10*3/uL   Protime-INR   Result Value Ref Range    Protime 20.3 (H) 9.3 - 12.7 seconds    INR 2.0 (H) 0.9 - 1.2   Basic Metabolic Panel   Result Value Ref Range    Glucose 134 (H) 74 - 99 mg/dL    Sodium 142 136 - 145 mmol/L    Potassium 3.7 3.5 - 5.3 mmol/L    Chloride 106 98 - 107 mmol/L    Bicarbonate 27 21 - 32 mmol/L    Anion Gap 13 10 - 20 mmol/L    " Urea Nitrogen 26 (H) 6 - 23 mg/dL    Creatinine 1.34 (H) 0.50 - 1.05 mg/dL    eGFR 42 (L) >60 mL/min/1.73m*2    Calcium 8.8 8.6 - 10.3 mg/dL   POCT GLUCOSE   Result Value Ref Range    POCT Glucose 142 (H) 74 - 99 mg/dL     *Note: Due to a large number of results and/or encounters for the requested time period, some results have not been displayed. A complete set of results can be found in Results Review.     ECG 12 lead  Result Date: 5/2/2025  Atrial fibrillation with premature ventricular or aberrantly conducted complexes Inferior infarct (cited on or before 07-APR-2025) Anterolateral infarct (cited on or before 07-APR-2025) QTcB >= 480 msec Abnormal ECG When compared with ECG of 07-APR-2025 15:53, No significant change was found Confirmed by Yury Bajwa ([id number]) on 5/2/2025 2:28:41 PM    Transthoracic Echo (TTE) Limited  Result Date: 5/2/2025            [place] [address]             Phone [phone] TRANSTHORACIC ECHOCARDIOGRAM REPORT Patient Name:       MARTI VALLADARES      Reading Physician:    [id number] Joie Robison MD Study Date:         5/2/2025             Ordering Provider:    [id number] JEANINE BELLO MRN/PID:            [id number]             Fellow: Accession#:         [id number]         Nurse: Date of Birth/Age:  1950 / 74 years Sonographer:          Cari Ocampo RDCS Gender Assigned at  F                    Additional Staff: Birth: Height:             165.10 cm            Admit Date: Weight:             78.47 kg             Admission Status:     Inpatient -                                                                Routine BSA / BMI:          1.86 m2 / 28.79      Department Location:  [facility]                     kg/m2 Blood Pressure: 100 /68 mmHg Study Type:     TRANSTHORACIC ECHO (TTE) LIMITED Diagnosis/ICD: Chest pain, unspecified-R07.9 Indication:    dypsnea, chest pain CPT Codes:     Echo Limited-15992; Color Doppler-47044 Patient History: Pertinent History: Cardiomyopathy, AFIb, CHF, TIA, STEMI, HLD, acute MI, CKD,                    DM. Study Detail: The following Echo studies were performed: 2D, M-Mode, Doppler and               color flow. Technically challenging study due to patient lying in               supine position and body habitus. Definity used as a contrast               agent for endocardial border definition. Total contrast used for               this procedure was 3 mL via IV push.  Critical Event Critical Event: Test was completed as per department protocol. Critical Finding: Possible mass towards apex of LV. Notify Nasif through epic chat. Time Test was Completed: 10:30:00 AM Notified: Enriqueta. Attending notification time: 10:34:49 AM  PHYSICIAN INTERPRETATION: Left Ventricle: The left ventricular systolic function is moderately decreased, with a visually estimated ejection fraction of 30-35%. The patient is in atrial fibrillation which may influence the estimate of left ventricular function and transvalvular flows. Wall motion is abnormal. The left ventricular cavity size is normal. There is normal septal and mildly increased posterior left ventricular wall thickness. There is left ventricular concentric remodeling. Abnormal (paradoxical) septal motion, consistent with an intraventricular conduction delay. Left ventricular diastolic filling was indeterminate due to atrial fibrillation/flutter. The apical wall is akinetic with a large thrombus seen. Left Atrium: The left atrial size is mildly dilated. Right Ventricle: The right ventricle is moderately enlarged. There is reduced right ventricular systolic function. Right Atrium: The right atrial size is moderately dilated. Aortic Valve: The aortic valve appears bicuspid. There is no evidence of aortic  valve regurgitation. The aortic valve off axis. Possible bicuspid aortic valve. No evidence of aortic valve stenosis. No regurgitation. Mitral Valve: The mitral valve is normal in structure. There is trace mitral valve regurgitation. Tricuspid Valve: The tricuspid valve is structurally normal. There is trace tricuspid regurgitation. The Doppler estimated RVSP is slightly elevated right ventricular systolic pressure at 32.2 mmHg. Pulmonic Valve: The pulmonic valve is structurally normal. There is physiologic pulmonic valve regurgitation. Pericardium: Small pericardial effusion. Aorta: The aortic root is normal. Systemic Veins: The inferior vena cava appears normal in size, with IVC inspiratory collapse greater than 50%.  CONCLUSIONS:  1. The left ventricular systolic function is moderately decreased, with a visually estimated ejection fraction of 30-35%.  2. Abnormal wall motion.  3. Left ventricular diastolic filling was indeterminate due to atrial fibrillation/flutter.  4. The apical wall is akinetic with a large thrombus seen.  5. There is reduced right ventricular systolic function.  6. Moderately enlarged right ventricle.  7. The right atrial size is moderately dilated.  8. Trace mitral valve regurgitation.  9. Slightly elevated right ventricular systolic pressure. 10. Trace tricuspid regurgitation is visualized. 11. The aortic valve appears bicuspid. 12. The patient is in atrial fibrillation which may influence the estimate of left ventricular function and transvalvular flows. QUANTITATIVE DATA SUMMARY:  2D MEASUREMENTS:             Normal Ranges: LAs:             3.80 cm     (2.7-4.0cm) IVSd:            0.78 cm     (0.6-1.1cm) LVPWd:           1.17 cm     (0.6-1.1cm) LVIDd:           4.44 cm     (3.9-5.9cm) LVIDs:           3.88 cm LV Mass Index:   83.3 g/m2 LVEDV Index:     77.45 ml/m2 LV % FS          12.8 %  LEFT ATRIUM:                  Normal Ranges: LA Vol A4C:        90.2 ml    (22+/-6mL/m2) LA Vol A2C:         102.4 ml LA Vol BP:         96.3 ml LA Vol Index A4C:  48.5ml/m2 LA Vol Index A2C:  55.1 ml/m2 LA Vol Index BP:   51.8 ml/m2 LA Area A4C:       28.2 cm2 LA Area A2C:       30.1 cm2 LA Major Axis A4C: 7.5 cm LA Major Axis A2C: 7.5 cm LA Volume Index:   50.4 ml/m2 LA Vol A4C:        88.1 ml LA Vol A2C:        99.6 ml LA Vol Index BSA:  50.5 ml/m2  RIGHT ATRIUM:                 Normal Ranges: RA Vol A4C:        96.9 ml    (8.3-19.5ml) RA Vol Index A4C:  52.1 ml/m2 RA Area A4C:       29.0 cm2 RA Major Axis A4C: 7.4 cm  M-MODE MEASUREMENTS:         Normal Ranges: Ao Root:             2.50 cm (2.0-3.7cm) LAs:                 3.20 cm (2.7-4.0cm)  LV SYSTOLIC FUNCTION:                      Normal Ranges: EF-A4C View:    33 % (>=55%) EF-A2C View:    42 % EF-Biplane:     38 % EF-Visual:      33 % LV EF Reported: 33 %  LV DIASTOLIC FUNCTION:           Normal Ranges: MV Peak E:             1.15 m/s  (0.7-1.2 m/s) MV e'                  0.066 m/s (>8.0) MV lateral e'          0.07 m/s MV medial e'           0.06 m/s E/e' Ratio:            17.48     (<8.0)  MITRAL VALVE:          Normal Ranges: MV DT:        164 msec (150-240msec)  RIGHT VENTRICLE: RV Basal 4.60 cm RV Mid   4.04 cm RV Major 7.8 cm TAPSE:   15.2 mm RV s'    0.06 m/s  TRICUSPID VALVE/RVSP:          Normal Ranges: Peak TR Velocity:     2.70 m/s RV Syst Pressure:     32 mmHg  (< 30mmHg) IVC Diam:             1.73 cm  PULMONIC VALVE:          Normal Ranges: PV Accel Time:  87 msec  (>120ms) PV Max Tristan:     1.1 m/s  (0.6-0.9m/s) PV Max P.6 mmHg  [id number] Joie Robison MD Electronically signed on 2025 at 11:33:48 AM  ** Final **     XR shoulder left 2+ views  Result Date: 2025  Interpreted By:  Paty Florian, STUDY: XR SHOULDER LEFT 2+ VIEWS 2025 10:56 pm   INDICATION: Signs/Symptoms:pain and weakness. Follow-up fracture.   COMPARISON: 2025   ACCESSION NUMBER(S): [id number]   ORDERING CLINICIAN: JEANINE BELLO   TECHNIQUE: Three views of the  left shoulder   FINDINGS: There is a healing mildly displaced fracture of the proximal humeral diaphysis noted with the distal fracture fragment displaced medially by a distance of 4 mm. There is now some periosteal reaction about the fracture site.   There is postoperative change from CABG with coronary artery stent graft seen.       Healing mildly displaced fracture of the proximal humeral diaphysis.   Signed by: Paty Florian 5/2/2025 7:23 AM Dictation workstation:   [id number]    CT head wo IV contrast  Result Date: 5/1/2025  Interpreted By:  Jose Manuel Garcia, STUDY: CT HEAD WO IV CONTRAST;  5/1/2025 11:08 pm   INDICATION: Signs/Symptoms:confusion.   COMPARISON: None   ACCESSION NUMBER(S): [id number]   ORDERING CLINICIAN: JEANINE BELLO   TECHNIQUE: Contiguous axial images of the head were obtained without intravenous contrast.   FINDINGS: BRAIN PARENCHYMA:  There is cerebral atrophy and chronic periventricular white matter small vessel ischemic change. The gray white matter differentiation is preserved.  No mass effect or midline shift.   HEMORRHAGE:  No evidence of acute intracranial hemorrhage. VENTRICLES AND EXTRA-AXIAL SPACES:  The ventricles are within normal limits in size for brain volume.  No evidence of abnormal extraaxial fluid collection. EXTRACRANIAL SOFT TISSUES:  Within normal limits. PARANASAL SINUSES/MASTOIDS:  Mucosal thickening with mucosal retention cyst or polyp left maxillary sinus. CALVARIUM:  No evidence of depressed calvarial fracture.   OTHER FINDINGS:  None       Cerebral atrophy and chronic periventricular white matter small vessel ischemic change.   No evidence of acute intracranial hemorrhage.   Mucosal thickening with mucosal retention cyst or polyp in left maxillary sinus.   MACRO: None   Signed by: Jose Manuel Garcia 5/1/2025 11:41 PM Dictation workstation:   [id number]    XR chest 1 view  Result Date: 5/1/2025  STUDY: Chest Radiograph;  05/01/2025 5:23 PM INDICATION: Generalized  weakness. COMPARISON: XR chest 04/07/2025, 03/11/2023, 03/11/2021. ACCESSION NUMBER(S): [id number] ORDERING CLINICIAN: ELLE TURNER TECHNIQUE:  Frontal chest was obtained at 17:23 hours. FINDINGS: CARDIOMEDIASTINAL SILHOUETTE: There is mild cardiomegaly.  Sternotomy wires present.  LUNGS: Lungs are clear.  ABDOMEN: No remarkable upper abdominal findings.  BONES: No acute osseous changes.    No acute pulmonary abnormality. Signed by Brennan Hartman MD    CT abdomen pelvis wo IV contrast  Result Date: 5/1/2025  STUDY: CT Abdomen and Pelvis without IV Contrast; 05/01/2025, 4:25 PM. INDICATION: Nausea, diarrhea, generalized weakness. COMPARISON: CT CAP: 04/08/25; US pelvis: 03/16/23. ACCESSION NUMBER(S): [id number] ORDERING CLINICIAN: ELLE TURNER TECHNIQUE: CT of the abdomen and pelvis was performed.  Contiguous axial images were obtained at 3 mm slice thickness through the abdomen and pelvis. Coronal and sagittal reconstructions at 3 mm slice thickness were performed. No intravenous contrast was administered.  Automated mA/kV exposure control was utilized and patient examination was performed in strict accordance with principles of ALARA. FINDINGS: Please note that the evaluation of vessels, lymph nodes and organs is limited without intravenous contrast.  LOWER CHEST: Heart is enlarged with coronary artery calcifications  No pericardial effusion.  Lung bases are clear.  ABDOMEN:  LIVER: No hepatomegaly.  Smooth surface contour.  Normal attenuation.  BILE DUCTS: No intrahepatic or extrahepatic biliary ductal dilatation.  GALLBLADDER: The gallbladder is absent. STOMACH: No abnormalities identified.  PANCREAS: No masses or ductal dilatation.  SPLEEN: No splenomegaly or focal splenic lesion.  ADRENAL GLANDS: No thickening or nodules.  KIDNEYS AND URETERS: Kidneys are normal in size and location.  No renal or ureteral calculi.  PELVIS:  BLADDER: No abnormalities identified.  REPRODUCTIVE ORGANS: Enlarged fibroid uterus  again noted.  Overall appearance is unchanged.  BOWEL: No abnormalities identified.  Appendix is normal.  VESSELS: No abnormalities identified.  Abdominal aorta is normal in caliber. Moderate to severe plaque along the distal aorta.  PERITONEUM/RETROPERITONEUM/LYMPH NODES: No free fluid.  No pneumoperitoneum.  Small fat-containing umbilical hernia. No lymphadenopathy.  ABDOMINAL WALL: No abnormalities identified. SOFT TISSUES: No abnormalities identified.  BONES: Stable lytic lesions in the right hemisacrum and right 10th rib adjacent to the costotransverse junction unchanged from prior    1.No acute process. 2.Cardiomegaly with coronary artery calcifications. 3.Enlarged fibroid uterus again noted. Overall appearance is unchanged. 4.Stable lytic lesions in the right hemisacrum and right 10th rib adjacent to the costotransverse junction unchanged from prior.  As previously suggested, metastatic disease should be considered. Consider further evaluation with MRI or pathologic sampling. Signed by Juan Early MD        Assessment/Plan    IMP:    CHHAYA on CKD 3  Dehydration  UTI  Metastatic uterine cancer with pathologic fracture of the left humerus-saw GYN oncology yesterday, needs outpatient PET scan.  Patient would like to pursue treatment options if there are available options.  Systolic heart failure  Atrial fibrillation on Coumadin, echo indicating apical wall thrombus present, continue anticoagulation  COPD  Palliative care     DNR CCA/DNI  Capable  Now has hpoa in [place]     5/3  Ongoing disease modifying mode with plan for skilled nursing facility at discharge will need to see if can get PET while in skilled.  Is scheduled for May 7 for PET and CT.  Was put in contact with supportive oncology palliative care [place] appointments.  They will schedule follow-up when she is discharged from hospital.  Discussed scheduling Tylenol and using topical lidocaine cream since pain specifically to tailbone area, not  interested at this time, does like towel as buffer to skin to help with sling discomfort.  She desired to make friend Killian and Carlos Bravo- I spoke to Killian on phone he is agreeable as is his brother.    5/2  Family meeting today with patient and close friend Killian who lives with patient and attempts to help her.  After much discussion with friend and housecalls team as well as patient, not safe to discharge home at this time as she is left alone for periods of time, there is no bathroom on the first floor and she cannot navigate stairs.  PT OT has recommended moderate intensity, and [facility] is planning for rehab when medically stable.  Education provided to patient and close friend about process for working up cancer and current hospitalization including diagnoses and treatment plan.  Patient is very much okay with receiving IV fluids and antibiotics, she is also interested in learning if there are treatment options available for her newly diagnosed cancer.  She tells me that she lost her  2 months ago and she knows he is waiting on the other side for her and she is not afraid but she does want to live if there is quality of life.  She personally experienced her  being on a ventilator, and she tells me that she has no desire for CPR or intubation should she arrest.  She is interested in filling out healthcare power of  and a living will.  Treatment model of care within the boundaries of the established [place] DO NOT RESUSCITATE Comfort Care arrest/DO NOT INTUBATE.  Plan for rehab when medically stable.   Patient prefers clear simple and concise communication, she becomes easily overwhelmed when too much information is provided to her.  Per housecal team: Patient has an active  through Adult Protective Services [facility] [phone], she also has a  through the [place] [facility] aging that is attempting to get Medicaid and passport set up however not safe for  patient to discharge home in her current debilitated condition without a bathroom.  Patient is not opposed to discussing long-term care or assisted living through the Medicaid waiver program.       I spent 110 minutes in the professional and overall care of this patient incl 50 min acp.      Neelima Castellano, APRN-CNP

## 2025-05-03 NOTE — CONSULTS
Inpatient consult to Oncology  Consult performed by: Man Chambers MD  Consult ordered by: MARTÍN Langston-CNP  Reason for consult: Endometrial cancer  Assessment/Recommendations: Please see below      Newly dx metastatic endometrial cancer with bone mets. P/to ER with worsening fatigue.   She has L arm pain. Known humerus fracture. Was evaluated by Ortho.   Also saw Gyn Onc as outpatient.   Feels better today than yesterday.     Creatinine elevated. CBC generally OK with mild anemia. Tumor markers elevated.    - No recommendation during hospital stay  - As discussed with the patient by Gyn Onc this week, she needs outpatient PET/CT (scheduled for 5/7).   - CT c/a/p had not shown other mets besides bone mets.   - She may benefit from palliative radiation for bone pain. Pain is moderately well controlled at this time as per patient.  - I explained to the patient that systemic therapy has a relatively high chance of controlling her disease. She needs to fu with Gyn Onc as outpatient      Reason For Consult  Endometrial cancer    History Of Present Illness  Jing Sanchez is a 74 y.o. female presenting with fatigue.     Past Medical History  She has a past medical history of A-fib (Multi), Acute MI (Multi) (08/2016), ASHD (arteriosclerotic heart disease), CAD (coronary artery disease), CHF (congestive heart failure), DM (diabetes mellitus) (Multi), Dyslipidemia, H/O echocardiogram (04/2017), H/O echocardiogram (09/2018), History of nuclear stress test (02/2015), History of nuclear stress test (04/2016), HTN (hypertension), Obesity, Obstructive sleep apnea, Personal history of other specified conditions, S/P cardiac catheterization (06/2016), and SOB (shortness of breath).    Surgical History  She has a past surgical history that includes MR angio head wo IV contrast (04/06/2017); Cardiac catheterization (09/04/2013); Tumor removal; Colonoscopy (04/2015); Coronary artery bypass graft (10/2016); and  "Cholecystectomy (03/11/2021).     Social History  She reports that she has never smoked. She has never been exposed to tobacco smoke. She has never used smokeless tobacco. She reports that she does not currently use alcohol. She reports that she does not use drugs.    Family History  Family History[1]     Allergies  Cetirizine    Review of Systems   Constitutional:  Positive for fatigue and unexpected weight change. Negative for fever.   HENT: Negative.     Gastrointestinal: Negative.    Musculoskeletal:  Positive for myalgias.        Physical Exam  HENT:      Head: Normocephalic and atraumatic.   Eyes:      General: No scleral icterus.  Pulmonary:      Effort: Pulmonary effort is normal.   Musculoskeletal:         General: Normal range of motion.   Skin:     Coloration: Skin is not jaundiced.   Neurological:      General: No focal deficit present.      Mental Status: She is alert and oriented to person, place, and time.          Last Recorded Vitals  Blood pressure 127/68, pulse 92, temperature 37.1 °C (98.8 °F), temperature source Temporal, resp. rate 17, height 1.651 m (5' 5\"), weight 78.5 kg (173 lb), SpO2 100%.    Relevant Results  See above     Assessment/Plan     See above    I spent 50 minutes in the professional and overall care of this patient.               [1]   Family History  Problem Relation Name Age of Onset    Hypertension Mother      Cancer Mother      Diabetes Father      Stroke Father      Diabetes Sister      Heart disease Maternal Grandmother      Heart disease Maternal Grandfather      Heart disease Paternal Grandmother      Heart disease Paternal Grandfather       "

## 2025-05-04 LAB
ANION GAP SERPL CALCULATED.3IONS-SCNC: 12 MMOL/L (ref 10–20)
BACTERIA BLD CULT: NORMAL
BACTERIA BLD CULT: NORMAL
BUN SERPL-MCNC: 21 MG/DL (ref 6–23)
CALCIUM SERPL-MCNC: 9.2 MG/DL (ref 8.6–10.3)
CHLORIDE SERPL-SCNC: 107 MMOL/L (ref 98–107)
CO2 SERPL-SCNC: 25 MMOL/L (ref 21–32)
CREAT SERPL-MCNC: 1.16 MG/DL (ref 0.5–1.05)
EGFRCR SERPLBLD CKD-EPI 2021: 50 ML/MIN/1.73M*2
ERYTHROCYTE [DISTWIDTH] IN BLOOD BY AUTOMATED COUNT: 13.9 % (ref 11.5–14.5)
GLUCOSE BLD MANUAL STRIP-MCNC: 101 MG/DL (ref 74–99)
GLUCOSE BLD MANUAL STRIP-MCNC: 176 MG/DL (ref 74–99)
GLUCOSE BLD MANUAL STRIP-MCNC: 190 MG/DL (ref 74–99)
GLUCOSE BLD MANUAL STRIP-MCNC: 202 MG/DL (ref 74–99)
GLUCOSE SERPL-MCNC: 108 MG/DL (ref 74–99)
HCT VFR BLD AUTO: 32.4 % (ref 36–46)
HGB BLD-MCNC: 10.1 G/DL (ref 12–16)
INR PPP: 1.8 (ref 0.9–1.2)
MCH RBC QN AUTO: 33.7 PG (ref 26–34)
MCHC RBC AUTO-ENTMCNC: 31.2 G/DL (ref 32–36)
MCV RBC AUTO: 108 FL (ref 80–100)
NRBC BLD-RTO: 0 /100 WBCS (ref 0–0)
PLATELET # BLD AUTO: 203 X10*3/UL (ref 150–450)
POTASSIUM SERPL-SCNC: 3.7 MMOL/L (ref 3.5–5.3)
PROTHROMBIN TIME: 18.9 SECONDS (ref 9.3–12.7)
RBC # BLD AUTO: 3 X10*6/UL (ref 4–5.2)
SODIUM SERPL-SCNC: 140 MMOL/L (ref 136–145)
WBC # BLD AUTO: 8.3 X10*3/UL (ref 4.4–11.3)

## 2025-05-04 PROCEDURE — 2500000001 HC RX 250 WO HCPCS SELF ADMINISTERED DRUGS (ALT 637 FOR MEDICARE OP): Performed by: NURSE PRACTITIONER

## 2025-05-04 PROCEDURE — 85027 COMPLETE CBC AUTOMATED: CPT | Performed by: NURSE PRACTITIONER

## 2025-05-04 PROCEDURE — 82947 ASSAY GLUCOSE BLOOD QUANT: CPT

## 2025-05-04 PROCEDURE — 80048 BASIC METABOLIC PNL TOTAL CA: CPT | Performed by: NURSE PRACTITIONER

## 2025-05-04 PROCEDURE — 82374 ASSAY BLOOD CARBON DIOXIDE: CPT | Performed by: NURSE PRACTITIONER

## 2025-05-04 PROCEDURE — 36415 COLL VENOUS BLD VENIPUNCTURE: CPT | Performed by: NURSE PRACTITIONER

## 2025-05-04 PROCEDURE — 1200000002 HC GENERAL ROOM WITH TELEMETRY DAILY

## 2025-05-04 PROCEDURE — 99232 SBSQ HOSP IP/OBS MODERATE 35: CPT | Performed by: NURSE PRACTITIONER

## 2025-05-04 PROCEDURE — 85610 PROTHROMBIN TIME: CPT | Performed by: NURSE PRACTITIONER

## 2025-05-04 PROCEDURE — 2500000002 HC RX 250 W HCPCS SELF ADMINISTERED DRUGS (ALT 637 FOR MEDICARE OP, ALT 636 FOR OP/ED)

## 2025-05-04 PROCEDURE — 2500000004 HC RX 250 GENERAL PHARMACY W/ HCPCS (ALT 636 FOR OP/ED): Mod: JZ | Performed by: NURSE PRACTITIONER

## 2025-05-04 RX ORDER — DOCUSATE SODIUM 100 MG/1
100 CAPSULE, LIQUID FILLED ORAL 2 TIMES DAILY
Status: DISPENSED | OUTPATIENT
Start: 2025-05-04

## 2025-05-04 RX ADMIN — INSULIN LISPRO 2 UNITS: 100 INJECTION, SOLUTION INTRAVENOUS; SUBCUTANEOUS at 12:13

## 2025-05-04 RX ADMIN — LOSARTAN POTASSIUM 25 MG: 25 TABLET, FILM COATED ORAL at 08:50

## 2025-05-04 RX ADMIN — ACETAMINOPHEN 650 MG: 325 TABLET ORAL at 08:50

## 2025-05-04 RX ADMIN — INSULIN LISPRO 4 UNITS: 100 INJECTION, SOLUTION INTRAVENOUS; SUBCUTANEOUS at 16:14

## 2025-05-04 RX ADMIN — PANTOPRAZOLE SODIUM 40 MG: 40 TABLET, DELAYED RELEASE ORAL at 06:10

## 2025-05-04 RX ADMIN — NYSTATIN 1 APPLICATION: 100000 POWDER TOPICAL at 08:50

## 2025-05-04 RX ADMIN — EMPAGLIFLOZIN 10 MG: 10 TABLET, FILM COATED ORAL at 08:50

## 2025-05-04 RX ADMIN — DOCUSATE SODIUM 100 MG: 100 CAPSULE, LIQUID FILLED ORAL at 20:34

## 2025-05-04 RX ADMIN — DOCUSATE SODIUM 100 MG: 100 CAPSULE, LIQUID FILLED ORAL at 09:58

## 2025-05-04 RX ADMIN — CARVEDILOL 3.12 MG: 3.12 TABLET, FILM COATED ORAL at 08:50

## 2025-05-04 RX ADMIN — OXYCODONE 5 MG: 5 TABLET ORAL at 20:33

## 2025-05-04 RX ADMIN — WARFARIN SODIUM 2 MG: 2 TABLET ORAL at 17:43

## 2025-05-04 RX ADMIN — CARVEDILOL 3.12 MG: 3.12 TABLET, FILM COATED ORAL at 20:34

## 2025-05-04 RX ADMIN — NYSTATIN 1 APPLICATION: 100000 POWDER TOPICAL at 20:34

## 2025-05-04 RX ADMIN — ACETAMINOPHEN 650 MG: 325 TABLET ORAL at 20:34

## 2025-05-04 RX ADMIN — DIPHENHYDRAMINE HYDROCHLORIDE 25 MG: 25 TABLET ORAL at 02:33

## 2025-05-04 RX ADMIN — CEFTRIAXONE 1 G: 1 INJECTION, SOLUTION INTRAVENOUS at 08:50

## 2025-05-04 ASSESSMENT — PAIN DESCRIPTION - LOCATION
LOCATION: BACK
LOCATION: ARM

## 2025-05-04 ASSESSMENT — COGNITIVE AND FUNCTIONAL STATUS - GENERAL
PERSONAL GROOMING: A LITTLE
TOILETING: A LITTLE
DRESSING REGULAR UPPER BODY CLOTHING: A LITTLE
TURNING FROM BACK TO SIDE WHILE IN FLAT BAD: A LITTLE
MOBILITY SCORE: 18
DRESSING REGULAR LOWER BODY CLOTHING: A LITTLE
CLIMB 3 TO 5 STEPS WITH RAILING: A LITTLE
MOVING FROM LYING ON BACK TO SITTING ON SIDE OF FLAT BED WITH BEDRAILS: A LITTLE
HELP NEEDED FOR BATHING: A LITTLE
MOVING TO AND FROM BED TO CHAIR: A LITTLE
WALKING IN HOSPITAL ROOM: A LITTLE
EATING MEALS: A LITTLE
STANDING UP FROM CHAIR USING ARMS: A LITTLE
DAILY ACTIVITIY SCORE: 18

## 2025-05-04 ASSESSMENT — PAIN DESCRIPTION - ORIENTATION
ORIENTATION: MID
ORIENTATION: LEFT

## 2025-05-04 ASSESSMENT — PAIN - FUNCTIONAL ASSESSMENT
PAIN_FUNCTIONAL_ASSESSMENT: UNABLE TO SELF-REPORT
PAIN_FUNCTIONAL_ASSESSMENT: 0-10
PAIN_FUNCTIONAL_ASSESSMENT: 0-10
PAIN_FUNCTIONAL_ASSESSMENT: UNABLE TO SELF-REPORT
PAIN_FUNCTIONAL_ASSESSMENT: 0-10
PAIN_FUNCTIONAL_ASSESSMENT: 0-10

## 2025-05-04 ASSESSMENT — PAIN SCALES - GENERAL
PAINLEVEL_OUTOF10: 8
PAINLEVEL_OUTOF10: 0 - NO PAIN
PAINLEVEL_OUTOF10: 3
PAINLEVEL_OUTOF10: 0 - NO PAIN

## 2025-05-04 ASSESSMENT — PAIN DESCRIPTION - DESCRIPTORS: DESCRIPTORS: DISCOMFORT

## 2025-05-04 NOTE — NURSING NOTE
Patient states that she received her original copy of her H POA and living will paperwork yesterday and had her POA Killian take it to her house. Copy in physical chart and scanned into epic as well as her DNR form.

## 2025-05-04 NOTE — PROGRESS NOTES
05/04/25 1000   Discharge Planning   Assistance Needed Plan is DC to SNF   Home or Post Acute Services Post acute facilities (Rehab/SNF/etc)   Expected Discharge Disposition SNF  ([place] - auth needed)   Does the patient need discharge transport arranged? Yes   RoundTrip coordination needed? Yes   Has discharge transport been arranged? No   What day is the transport expected? 05/05/25     Confirmed with provider to submit for auth.   Secure chat sent to [facility] to submit for auth.    1312:  ADDENDUM  Per [facility], Auth submitted.

## 2025-05-04 NOTE — CARE PLAN
The patient's goals for the shift include comfort    The clinical goals for the shift include Safety, monitor labs and vitals    Problem: Fall/Injury  Goal: Not fall by end of shift  Outcome: Progressing  Goal: Be free from injury by end of the shift  Outcome: Progressing  Goal: Verbalize understanding of personal risk factors for fall in the hospital  Outcome: Progressing  Goal: Verbalize understanding of risk factor reduction measures to prevent injury from fall in the home  Outcome: Progressing  Goal: Use assistive devices by end of the shift  Outcome: Progressing  Goal: Pace activities to prevent fatigue by end of the shift  Outcome: Progressing     Problem: Pain  Goal: Takes deep breaths with improved pain control throughout the shift  Outcome: Progressing  Goal: Turns in bed with improved pain control throughout the shift  Outcome: Progressing  Goal: Walks with improved pain control throughout the shift  Outcome: Progressing  Goal: Performs ADL's with improved pain control throughout shift  Outcome: Progressing  Goal: Participates in PT with improved pain control throughout the shift  Outcome: Progressing  Goal: Free from opioid side effects throughout the shift  Outcome: Progressing  Goal: Free from acute confusion related to pain meds throughout the shift  Outcome: Progressing       Problem: Skin  Goal: Decreased wound size/increased tissue granulation at next dressing change  Outcome: Not Progressing  Flowsheets (Taken 5/4/2025 1130)  Decreased wound size/increased tissue granulation at next dressing change: Promote sleep for wound healing

## 2025-05-04 NOTE — NURSING NOTE
Assumed care of patient. patient is sitting in the chair. Call bell and possessions within reach. Chair alarm on.

## 2025-05-04 NOTE — CARE PLAN
The patient's goals for the shift include comfort    The clinical goals for the shift include maintain safety, stable hemodyanmics        Problem: Diabetes  Goal: Achieve decreasing blood glucose levels by end of shift  Outcome: Progressing  Goal: Increase stability of blood glucose readings by end of shift  Outcome: Progressing  Goal: Decrease in ketones present in urine by end of shift  Outcome: Progressing  Goal: Maintain electrolyte levels within acceptable range throughout shift  Outcome: Progressing  Goal: Maintain glucose levels >70mg/dl to <250mg/dl throughout shift  Outcome: Progressing  Goal: No changes in neurological exam by end of shift  Outcome: Progressing  Goal: Learn about and adhere to nutrition recommendations by end of shift  Outcome: Progressing  Goal: Vital signs within normal range for age by end of shift  Outcome: Progressing  Goal: Increase self care and/or family involovement by end of shift  Outcome: Progressing  Goal: Receive DSME education by end of shift  Outcome: Progressing     Problem: Pain - Adult  Goal: Verbalizes/displays adequate comfort level or baseline comfort level  Outcome: Progressing     Problem: Safety - Adult  Goal: Free from fall injury  Outcome: Progressing     Problem: Discharge Planning  Goal: Discharge to home or other facility with appropriate resources  Outcome: Progressing     Problem: Chronic Conditions and Co-morbidities  Goal: Patient's chronic conditions and co-morbidity symptoms are monitored and maintained or improved  Outcome: Progressing     Problem: Nutrition  Goal: Nutrient intake appropriate for maintaining nutritional needs  Outcome: Progressing     Problem: Skin  Goal: Decreased wound size/increased tissue granulation at next dressing change  Outcome: Progressing  Flowsheets (Taken 5/4/2025 2977)  Decreased wound size/increased tissue granulation at next dressing change:   Protective dressings over bony prominences   Promote sleep for wound  healing  Goal: Participates in plan/prevention/treatment measures  Outcome: Progressing  Flowsheets (Taken 5/4/2025 0434)  Participates in plan/prevention/treatment measures:   Elevate heels   Increase activity/out of bed for meals  Goal: Prevent/manage excess moisture  Outcome: Progressing  Flowsheets (Taken 5/4/2025 0434)  Prevent/manage excess moisture:   Moisturize dry skin   Follow provider orders for dressing changes   Monitor for/manage infection if present   Cleanse incontinence/protect with barrier cream  Goal: Prevent/minimize sheer/friction injuries  Outcome: Progressing  Flowsheets (Taken 5/4/2025 0434)  Prevent/minimize sheer/friction injuries:   Use pull sheet   Increase activity/out of bed for meals   HOB 30 degrees or less  Goal: Promote/optimize nutrition  Outcome: Progressing  Flowsheets (Taken 5/4/2025 0434)  Promote/optimize nutrition:   Monitor/record intake including meals   Consume > 50% meals/supplements   Offer water/supplements/favorite foods  Goal: Promote skin healing  Outcome: Progressing  Flowsheets (Taken 5/4/2025 0434)  Promote skin healing:   Assess skin/pad under line(s)/device(s)   Protective dressings over bony prominences     Problem: Fall/Injury  Goal: Not fall by end of shift  Outcome: Progressing  Goal: Be free from injury by end of the shift  Outcome: Progressing  Goal: Verbalize understanding of personal risk factors for fall in the hospital  Outcome: Progressing  Goal: Verbalize understanding of risk factor reduction measures to prevent injury from fall in the home  Outcome: Progressing  Goal: Use assistive devices by end of the shift  Outcome: Progressing  Goal: Pace activities to prevent fatigue by end of the shift  Outcome: Progressing     Problem: Pain  Goal: Takes deep breaths with improved pain control throughout the shift  Outcome: Progressing  Goal: Turns in bed with improved pain control throughout the shift  Outcome: Progressing  Goal: Walks with improved pain  control throughout the shift  Outcome: Progressing  Goal: Performs ADL's with improved pain control throughout shift  Outcome: Progressing  Goal: Participates in PT with improved pain control throughout the shift  Outcome: Progressing  Goal: Free from opioid side effects throughout the shift  Outcome: Progressing  Goal: Free from acute confusion related to pain meds throughout the shift  Outcome: Progressing     Problem: Deep Vein Thrombosis  Goal: I will remain free from complications of deep vein thrombosis and maintain current level of mobility  Outcome: Progressing     Problem: Heart Failure  Goal: Improved urinary output this shift  Outcome: Progressing  Goal: Reduction in peripheral edema within 24 hours  Outcome: Progressing  Goal: Report improvement of dyspnea/breathlessness this shift  Outcome: Progressing  Goal: Weight from fluid excess reduced over 2-3 days, then stabilize  Outcome: Progressing  Goal: Increase self care and/or family involvement in 24 hours  Outcome: Progressing     Problem: Infective UTI/pyelonephritis  Goal: Manages/understands urgency, continence  Outcome: Progressing  Goal: No signs of neurosensory, musculoskeletal, cardiac changes  Outcome: Progressing  Goal: No worsening signs of infection  Outcome: Progressing  Goal: Stable weight and I&O  Outcome: Progressing

## 2025-05-04 NOTE — PROGRESS NOTES
Jing Sanchez is a 74 y.o. female on day 3 of admission presenting with Malaise and fatigue.      Subjective   Patient seen and examined. Sitting up in the chair. States she has some back and tailbone pain, offered tylenol and she will take. Afebrile.        Objective     Last Recorded Vitals  /88 (BP Location: Right arm, Patient Position: Sitting)   Pulse 98   Temp 36.6 °C (97.9 °F) (Temporal)   Resp 19   Wt 78.5 kg (173 lb)   SpO2 95%   Intake/Output last 3 Shifts:    Intake/Output Summary (Last 24 hours) at 5/4/2025 0829  Last data filed at 5/4/2025 0600  Gross per 24 hour   Intake 590 ml   Output 800 ml   Net -210 ml       Admission Weight  Weight: 78.5 kg (173 lb) (05/01/25 1556)    Daily Weight  05/01/25 : 78.5 kg (173 lb)    Image Results    No new imaging to review.     Physical Exam    General: Alert and oriented x3, pleasant.   Cardiac: Irregular rate and rhythm, S1/S2 , no murmur.   Pulmonary: Clear to auscultation on room air.   Abdomen: Soft, round, nontender. BS +x4.   Extremities: No edema. LUE in sling.   Skin: No rashes or lesions.    Relevant Results    Scheduled medications  Scheduled Medications[1]  Continuous medications  Continuous Medications[2]  PRN medications  PRN Medications[3]     Results for orders placed or performed during the hospital encounter of 05/01/25 (from the past 24 hours)   Urinalysis with Reflex Culture and Microscopic   Result Value Ref Range    Color, Urine Light-Yellow Light-Yellow, Yellow, Dark-Yellow    Appearance, Urine Turbid (N) Clear    Specific Gravity, Urine 1.025 1.005 - 1.035    pH, Urine 5.5 5.0, 5.5, 6.0, 6.5, 7.0, 7.5, 8.0    Protein, Urine 10 (TRACE) NEGATIVE, 10 (TRACE), 20 (TRACE) mg/dL    Glucose, Urine OVER (4+) (A) Normal mg/dL    Blood, Urine 0.5 (2+) (A) NEGATIVE mg/dL    Ketones, Urine NEGATIVE NEGATIVE mg/dL    Bilirubin, Urine NEGATIVE NEGATIVE mg/dL    Urobilinogen, Urine Normal Normal mg/dL    Nitrite, Urine NEGATIVE NEGATIVE     Leukocyte Esterase, Urine 500 Michelle/uL (A) NEGATIVE   Extra Urine Gray Tube   Result Value Ref Range    Extra Tube Hold for add-ons.    Microscopic Only, Urine   Result Value Ref Range    WBC, Urine >50 (A) 1-5, NONE /HPF    RBC, Urine >20 (A) NONE, 1-2, 3-5 /HPF    Squamous Epithelial Cells, Urine 1-9 (SPARSE) Reference range not established. /HPF    Bacteria, Urine 1+ (A) NONE SEEN /HPF   POCT GLUCOSE   Result Value Ref Range    POCT Glucose 309 (H) 74 - 99 mg/dL   POCT GLUCOSE   Result Value Ref Range    POCT Glucose 198 (H) 74 - 99 mg/dL   POCT GLUCOSE   Result Value Ref Range    POCT Glucose 143 (H) 74 - 99 mg/dL   CBC   Result Value Ref Range    WBC 8.3 4.4 - 11.3 x10*3/uL    nRBC 0.0 0.0 - 0.0 /100 WBCs    RBC 3.00 (L) 4.00 - 5.20 x10*6/uL    Hemoglobin 10.1 (L) 12.0 - 16.0 g/dL    Hematocrit 32.4 (L) 36.0 - 46.0 %     (H) 80 - 100 fL    MCH 33.7 26.0 - 34.0 pg    MCHC 31.2 (L) 32.0 - 36.0 g/dL    RDW 13.9 11.5 - 14.5 %    Platelets 203 150 - 450 x10*3/uL   Protime-INR   Result Value Ref Range    Protime 18.9 (H) 9.3 - 12.7 seconds    INR 1.8 (H) 0.9 - 1.2   Basic Metabolic Panel   Result Value Ref Range    Glucose 108 (H) 74 - 99 mg/dL    Sodium 140 136 - 145 mmol/L    Potassium 3.7 3.5 - 5.3 mmol/L    Chloride 107 98 - 107 mmol/L    Bicarbonate 25 21 - 32 mmol/L    Anion Gap 12 10 - 20 mmol/L    Urea Nitrogen 21 6 - 23 mg/dL    Creatinine 1.16 (H) 0.50 - 1.05 mg/dL    eGFR 50 (L) >60 mL/min/1.73m*2    Calcium 9.2 8.6 - 10.3 mg/dL   POCT GLUCOSE   Result Value Ref Range    POCT Glucose 101 (H) 74 - 99 mg/dL     *Note: Due to a large number of results and/or encounters for the requested time period, some results have not been displayed. A complete set of results can be found in Results Review.            Assessment and Plan     Malaise / Fatigue / Weakness  -Likely multifactorial from UTI, mild CHHAYA, recently found cancer.   -PT/OT recommending moderate intensity therapy.   -Monitor with treating  underlying issues. States she feels some improvement.  -Fall precautions.      UTI  -UA suggests UTI. Urine culture showed contamination, another urine culture sent and is pending.   -Continue IV ceftriaxone.      CHHAYA on CKD  -Looks as though her baseline creat ~1.5-1.6, she was 1.8 on admission.   -Creat down 1.16 this AM. Monitor.   -Gentle IVF hydration given, will hold off on any further fluid, she is eating and drinking.   -Holding home dose torsemide, losartan for now. Will resume the losartan today.      Endometrial Cancer with Bone Mets  -New diagnosis, just saw Oncology outpatient, no definitive treatment plan as of yet per patient. She does have PET scan scheduled for 5/7.   -Oncology saw here in the hospital, no inpatient needs at this time.    -She is having some intermittent vaginal bleeding.   -She also has appt with ortho oncology for the bone mets.      Left Humerus Fracture  -Ortho saw, no intervention needed.    -Maintain sling and will need follow up with Dr. Galvez- ortho onc.   -Pain control.      Atrial Fibrillation   -On warfarin- Monitor INR.    -Continue BB.   -Monitor on telemetry.      DM 2  -HgA1c 7.7.   -Continue SSI and Jardiance.   -Monitor blood sugars.     Chronic Systolic CHF  -Echo done which shows low EF 30-35% with apical wall thrombus. Discussed with Dr. Robison, he states they do not need to see unless she develops symptoms of chest pain or SOB. He recommends continuing her warfarin and monitoring INR.    -Continue her BB.   -Holding ARB and diuretic for now, will resume her losartan today and BP is rising and creat improved.   -Appears euvolemic at this time.  -Monitor volume status closely.      DVT Risk  -On Warfarin, continue. Monitor INR.      Plan  Oncology, Ortho saw- no acute interventions needed inpatient.   Palliative care follows. POA paperwork done with patient yesterday.    Continue IV abx for UTI, await follow up urine culture.   Stable off IVF. Continue to hold  diuretic today, will resume her losartan.  Maintain sling to the LUE and PRN med for pain.  PT/OT recommends moderate intensity therapy. Plan will be for SNF.   She will need to follow up with GYN onc and ortho onc outpatient and has PET scan already scheduled.   Will need precert for SNF.        Carlotta Kent, APRN-CNP           [1] carvedilol, 3.125 mg, oral, BID  cefTRIAXone, 1 g, intravenous, Once  cefTRIAXone, 1 g, intravenous, q24h  empagliflozin, 10 mg, oral, Daily  insulin lispro, 0-10 Units, subcutaneous, TID  [Held by provider] losartan, 25 mg, oral, Daily  nystatin, 1 Application, Topical, BID  pantoprazole, 40 mg, oral, Daily before breakfast  [Held by provider] torsemide, 20 mg, oral, Daily  warfarin, 2 mg, oral, Daily  [2]    [3] PRN medications: acetaminophen **OR** acetaminophen **OR** acetaminophen, benzocaine-menthol, bisacodyl, dextrose, dextrose, diphenhydrAMINE, glucagon, glucagon, ondansetron ODT **OR** ondansetron, oxyCODONE, polyethylene glycol, prochlorperazine **OR** prochlorperazine **OR** prochlorperazine

## 2025-05-04 NOTE — PROGRESS NOTES
Jing Sanchez is a 74 y.o. female on day 3 of admission presenting with Malaise and fatigue.    Subjective   Symptoms (0 - 10, Best to Worst)  [place] Symptom Assessment System  0-10 (Numeric) Pain Score: 3  Patient resting in bed had been reluctant to get out of bed due to feeling very sleepy and in some discomfort.  Still remains reluctant to take Tylenol.  Nurse was able to convince her to take some as she also had back pain from being in bed for too long.  Discussed with patient that she needs to maintain her activity and her functional status if she wants to be eligible for treatment of her cancer and wants to try to get better and live long.  She confirms that these are her goals but states she did not get much sleep last night.  She states she will get up for lunch after her nap.  She has no questions and hopes to discharge soon.       Objective     Physical Exam  Constitutional:       General: Patient is not in acute distress.  HENT:      Head: Normocephalic.      Mouth: Mucous membranes are moist. Mild strabismus.  Eyes:      Conjunctiva/sclera: Conjunctivae clear, sclerae white. No discharge.     Pupils: Pupils are equal, round, and reactive to light.   Neck:      Vascular: No carotid bruit.   Cardiovascular:      Rate and Rhythm: Normal rate and irregular rhythm.      Heart sounds: No murmur heard.  Pulmonary:      Effort: No respiratory distress.      Breath sounds: Clear to auscultation  Abdominal:      General: There is no distension.      Tenderness: There is no abdominal tenderness. There is no guarding.   Urology:     Genitalia: normal genitalia    Urine: urine yellow and clear  Musculoskeletal:         General: Known left humerus fracture arm in sling.  Baseline right arm weakness at baseline.  Skin:     Coloration: Skin is not jaundiced.   Neurological:      General: No focal deficit present.      Mental Status: pt is oriented to person, place, and time.   Psychiatric:         Behavior:  "Behavior normal. Behavior is cooperative.   Last Recorded Vitals  Blood pressure 136/88, pulse 98, temperature 36.6 °C (97.9 °F), temperature source Temporal, resp. rate 19, height 1.651 m (5' 5\"), weight 78.5 kg (173 lb), SpO2 95%.  Intake/Output last 3 Shifts:  I/O last 3 completed shifts:  In: 2178.3 (27.8 mL/kg) [P.O.:1140; I.V.:988.3 (12.6 mL/kg); IV Piggyback:50]  Out: 800 (10.2 mL/kg) [Urine:800 (0.3 mL/kg/hr)]  Weight: 78.5 kg     Relevant Results  Results for orders placed or performed during the hospital encounter of 05/01/25 (from the past 24 hours)   Urinalysis with Reflex Culture and Microscopic   Result Value Ref Range    Color, Urine Light-Yellow Light-Yellow, Yellow, Dark-Yellow    Appearance, Urine Turbid (N) Clear    Specific Gravity, Urine 1.025 1.005 - 1.035    pH, Urine 5.5 5.0, 5.5, 6.0, 6.5, 7.0, 7.5, 8.0    Protein, Urine 10 (TRACE) NEGATIVE, 10 (TRACE), 20 (TRACE) mg/dL    Glucose, Urine OVER (4+) (A) Normal mg/dL    Blood, Urine 0.5 (2+) (A) NEGATIVE mg/dL    Ketones, Urine NEGATIVE NEGATIVE mg/dL    Bilirubin, Urine NEGATIVE NEGATIVE mg/dL    Urobilinogen, Urine Normal Normal mg/dL    Nitrite, Urine NEGATIVE NEGATIVE    Leukocyte Esterase, Urine 500 Michelle/uL (A) NEGATIVE   Extra Urine Gray Tube   Result Value Ref Range    Extra Tube Hold for add-ons.    Microscopic Only, Urine   Result Value Ref Range    WBC, Urine >50 (A) 1-5, NONE /HPF    RBC, Urine >20 (A) NONE, 1-2, 3-5 /HPF    Squamous Epithelial Cells, Urine 1-9 (SPARSE) Reference range not established. /HPF    Bacteria, Urine 1+ (A) NONE SEEN /HPF   POCT GLUCOSE   Result Value Ref Range    POCT Glucose 309 (H) 74 - 99 mg/dL   POCT GLUCOSE   Result Value Ref Range    POCT Glucose 198 (H) 74 - 99 mg/dL   POCT GLUCOSE   Result Value Ref Range    POCT Glucose 143 (H) 74 - 99 mg/dL   CBC   Result Value Ref Range    WBC 8.3 4.4 - 11.3 x10*3/uL    nRBC 0.0 0.0 - 0.0 /100 WBCs    RBC 3.00 (L) 4.00 - 5.20 x10*6/uL    Hemoglobin 10.1 (L) 12.0 - " 16.0 g/dL    Hematocrit 32.4 (L) 36.0 - 46.0 %     (H) 80 - 100 fL    MCH 33.7 26.0 - 34.0 pg    MCHC 31.2 (L) 32.0 - 36.0 g/dL    RDW 13.9 11.5 - 14.5 %    Platelets 203 150 - 450 x10*3/uL   Protime-INR   Result Value Ref Range    Protime 18.9 (H) 9.3 - 12.7 seconds    INR 1.8 (H) 0.9 - 1.2   Basic Metabolic Panel   Result Value Ref Range    Glucose 108 (H) 74 - 99 mg/dL    Sodium 140 136 - 145 mmol/L    Potassium 3.7 3.5 - 5.3 mmol/L    Chloride 107 98 - 107 mmol/L    Bicarbonate 25 21 - 32 mmol/L    Anion Gap 12 10 - 20 mmol/L    Urea Nitrogen 21 6 - 23 mg/dL    Creatinine 1.16 (H) 0.50 - 1.05 mg/dL    eGFR 50 (L) >60 mL/min/1.73m*2    Calcium 9.2 8.6 - 10.3 mg/dL   POCT GLUCOSE   Result Value Ref Range    POCT Glucose 101 (H) 74 - 99 mg/dL     *Note: Due to a large number of results and/or encounters for the requested time period, some results have not been displayed. A complete set of results can be found in Results Review.         Assessment/Plan    IMP:    CHHAYA on CKD 3  Dehydration  UTI  Metastatic uterine cancer with pathologic fracture of the left humerus-saw GYN oncology yesterday, needs outpatient PET scan.  Patient would like to pursue treatment options if there are available options.  Systolic heart failure  Atrial fibrillation on Coumadin, echo indicating apical wall thrombus present, continue anticoagulation  COPD  Palliative care     DNR CCA/DNI  Capable  Now has hpoa in [place]     5/4  No change in plans.  Awaiting urine cultures prior to discharge so can be discharged on appropriate antibiotics.  Ongoing disease modifying mode with goal for skilled nursing facility and PET scan outpatient which is scheduled for Wednesday.  Discussed with hospitalist that we need to clarify if skilled nursing facility will be able to send her to PET scan.  Patient confirms her plan confirms [facility] has copy of H POA and other copy was scanned into chart.  Discussed with nurse to also provide a copy  of DNR and POA at discharge to [facility] nursing facility    5/3  Ongoing disease modifying mode with plan for skilled nursing facility at discharge will need to see if can get PET while in skilled.  Is scheduled for May 7 for PET and CT.  Was put in contact with supportive oncology palliative care [facility] appointments.  They will schedule follow-up when she is discharged from hospital.  Discussed scheduling Tylenol and using topical lidocaine cream since pain specifically to tailbone area, not interested at this time, does like towel as buffer to skin to help with sling discomfort.  She desired to make friend Killian and Carlso Bravo- I spoke to Killian on phone he is agreeable as is his brother.     5/2  Family meeting today with patient and close friend Killian who lives with patient and attempts to help her.  After much discussion with friend and housecalls team as well as patient, not safe to discharge home at this time as she is left alone for periods of time, there is no bathroom on the first floor and she cannot navigate stairs.  PT OT has recommended moderate intensity, and [facility] is planning for rehab when medically stable.  Education provided to patient and close friend about process for working up cancer and current hospitalization including diagnoses and treatment plan.  Patient is very much okay with receiving IV fluids and antibiotics, she is also interested in learning if there are treatment options available for her newly diagnosed cancer.  She tells me that she lost her  2 months ago and she knows he is waiting on the other side for her and she is not afraid but she does want to live if there is quality of life.  She personally experienced her  being on a ventilator, and she tells me that she has no desire for CPR or intubation should she arrest.  She is interested in filling out healthcare power of  and a living will.  Treatment model of care within the boundaries  of the established [place] DO NOT RESUSCITATE Com[facility] Care arrest/DO NOT INTUBATE.  Plan for rehab when medically stable.   Patient prefers clear simple and concise communication, she becomes easily overwhelmed when too much information is provided to her.  Per [facility] team: Patient has an active  through Adult Protective Services [facility] [phone], she also has a  through the [place] on aging that is attempting to get Medicaid and passport set up however not safe for patient to discharge home in her current debilitated condition without a bathroom.  Patient is not opposed to discussing long-term care or assisted living through the Medicaid waiver program.          I spent 45 minutes in the professional and overall care of this patient.      Neelima Castellano, APRN-CNP

## 2025-05-05 VITALS
WEIGHT: 173 LBS | SYSTOLIC BLOOD PRESSURE: 95 MMHG | TEMPERATURE: 98.8 F | HEART RATE: 88 BPM | RESPIRATION RATE: 19 BRPM | OXYGEN SATURATION: 96 % | BODY MASS INDEX: 28.82 KG/M2 | HEIGHT: 65 IN | DIASTOLIC BLOOD PRESSURE: 62 MMHG

## 2025-05-05 LAB
ANION GAP SERPL CALCULATED.3IONS-SCNC: 13 MMOL/L (ref 10–20)
BACTERIA UR CULT: NORMAL
BUN SERPL-MCNC: 19 MG/DL (ref 6–23)
CALCIUM SERPL-MCNC: 9 MG/DL (ref 8.6–10.3)
CHLORIDE SERPL-SCNC: 106 MMOL/L (ref 98–107)
CO2 SERPL-SCNC: 25 MMOL/L (ref 21–32)
CREAT SERPL-MCNC: 1.15 MG/DL (ref 0.5–1.05)
EGFRCR SERPLBLD CKD-EPI 2021: 50 ML/MIN/1.73M*2
ERYTHROCYTE [DISTWIDTH] IN BLOOD BY AUTOMATED COUNT: 13.8 % (ref 11.5–14.5)
GLUCOSE BLD MANUAL STRIP-MCNC: 122 MG/DL (ref 74–99)
GLUCOSE BLD MANUAL STRIP-MCNC: 147 MG/DL (ref 74–99)
GLUCOSE BLD MANUAL STRIP-MCNC: 224 MG/DL (ref 74–99)
GLUCOSE BLD MANUAL STRIP-MCNC: 224 MG/DL (ref 74–99)
GLUCOSE BLD MANUAL STRIP-MCNC: 258 MG/DL (ref 74–99)
GLUCOSE SERPL-MCNC: 118 MG/DL (ref 74–99)
HCT VFR BLD AUTO: 29.7 % (ref 36–46)
HGB BLD-MCNC: 9.4 G/DL (ref 12–16)
INR PPP: 2.1 (ref 0.9–1.2)
MCH RBC QN AUTO: 33.2 PG (ref 26–34)
MCHC RBC AUTO-ENTMCNC: 31.6 G/DL (ref 32–36)
MCV RBC AUTO: 105 FL (ref 80–100)
NRBC BLD-RTO: 0 /100 WBCS (ref 0–0)
PLATELET # BLD AUTO: 202 X10*3/UL (ref 150–450)
POTASSIUM SERPL-SCNC: 3.6 MMOL/L (ref 3.5–5.3)
PROTHROMBIN TIME: 21.2 SECONDS (ref 9.3–12.7)
RBC # BLD AUTO: 2.83 X10*6/UL (ref 4–5.2)
SODIUM SERPL-SCNC: 140 MMOL/L (ref 136–145)
WBC # BLD AUTO: 7 X10*3/UL (ref 4.4–11.3)

## 2025-05-05 PROCEDURE — 2500000004 HC RX 250 GENERAL PHARMACY W/ HCPCS (ALT 636 FOR OP/ED): Mod: JZ | Performed by: NURSE PRACTITIONER

## 2025-05-05 PROCEDURE — 97530 THERAPEUTIC ACTIVITIES: CPT | Mod: GO,CO

## 2025-05-05 PROCEDURE — 2500000001 HC RX 250 WO HCPCS SELF ADMINISTERED DRUGS (ALT 637 FOR MEDICARE OP): Performed by: NURSE PRACTITIONER

## 2025-05-05 PROCEDURE — 80048 BASIC METABOLIC PNL TOTAL CA: CPT | Performed by: NURSE PRACTITIONER

## 2025-05-05 PROCEDURE — 97110 THERAPEUTIC EXERCISES: CPT | Mod: GO,CO

## 2025-05-05 PROCEDURE — 97535 SELF CARE MNGMENT TRAINING: CPT | Mod: GO,CO

## 2025-05-05 PROCEDURE — 97110 THERAPEUTIC EXERCISES: CPT | Mod: GP,CQ

## 2025-05-05 PROCEDURE — 1100000001 HC PRIVATE ROOM DAILY

## 2025-05-05 PROCEDURE — 99232 SBSQ HOSP IP/OBS MODERATE 35: CPT | Performed by: NURSE PRACTITIONER

## 2025-05-05 PROCEDURE — 85610 PROTHROMBIN TIME: CPT | Performed by: NURSE PRACTITIONER

## 2025-05-05 PROCEDURE — 82947 ASSAY GLUCOSE BLOOD QUANT: CPT

## 2025-05-05 PROCEDURE — 97530 THERAPEUTIC ACTIVITIES: CPT | Mod: GP,CQ

## 2025-05-05 PROCEDURE — 2500000002 HC RX 250 W HCPCS SELF ADMINISTERED DRUGS (ALT 637 FOR MEDICARE OP, ALT 636 FOR OP/ED)

## 2025-05-05 PROCEDURE — 99232 SBSQ HOSP IP/OBS MODERATE 35: CPT

## 2025-05-05 PROCEDURE — 85027 COMPLETE CBC AUTOMATED: CPT | Performed by: NURSE PRACTITIONER

## 2025-05-05 PROCEDURE — 36415 COLL VENOUS BLD VENIPUNCTURE: CPT | Performed by: NURSE PRACTITIONER

## 2025-05-05 PROCEDURE — 2500000001 HC RX 250 WO HCPCS SELF ADMINISTERED DRUGS (ALT 637 FOR MEDICARE OP)

## 2025-05-05 RX ORDER — DOCUSATE SODIUM 100 MG/1
200 CAPSULE, LIQUID FILLED ORAL DAILY PRN
Status: DISCONTINUED | OUTPATIENT
Start: 2025-05-05 | End: 2025-05-07 | Stop reason: HOSPADM

## 2025-05-05 RX ORDER — AMOXICILLIN 250 MG
2 CAPSULE ORAL NIGHTLY
Status: DISCONTINUED | OUTPATIENT
Start: 2025-05-05 | End: 2025-05-07 | Stop reason: HOSPADM

## 2025-05-05 RX ADMIN — CARVEDILOL 3.12 MG: 3.12 TABLET, FILM COATED ORAL at 21:19

## 2025-05-05 RX ADMIN — SENNOSIDES AND DOCUSATE SODIUM 2 TABLET: 50; 8.6 TABLET ORAL at 21:19

## 2025-05-05 RX ADMIN — OXYCODONE 5 MG: 5 TABLET ORAL at 10:10

## 2025-05-05 RX ADMIN — WARFARIN SODIUM 2 MG: 2 TABLET ORAL at 17:52

## 2025-05-05 RX ADMIN — INSULIN LISPRO 4 UNITS: 100 INJECTION, SOLUTION INTRAVENOUS; SUBCUTANEOUS at 16:31

## 2025-05-05 RX ADMIN — EMPAGLIFLOZIN 10 MG: 10 TABLET, FILM COATED ORAL at 08:53

## 2025-05-05 RX ADMIN — CEFTRIAXONE 1 G: 1 INJECTION, SOLUTION INTRAVENOUS at 08:55

## 2025-05-05 RX ADMIN — NYSTATIN 1 APPLICATION: 100000 POWDER TOPICAL at 08:54

## 2025-05-05 RX ADMIN — ONDANSETRON 4 MG: 2 INJECTION, SOLUTION INTRAMUSCULAR; INTRAVENOUS at 11:53

## 2025-05-05 RX ADMIN — NYSTATIN 1 APPLICATION: 100000 POWDER TOPICAL at 21:19

## 2025-05-05 RX ADMIN — LOSARTAN POTASSIUM 25 MG: 25 TABLET, FILM COATED ORAL at 08:54

## 2025-05-05 RX ADMIN — CARVEDILOL 3.12 MG: 3.12 TABLET, FILM COATED ORAL at 08:53

## 2025-05-05 RX ADMIN — PANTOPRAZOLE SODIUM 40 MG: 40 TABLET, DELAYED RELEASE ORAL at 06:03

## 2025-05-05 ASSESSMENT — PAIN SCALES - GENERAL
PAINLEVEL_OUTOF10: 8
PAINLEVEL_OUTOF10: 7
PAINLEVEL_OUTOF10: 8
PAINLEVEL_OUTOF10: 8
PAINLEVEL_OUTOF10: 0 - NO PAIN
PAINLEVEL_OUTOF10: 0 - NO PAIN

## 2025-05-05 ASSESSMENT — PAIN DESCRIPTION - LOCATION: LOCATION: ARM

## 2025-05-05 ASSESSMENT — COGNITIVE AND FUNCTIONAL STATUS - GENERAL
TOILETING: A LOT
DAILY ACTIVITIY SCORE: 14
PERSONAL GROOMING: A LITTLE
EATING MEALS: A LITTLE
TURNING FROM BACK TO SIDE WHILE IN FLAT BAD: A LOT
STANDING UP FROM CHAIR USING ARMS: A LITTLE
DRESSING REGULAR LOWER BODY CLOTHING: A LOT
CLIMB 3 TO 5 STEPS WITH RAILING: TOTAL
DRESSING REGULAR UPPER BODY CLOTHING: A LOT
HELP NEEDED FOR BATHING: A LOT
MOVING FROM LYING ON BACK TO SITTING ON SIDE OF FLAT BED WITH BEDRAILS: A LITTLE
WALKING IN HOSPITAL ROOM: A LOT
MOVING TO AND FROM BED TO CHAIR: A LITTLE
MOBILITY SCORE: 14

## 2025-05-05 ASSESSMENT — ACTIVITIES OF DAILY LIVING (ADL): HOME_MANAGEMENT_TIME_ENTRY: 20

## 2025-05-05 ASSESSMENT — PAIN - FUNCTIONAL ASSESSMENT
PAIN_FUNCTIONAL_ASSESSMENT: 0-10

## 2025-05-05 ASSESSMENT — PAIN DESCRIPTION - DESCRIPTORS: DESCRIPTORS: ACHING

## 2025-05-05 ASSESSMENT — PAIN DESCRIPTION - ORIENTATION: ORIENTATION: LEFT

## 2025-05-05 NOTE — NURSING NOTE
Patient refusing noon insulin coverage, pt states she is nauseated and wants to skip lunch, zofran given iv per prn order

## 2025-05-05 NOTE — PROGRESS NOTES
05/05/25 0829   Discharge Planning   Expected Discharge Disposition SNF   Does the patient need discharge transport arranged? Yes   RoundTrip coordination needed? Yes   Has discharge transport been arranged? No     [facility] notified that  Direct Submission team was unable to submit for authorization. [facility] notified of the same and requested they initiate authorization.     10:36 AM  [facility] updated to plan as above per their inquiry.     [facility] also confirmed they will initiate authorization.     11:28 AM  [facility] did submit for authorization and insurance is requesting updated therapy notes. Updated PT note sent at this time. Will send OT when available.    2:33 PM  OT note sent to [facility] at this time. Care Transitions will continue to follow.

## 2025-05-05 NOTE — ASSESSMENT & PLAN NOTE
X ray shows Pathologic fracture left humeral shaft.   Continue left arm sling per orthopedic surgeon.

## 2025-05-05 NOTE — PROGRESS NOTES
Jing Sanchez is a 74 y.o. female on day 4 of admission presenting with Malaise and fatigue.      Subjective   Patient resting in bed without any complaints. Denies pain or sob.        Objective     Last Recorded Vitals  /85 (BP Location: Left arm, Patient Position: Lying)   Pulse 95   Temp 36.6 °C (97.9 °F) (Temporal)   Resp 18   Wt 78.5 kg (173 lb)   SpO2 96%   Intake/Output last 3 Shifts:    Intake/Output Summary (Last 24 hours) at 5/5/2025 1510  Last data filed at 5/5/2025 0900  Gross per 24 hour   Intake 500 ml   Output 200 ml   Net 300 ml       Admission Weight  Weight: 78.5 kg (173 lb) (05/01/25 1556)    Daily Weight  05/01/25 : 78.5 kg (173 lb)    Image Results  ECG 12 lead  Atrial fibrillation with premature ventricular or aberrantly conducted complexes  Inferior infarct (cited on or before 07-APR-2025)  Anterolateral infarct (cited on or before 07-APR-2025)  QTcB >= 480 msec  Abnormal ECG  When compared with ECG of 07-APR-2025 15:53,  No significant change was found  Confirmed by Yury Bajwa ([id number]) on 5/2/2025 2:28:41 PM  Transthoracic Echo (TTE) [place] [address]              Phone [phone]    TRANSTHORACIC ECHOCARDIOGRAM REPORT    Patient Name:       JING SANCHEZ      Reading Physician:    [id number] Joie Robison MD  Study Date:         5/2/2025             Ordering Provider:    [id number] JEANINE BELLO  MRN/PID:            [id number]             Fellow:  Accession#:         [id number]         Nurse:  Date of Birth/Age:  1950 / 74 years Sonographer:          Cari Ocampo RDCS  Gender Assigned at  F                    Additional Staff:  Birth:  Height:             165.10 cm            Admit Date:  Weight:             78.47 kg              Admission Status:     Inpatient -                                                                 Routine  BSA / BMI:          1.86 m2 / 28.79      [facility]                      kg/m2  Blood Pressure: 100 /68 mmHg    Study Type:    TRANSTHORACIC ECHO (TTE) LIMITED  Diagnosis/ICD: Chest pain, unspecified-R07.9  Indication:    dypsnea, chest pain  CPT Codes:     Echo Limited-71192; Color Doppler-16254    Patient History:  Pertinent History: Cardiomyopathy, AFIb, CHF, TIA, STEMI, HLD, acute MI, CKD,                     DM.    Study Detail: The following Echo studies were performed: 2D, M-Mode, Doppler and                color flow. Technically challenging study due to patient lying in                supine position and body habitus. Definity used as a contrast                agent for endocardial border definition. Total contrast used for                this procedure was 3 mL via IV push.       Critical Event  Critical Event: Test was completed as per department protocol.  Critical Finding: Possible mass towards apex of LV. Notify Nasif through epic chat.  Time Test was Completed: 10:30:00 AM  Notified: Enriqueta.  Attending notification time: 10:34:49 AM     PHYSICIAN INTERPRETATION:  Left Ventricle: The left ventricular systolic function is moderately decreased, with a visually estimated ejection fraction of 30-35%. The patient is in atrial fibrillation which may influence the estimate of left ventricular function and transvalvular flows. Wall motion is abnormal. The left ventricular cavity size is normal. There is normal septal and mildly increased posterior left ventricular wall thickness. There is left ventricular concentric remodeling. Abnormal (paradoxical) septal motion, consistent with an intraventricular conduction delay. Left ventricular diastolic filling was indeterminate due to atrial fibrillation/flutter. The apical wall is akinetic with a large thrombus seen.  Left Atrium:  The left atrial size is mildly dilated.  Right Ventricle: The right ventricle is moderately enlarged. There is reduced right ventricular systolic function.  Right Atrium: The right atrial size is moderately dilated.  Aortic Valve: The aortic valve appears bicuspid. There is no evidence of aortic valve regurgitation. The aortic valve off axis. Possible bicuspid aortic valve. No evidence of aortic valve stenosis. No regurgitation.  Mitral Valve: The mitral valve is normal in structure. There is trace mitral valve regurgitation.  Tricuspid Valve: The tricuspid valve is structurally normal. There is trace tricuspid regurgitation. The Doppler estimated RVSP is slightly elevated right ventricular systolic pressure at 32.2 mmHg.  Pulmonic Valve: The pulmonic valve is structurally normal. There is physiologic pulmonic valve regurgitation.  Pericardium: Small pericardial effusion.  Aorta: The aortic root is normal.  Systemic Veins: The inferior vena cava appears normal in size, with IVC inspiratory collapse greater than 50%.       CONCLUSIONS:   1. The left ventricular systolic function is moderately decreased, with a visually estimated ejection fraction of 30-35%.   2. Abnormal wall motion.   3. Left ventricular diastolic filling was indeterminate due to atrial fibrillation/flutter.   4. The apical wall is akinetic with a large thrombus seen.   5. There is reduced right ventricular systolic function.   6. Moderately enlarged right ventricle.   7. The right atrial size is moderately dilated.   8. Trace mitral valve regurgitation.   9. Slightly elevated right ventricular systolic pressure.  10. Trace tricuspid regurgitation is visualized.  11. The aortic valve appears bicuspid.  12. The patient is in atrial fibrillation which may influence the estimate of left ventricular function and transvalvular flows.    QUANTITATIVE DATA SUMMARY:     2D MEASUREMENTS:             Normal Ranges:  LAs:             3.80 cm      (2.7-4.0cm)  IVSd:            0.78 cm     (0.6-1.1cm)  LVPWd:           1.17 cm     (0.6-1.1cm)  LVIDd:           4.44 cm     (3.9-5.9cm)  LVIDs:           3.88 cm  LV Mass Index:   83.3 g/m2  LVEDV Index:     77.45 ml/m2  LV % FS          12.8 %       LEFT ATRIUM:                  Normal Ranges:  LA Vol A4C:        90.2 ml    (22+/-6mL/m2)  LA Vol A2C:        102.4 ml  LA Vol BP:         96.3 ml  LA Vol Index A4C:  48.5ml/m2  LA Vol Index A2C:  55.1 ml/m2  LA Vol Index BP:   51.8 ml/m2  LA Area A4C:       28.2 cm2  LA Area A2C:       30.1 cm2  LA Major Axis A4C: 7.5 cm  LA Major Axis A2C: 7.5 cm  LA Volume Index:   50.4 ml/m2  LA Vol A4C:        88.1 ml  LA Vol A2C:        99.6 ml  LA Vol Index BSA:  50.5 ml/m2       RIGHT ATRIUM:                 Normal Ranges:  RA Vol A4C:        96.9 ml    (8.3-19.5ml)  RA Vol Index A4C:  52.1 ml/m2  RA Area A4C:       29.0 cm2  RA Major Axis A4C: 7.4 cm       M-MODE MEASUREMENTS:         Normal Ranges:  Ao Root:             2.50 cm (2.0-3.7cm)  LAs:                 3.20 cm (2.7-4.0cm)       LV SYSTOLIC FUNCTION:                       Normal Ranges:  EF-A4C View:    33 % (>=55%)  EF-A2C View:    42 %  EF-Biplane:     38 %  EF-Visual:      33 %  LV EF Reported: 33 %       LV DIASTOLIC FUNCTION:           Normal Ranges:  MV Peak E:             1.15 m/s  (0.7-1.2 m/s)  MV e'                  0.066 m/s (>8.0)  MV lateral e'          0.07 m/s  MV medial e'           0.06 m/s  E/e' Ratio:            17.48     (<8.0)       MITRAL VALVE:          Normal Ranges:  MV DT:        164 msec (150-240msec)       RIGHT VENTRICLE:  RV Basal 4.60 cm  RV Mid   4.04 cm  RV Major 7.8 cm  TAPSE:   15.2 mm  RV s'    0.06 m/s       TRICUSPID VALVE/RVSP:          Normal Ranges:  Peak TR Velocity:     2.70 m/s  RV Syst Pressure:     32 mmHg  (< 30mmHg)  IVC Diam:             1.73 cm       PULMONIC VALVE:          Normal Ranges:  PV Accel Time:  87 msec  (>120ms)  PV Max Tristan:     1.1 m/s  (0.6-0.9m/s)  PV  Max P.6 mmHg       [id number] oJie Robison MD  Electronically signed on 2025 at 11:33:48 AM       ** Final **  XR shoulder left 2+ views  Narrative: Interpreted By:  Paty Florian,   STUDY:  XR SHOULDER LEFT 2+ VIEWS 2025 10:56 pm      INDICATION:  Signs/Symptoms:pain and weakness. Follow-up fracture.      COMPARISON:  2025      ACCESSION NUMBER(S):  [id number]      ORDERING CLINICIAN:  JEANINE BELLO      TECHNIQUE:  Three views of the left shoulder      FINDINGS:  There is a healing mildly displaced fracture of the proximal humeral  diaphysis noted with the distal fracture fragment displaced medially  by a distance of 4 mm. There is now some periosteal reaction about  the fracture site.      There is postoperative change from CABG with coronary artery stent  graft seen.      Impression: Healing mildly displaced fracture of the proximal humeral diaphysis.      Signed by: Paty Florian 2025 7:23 AM  Dictation workstation:   [id number]      Physical Exam  Constitutional:       Appearance: Normal appearance.   Cardiovascular:      Rate and Rhythm: Normal rate and regular rhythm.      Pulses: Normal pulses.      Heart sounds: Normal heart sounds.   Pulmonary:      Effort: Pulmonary effort is normal.      Breath sounds: Normal breath sounds.   Abdominal:      General: Bowel sounds are normal.      Palpations: Abdomen is soft.   Musculoskeletal:         General: Normal range of motion.   Skin:     General: Skin is warm and dry.   Neurological:      Mental Status: She is alert and oriented to person, place, and time.         Relevant Results      Results for orders placed or performed during the hospital encounter of 25 (from the past 24 hours)   POCT GLUCOSE   Result Value Ref Range    POCT Glucose 202 (H) 74 - 99 mg/dL   POCT GLUCOSE   Result Value Ref Range    POCT Glucose 176 (H) 74 - 99 mg/dL   Protime-INR   Result Value Ref Range    Protime 21.2 (H) 9.3 - 12.7 seconds    INR 2.1 (H) 0.9 -  1.2   CBC   Result Value Ref Range    WBC 7.0 4.4 - 11.3 x10*3/uL    nRBC 0.0 0.0 - 0.0 /100 WBCs    RBC 2.83 (L) 4.00 - 5.20 x10*6/uL    Hemoglobin 9.4 (L) 12.0 - 16.0 g/dL    Hematocrit 29.7 (L) 36.0 - 46.0 %     (H) 80 - 100 fL    MCH 33.2 26.0 - 34.0 pg    MCHC 31.6 (L) 32.0 - 36.0 g/dL    RDW 13.8 11.5 - 14.5 %    Platelets 202 150 - 450 x10*3/uL   Basic Metabolic Panel   Result Value Ref Range    Glucose 118 (H) 74 - 99 mg/dL    Sodium 140 136 - 145 mmol/L    Potassium 3.6 3.5 - 5.3 mmol/L    Chloride 106 98 - 107 mmol/L    Bicarbonate 25 21 - 32 mmol/L    Anion Gap 13 10 - 20 mmol/L    Urea Nitrogen 19 6 - 23 mg/dL    Creatinine 1.15 (H) 0.50 - 1.05 mg/dL    eGFR 50 (L) >60 mL/min/1.73m*2    Calcium 9.0 8.6 - 10.3 mg/dL   POCT GLUCOSE   Result Value Ref Range    POCT Glucose 122 (H) 74 - 99 mg/dL   POCT GLUCOSE   Result Value Ref Range    POCT Glucose 258 (H) 74 - 99 mg/dL     *Note: Due to a large number of results and/or encounters for the requested time period, some results have not been displayed. A complete set of results can be found in Results Review.                             Assessment & Plan  Malaise and fatigue  From multiple disease  Patient is declining  Consult palliative care  CT of Brain no acute changes  Asthenia  Fall precautions  PT OT  Chronic atrial fibrillation (Multi)  Order PT/INR start patient on Coumadin at home, 2.1 today  On telemetry  Heart rate is under control  Acute cystitis without hematuria  Stop Rocephin   Urine cultures 5/3 and 5/1 have no growth  Uterine cancer (Multi)   a new diagnosis for patient 2-3 weeks ago  Labs shows that patient has a cancer  Consult oncology    Left arm weakness  X ray shows Pathologic fracture left humeral shaft.   Continue left arm sling per orthopedic surgeon.   Hypothyroidism  Continue  with home meds  CHHAYA (acute kidney injury)  Creatinine baseline 1.8-1, resolving. This am creatinine was 1.15    Chronic systolic heart failure  Last  echo was done on 3/13/ 2023 with EF 30 to 35%      Plan of Care  Patient to be discharged to SNF when arrangements made.   Plan of care discussed with patient and collaborating physician.              Cari Leiva, MARTÍN-CNP

## 2025-05-05 NOTE — PROGRESS NOTES
Jing Sanchez is a 74 y.o. female on day 4 of admission presenting with Malaise and fatigue.    Subjective   Symptoms (0 - 10, Best to Worst)  [place] Symptom Assessment System  0-10 (Numeric) Pain Score: 0 - No pain  Patient reports left shoulder pain, currently rated as 8, as worst as 10 when she moves her left arm.  Requested oxycodone to relieve pain.  Reports chronic constipation, has refused scheduled Colace for fear of incontinence.  Receptive to beginning Senna-S bowel regimen.  Reports flatus and some abdominal bloating (that she contributes to eating a large breakfast).  Updates provided by bedside RNGloria     Objective     Physical Exam  Vitals and nursing note reviewed.   Constitutional:       Appearance: She is obese.   HENT:      Head: Normocephalic and atraumatic.      Mouth/Throat:      Mouth: Mucous membranes are dry.      Pharynx: Oropharynx is clear.      Comments: Poor dentition, with fractured upper teeth.  Denies pain  Eyes:      Extraocular Movements: Extraocular movements intact.      Conjunctiva/sclera: Conjunctivae normal.      Pupils: Pupils are equal, round, and reactive to light.   Cardiovascular:      Rate and Rhythm: Tachycardia present. Rhythm irregular.      Pulses: Normal pulses.      Heart sounds: Normal heart sounds.   Pulmonary:      Effort: Pulmonary effort is normal.      Breath sounds: Normal breath sounds.   Abdominal:      General: Bowel sounds are normal. There is distension.      Palpations: Abdomen is soft.   Musculoskeletal:         General: Normal range of motion.      Comments: Recent left humerus pathologic fracture, wearing sling   Skin:     General: Skin is warm and dry.      Capillary Refill: Capillary refill takes 2 to 3 seconds.      Coloration: Skin is pale.      Findings: Bruising present.   Neurological:      General: No focal deficit present.      Mental Status: She is alert and oriented to person, place, and time.      Motor: Weakness present.  "  Psychiatric:         Mood and Affect: Mood normal.         Behavior: Behavior normal.         Thought Content: Thought content normal.         Judgment: Judgment normal.         Last Recorded Vitals  Blood pressure 125/78, pulse 81, temperature 36.4 °C (97.5 °F), temperature source Temporal, resp. rate 18, height 1.651 m (5' 5\"), weight 78.5 kg (173 lb), SpO2 95%.  Intake/Output last 3 Shifts:  I/O last 3 completed shifts:  In: 980 (12.5 mL/kg) [P.O.:930; IV Piggyback:50]  Out: 600 (7.6 mL/kg) [Urine:600 (0.2 mL/kg/hr)]  Weight: 78.5 kg     Relevant Results  Results for orders placed or performed during the hospital encounter of 05/01/25 (from the past 24 hours)   POCT GLUCOSE   Result Value Ref Range    POCT Glucose 190 (H) 74 - 99 mg/dL   POCT GLUCOSE   Result Value Ref Range    POCT Glucose 202 (H) 74 - 99 mg/dL   POCT GLUCOSE   Result Value Ref Range    POCT Glucose 176 (H) 74 - 99 mg/dL   Protime-INR   Result Value Ref Range    Protime 21.2 (H) 9.3 - 12.7 seconds    INR 2.1 (H) 0.9 - 1.2   CBC   Result Value Ref Range    WBC 7.0 4.4 - 11.3 x10*3/uL    nRBC 0.0 0.0 - 0.0 /100 WBCs    RBC 2.83 (L) 4.00 - 5.20 x10*6/uL    Hemoglobin 9.4 (L) 12.0 - 16.0 g/dL    Hematocrit 29.7 (L) 36.0 - 46.0 %     (H) 80 - 100 fL    MCH 33.2 26.0 - 34.0 pg    MCHC 31.6 (L) 32.0 - 36.0 g/dL    RDW 13.8 11.5 - 14.5 %    Platelets 202 150 - 450 x10*3/uL   Basic Metabolic Panel   Result Value Ref Range    Glucose 118 (H) 74 - 99 mg/dL    Sodium 140 136 - 145 mmol/L    Potassium 3.6 3.5 - 5.3 mmol/L    Chloride 106 98 - 107 mmol/L    Bicarbonate 25 21 - 32 mmol/L    Anion Gap 13 10 - 20 mmol/L    Urea Nitrogen 19 6 - 23 mg/dL    Creatinine 1.15 (H) 0.50 - 1.05 mg/dL    eGFR 50 (L) >60 mL/min/1.73m*2    Calcium 9.0 8.6 - 10.3 mg/dL     *Note: Due to a large number of results and/or encounters for the requested time period, some results have not been displayed. A complete set of results can be found in Results Review.    "   Scheduled medications  Scheduled Medications[1]  Continuous medications  Continuous Medications[2]  PRN medications  PRN Medications[3]          Assessment/Plan   CHHAYA on CKD 3  Dehydration  UTI - on Ceftriaxone   Metastatic uterine cancer with pathologic fracture of the left humerus-saw GYN oncology yesterday, needs outpatient PET scan.  Patient would like to pursue treatment options if there are available options.  Systolic heart failure -most recent Echo (5/1/25) 30 to 35%  Atrial fibrillation on Coumadin, echo indicating apical wall thrombus present, continue anticoagulation  COPD  Palliative care     DNR CCA/DNI  Capable  Now has hpoa in [place]    5/5  [facility] preauthorization sent this morning (ANA Boswell, Care Transitions).  Goal is to be discharged to SNF either today or tomorrow (so that patient can complete PET diagnostic for staging uterine cancer on W, 5/7/25).   Minal's friend Killian, is coordinating her outpatient appointments.  Reached out to Dr. Melva Gaines (secure chat), patient's GYN oncologist to request follow-up appointment.    Senna-S added as bowel regimen for patient.  Patient reports bloating and some abdominal discomfort.  Of note, she has been refusing scheduled Colace for fear that she might have bowel incontinence.  Last known BM, Friday, 5/2/35.  Left shoulder pain persists especially with movement, and patient requested oxycodone this morning.  Last scheduled Tylenol dose given Sunday evening, 5/4.     Patient has been up to chair for her breakfast and worked with PT (for 30 minutes this morning). Discussed with hospitalist, MARTÍN Mcrae-RICHARD.  Palliative medicine will continue to follow.      5/4  Keeping colleague's note in place for continuity  No change in plans.  Awaiting urine cultures prior to discharge so can be discharged on appropriate antibiotics.  Ongoing disease modifying mode with goal for skilled nursing facility and PET scan outpatient which  is scheduled for Wednesday.  Discussed with hospitalist that we need to clarify if skilled nursing facility will be able to send her to PET scan.  Patient confirms her plan confirms [facility] has copy of H POA and other copy was scanned into chart.  Discussed with nurse to also provide a copy of DNR and POA at discharge to [facility]    I spent 42 non-continuous minutes in the professional and overall care of this patient.      Leticia Gayle, APRN-CNP           [1] carvedilol, 3.125 mg, oral, BID  cefTRIAXone, 1 g, intravenous, Once  cefTRIAXone, 1 g, intravenous, q24h  docusate sodium, 100 mg, oral, BID  empagliflozin, 10 mg, oral, Daily  insulin lispro, 0-10 Units, subcutaneous, TID  losartan, 25 mg, oral, Daily  nystatin, 1 Application, Topical, BID  pantoprazole, 40 mg, oral, Daily before breakfast  [Held by provider] torsemide, 20 mg, oral, Daily  warfarin, 2 mg, oral, Daily  [2]    [3] PRN medications: acetaminophen **OR** acetaminophen **OR** acetaminophen, benzocaine-menthol, bisacodyl, dextrose, dextrose, diphenhydrAMINE, glucagon, glucagon, ondansetron ODT **OR** ondansetron, oxyCODONE, polyethylene glycol, prochlorperazine **OR** prochlorperazine **OR** prochlorperazine

## 2025-05-05 NOTE — ASSESSMENT & PLAN NOTE
Order PT/INR start patient on Coumadin at home, 2.1 today  On telemetry  Heart rate is under control

## 2025-05-05 NOTE — PROGRESS NOTES
Occupational Therapy    Occupational Therapy Treatment    Name: Jing Sanchez  MRN: [id number]  Department: [address]  Room: [facility]  Date: 05/05/25  Time Calculation  Start Time: 1336  Stop Time: 1419  Time Calculation (min): 43 min   Plan:  Treatment Interventions: ADL retraining, Functional transfer training, UE strengthening/ROM, Endurance training, Equipment evaluation/education, Patient/family training, Neuromuscular reeducation, Compensatory technique education  OT Frequency: 3 times per week  OT Discharge Recommendations: Moderate intensity level of continued care  Equipment Recommended upon Discharge:  (TBD)  OT Recommended Transfer Status: Assist of 1  OT - OK to Discharge: Yes    Subjective   Previous Visit Info:  OT Last Visit  OT Received On: 05/05/25  General:  General  Prior to Session Communication: Bedside nurse  Patient Position Received: Bed, 2 rail up, Alarm off, not on at start of session  Preferred Learning Style: verbal, visual  General Comment: Cleared per nurse to see pt for OT. Pt agreeable with encouragement.  Precautions:  UE Weight Bearing Status: Left Non-Weight Bearing  Medical Precautions: Fall precautions  Prosthesis/Orthosis Used: Other (comment) (sling adjusted and in place at all times.)  Precautions Comment: sling on L UE    Pain Assessment:  Pain Assessment  Pain Assessment: 0-10  0-10 (Numeric) Pain Score: 8  Pain Type: Acute pain  Pain Location: Elbow  Pain Orientation: Left  Pain Interventions: Repositioned  Response to Interventions: Decrease in pain    Objective   Cognition:  Overall Cognitive Status: Within Functional Limits  Activities of Daily Living: Grooming  Grooming Comments: already completed    LE Dressing  LE Dressing: Yes  LE Dressing Adaptive Equipment: Reacher, Sock aide  Sock Level of Assistance: Moderate assistance  LE Dressing Where Assessed: Edge of bed  LE Dressing Comments: ;provided with written information for sock aide, has  reacher    Toileting  Toileting Level of Assistance: Maximum assistance  Where Assessed: Bed level  Toileting Comments: use of bed pan, encouraged to get OOB to BSC    Functional Standing Tolerance:  Functional Standing Tolerance  Time: 2  Activity: standing to side step to top of bed  Functional Standing Tolerance Comments: use of small based quad cane and CGA  Bed Mobility/Transfers: Bed Mobility  Bed Mobility: Yes  Bed Mobility 1  Bed Mobility 1: Sitting to supine  Level of Assistance 1: Close supervision, Minimal verbal cues (for hand placement)  Bed Mobility 2  Bed Mobility  2: Supine to sitting  Level of Assistance 2: Close supervision, Minimal verbal cues  Bed Mobility Comments 2: verbal cues for hand placement    Transfer 1  Transfer From 1: Sit to  Transfer to 1: Stand  Technique 1: Stand to sit  Transfer Device 1: Quad cane  Transfer Level of Assistance 1: Minimum assistance    Standing Balance:no LOB   Therapy/Activity: Therapeutic Exercise  Therapeutic Exercise Performed: Yes  IP THER EXER PROGRAM -Written copy provided as HEP  1x 10 reps with R UE only  Shoulder flexion  Shoulder abduction  Horizontal abduction  Bicep curls  FA supination/pronation  Tricep presses    Outcome Measures:  [facility] Daily Activity  Putting on and taking off regular lower body clothing: A lot  Bathing (including washing, rinsing, drying): A lot  Putting on and taking off regular upper body clothing: A lot  Toileting, which includes using toilet, bedpan or urinal: A lot  Taking care of personal grooming such as brushing teeth: A little  Eating Meals: A little  Daily Activity - Total Score: 14        Education Documentation  Handouts, taught by TORREY Valadez at 5/5/2025  2:16 PM.  Learner: Patient  Readiness: Acceptance  Method: Explanation, Demonstration, Handout  Response: Verbalizes Understanding, Needs Reinforcement    Body Mechanics, taught by TORREY Valadez at 5/5/2025  2:16 PM.  Learner: Patient  Readiness:  Acceptance  Method: Explanation, Demonstration, Handout  Response: Verbalizes Understanding, Needs Reinforcement    Precautions, taught by TORREY Valadez at 5/5/2025  2:16 PM.  Learner: Patient  Readiness: Acceptance  Method: Explanation, Demonstration, Handout  Response: Verbalizes Understanding, Needs Reinforcement    Home Exercise Program, taught by TORREY Valadez at 5/5/2025  2:16 PM.  Learner: Patient  Readiness: Acceptance  Method: Explanation, Demonstration, Handout  Response: Verbalizes Understanding, Needs Reinforcement    ADL Training, taught by TORREY Valadez at 5/5/2025  2:16 PM.  Learner: Patient  Readiness: Acceptance  Method: Explanation, Demonstration, Handout  Response: Verbalizes Understanding, Needs Reinforcement    Education Comments  No comments found.      Goals:  Encounter Problems       Encounter Problems (Active)       OT Goals       ADLs (Progressing)       Start:  05/02/25    Expected End:  05/16/25       Patient will complete ADL tasks, with supervision using AE as needed in order to increase patient's safety and independence with self-care tasks.           Functional Transfers (Progressing)       Start:  05/02/25    Expected End:  05/16/25       Patient will complete functional transfers with supervision using least restrictive device, in order to increase patient's safety and independence with daily tasks.           Activity Tolerance (Progressing)       Start:  05/02/25    Expected End:  05/16/25       Patient will demonstrate the ability to participate in functional activity at least >/= 20 minutes in order to increase patient's safety and independence with daily tasks.

## 2025-05-05 NOTE — CARE PLAN
Problem: Diabetes  Goal: Achieve decreasing blood glucose levels by end of shift  Outcome: Progressing  Goal: Increase stability of blood glucose readings by end of shift  Outcome: Progressing  Goal: Decrease in ketones present in urine by end of shift  Outcome: Progressing  Goal: Maintain electrolyte levels within acceptable range throughout shift  Outcome: Progressing  Goal: Maintain glucose levels >70mg/dl to <250mg/dl throughout shift  Outcome: Progressing  Goal: No changes in neurological exam by end of shift  Outcome: Progressing  Goal: Learn about and adhere to nutrition recommendations by end of shift  Outcome: Progressing  Goal: Vital signs within normal range for age by end of shift  Outcome: Progressing  Goal: Increase self care and/or family involovement by end of shift  Outcome: Progressing  Goal: Receive DSME education by end of shift  Outcome: Progressing   The patient's goals for the shift include comfort    The clinical goals for the shift include pt safety, pain control

## 2025-05-05 NOTE — ASSESSMENT & PLAN NOTE
Last echo was done on 3/13/ 2023 with EF 30 to 35%      Plan of Care  Patient to be discharged to SNF when arrangements made.   Plan of care discussed with patient and collaborating physician.

## 2025-05-05 NOTE — PROGRESS NOTES
Physical Therapy    Physical Therapy Treatment    Patient Name: Jing Sanchez  MRN: [id number]  Department: [address]  Room: [facility]-  Today's Date: 5/5/2025  Time Calculation  Start Time: 0921  Stop Time: 0946  Time Calculation (min): 25 min         Assessment/Plan   PT Assessment  PT Assessment Results: Decreased strength, Decreased range of motion, Decreased endurance, Impaired balance, Decreased mobility, Decreased coordination, Impaired judgement, Decreased safety awareness, Impaired vision, Decreased skin integrity, Pain  Rehab Prognosis: Good  Barriers to Discharge Home: Caregiver assistance, Physical needs, Cognition needs  Caregiver Assistance: Patient lives alone and/or does not have reliable caregiver assistance  Cognition Needs: Insight of patient limited regarding functional ability/needs, Recollection or understanding of precautions/restrictions limited  Physical Needs: 24hr mobility assistance needed, 24hr ADL assistance needed  Evaluation/Treatment Tolerance: Patient tolerated treatment well  Medical Staff Made Aware: Yes  Strengths: Premorbid level of function  Barriers to Participation: Comorbidities  End of Session Communication: Bedside nurse  Assessment Comment: Pt generally weak, gives effort. Reporting high pain level. Pt would benefit from continued PT services to progress mobility.  End of Session Patient Position: Bed, 3 rail up, Alarm on     PT Plan  Treatment/Interventions: Bed mobility, Transfer training, Gait training, Balance training, Strengthening, Endurance training, Therapeutic exercise, Therapeutic activity  PT Plan: Ongoing PT  PT Frequency: 4 times per week  PT Discharge Recommendations: Moderate intensity level of continued care  Equipment Recommended upon Discharge: Other (comment) (Pt would benefit from RW however unable at this time due to WBing restriction left UE.)  PT Recommended Transfer Status: Assist x1, Assistive device  PT - OK to Discharge: Yes      General Visit  Information:   PT  Visit  PT Received On: 05/05/25  General  Reason for Referral: Impaired Mobility-Malaise and fatigue  Referred By: Dr. Wu  Past Medical History Relevant to Rehab: uterine cancer, recent Lt humeral fx, afib, MI, HTN, RISHI, ASHD, CAD, CHF, DM  Prior to Session Communication: Bedside nurse  Patient Position Received: Up in chair, Alarm on  Preferred Learning Style: verbal, visual  General Comment: Cleared per nurse to see pt for PT. Pt agreeable with encouragement.    Subjective   Precautions:  Precautions  Hearing/Visual Limitations: Glasses. Hearing WFL  UE Weight Bearing Status: Left Non-Weight Bearing  Medical Precautions: Fall precautions  Precautions Comment: LUE in sling        Objective   Pain:  Pain Assessment  Pain Assessment: 0-10  0-10 (Numeric) Pain Score: 8  Pain Type: Acute pain  Pain Location: Arm  Pain Orientation: Left  Pain Interventions: Repositioned, Ambulation/increased activity (RN made aware of pain rating and request for tylenol)  Response to Interventions: Content/relaxed    Cognition:  Cognition  Overall Cognitive Status: Within Functional Limits  Orientation Level: Oriented X4  Following Commands: Follows multistep commands without difficulty    Activity Tolerance:  Activity Tolerance  Endurance: Decreased tolerance for upright activites    Treatments:  Therapeutic Exercise  Therapeutic Exercise Performed: Yes  Therapeutic Exercise Activity 1: Pt performed seated bilat LE ankle pumps, heel raises, glute sets, LAQ, hip flexion, dell hip adduction and resisted hip abduction x20 reps each. Rest breaks as needed.    Bed Mobility 1  Bed Mobility 1: Sitting to supine  Level of Assistance 1: Close supervision  Bed Mobility Comments 1: Pt struggled getting bilat LE's onto bed but able to complete without assist.    Ambulation/Gait Training 1  Surface 1: Level tile  Device 1: Small base quad cane  Assistance 1: Minimum assistance  Quality of Gait 1: Shuffling  "gait  Comments/Distance (ft) 1: Pt used SBQC to take ~6 turning steps then ~3 lateral steps to head of bed from chair to bedside, min assist of 1. Pt refused to try ambulating farther \"No, my stomach hurts because I ate too much and I just want to lay down\"    Transfer 1  Transfer From 1: Chair with arms to  Transfer to 1: Stand  Technique 1: Sit to stand  Transfer Device 1: Quad cane  Transfer Level of Assistance 1: Moderate assistance  Trials/Comments 1: Noted difficulty with sit to stand from low seated surface.  Transfers 2  Transfer From 2: Stand to  Transfer to 2: Sit, Bed  Technique 2: Stand to sit  Transfer Device 2: Quad cane  Transfer Level of Assistance 2: Minimum assistance    Outcome Measures:  [facility] Basic Mobility  Turning from your back to your side while in a flat bed without using bedrails: A little  Moving from lying on your back to sitting on the side of a flat bed without using bedrails: A lot  Moving to and from bed to chair (including a wheelchair): A little  Standing up from a chair using your arms (e.g. wheelchair or bedside chair): A little  To walk in hospital room: A lot  Climbing 3-5 steps with railing: Total  Basic Mobility - Total Score: 14    Education Documentation  Mobility Training, taught by Mariela Lemus PTA at 5/5/2025 10:01 AM.  Learner: Patient  Readiness: Acceptance  Method: Explanation  Response: Needs Reinforcement  Comment: Safety with mobility    Education Comments  No comments found.        OP EDUCATION:       Encounter Problems       Encounter Problems (Active)       Balance       Standing Balance (Progressing)       Start:  05/02/25    Expected End:  05/16/25       Pt will demonstrate good static standing balance to promote safe participation with out of bed activity, transfers, and mobility              Mobility       Ambulation (Progressing)       Start:  05/02/25    Expected End:  05/16/25       Pt will ambulate 50' modified independent assist with LRD to promote " safe home mobility              PT Transfers       Supine to sit (Progressing)       Start:  05/02/25    Expected End:  05/16/25       Pt will transfer supine to sitting at edge of bed with modified independent assist to promote acute care out of bed activity           Sit to stand (Progressing)       Start:  05/02/25    Expected End:  05/16/25       Pt will transfer sit to standing position with modified independent assist and LRD to promote safe out of bed activity           Bed to chair (Progressing)       Start:  05/02/25    Expected End:  05/16/25       Pt will transfer from sitting edge of bed to the chair with modified independent assist and LRD to promote out of bed activity and reduce the risks of prolonged acute care bedrest              Pain - Adult

## 2025-05-05 NOTE — ASSESSMENT & PLAN NOTE
From multiple disease  Patient is declining  Consult palliative care  CT of Brain no acute changes

## 2025-05-06 LAB
BACTERIA BLD CULT: NORMAL
BACTERIA BLD CULT: NORMAL
GLUCOSE BLD MANUAL STRIP-MCNC: 124 MG/DL (ref 74–99)
GLUCOSE BLD MANUAL STRIP-MCNC: 127 MG/DL (ref 74–99)
GLUCOSE BLD MANUAL STRIP-MCNC: 211 MG/DL (ref 74–99)
GLUCOSE BLD MANUAL STRIP-MCNC: 212 MG/DL (ref 74–99)

## 2025-05-06 PROCEDURE — 1100000001 HC PRIVATE ROOM DAILY

## 2025-05-06 PROCEDURE — 97110 THERAPEUTIC EXERCISES: CPT | Mod: GP,CQ

## 2025-05-06 PROCEDURE — 2500000001 HC RX 250 WO HCPCS SELF ADMINISTERED DRUGS (ALT 637 FOR MEDICARE OP): Performed by: NURSE PRACTITIONER

## 2025-05-06 PROCEDURE — 97116 GAIT TRAINING THERAPY: CPT | Mod: GP,CQ

## 2025-05-06 PROCEDURE — 82947 ASSAY GLUCOSE BLOOD QUANT: CPT

## 2025-05-06 PROCEDURE — 2500000002 HC RX 250 W HCPCS SELF ADMINISTERED DRUGS (ALT 637 FOR MEDICARE OP, ALT 636 FOR OP/ED)

## 2025-05-06 PROCEDURE — 2500000004 HC RX 250 GENERAL PHARMACY W/ HCPCS (ALT 636 FOR OP/ED): Performed by: NURSE PRACTITIONER

## 2025-05-06 PROCEDURE — 99239 HOSP IP/OBS DSCHRG MGMT >30: CPT | Performed by: NURSE PRACTITIONER

## 2025-05-06 PROCEDURE — 99232 SBSQ HOSP IP/OBS MODERATE 35: CPT

## 2025-05-06 PROCEDURE — 2500000001 HC RX 250 WO HCPCS SELF ADMINISTERED DRUGS (ALT 637 FOR MEDICARE OP)

## 2025-05-06 PROCEDURE — 97110 THERAPEUTIC EXERCISES: CPT | Mod: GO,CO

## 2025-05-06 RX ORDER — DOCUSATE SODIUM 100 MG/1
200 CAPSULE, LIQUID FILLED ORAL 2 TIMES DAILY
Qty: 60 CAPSULE | Refills: 0 | Status: SHIPPED | OUTPATIENT
Start: 2025-05-06

## 2025-05-06 RX ORDER — POLYETHYLENE GLYCOL 3350 17 G/17G
17 POWDER, FOR SOLUTION ORAL DAILY PRN
Start: 2025-05-06

## 2025-05-06 RX ORDER — NYSTATIN 100000 [USP'U]/G
1 POWDER TOPICAL 2 TIMES DAILY
Start: 2025-05-06

## 2025-05-06 RX ORDER — OXYCODONE HYDROCHLORIDE 5 MG/1
5 TABLET ORAL EVERY 6 HOURS PRN
Qty: 12 TABLET | Refills: 0 | Status: SHIPPED | OUTPATIENT
Start: 2025-05-06 | End: 2025-05-09

## 2025-05-06 RX ORDER — ACETAMINOPHEN 325 MG/1
650 TABLET ORAL EVERY 4 HOURS PRN
Qty: 30 TABLET | Refills: 0 | Status: SHIPPED | OUTPATIENT
Start: 2025-05-06

## 2025-05-06 RX ORDER — BISACODYL 10 MG/1
10 SUPPOSITORY RECTAL ONCE AS NEEDED
Status: COMPLETED | OUTPATIENT
Start: 2025-05-06 | End: 2025-05-06

## 2025-05-06 RX ORDER — WARFARIN 2 MG/1
TABLET ORAL
Start: 2025-05-06 | End: 2026-05-06

## 2025-05-06 RX ORDER — BISACODYL 5 MG
10 TABLET, DELAYED RELEASE (ENTERIC COATED) ORAL DAILY PRN
Start: 2025-05-06

## 2025-05-06 RX ADMIN — CARVEDILOL 3.12 MG: 3.12 TABLET, FILM COATED ORAL at 09:19

## 2025-05-06 RX ADMIN — EMPAGLIFLOZIN 10 MG: 10 TABLET, FILM COATED ORAL at 09:19

## 2025-05-06 RX ADMIN — PANTOPRAZOLE SODIUM 40 MG: 40 TABLET, DELAYED RELEASE ORAL at 05:19

## 2025-05-06 RX ADMIN — INSULIN LISPRO 4 UNITS: 100 INJECTION, SOLUTION INTRAVENOUS; SUBCUTANEOUS at 12:45

## 2025-05-06 RX ADMIN — CARVEDILOL 3.12 MG: 3.12 TABLET, FILM COATED ORAL at 19:37

## 2025-05-06 RX ADMIN — SENNOSIDES AND DOCUSATE SODIUM 2 TABLET: 50; 8.6 TABLET ORAL at 19:37

## 2025-05-06 RX ADMIN — WARFARIN SODIUM 2 MG: 2 TABLET ORAL at 17:44

## 2025-05-06 RX ADMIN — LOSARTAN POTASSIUM 25 MG: 25 TABLET, FILM COATED ORAL at 09:19

## 2025-05-06 RX ADMIN — OXYCODONE 5 MG: 5 TABLET ORAL at 09:19

## 2025-05-06 RX ADMIN — BISACODYL 10 MG: 10 SUPPOSITORY RECTAL at 10:31

## 2025-05-06 RX ADMIN — NYSTATIN 1 APPLICATION: 100000 POWDER TOPICAL at 19:37

## 2025-05-06 RX ADMIN — POLYETHYLENE GLYCOL 3350 17 G: 17 POWDER, FOR SOLUTION ORAL at 09:25

## 2025-05-06 ASSESSMENT — PAIN DESCRIPTION - ORIENTATION: ORIENTATION: LEFT

## 2025-05-06 ASSESSMENT — PAIN DESCRIPTION - LOCATION: LOCATION: ARM

## 2025-05-06 ASSESSMENT — COGNITIVE AND FUNCTIONAL STATUS - GENERAL
TOILETING: A LITTLE
STANDING UP FROM CHAIR USING ARMS: A LITTLE
DAILY ACTIVITIY SCORE: 14
WALKING IN HOSPITAL ROOM: A LITTLE
TURNING FROM BACK TO SIDE WHILE IN FLAT BAD: A LITTLE
PERSONAL GROOMING: A LITTLE
STANDING UP FROM CHAIR USING ARMS: A LOT
EATING MEALS: A LITTLE
MOVING TO AND FROM BED TO CHAIR: A LITTLE
PERSONAL GROOMING: A LITTLE
MOVING FROM LYING ON BACK TO SITTING ON SIDE OF FLAT BED WITH BEDRAILS: A LITTLE
MOVING TO AND FROM BED TO CHAIR: A LITTLE
CLIMB 3 TO 5 STEPS WITH RAILING: A LITTLE
HELP NEEDED FOR BATHING: A LITTLE
DRESSING REGULAR UPPER BODY CLOTHING: A LITTLE
DRESSING REGULAR LOWER BODY CLOTHING: A LITTLE
DAILY ACTIVITIY SCORE: 18
DRESSING REGULAR UPPER BODY CLOTHING: A LOT
WALKING IN HOSPITAL ROOM: A LITTLE
MOVING FROM LYING ON BACK TO SITTING ON SIDE OF FLAT BED WITH BEDRAILS: A LITTLE
CLIMB 3 TO 5 STEPS WITH RAILING: TOTAL
DRESSING REGULAR LOWER BODY CLOTHING: A LOT
TOILETING: A LOT
MOBILITY SCORE: 14
HELP NEEDED FOR BATHING: A LOT
TURNING FROM BACK TO SIDE WHILE IN FLAT BAD: A LOT
EATING MEALS: A LITTLE
MOBILITY SCORE: 18

## 2025-05-06 ASSESSMENT — PAIN SCALES - GENERAL
PAINLEVEL_OUTOF10: 6
PAINLEVEL_OUTOF10: 8
PAINLEVEL_OUTOF10: 0 - NO PAIN
PAINLEVEL_OUTOF10: 3
PAINLEVEL_OUTOF10: 4
PAINLEVEL_OUTOF10: 0 - NO PAIN

## 2025-05-06 ASSESSMENT — PAIN - FUNCTIONAL ASSESSMENT
PAIN_FUNCTIONAL_ASSESSMENT: 0-10

## 2025-05-06 NOTE — CARE PLAN
The patient's goals for the shift include comfort    The clinical goals for the shift include Safety      Problem: Diabetes  Goal: Achieve decreasing blood glucose levels by end of shift  Outcome: Progressing  Goal: Increase stability of blood glucose readings by end of shift  Outcome: Progressing  Goal: Decrease in ketones present in urine by end of shift  Outcome: Progressing  Goal: Maintain electrolyte levels within acceptable range throughout shift  Outcome: Progressing  Goal: Maintain glucose levels >70mg/dl to <250mg/dl throughout shift  Outcome: Progressing  Goal: No changes in neurological exam by end of shift  Outcome: Progressing  Goal: Learn about and adhere to nutrition recommendations by end of shift  Outcome: Progressing  Goal: Vital signs within normal range for age by end of shift  Outcome: Progressing  Goal: Increase self care and/or family involovement by end of shift  Outcome: Progressing  Goal: Receive DSME education by end of shift  Outcome: Progressing     Problem: Pain - Adult  Goal: Verbalizes/displays adequate comfort level or baseline comfort level  Outcome: Progressing     Problem: Discharge Planning  Goal: Discharge to home or other facility with appropriate resources  Outcome: Progressing     Problem: Skin  Goal: Decreased wound size/increased tissue granulation at next dressing change  Outcome: Progressing  Goal: Participates in plan/prevention/treatment measures  Outcome: Progressing  Goal: Prevent/manage excess moisture  Outcome: Progressing  Goal: Prevent/minimize sheer/friction injuries  Outcome: Progressing  Goal: Promote/optimize nutrition  Outcome: Progressing  Goal: Promote skin healing  Outcome: Progressing

## 2025-05-06 NOTE — DISCHARGE SUMMARY
"Discharge Diagnosis  Malaise and fatigue           Issues Requiring Follow-Up  Follow up with PCP    Discharge Meds     Medication List      START taking these medications     acetaminophen 325 mg tablet; Commonly known as: Tylenol; Take 2 tablets   (650 mg) by mouth every 4 hours if needed for mild pain (1 - 3).   bisacodyl 5 mg EC tablet; Commonly known as: Dulcolax; Take 2 tablets   (10 mg) by mouth once daily as needed for constipation. Do not crush,   chew, or split.   docusate sodium 100 mg capsule; Commonly known as: Colace; Take 2   capsules (200 mg) by mouth 2 times a day.   oxyCODONE 5 mg immediate release tablet; Commonly known as: Roxicodone;   Take 1 tablet (5 mg) by mouth every 6 hours if needed for severe pain (7 -   10) for up to 3 days.   polyethylene glycol 17 gram packet; Commonly known as: Glycolax,   Miralax; Take 17 g by mouth once daily as needed (constipation).     CHANGE how you take these medications     warfarin 2 mg tablet; Commonly known as: Coumadin; Take as directed. If   you are unsure how to take this medication, talk to your nurse or doctor.;   Original instructions: Take as directed per After Visit Summary.; What   changed: additional instructions     CONTINUE taking these medications     BD Ultra-Fine Short Pen Needle 31 gauge x 5/16\" needle; Generic drug:   pen needle, diabetic   blood-glucose meter misc; Commonly known as: Accu-Chek Guide Glucose   Meter; Test TID   carvedilol 3.125 mg tablet; Commonly known as: Coreg; Take 1 tablet   (3.125 mg) by mouth 2 times a day.   empagliflozin 10 mg tablet; Commonly known as: Jardiance; Take 1 tablet   (10 mg) by mouth once daily.; Start taking on: May 7, 2025   ergocalciferol 1250 mcg (50,000 units) capsule; Commonly known as:   Vitamin D-2; 1 CAP BY MOUTH WEEKLY ON SUNDAY   fish oil 60- mg capsule; Commonly known as: Omega-3; Take 2   capsules (1,000 mg) by mouth once daily.   glucagon 0.5 mg/0.1 mL auto-injector; Inject 1 mg " into the muscle every   15 minutes if needed (< 40).   insulin lispro 100 unit/mL pen; Commonly known as: HumaLOG; INJECT SUBQ   PER SLIDING SCALE WITH MEALS (MAX DAILY DOSE OF 80 UNITS)   lancets misc; Accucheck lancets   Lantus Solostar U-100 Insulin 100 unit/mL (3 mL) pen; Generic drug:   insulin glargine; 25 UNITS SUBQ DAILY AT BEDTIME - TAKE AS DIRECTED PER   INSULIN INSTRUCTIONS   losartan 25 mg tablet; Commonly known as: Cozaar; Take 1 tablet (25 mg)   by mouth once daily.   nystatin 100,000 unit/gram powder; Commonly known as: Mycostatin; Apply   1 Application topically 2 times a day.   pantoprazole 40 mg EC tablet; Commonly known as: ProtoNix; Take 1 tablet   (40 mg) by mouth once daily in the morning. Take before meals.   torsemide 20 mg tablet; Commonly known as: Demadex; Take 1 tablet (20   mg) by mouth once daily.     STOP taking these medications     glucagon 1 mg injection; Commonly known as: Glucagen   traMADol 50 mg tablet; Commonly known as: Ultram       Test Results Pending At Discharge  Pending Labs       No current pending labs.            Hospital Course  Jing Sanchez is a 74 y.o. female with history of uterine cancer right arm weakness, systolic heart failure EF 30-35 %, diabetes type 2, hypertension, A-fib on Coumadin.   Patient presented with generalized malaise fatigue.  Admitted with UTI, uterine cancer 2 to 3 weeks, pathologic left shoulder fracture/weakness 2 to 3 weeks.  Ortho surgeon oncology and palliative wer consulted .  Patient on Coumadin INR daily for A-fib.,  Urine culture was negative and antibiotics have been stopped.  Is stable and would be discharged today to SNF.  She is to follow-up outpatient with her oncologist and has an appointment for a PET scan    Pertinent Physical Exam At Time of Discharge  Physical Exam  Constitutional:       Appearance: She is obese. She is ill-appearing.   Cardiovascular:      Rate and Rhythm: Normal rate and regular rhythm.      Pulses:  Normal pulses.      Heart sounds: Normal heart sounds.   Pulmonary:      Effort: Pulmonary effort is normal.      Breath sounds: Normal breath sounds.   Genitourinary:     Vagina: Vaginal discharge present.      Comments: Red- serousang vaginal drainage, small amount  Musculoskeletal:         General: Normal range of motion.   Skin:     General: Skin is warm and dry.   Neurological:      Mental Status: She is alert and oriented to person, place, and time.         Outpatient Follow-Up  Future Appointments   Date Time Provider Department Jay   5/7/2025  9:45 AM HIWOT [facility] ADMIN ROOM PET [facility] [place] [facility]   5/7/2025 10:45 AM HIWOT [facility] PET [facility]   5/9/2025  1:30 PM Kathie Selby APRN-CNP [id number] [facility]   5/29/2025  9:00 AM Melva Gaines MD [id number] [facility]         Cari Leiva APRN-CNP

## 2025-05-06 NOTE — PROGRESS NOTES
05/06/25 0945   Discharge Planning   Expected Discharge Disposition SNF   Does the patient need discharge transport arranged? Yes   RoundTrip coordination needed? Yes   Has discharge transport been arranged? No     MSW met with patient and updated her to pre-cert pending status for [place]. Encouraged her to work with therapy this date as well.     9:45 AM  Authorization is approved. Medical team updated.     11:00 AM  Patient is being discharged to [facility] this date. [facility] and AVS sent to [facility] for their records. [id number] submitted via [employer].     Transportation arranged for 1:30 PM. Facility and nurse updated.     MSW also called patient's friend, Emilee, and updated her as they plan to visit the patient.    11:14 AM  Notified by nurse that patient needs to have a BM prior to discharge. Transportation placed in will-call and facility updated.

## 2025-05-06 NOTE — CARE PLAN
Problem: Fall/Injury  Goal: Not fall by end of shift  Outcome: Progressing  Goal: Be free from injury by end of the shift  Outcome: Progressing  Goal: Verbalize understanding of personal risk factors for fall in the hospital  Outcome: Progressing   The patient's goals for the shift include comfort    The clinical goals for the shift include Safety

## 2025-05-06 NOTE — PROGRESS NOTES
Occupational Therapy    OT Treatment    Patient Name: Jing Sanchez  MRN: [id number]  Department: [address]  Room: [address] [place] [address]  Today's Date: 5/6/2025  Time Calculation  Start Time: 1356  Stop Time: 1410  Time Calculation (min): 14 min        Assessment:  OT Assessment: Pt with limited participation this session, continue to encourage out of bed activities.  Prognosis: Good  Barriers to Discharge Home: Caregiver assistance, Physical needs  Caregiver Assistance: Caregiver assistance needed per identified barriers - however, level of patient's required assistance exceeds assistance available at home  Physical Needs: 24hr mobility assistance needed, 24hr ADL assistance needed, High falls risk due to function or environment  Evaluation/Treatment Tolerance: Patient tolerated treatment well  Medical Staff Made Aware: Yes  End of Session Communication: Bedside nurse  End of Session Patient Position: Bed, 3 rail up, Alarm on (All needs in reach.)  OT Assessment Results: Decreased ADL status, Decreased upper extremity strength, Decreased safe judgment during ADL, Decreased endurance, Decreased functional mobility, Decreased IADLs, Non-functional left upper extremity  Prognosis: Good  Evaluation/Treatment Tolerance: Patient tolerated treatment well  Medical Staff Made Aware: Yes  Strengths: Premorbid level of function  Barriers to Participation: Comorbidities  Plan:  Treatment Interventions: UE strengthening/ROM  OT Frequency: 3 times per week  OT Discharge Recommendations: Moderate intensity level of continued care  Equipment Recommended upon Discharge:  (TBD)  OT Recommended Transfer Status: Assist of 1  OT - OK to Discharge: Yes  Treatment Interventions: UE strengthening/ROM    Subjective   Previous Visit Info:  OT Last Visit  OT Received On: 05/06/25  General:  General  Reason for Referral: Impaired Mobility-Malaise and fatigue  Referred By: Dr. Wu  Past Medical History Relevant to Rehab: uterine cancer, recent Lt  humeral fx, afib, MI, HTN, RISHI, ASHD, CAD, CHF, DM  Prior to Session Communication: Bedside nurse  Patient Position Received: Bed, 3 rail up, Alarm on  General Comment: Agreeable to treatment.  Precautions:  Hearing/Visual Limitations: Glasses. Hearing WFL  UE Weight Bearing Status: Left Non-Weight Bearing  Medical Precautions: Fall precautions  Precautions Comment: sling on LUE      Pain:  Pain Assessment  Pain Assessment: 0-10  0-10 (Numeric) Pain Score: 4  Pain Type: Acute pain  Pain Location: Arm  Pain Orientation: Left    Objective       Bed Mobility/Transfers: Bed Mobility 1  Bed Mobility 1: Sitting to supine  Level of Assistance 1: Minimum assistance  Bed Mobility Comments 1: limited with NWB LUE    Transfer 1  Transfer From 1: Bed to  Transfer to 1: Sit, Stand  Transfer Device 1: Quad cane  Transfer Level of Assistance 1: Minimum assistance  Trials/Comments 1: pt takes ~ 4 steps to HOB     Therapy/Activity: Therapeutic Exercise  Therapeutic Exercise Activity 1: bicep curls  Therapeutic Exercise Activity 2: shoulder flexion  Therapeutic Exercise Activity 3: tricep presses  Therapeutic Exercise Activity 4: wrist flexion/extension  Therapeutic Exercise Activity 5: 1 set x 15 reps, 2 lb free wt.    Outcome Me[facility] Daily [facility]  Putting on and taking off regular lower body clothing: A lot  Bathing (including washing, rinsing, drying): A lot  Putting on and taking off regular upper body clothing: A lot  Toileting, which includes using toilet, bedpan or urinal: A lot  Taking care of personal grooming such as brushing teeth: A little  Eating Meals: A little  Daily Activity - Total Score: 14      Education Documentation  Home Exercise Program, taught by TORREY Read at 5/6/2025  2:20 PM.  Learner: Patient  Readiness: Acceptance  Method: Explanation  Response: Verbalizes Understanding, Demonstrated Understanding, Needs Reinforcement  Comment: Risks and benefits of prolonged bedrest, benefits of UE  therex    Education Comments  No comments found.      Goals:  Encounter Problems       Encounter Problems (Active)       OT Goals       ADLs (Progressing)       Start:  05/02/25    Expected End:  05/16/25       Patient will complete ADL tasks, with supervision using AE as needed in order to increase patient's safety and independence with self-care tasks.           Functional Transfers (Progressing)       Start:  05/02/25    Expected End:  05/16/25       Patient will complete functional transfers with supervision using least restrictive device, in order to increase patient's safety and independence with daily tasks.           Activity Tolerance (Progressing)       Start:  05/02/25    Expected End:  05/16/25       Patient will demonstrate the ability to participate in functional activity at least >/= 20 minutes in order to increase patient's safety and independence with daily tasks.

## 2025-05-06 NOTE — PROGRESS NOTES
Physical Therapy    Physical Therapy Treatment    Patient Name: Jing Sanchez  MRN: [id number]  Department: [address]  Room: [facility]-  Today's Date: 5/6/2025  Time Calculation  Start Time: 0837  Stop Time: 0901  Time Calculation (min): 24 min         Assessment/Plan   PT Assessment  PT Assessment Results: Decreased strength, Decreased range of motion, Decreased endurance, Impaired balance, Decreased mobility, Decreased coordination, Impaired judgement, Decreased safety awareness, Impaired vision, Decreased skin integrity, Pain  Rehab Prognosis: Good  Barriers to Discharge Home: Caregiver assistance, Physical needs, Cognition needs  Caregiver Assistance: Patient lives alone and/or does not have reliable caregiver assistance  Cognition Needs: Insight of patient limited regarding functional ability/needs, Recollection or understanding of precautions/restrictions limited  Physical Needs: 24hr mobility assistance needed, 24hr ADL assistance needed  Evaluation/Treatment Tolerance: Patient tolerated treatment well  Medical Staff Made Aware: Yes  Strengths: Premorbid level of function  Barriers to Participation: Comorbidities  End of Session Communication: Bedside nurse  Assessment Comment: Pt generally weak, gives effort. Reporting high pain level. Pt would benefit from continued PT services to progress mobility.  End of Session Patient Position: Bed, 3 rail up, Alarm on     PT Plan  Treatment/Interventions: Bed mobility, Transfer training, Gait training, Balance training, Strengthening, Endurance training, Therapeutic exercise, Therapeutic activity  PT Plan: Ongoing PT  PT Frequency: 4 times per week  PT Discharge Recommendations: Moderate intensity level of continued care  Equipment Recommended upon Discharge: Other (comment) (Pt would benefit from RW however unable to use at this time due to left UE WBing restrictions.)  PT Recommended Transfer Status: Assist x1, Assistive device  PT - OK to Discharge: Yes      General  Visit Information:   PT  Visit  PT Received On: 05/06/25  General  Reason for Referral: Impaired Mobility-Malaise and fatigue  Referred By: Dr. Wu  Past Medical History Relevant to Rehab: uterine cancer, recent Lt humeral fx, afib, MI, HTN, RISHI, ASHD, CAD, CHF, DM  Prior to Session Communication: Bedside nurse  Patient Position Received: Up in chair, Alarm on  Preferred Learning Style: verbal, visual  General Comment: Cleared per nurse to see pt for PT. Pt agreeable with encouragment.    Subjective   Precautions:  Precautions  Hearing/Visual Limitations: Glasses. Hearing WFL  UE Weight Bearing Status: Left Non-Weight Bearing  Medical Precautions: Fall precautions  Precautions Comment: sling on L UE        Objective   Pain:  Pain Assessment  Pain Assessment: 0-10  0-10 (Numeric) Pain Score: 8  Pain Type: Acute pain  Pain Location: Arm  Pain Orientation: Left  Pain Interventions: Repositioned  Response to Interventions: Resting quietly    Cognition:  Cognition  Overall Cognitive Status: Within Functional Limits  Orientation Level: Oriented X4  Following Commands: Follows one step commands with increased time    Activity Tolerance:  Activity Tolerance  Endurance: Decreased tolerance for upright activites    Treatments:  Therapeutic Exercise  Therapeutic Exercise Activity 1: Pt performed seated bilat LE ankle pumps, heel raises, glute sets, LAQ, hip flexion, dell hip adduction and resisted hip abduction x20 reps each. Rest breaks as needed.    Bed Mobility 1  Bed Mobility 1: Sitting to supine  Level of Assistance 1: Close supervision  Bed Mobility Comments 1: Pt struggled to get bilat LE's onto bed.    Ambulation/Gait Training 1  Surface 1: Level tile  Device 1: Small base quad cane  Gait Support Devices: Gait belt  Assistance 1: Minimum assistance  Quality of Gait 1: Shuffling gait  Comments/Distance (ft) 1: Pt ambulated with SBQC ~15' x1 min assist of 1. Unsteadiness throughout, x1 small LOB requiring assist to  correct (turning to bed). Very slow pace, shuffled steps. Verbal cues for cane sequencing.    Transfer 1  Transfer From 1: Chair with arms to  Transfer to 1: Stand  Technique 1: Sit to stand  Transfer Device 1: Quad cane  Transfer Level of Assistance 1: Moderate assistance  Transfers 2  Transfer From 2: Stand to  Transfer to 2: Sit, Bed  Technique 2: Stand to sit  Transfer Device 2: Quad cane  Transfer Level of Assistance 2: Minimum assistance, Minimal verbal cues  Trials/Comments 2: Verbal cues to reach back for bed with right UE.    Outcome Measures:  [facility] Basic Mobility  Turning from your back to your side while in a flat bed without using bedrails: A little  Moving from lying on your back to sitting on the side of a flat bed without using bedrails: A lot  Moving to and from bed to chair (including a wheelchair): A little  Standing up from a chair using your arms (e.g. wheelchair or bedside chair): A lot  To walk in hospital room: A little  Climbing 3-5 steps with railing: Total  Basic Mobility - Total Score: 14    Education Documentation  Precautions, taught by Mariela Lemus PTA at 5/6/2025 10:12 AM.  Learner: Patient  Readiness: Acceptance  Method: Explanation  Response: Needs Reinforcement  Comment: Safety with mobility.    Mobility Training, taught by Mariela Lemus PTA at 5/6/2025 10:12 AM.  Learner: Patient  Readiness: Acceptance  Method: Explanation  Response: Needs Reinforcement  Comment: Safety with mobility.    Education Comments  No comments found.        OP EDUCATION:       Encounter Problems       Encounter Problems (Active)       Balance       Standing Balance (Progressing)       Start:  05/02/25    Expected End:  05/16/25       Pt will demonstrate good static standing balance to promote safe participation with out of bed activity, transfers, and mobility              Mobility       Ambulation (Progressing)       Start:  05/02/25    Expected End:  05/16/25       Pt will ambulate 50' modified  independent assist with LRD to promote safe home mobility              PT Transfers       Supine to sit (Progressing)       Start:  05/02/25    Expected End:  05/16/25       Pt will transfer supine to sitting at edge of bed with modified independent assist to promote acute care out of bed activity           Sit to stand (Progressing)       Start:  05/02/25    Expected End:  05/16/25       Pt will transfer sit to standing position with modified independent assist and LRD to promote safe out of bed activity           Bed to chair (Progressing)       Start:  05/02/25    Expected End:  05/16/25       Pt will transfer from sitting edge of bed to the chair with modified independent assist and LRD to promote out of bed activity and reduce the risks of prolonged acute care bedrest              Pain - Adult

## 2025-05-06 NOTE — CARE PLAN
Problem: Fall/Injury  Goal: Not fall by end of shift  Outcome: Progressing  Goal: Be free from injury by end of the shift  Outcome: Progressing  Goal: Verbalize understanding of personal risk factors for fall in the hospital  Outcome: Progressing     Problem: Pain  Goal: Takes deep breaths with improved pain control throughout the shift  Outcome: Progressing  Goal: Turns in bed with improved pain control throughout the shift  Outcome: Progressing  Goal: Walks with improved pain control throughout the shift  Outcome: Progressing   The patient's goals for the shift include comfort    The clinical goals for the shift include Bowel Movement

## 2025-05-06 NOTE — PROGRESS NOTES
"Jing Sanchez is a 74 y.o. female on day 5 of admission presenting with Malaise and fatigue.    Subjective   Symptoms (0 - 10, Best to Worst)  [place] Symptom Assessment System  0-10 (Numeric) Pain Score: 6  Pt. reports lower abdominal pain, (cramping w/\"bloating\") more persistent following MiraLAX dose.  Last BM 5 days ago  Complains of significant itching under left armpit which has been present(over hospital stay).  Patient has Candida (persistent) under bilateral breast and L axilla  Denies left shoulder pain (has not requested as needed Tylenol or Oxycodone in 24 hours)     Objective   Physical Exam  Vitals and nursing note reviewed.   Constitutional:       Appearance: She is obese.   HENT:      Head: Normocephalic and atraumatic.      Mouth/Throat:      Mouth: Mucous membranes are dry.      Pharynx: Oropharynx is clear.      Comments: Poor dentition, with fractured upper teeth.  Denies pain  Eyes:      Extraocular Movements: Extraocular movements intact.      Conjunctiva/sclera: Conjunctivae normal.      Pupils: Pupils are equal, round, and reactive to light.   Cardiovascular:      Rate and Rhythm: Tachycardia present. Rhythm irregular.      Pulses: Normal pulses.      Heart sounds: Normal heart sounds.   Pulmonary:      Effort: Pulmonary effort is normal.      Breath sounds: Normal breath sounds.   Abdominal:      General: Bowel sounds are normal. There is distension.      Palpations: Abdomen is soft.   Musculoskeletal:         General: Normal range of motion.      Comments: Recent left humerus pathologic fracture, wearing sling   Skin:     General: Skin is warm.  Candida noted in breast folds and L axilla w/moderate odor.      Capillary Refill: Capillary refill takes 2 to 3 seconds.      Coloration: Skin is pale.      Findings: Bruising present.   Neurological:      General: No focal deficit present.      Mental Status: She is alert and oriented to person, place, and time.      Motor: Weakness present. " "  Psychiatric:         Mood and Affect: Mood normal.         Behavior: Behavior normal.         Thought Content: Thought content normal.         Judgment: Judgment normal.     Last Recorded Vitals  Blood pressure 115/67, pulse 80, temperature 37 °C (98.6 °F), temperature source Temporal, resp. rate 18, height 1.651 m (5' 5\"), weight 78.5 kg (173 lb), SpO2 94%.  Intake/Output last 3 Shifts:  I/O last 3 completed shifts:  In: 400 (5.1 mL/kg) [P.O.:400]  Out: 700 (8.9 mL/kg) [Urine:700 (0.2 mL/kg/hr)]  Weight: 78.5 kg     Relevant Results  Scheduled medications  Scheduled Medications[1]  Continuous medications  Continuous Medications[2]  PRN medications  PRN Medications[3]       Assessment/Plan   CHHAYA on CKD 3 - 5/5/25 labs: Creatinine 1.15; eGFR 50  Dehydration - resolved  UTI - Final cultures negative, IV Ceftriaxone (Rociphen) d'cd  Metastatic endometrial cancer, Stage IV with pathologic fracture of the left humerus-Initial consult w/Gyn Onc (Dr. Melva Gaines) 5/1/2024.  If dc'd today, will complete PET staging scan at [place] tomorrow, 5/7/25.  Follow-up appointment with Dr. Gaines -> Th, 5/29 at [facility].  Patient would like to pursue treatment (palliative cancer-directed therapy) if appropriate.  Systolic heart failure -most recent Echo (5/1/25) 30 to 35%  Atrial fibrillation on Coumadin, echo indicating apical wall thrombus present, continue anticoagulation  COPD  Constipation - Related to immobility and opioid (as needed)  Palliative care     DNR CCA/DNI  Capable  [facility]- Killian Strong (friend) - Advance Directives completed during this admission and in bedside chart.    5/6  Recommended additional laxative/constipation relief prior to patient's discharge from hospital.  Took MiraLAX dose this morning after breakfast. Hospitalist, MARTÍN Mcrae-CNP ordered bisacodyl suppository (given at 1035).  Educated Minal on the importance of routine bowel regimen to prevent chronic constipation (immobility " and use of opioids for pain management contribute to risk of constipation).  Nystatin powder already in place for bilateral breast and will add to left axilla. Reviewed planned discharge to rehab today  ([facility] pre-authorization completed).  Patient confirmed PET/CT scheduled for [facility], 5/7.  At pt.'s request, placed call to her POAHC, Desmond Strong to communicate follow-up appointment made w/ Dr. Gaines.  Discussed w/primary service.  Secure chat sent to [facility] NP, Kathie Dailey with update.     5/5  [facility] preauthorization sent this morning ([facility], Care Transitions).  Goal is to be discharged to SNF either today or tomorrow (so that patient can complete PET diagnostic for staging uterine cancer on [facility], 5/7/25).   Minal's friend Killian, is coordinating her outpatient appointments.  Reached out to Dr. Melva Gaines (secure chat), patient's GYN oncologist to request follow-up appointment.    Senna-S added as bowel regimen for patient.  Patient reports bloating and some abdominal discomfort.  Of note, she has been refusing scheduled Colace for fear that she might have bowel incontinence.  Last known BM, Friday, 5/2/35.  Left shoulder pain persists especially with movement, and patient requested oxycodone this morning.  Last scheduled Tylenol dose given Sunday evening, 5/4.     Patient has been up to chair for her breakfast and worked with PT (for 30 minutes this morning). Discussed with hospitalist, FARHANA Mcrae.  Palliative medicine will continue to follow.         I spent 42 non-continuous minutes in the professional and overall care of this patient.      Leticia Gayle, MARTÍN-CNP           [1] carvedilol, 3.125 mg, oral, BID  cefTRIAXone, 1 g, intravenous, Once  empagliflozin, 10 mg, oral, Daily  insulin lispro, 0-10 Units, subcutaneous, TID  losartan, 25 mg, oral, Daily  nystatin, 1 Application, Topical, BID  pantoprazole, 40 mg, oral, Daily before breakfast  sennosides-docusate sodium, 2  tablet, oral, Nightly  [Held by provider] torsemide, 20 mg, oral, Daily  warfarin, 2 mg, oral, Daily  [2]    [3] PRN medications: acetaminophen **OR** acetaminophen **OR** acetaminophen, benzocaine-menthol, bisacodyl, dextrose, dextrose, diphenhydrAMINE, docusate sodium, glucagon, glucagon, ondansetron ODT **OR** ondansetron, oxyCODONE, polyethylene glycol, prochlorperazine **OR** prochlorperazine **OR** prochlorperazine

## 2025-05-07 ENCOUNTER — APPOINTMENT (OUTPATIENT)
Dept: RADIOLOGY | Facility: HOSPITAL | Age: 75
DRG: 948 | End: 2025-05-07
Payer: MEDICARE

## 2025-05-07 VITALS
DIASTOLIC BLOOD PRESSURE: 58 MMHG | SYSTOLIC BLOOD PRESSURE: 109 MMHG | HEIGHT: 65 IN | TEMPERATURE: 97.5 F | OXYGEN SATURATION: 96 % | WEIGHT: 173 LBS | RESPIRATION RATE: 18 BRPM | BODY MASS INDEX: 28.82 KG/M2 | HEART RATE: 72 BPM

## 2025-05-07 LAB
GLUCOSE BLD MANUAL STRIP-MCNC: 142 MG/DL (ref 74–99)
GLUCOSE BLD MANUAL STRIP-MCNC: 208 MG/DL (ref 74–99)

## 2025-05-07 PROCEDURE — 82947 ASSAY GLUCOSE BLOOD QUANT: CPT

## 2025-05-07 PROCEDURE — 2500000001 HC RX 250 WO HCPCS SELF ADMINISTERED DRUGS (ALT 637 FOR MEDICARE OP): Performed by: NURSE PRACTITIONER

## 2025-05-07 PROCEDURE — 74018 RADEX ABDOMEN 1 VIEW: CPT | Performed by: RADIOLOGY

## 2025-05-07 PROCEDURE — 99232 SBSQ HOSP IP/OBS MODERATE 35: CPT | Performed by: NURSE PRACTITIONER

## 2025-05-07 PROCEDURE — 97116 GAIT TRAINING THERAPY: CPT | Mod: GP,CQ

## 2025-05-07 PROCEDURE — 2500000004 HC RX 250 GENERAL PHARMACY W/ HCPCS (ALT 636 FOR OP/ED): Performed by: NURSE PRACTITIONER

## 2025-05-07 PROCEDURE — 74018 RADEX ABDOMEN 1 VIEW: CPT

## 2025-05-07 PROCEDURE — 2500000001 HC RX 250 WO HCPCS SELF ADMINISTERED DRUGS (ALT 637 FOR MEDICARE OP): Performed by: STUDENT IN AN ORGANIZED HEALTH CARE EDUCATION/TRAINING PROGRAM

## 2025-05-07 PROCEDURE — 97110 THERAPEUTIC EXERCISES: CPT | Mod: GP,CQ

## 2025-05-07 PROCEDURE — 2500000002 HC RX 250 W HCPCS SELF ADMINISTERED DRUGS (ALT 637 FOR MEDICARE OP, ALT 636 FOR OP/ED)

## 2025-05-07 RX ORDER — DEXTROMETHORPHAN POLISTIREX 30 MG/5 ML
1 SUSPENSION, EXTENDED RELEASE 12 HR ORAL ONCE
Status: DISCONTINUED | OUTPATIENT
Start: 2025-05-07 | End: 2025-05-07 | Stop reason: HOSPADM

## 2025-05-07 RX ORDER — SYRING-NEEDL,DISP,INSUL,0.3 ML 29 G X1/2"
296 SYRINGE, EMPTY DISPOSABLE MISCELLANEOUS ONCE
Status: COMPLETED | OUTPATIENT
Start: 2025-05-07 | End: 2025-05-07

## 2025-05-07 RX ORDER — POLYETHYLENE GLYCOL 3350 17 G/17G
17 POWDER, FOR SOLUTION ORAL DAILY
Status: DISCONTINUED | OUTPATIENT
Start: 2025-05-08 | End: 2025-05-07 | Stop reason: HOSPADM

## 2025-05-07 RX ORDER — POLYETHYLENE GLYCOL 3350 17 G/17G
17 POWDER, FOR SOLUTION ORAL DAILY
Start: 2025-05-08

## 2025-05-07 RX ADMIN — MAJOR MAGNESIUM CITRATE ORAL SOLUTION - LEMON 296 ML: 1.75 LIQUID ORAL at 04:47

## 2025-05-07 RX ADMIN — NYSTATIN 1 APPLICATION: 100000 POWDER TOPICAL at 10:00

## 2025-05-07 RX ADMIN — BISACODYL 10 MG: 5 TABLET, COATED ORAL at 10:05

## 2025-05-07 RX ADMIN — LOSARTAN POTASSIUM 25 MG: 25 TABLET, FILM COATED ORAL at 10:05

## 2025-05-07 RX ADMIN — CARVEDILOL 3.12 MG: 3.12 TABLET, FILM COATED ORAL at 10:00

## 2025-05-07 RX ADMIN — PANTOPRAZOLE SODIUM 40 MG: 40 TABLET, DELAYED RELEASE ORAL at 05:33

## 2025-05-07 RX ADMIN — POLYETHYLENE GLYCOL 3350 17 G: 17 POWDER, FOR SOLUTION ORAL at 10:04

## 2025-05-07 RX ADMIN — INSULIN LISPRO 4 UNITS: 100 INJECTION, SOLUTION INTRAVENOUS; SUBCUTANEOUS at 13:11

## 2025-05-07 RX ADMIN — EMPAGLIFLOZIN 10 MG: 10 TABLET, FILM COATED ORAL at 10:00

## 2025-05-07 ASSESSMENT — COGNITIVE AND FUNCTIONAL STATUS - GENERAL
WALKING IN HOSPITAL ROOM: A LITTLE
CLIMB 3 TO 5 STEPS WITH RAILING: TOTAL
MOVING FROM LYING ON BACK TO SITTING ON SIDE OF FLAT BED WITH BEDRAILS: A LITTLE
MOVING TO AND FROM BED TO CHAIR: A LITTLE
HELP NEEDED FOR BATHING: A LITTLE
DAILY ACTIVITIY SCORE: 18
STANDING UP FROM CHAIR USING ARMS: A LOT
DRESSING REGULAR LOWER BODY CLOTHING: A LITTLE
MOBILITY SCORE: 18
MOVING FROM LYING ON BACK TO SITTING ON SIDE OF FLAT BED WITH BEDRAILS: A LITTLE
DRESSING REGULAR UPPER BODY CLOTHING: A LITTLE
MOBILITY SCORE: 14
TOILETING: A LITTLE
TURNING FROM BACK TO SIDE WHILE IN FLAT BAD: A LITTLE
CLIMB 3 TO 5 STEPS WITH RAILING: A LITTLE
EATING MEALS: A LITTLE
TURNING FROM BACK TO SIDE WHILE IN FLAT BAD: A LOT
STANDING UP FROM CHAIR USING ARMS: A LITTLE
MOVING TO AND FROM BED TO CHAIR: A LITTLE
PERSONAL GROOMING: A LITTLE
WALKING IN HOSPITAL ROOM: A LITTLE

## 2025-05-07 ASSESSMENT — PAIN SCALES - GENERAL: PAINLEVEL_OUTOF10: 8

## 2025-05-07 ASSESSMENT — PAIN - FUNCTIONAL ASSESSMENT: PAIN_FUNCTIONAL_ASSESSMENT: 0-10

## 2025-05-07 NOTE — ASSESSMENT & PLAN NOTE
Last echo was done on 3/13/ 2023 with EF 30 to 35%      Plan of Care  Patient to be discharged to SNF yesterday however she was unable to have a BM after several attempts; laxatives and enema. Patient had a large BM today and will go to SNF.   Plan of care discussed with patient and collaborating physician.

## 2025-05-07 NOTE — INDIVIDUALIZED OVERALL PLAN OF CARE NOTE
KUB done, reviewed.    XR abdomen 1 view  Result Date: 5/7/2025  Interpreted By:  Emily Renteria, STUDY: XR ABDOMEN 1 VIEW; ;  5/7/2025 11:20 am   INDICATION: Signs/Symptoms:abd distention, constipation.     COMPARISON: None.   ACCESSION NUMBER(S): [id number]   ORDERING CLINICIAN: KADEN BUSH   TECHNIQUE: Single view abdomen   FINDINGS: Gas-filled distended loops of large bowel. No air-fluid levels. Mild stool-filled distal colon. Surgical staples right upper quadrant. Diffusely calcified fibroid along the left lateral pelvis measuring 7.1 cm.   Multilevel facet arthropathy lower lumbar spine with degenerative discogenic changes noted.               1. Gas-filled distended loops of large bowel without air-fluid levels     MACRO: None   Signed by: Emily Renteria 5/7/2025 11:50 AM Dictation workstation:   [id number]      Will give enema(s).  Could consider a dose of relistor though hasn't gotten that much in way of opioids over past 4 days though could possibly contribute.   Follow closely for BM.   Recent CT abd reviewed w/o mention of any concern of obstruction.     Discussed with rEi Leiva CNP and Tristen GREEN.     Kaden Bush, MARTÍN-RICHARD

## 2025-05-07 NOTE — CARE PLAN
Problem: Diabetes  Goal: Achieve decreasing blood glucose levels by end of shift  5/7/2025 1244 by Tristen Chavarria RN  Outcome: Progressing  Flowsheets (Taken 5/7/2025 1244)  Achieve decreasing blood glucose levels by end of shift: Med administration/monitoring of effect  5/7/2025 1244 by Tristen Chavarria RN  Outcome: Progressing  Flowsheets (Taken 5/7/2025 1244)  Achieve decreasing blood glucose levels by end of shift: Med administration/monitoring of effect  Goal: Increase stability of blood glucose readings by end of shift  5/7/2025 1244 by Tristen Chavarria RN  Outcome: Progressing  Flowsheets (Taken 5/7/2025 1244)  Increase stability of blood glucose readings by end of shift: Med administration/monitoring of effect  5/7/2025 1244 by Tristen Chavarria RN  Outcome: Progressing  Flowsheets (Taken 5/7/2025 1244)  Increase stability of blood glucose readings by end of shift: Med administration/monitoring of effect  Goal: Decrease in ketones present in urine by end of shift  5/7/2025 1244 by Tristen Chavarria RN  Outcome: Progressing  Flowsheets (Taken 5/7/2025 1244)  Decrease in ketones present in urine by end of shift: Med administration/monitoring of effect  5/7/2025 1244 by Tristen Chavarria RN  Outcome: Progressing  Flowsheets (Taken 5/7/2025 1244)  Decrease in ketones present in urine by end of shift: Med administration/monitoring of effect  Goal: Maintain electrolyte levels within acceptable range throughout shift  5/7/2025 1244 by Tristen Chavarria RN  Outcome: Progressing  Flowsheets (Taken 5/7/2025 1244)  Maintain electrolyte levels within acceptable range throughout shift: Med administration/monitoring of effect  5/7/2025 1244 by Tristen Chavarria RN  Outcome: Progressing  Flowsheets (Taken 5/7/2025 1244)  Maintain electrolyte levels within acceptable range throughout shift: Med administration/monitoring of effect  Goal: Maintain glucose levels >70mg/dl to <250mg/dl throughout shift  5/7/2025  1244 by Tristen Chavarria RN  Outcome: Progressing  Flowsheets (Taken 5/7/2025 1244)  Maintain glucose levels >70mg/dl to <250mg/dl throughout shift: Med administration/monitoring of effect  5/7/2025 1244 by Tristen Chavarria RN  Outcome: Progressing  Flowsheets (Taken 5/7/2025 1244)  Maintain glucose levels >70mg/dl to <250mg/dl throughout shift: Med administration/monitoring of effect  Goal: No changes in neurological exam by end of shift  5/7/2025 1244 by Tristen Chavarria RN  Outcome: Progressing  Flowsheets (Taken 5/7/2025 1244)  No changes in neurological exam by end of shift: Complete frequent neurological assessments  5/7/2025 1244 by Tristen Chavarria RN  Outcome: Progressing  Flowsheets (Taken 5/7/2025 1244)  No changes in neurological exam by end of shift: Complete frequent neurological assessments  Goal: Learn about and adhere to nutrition recommendations by end of shift  5/7/2025 1244 by Tristen Chavarria RN  Outcome: Progressing  Flowsheets (Taken 5/7/2025 1244)  Learn about and adhere to nutrition recommendations by end of shift: Ensure/encourage compliance with appropriate diet  5/7/2025 1244 by Tristen Chavarria RN  Outcome: Progressing  Flowsheets (Taken 5/7/2025 1244)  Learn about and adhere to nutrition recommendations by end of shift: Ensure/encourage compliance with appropriate diet  Goal: Vital signs within normal range for age by end of shift  5/7/2025 1244 by Tristen Chavarria RN  Outcome: Progressing  Flowsheets (Taken 5/7/2025 1244)  Vital signs within normal range for age by end of shift: Med administration/monitoring of effect  5/7/2025 1244 by Tristen Chavarria RN  Outcome: Progressing  Flowsheets (Taken 5/7/2025 1244)  Vital signs within normal range for age by end of shift: Med administration/monitoring of effect  Goal: Increase self care and/or family involovement by end of shift  5/7/2025 1244 by Tristen Chavarria RN  Outcome: Progressing  Flowsheets (Taken 5/7/2025  1244)  Increase self care and/or family involovement by end of shift: Self monitor blood glucose with staff oversight  5/7/2025 1244 by Tristen Chavarria RN  Outcome: Progressing  Flowsheets (Taken 5/7/2025 1244)  Increase self care and/or family involovement by end of shift: Self monitor blood glucose with staff oversight  Goal: Receive DSME education by end of shift  5/7/2025 1244 by Tristen Chavarria RN  Outcome: Progressing  Flowsheets (Taken 5/7/2025 1244)  Receive DSME education by end of shift: Provide patient centered education on Diabetic Self Management Education  5/7/2025 1244 by Tristen Chavarria RN  Outcome: Progressing  Flowsheets (Taken 5/7/2025 1244)  Receive DSME education by end of shift: Provide patient centered education on Diabetic Self Management Education     Problem: Pain - Adult  Goal: Verbalizes/displays adequate comfort level or baseline comfort level  5/7/2025 1244 by Tristen Chavarria RN  Outcome: Progressing  Flowsheets (Taken 5/7/2025 1244)  Verbalizes/displays adequate comfort level or baseline comfort level:   Encourage patient to monitor pain and request assistance   Assess pain using appropriate pain scale   Administer analgesics based on type and severity of pain and evaluate response   Implement non-pharmacological measures as appropriate and evaluate response  5/7/2025 1244 by Tristen Chavarria RN  Outcome: Progressing  Flowsheets (Taken 5/7/2025 1244)  Verbalizes/displays adequate comfort level or baseline comfort level:   Encourage patient to monitor pain and request assistance   Assess pain using appropriate pain scale   Administer analgesics based on type and severity of pain and evaluate response   Implement non-pharmacological measures as appropriate and evaluate response     Problem: Safety - Adult  Goal: Free from fall injury  5/7/2025 1244 by Tristen Chavarria RN  Outcome: Progressing  Flowsheets (Taken 5/7/2025 1244)  Free from fall injury: Instruct  family/caregiver on patient safety  5/7/2025 1244 by Tristen Chavarria RN  Outcome: Progressing  Flowsheets (Taken 5/7/2025 1244)  Free from fall injury: Instruct family/caregiver on patient safety     Problem: Discharge Planning  Goal: Discharge to home or other facility with appropriate resources  5/7/2025 1244 by Tristen Chavarria RN  Outcome: Progressing  Flowsheets (Taken 5/7/2025 1244)  Discharge to home or other facility with appropriate resources:   Identify barriers to discharge with patient and caregiver   Arrange for needed discharge resources and transportation as appropriate   Identify discharge learning needs (meds, wound care, etc)  5/7/2025 1244 by Tristen Chavarria RN  Outcome: Progressing  Flowsheets (Taken 5/7/2025 1244)  Discharge to home or other facility with appropriate resources:   Identify barriers to discharge with patient and caregiver   Arrange for needed discharge resources and transportation as appropriate   Identify discharge learning needs (meds, wound care, etc)     Problem: Chronic Conditions and Co-morbidities  Goal: Patient's chronic conditions and co-morbidity symptoms are monitored and maintained or improved  5/7/2025 1244 by Tristen Chavarria RN  Outcome: Progressing  Flowsheets (Taken 5/7/2025 1244)  Care Plan - Patient's Chronic Conditions and Co-Morbidity Symptoms are Monitored and Maintained or Improved:   Monitor and assess patient's chronic conditions and comorbid symptoms for stability, deterioration, or improvement   Collaborate with multidisciplinary team to address chronic and comorbid conditions and prevent exacerbation or deterioration  5/7/2025 1244 by Tristen Chavarria RN  Outcome: Progressing  Flowsheets (Taken 5/7/2025 1244)  Care Plan - Patient's Chronic Conditions and Co-Morbidity Symptoms are Monitored and Maintained or Improved:   Monitor and assess patient's chronic conditions and comorbid symptoms for stability, deterioration, or improvement    Collaborate with multidisciplinary team to address chronic and comorbid conditions and prevent exacerbation or deterioration     Problem: Nutrition  Goal: Nutrient intake appropriate for maintaining nutritional needs  5/7/2025 1244 by Tristen Chavarria RN  Outcome: Progressing  5/7/2025 1244 by Tristen Chavarria RN  Outcome: Progressing     Problem: Skin  Goal: Decreased wound size/increased tissue granulation at next dressing change  5/7/2025 1244 by Tristen Chavarria RN  Outcome: Progressing  Flowsheets (Taken 5/7/2025 1244)  Decreased wound size/increased tissue granulation at next dressing change:   Promote sleep for wound healing   Utilize specialty bed per algorithm   Protective dressings over bony prominences  5/7/2025 1244 by Tristen Chavarria RN  Outcome: Progressing  Flowsheets (Taken 5/7/2025 1244)  Decreased wound size/increased tissue granulation at next dressing change:   Promote sleep for wound healing   Utilize specialty bed per algorithm   Protective dressings over bony prominences  Goal: Participates in plan/prevention/treatment measures  5/7/2025 1244 by Tristen Chavarria RN  Outcome: Progressing  Flowsheets (Taken 5/5/2025 0756 by Gloria Mccoy RN)  Participates in plan/prevention/treatment measures:   Discuss with provider PT/OT consult   Elevate heels   Increase activity/out of bed for meals  5/7/2025 1244 by Tristen Chavarria RN  Outcome: Progressing  Goal: Prevent/manage excess moisture  5/7/2025 1244 by Tristen Chavarria RN  Outcome: Progressing  Flowsheets (Taken 5/7/2025 1244)  Prevent/manage excess moisture:   Cleanse incontinence/protect with barrier cream   Moisturize dry skin   Use wicking fabric (obtain order)   Follow provider orders for dressing changes  5/7/2025 1244 by Tristen Chavarria RN  Outcome: Progressing  Flowsheets (Taken 5/7/2025 1244)  Prevent/manage excess moisture:   Cleanse incontinence/protect with barrier cream   Moisturize dry skin   Use wicking fabric  (obtain order)   Follow provider orders for dressing changes  Goal: Prevent/minimize sheer/friction injuries  5/7/2025 1244 by Tristen Chavarria RN  Outcome: Progressing  Flowsheets (Taken 5/7/2025 1244)  Prevent/minimize sheer/friction injuries:   Complete micro-shifts as needed if patient unable. Adjust patient position to relieve pressure points, not a full turn   HOB 30 degrees or less   Increase activity/out of bed for meals   Turn/reposition every 2 hours/use positioning/transfer devices  5/7/2025 1244 by Tristen Chavarria RN  Outcome: Progressing  Flowsheets (Taken 5/7/2025 1244)  Prevent/minimize sheer/friction injuries:   Complete micro-shifts as needed if patient unable. Adjust patient position to relieve pressure points, not a full turn   HOB 30 degrees or less   Increase activity/out of bed for meals   Turn/reposition every 2 hours/use positioning/transfer devices  Goal: Promote/optimize nutrition  5/7/2025 1244 by Tristen Chavarria RN  Outcome: Progressing  Flowsheets (Taken 5/7/2025 1244)  Promote/optimize nutrition:   Assist with feeding   Discuss with provider if NPO > 2 days   Offer water/supplements/favorite foods   Consume > 50% meals/supplements   Monitor/record intake including meals  5/7/2025 1244 by Tristen Chavarria RN  Outcome: Progressing  Flowsheets (Taken 5/7/2025 1244)  Promote/optimize nutrition:   Assist with feeding   Discuss with provider if NPO > 2 days   Offer water/supplements/favorite foods   Consume > 50% meals/supplements   Monitor/record intake including meals  Goal: Promote skin healing  5/7/2025 1244 by Tristen Chavarria RN  Outcome: Progressing  Flowsheets (Taken 5/7/2025 1244)  Promote skin healing:   Assess skin/pad under line(s)/device(s)   Ensure correct size (line/device) and apply per  instructions   Protective dressings over bony prominences   Rotate device position/do not position patient on device   Turn/reposition every 2 hours/use  positioning/transfer devices  5/7/2025 1244 by Tristen Chavarria RN  Outcome: Progressing  Flowsheets (Taken 5/7/2025 1244)  Promote skin healing:   Assess skin/pad under line(s)/device(s)   Ensure correct size (line/device) and apply per  instructions   Protective dressings over bony prominences   Rotate device position/do not position patient on device   Turn/reposition every 2 hours/use positioning/transfer devices     Problem: Fall/Injury  Goal: Not fall by end of shift  5/7/2025 1244 by Tristen Chavarria RN  Outcome: Progressing  5/7/2025 1244 by Tristen Chavarria RN  Outcome: Progressing  Goal: Be free from injury by end of the shift  5/7/2025 1244 by Tristen Chavarria RN  Outcome: Progressing  5/7/2025 1244 by Tristen Chavarria RN  Outcome: Progressing  Goal: Verbalize understanding of personal risk factors for fall in the hospital  5/7/2025 1244 by Tristen Chavarria RN  Outcome: Progressing  5/7/2025 1244 by Tristen Chavarria RN  Outcome: Progressing  Goal: Verbalize understanding of risk factor reduction measures to prevent injury from fall in the home  5/7/2025 1244 by Tristen Chavarria RN  Outcome: Progressing  5/7/2025 1244 by Tristen Chavarria RN  Outcome: Progressing  Goal: Use assistive devices by end of the shift  5/7/2025 1244 by Tristen Chavarria RN  Outcome: Progressing  5/7/2025 1244 by Tristen Chavarria RN  Outcome: Progressing  Goal: Pace activities to prevent fatigue by end of the shift  5/7/2025 1244 by Tristen Chavarria RN  Outcome: Progressing  5/7/2025 1244 by Tristen Chavarria RN  Outcome: Progressing     Problem: Pain  Goal: Takes deep breaths with improved pain control throughout the shift  5/7/2025 1244 by Tristen Chavarria RN  Outcome: Progressing  5/7/2025 1244 by Tristen Chavarria RN  Outcome: Progressing  Goal: Turns in bed with improved pain control throughout the shift  5/7/2025 1244 by Tristen H [place], RN  Outcome: Progressing  5/7/2025 1244 by Tristen  SAVAGE Chavarria RN  Outcome: Progressing  Goal: Walks with improved pain control throughout the shift  5/7/2025 1244 by Tristen Chavarria RN  Outcome: Progressing  5/7/2025 1244 by Tristen Chavarria RN  Outcome: Progressing  Goal: Performs ADL's with improved pain control throughout shift  5/7/2025 1244 by Tristen Chavarria RN  Outcome: Progressing  5/7/2025 1244 by Tristen Chavarria RN  Outcome: Progressing  Goal: Participates in PT with improved pain control throughout the shift  5/7/2025 1244 by Tristen Chavarria RN  Outcome: Progressing  5/7/2025 1244 by Tristen Chavarria RN  Outcome: Progressing  Goal: Free from opioid side effects throughout the shift  5/7/2025 1244 by Tristen Chavarria RN  Outcome: Progressing  5/7/2025 1244 by Tristen Chavarria RN  Outcome: Progressing  Goal: Free from acute confusion related to pain meds throughout the shift  5/7/2025 1244 by Tristen Chavarria RN  Outcome: Progressing  5/7/2025 1244 by Tristen Chavarria RN  Outcome: Progressing     Problem: Deep Vein Thrombosis  Goal: I will remain free from complications of deep vein thrombosis and maintain current level of mobility  5/7/2025 1244 by Tristen Chavarria RN  Outcome: Progressing  Flowsheets (Taken 5/7/2025 1244)  Resident will remain free from complications of deep vein thrombosis and maintain current level of mobility:   Observe for and document signs and symptoms of deep vein thrombosis: tenderness, pain, swelling, positive Prem's sign, discoloration of skin, skin hot/warm to touch, and report to provider   Nurse will administer medications as ordered by provider   Obtain PT/INR as ordered and report results to provider  5/7/2025 1244 by Tristen Chavarria RN  Outcome: Progressing  Flowsheets (Taken 5/7/2025 1244)  Resident will remain free from complications of deep vein thrombosis and maintain current level of mobility:   Observe for and document signs and symptoms of deep vein thrombosis: tenderness, pain,  swelling, positive Prem's sign, discoloration of skin, skin hot/warm to touch, and report to provider   Nurse will administer medications as ordered by provider   Obtain PT/INR as ordered and report results to provider     Problem: Heart Failure  Goal: Improved urinary output this shift  5/7/2025 1244 by Tristen Chavarria RN  Outcome: Progressing  Flowsheets (Taken 5/7/2025 1244)  Improved urinary output this shift:   Med administration/monitor effect   Monitor intake/output and daily weight  5/7/2025 1244 by Tristen Chavarria RN  Outcome: Progressing  Flowsheets (Taken 5/7/2025 1244)  Improved urinary output this shift:   Med administration/monitor effect   Monitor intake/output and daily weight  Goal: Reduction in peripheral edema within 24 hours  5/7/2025 1244 by Tristen Chavarria RN  Outcome: Progressing  Flowsheets (Taken 5/7/2025 1244)  Reduction in peripheral edema within 24 hours:   Monitor edema/skin/mucous membrane integrity   Frequent small meals/supplements per order  5/7/2025 1244 by Tristen Chavarria RN  Outcome: Progressing  Flowsheets (Taken 5/7/2025 1244)  Reduction in peripheral edema within 24 hours:   Monitor edema/skin/mucous membrane integrity   Frequent small meals/supplements per order  Goal: Report improvement of dyspnea/breathlessness this shift  5/7/2025 1244 by Tristen Chavarria RN  Outcome: Progressing  Flowsheets (Taken 5/7/2025 1244)  Report improvement of dyspnea/breathlessness this shift:   Ambulate/OOB 3 times daily   Encourage activity/mobility/ROM   Incentive spirometry/deep breathing /HOB elevated elevated  5/7/2025 1244 by Tristen Chavarria RN  Outcome: Progressing  Flowsheets (Taken 5/7/2025 1244)  Report improvement of dyspnea/breathlessness this shift:   Ambulate/OOB 3 times daily   Encourage activity/mobility/ROM   Incentive spirometry/deep breathing /HOB elevated elevated  Goal: Weight from fluid excess reduced over 2-3 days, then stabilize  5/7/2025 1244 by Tristen WAN  PETER Chavarria  Outcome: Progressing  Flowsheets (Taken 5/7/2025 1244)  Weight from fluid excess reduced over 2-3 days, then stabilize:   Med administration/monitor effect   Monitor intake/output and daily weight   Monitor bowel elimination  5/7/2025 1244 by Tristen Chavarria RN  Outcome: Progressing  Flowsheets (Taken 5/7/2025 1244)  Weight from fluid excess reduced over 2-3 days, then stabilize:   Med administration/monitor effect   Monitor intake/output and daily weight   Monitor bowel elimination  Goal: Increase self care and/or family involvement in 24 hours  5/7/2025 1244 by Tristen Chavarria RN  Outcome: Progressing  Flowsheets (Taken 5/7/2025 1244)  Increase self care and/or family involvement in 24 hours:   Assess for signs/symptoms of depression   Facilitate advanced care planning/discussion of treatment options   Organize tasks/allow time for rest   Recognize current coping skills and assist to develop new coping skills  5/7/2025 1244 by Tristen Chavarria RN  Outcome: Progressing  Flowsheets (Taken 5/7/2025 1244)  Increase self care and/or family involvement in 24 hours:   Assess for signs/symptoms of depression   Facilitate advanced care planning/discussion of treatment options   Organize tasks/allow time for rest   Recognize current coping skills and assist to develop new coping skills     Problem: Infective UTI/pyelonephritis  Goal: Manages/understands urgency, continence  5/7/2025 1244 by Tristen Chavarria RN  Outcome: Progressing  Flowsheets (Taken 5/7/2025 1244)  Manages/understands urgency, continence: Establish toileting routine  5/7/2025 1244 by Tristen Chavarria RN  Outcome: Progressing  Flowsheets (Taken 5/7/2025 1244)  Manages/understands urgency, continence: Establish toileting routine  Goal: No signs of neurosensory, musculoskeletal, cardiac changes  5/7/2025 1244 by Tristen Chavarria RN  Outcome: Progressing  Flowsheets (Taken 5/7/2025 1244)  No signs of neurosensory, musculoskeletal,  cardiac changes: Position to promote elimination  5/7/2025 1244 by Tristen Chavarria RN  Outcome: Progressing  Flowsheets (Taken 5/7/2025 1244)  No signs of neurosensory, musculoskeletal, cardiac changes: Position to promote elimination  Goal: No worsening signs of infection  5/7/2025 1244 by Tristen Chavarria RN  Outcome: Progressing  Flowsheets (Taken 5/7/2025 1244)  No worsening signs of infection:   Antibiotics/viral/fungal medications as ordered   Monitor for sepsis/infection and/or treat fever   Promote hygiene   Monitor urine color, clarity, odor  5/7/2025 1244 by Tristen Chavarria RN  Outcome: Progressing  Flowsheets (Taken 5/7/2025 1244)  No worsening signs of infection:   Antibiotics/viral/fungal medications as ordered   Monitor for sepsis/infection and/or treat fever   Promote hygiene   Monitor urine color, clarity, odor  Goal: Stable weight and I&O  5/7/2025 1244 by Tristen Chavarria RN  Outcome: Progressing  Flowsheets (Taken 5/7/2025 1244)  Stable weight and I&O: Monitor I&O, weight, labs  5/7/2025 1244 by Tristen Chavarria RN  Outcome: Progressing  Flowsheets (Taken 5/7/2025 1244)  Stable weight and I&O: Monitor I&O, weight, labs   The patient's goals for the shift include comfort    The clinical goals for the shift include make a bowel movement    Over the shift, the patient did make progress toward the following goals. Barriers to progression include constipation. Recommendations to address these barriers include laxitives.

## 2025-05-07 NOTE — TELEPHONE ENCOUNTER
Patient discharged today from [facility] to [facility].

## 2025-05-07 NOTE — PROGRESS NOTES
05/07/25 0937   Discharge Planning   Expected Discharge Disposition SNF   Does the patient need discharge transport arranged? Yes   RoundTrip coordination needed? Yes   Has discharge transport been arranged? No     Patient is accepted and approved for [facility] this date. Authorization expires today, 5/7, and medical team is aware. Plan for discharge pending BM.     12:55 PM  Transportation arranged for pick-up at 2:30 PM. Nurse and facility notified.

## 2025-05-07 NOTE — PROGRESS NOTES
Spiritual Care Visit  Spiritual Care Request    Reason for Visit:  Routine Visit: Introduction     Request Received From:       Focus of Care:  Visited With: Patient         Refer to :          Spiritual Care Assessment    Spiritual Assessment:                      Care Provided:  Intended Effects: Establish rapport and connectedness, Build relationship of care and support, Convey a calming presence, Demonstrate caring and concern, Lessen someone's feelings of isolation  Methods: Offer emotional support  Interventions: Explain  role, [place]    Sense of Community and or Samaritan Affiliation:  Moravian         Addressed Needs/Concerns and/or Boris Through:  Samaritan Encounters  Samaritan Needs: Prayer       Outcome:  Outcome of Spiritual Care Visit: Identifying spiritual/emotional issues, Comfort/healing presence     Advance Directives:         Spiritual Care Annotation        Annotation:  provided patient support while rounding the Unit.  introduced  services of [facility].   explained the role of the  in providing emotional and spiritual support for patient's and family while in admitted to the hospital.      provided patient support, patient was seated in the chair next to her bed. Patient feels frustrated with her current medical situation. Patient states that she need to have a bowel movement until this happens she cannot be discharged. Patient states that she has not close family connections.  Patient is member of [facility] who have been supportive since her spouse  two months ago.  Prayer was offered as requested.      No spiritual or Jehovah's witness needs were expressed. Spiritual care will remain available for support as requested.

## 2025-05-07 NOTE — PROGRESS NOTES
"Jing Sanchez is a 74 y.o. female on day 6 of admission presenting with Malaise and fatigue.    Subjective   Symptoms (0 - 10, Best to Worst)  [place] Symptom Assessment System  0-10 (Numeric) Pain Score: 0 - No pain  Currently feels okay except has not had a bowel movement.  No abdominal pain no nausea she is belching feels bloated.  Sitting up in the bedside chair left arm in the sling nurse at bedside, I discussed with her on the nurse.  He is getting ready to give her bisacodyl and MiraLAX.  Received mag citrate this morning about 445am.  Her doses of oxycodone 5 mg over the last 4 days thus far  Denies headache fever chills shortness of breath chest pain or pressure.    Objective     Physical Exam  Nurses notes and vitals reviewed.  No acute distress other than feeling bloated belching occasionally denied any bowel movement recently despite laxatives.  Normocephalic appears to be grossly atraumatic  Supple neck no obvious JVD  Lungs grossly clear with the exception of a few scattered crackles in the left lung.  Rhythm and rate seem to be irregularly irregular no easily appreciated murmur gallop or rub  Abdomen seems to be a bit softly distended positive bowel sounds no CVAT no rebound or guarding noted  Lower extremities no significant edema  Left arm in a sling.  Logically grossly intact.  Frustrated that she has not yet had a bowel movement.  Gait not observed.    Last Recorded Vitals  Blood pressure 126/68, pulse 92, temperature 37.1 °C (98.8 °F), temperature source Temporal, resp. rate 20, height 1.651 m (5' 5\"), weight 78.5 kg (173 lb), SpO2 94%.  Intake/Output last 3 Shifts:  I/O last 3 completed shifts:  In: 800 (10.2 mL/kg) [P.O.:800]  Out: 900 (11.5 mL/kg) [Urine:900 (0.3 mL/kg/hr)]  Weight: 78.5 kg     Relevant Results  Results for orders placed or performed during the hospital encounter of 05/01/25 (from the past 24 hours)   POCT GLUCOSE   Result Value Ref Range    POCT Glucose 211 (H) 74 - 99 " mg/dL   POCT GLUCOSE   Result Value Ref Range    POCT Glucose 124 (H) 74 - 99 mg/dL   POCT GLUCOSE   Result Value Ref Range    POCT Glucose 212 (H) 74 - 99 mg/dL   POCT GLUCOSE   Result Value Ref Range    POCT Glucose 142 (H) 74 - 99 mg/dL     *Note: Due to a large number of results and/or encounters for the requested time period, some results have not been displayed. A complete set of results can be found in Results Review.     No results found.  Scheduled medications  Scheduled Medications[1]  Continuous medications  Continuous Medications[2]  PRN medications  PRN Medications[3]  No results found for the last 90 days.      Assessment/Plan   CHHAYA on CKD 3 - 5/5/25 labs: Creatinine 1.15; eGFR 50, improving  Dehydration - resolved  UTI - Final cultures negative, IV Ceftriaxone (Rociphen) d'cd  Metastatic endometrial cancer, Stage IV with pathologic fracture of the left humerus-Initial consult w/Gyn Onc (Dr. Melva Gaines) 5/1/2024.  If dc'd today, will complete PET staging scan at [facility] tomorrow, 5/7/25.  Follow-up appointment with Dr. Gaines -> Th, 5/29 at [facility].  Patient would like to pursue treatment (palliative cancer-directed therapy) if appropriate.  Systolic heart failure -most recent Echo (5/1/25) 30 to 35%  Atrial fibrillation on Coumadin, echo indicating apical wall thrombus present, continue anticoagulation  COPD  Constipation - Related to immobility and opioid (as needed)  Palliative care     DNR CCA/DNI  Capable  [facility]- Killian Strong (friend) - Advance Directives completed during this admission and in bedside chart.    5/7/2025  Keeping my colleagues notes intact as below for continuity of care  Plans for discharge to [facility], though she has not had a bowel movement yet  Overall seems to be doing okay but has not had a bowel movement as of yet despite oral laxatives and suppository.  She does not seem to have red flag symptoms on clinical exam but she is a little bit distended, now  she's belching today, denied nausea or vomiting, no pain.   Would like to check a KUB stat then if no concerning findings can move forward with giving some enemas and get more aggressive with the oral laxative regimen.  She has received 4 doses of oxycodone over the last 4 days could consider a dose of Relistor again would like to see the KUB first.  Secure chat message sent to Cari Leiva CNP, hospitalist.   Discussed with nurse caring for her, Tristen, he will update me if the patient does have a successful bowel movement.  Total time which has not been continuous now close to 38 minutes.     5/6  Recommended additional laxative/constipation relief prior to patient's discharge from hospital.  Took MiraLAX dose this morning after breakfast. Hospitalist, FARHANA Mcrae ordered bisacodyl suppository (given at 1035).  Educated Minal on the importance of routine bowel regimen to prevent chronic constipation (immobility and use of opioids for pain management contribute to risk of constipation).  Nystatin powder already in place for bilateral breast and will add to left axilla. Reviewed planned discharge to rehab today  ([place] pre-authorization completed).  Patient confirmed PET/CT scheduled for [facility], 5/7.  At pt.'s request, placed call to her [facility], Desmond Strong to communicate follow-up appointment made w/ Dr. Gaines.  Discussed w/primary service.  Secure chat sent to [facility] NP, Kathie Dailey with update.      5/5  [facility] preauthorization sent this morning (ANA Boswell, Care Transitions).  Goal is to be discharged to SNF either today or tomorrow (so that patient can complete PET diagnostic for staging uterine cancer on [facility], 5/7/25).   Minal's friend Killian, is coordinating her outpatient appointments.  Reached out to Dr. Melva Gaines (secure chat), patient's GYN oncologist to request follow-up appointment.    Senna-S added as bowel regimen for patient.  Patient reports bloating and some abdominal  discomfort.  Of note, she has been refusing scheduled Colace for fear that she might have bowel incontinence.  Last known BM, Friday, 5/2/35.  Left shoulder pain persists especially with movement, and patient requested oxycodone this morning.  Last scheduled Tylenol dose given Sunday evening, 5/4.     Patient has been up to chair for her breakfast and worked with PT (for 30 minutes this morning). Discussed with hospitalist, MARTÍN Mcrae-RICHARD.  Palliative medicine will continue to follow.         FARHANA Nava       [1] carvedilol, 3.125 mg, oral, BID  cefTRIAXone, 1 g, intravenous, Once  empagliflozin, 10 mg, oral, Daily  insulin lispro, 0-10 Units, subcutaneous, TID  losartan, 25 mg, oral, Daily  nystatin, 1 Application, Topical, BID  pantoprazole, 40 mg, oral, Daily before breakfast  sennosides-docusate sodium, 2 tablet, oral, Nightly  [Held by provider] torsemide, 20 mg, oral, Daily  warfarin, 2 mg, oral, Daily  [2]    [3] PRN medications: acetaminophen **OR** acetaminophen **OR** acetaminophen, benzocaine-menthol, bisacodyl, dextrose, dextrose, diphenhydrAMINE, docusate sodium, glucagon, glucagon, ondansetron ODT **OR** ondansetron, oxyCODONE, polyethylene glycol, prochlorperazine **OR** prochlorperazine **OR** prochlorperazine

## 2025-05-07 NOTE — PROGRESS NOTES
Jing Sanchez is a 74 y.o. female on day 6 of admission presenting with Malaise and fatigue.      Subjective   Patient resting in bed, states she has some discomfort in her left arm. No abdominal pain or bloating.        Objective     Last Recorded Vitals  /68 (BP Location: Right arm, Patient Position: Lying)   Pulse 92   Temp 37.1 °C (98.8 °F) (Temporal)   Resp 20   Wt 78.5 kg (173 lb)   SpO2 94%   Intake/Output last 3 Shifts:    Intake/Output Summary (Last 24 hours) at 5/7/2025 1217  Last data filed at 5/7/2025 1008  Gross per 24 hour   Intake 520 ml   Output 700 ml   Net -180 ml       Admission Weight  Weight: 78.5 kg (173 lb) (05/01/25 1556)    Daily Weight  05/01/25 : 78.5 kg (173 lb)    Image Results  XR abdomen 1 view  Narrative: Interpreted By:  Emily Renteria,   STUDY:  XR ABDOMEN 1 VIEW; ;  5/7/2025 11:20 am      INDICATION:  Signs/Symptoms:abd distention, constipation.          COMPARISON:  None.      ACCESSION NUMBER(S):  [id number]      ORDERING CLINICIAN:  GILMER BUSH      TECHNIQUE:  Single view abdomen      FINDINGS:  Gas-filled distended loops of large bowel. No air-fluid levels. Mild  stool-filled distal colon. Surgical staples right upper quadrant.  Diffusely calcified fibroid along the left lateral pelvis measuring  7.1 cm.      Multilevel facet arthropathy lower lumbar spine with degenerative  discogenic changes noted.                      Impression: 1. Gas-filled distended loops of large bowel without air-fluid levels          MACRO:  None      Signed by: Emily Renteria 5/7/2025 11:50 AM  Dictation workstation:   [id number]      Physical Exam    Relevant Results                              Assessment & Plan  Malaise and fatigue  From multiple disease  Patient is declining  Consult palliative care  CT of Brain no acute changes  Asthenia  Fall precautions  PT OT  Chronic atrial fibrillation (Multi)  Order PT/INR start patient on Coumadin at home, 2.1 today  On  telemetry  Heart rate is under control  Acute cystitis without hematuria  Stop Rocephin   Urine cultures 5/3 and 5/1 have no growth  Uterine cancer (Multi)   a new diagnosis for patient 2-3 weeks ago  Labs shows that patient has a cancer  Consult oncology    Left arm weakness  X ray shows Pathologic fracture left humeral shaft.   Continue left arm sling per orthopedic surgeon.   Hypothyroidism  Continue  with home meds  CHHAYA (acute kidney injury)  Creatinine baseline 1.8-1, resolving. This am creatinine was 1.15    Chronic systolic heart failure  Last echo was done on 3/13/ 2023 with EF 30 to 35%      Plan of Care  Patient to be discharged to SNF yesterday however she was unable to have a BM after several attempts; laxatives and enema. Patient had a large BM today and will go to SNF.   Plan of care discussed with patient and collaborating physician.              Cari Leiva, APRN-CNP

## 2025-05-07 NOTE — PROGRESS NOTES
Physical Therapy    Physical Therapy Treatment    Patient Name: Jing Sanchez  MRN: [id number]  Department: [address]  Room: [address] [place] [address]  Today's Date: 5/7/2025  Time Calculation  Start Time: 0845  Stop Time: 0910  Time Calculation (min): 25 min         Assessment/Plan   PT Assessment  PT Assessment Results: Decreased strength, Decreased range of motion, Decreased endurance, Impaired balance, Decreased mobility, Decreased coordination, Impaired judgement, Decreased safety awareness, Impaired vision, Decreased skin integrity, Pain  Rehab Prognosis: Good  Barriers to Discharge Home: Caregiver assistance, Physical needs, Cognition needs  Caregiver Assistance: Patient lives alone and/or does not have reliable caregiver assistance  Cognition Needs: Insight of patient limited regarding functional ability/needs, Recollection or understanding of precautions/restrictions limited  Physical Needs: 24hr mobility assistance needed, 24hr ADL assistance needed  Evaluation/Treatment Tolerance: Patient tolerated treatment well  Medical Staff Made Aware: Yes  Strengths: Premorbid level of function  Barriers to Participation: Comorbidities  End of Session Communication: Bedside nurse  Assessment Comment: Pt generally weak, gives effort. Reporting high pain level. Pt would benefit from continued PT services to progress mobility.  End of Session Patient Position: Up in chair, Alarm on     PT Plan  Treatment/Interventions: Bed mobility, Transfer training, Gait training, Balance training, Strengthening, Endurance training, Therapeutic exercise, Therapeutic activity  PT Plan: Ongoing PT  PT Frequency: 4 times per week  PT Discharge Recommendations: Moderate intensity level of continued care  Equipment Recommended upon Discharge: Other (comment) (RW-unable to use due to NWBing left UE.)  PT Recommended Transfer Status: Assist x1, Assistive device  PT - OK to Discharge: Yes      General Visit Information:   PT  Visit  PT Received On:  25  General  Reason for Referral: Impaired Mobility-Malaise and fatigue  Referred By: Dr. Wu  Past Medical History Relevant to Rehab: uterine cancer, recent Lt humeral fx, afib, MI, HTN, RISHI, ASHD, CAD, CHF, DM  Prior to Session Communication: Bedside nurse  Patient Position Received: Bed, 3 rail up, Alarm on  Preferred Learning Style: verbal, visual  General Comment: Agreeable to treatment.    Subjective   Precautions:  Precautions  Hearing/Visual Limitations: Glasses. Hearing WFL  UE Weight Bearing Status: Left Non-Weight Bearing  Medical Precautions: Fall precautions  Precautions Comment: sling on LUE        Objective   Pain:  Pain Assessment  Pain Assessment: 0-10  0-10 (Numeric) Pain Score: 8  Pain Type: Acute pain  Pain Location: Arm  Pain Orientation: Left  Pain Interventions: Repositioned, Ambulation/increased activity  Response to Interventions: Content/relaxed    Cognition:  Cognition  Overall Cognitive Status: Within Functional Limits  Orientation Level: Oriented X4  Following Commands: Follows one step commands without difficulty     Treatments:  Therapeutic Exercise  Therapeutic Exercise Activity 1: Pt performed seated bilat LE ankle pumps, heel raises, hip flexion and resisted hip abduction x20 reps each. Bilat LE glute sets, LAQ and dell hip adduction x15 reps each.    Bed Mobility 1  Bed Mobility 1: Sitting to supine  Level of Assistance 1: Minimum assistance  Bed Mobility Comments 1: Head of bed elevated. Pt able to bring bilat LE's off bed, min assist with trunk up.    Ambulation/Gait Training 1  Surface 1: Level tile  Device 1: Small base quad cane  Assistance 1: Minimum assistance  Quality of Gait 1: Shuffling gait  Comments/Distance (ft) 1: Pt ambulated with SBQC ~35' x1 and ~15' x1 min assist of 1. Mild unsteadiness throughout but no actual LOB. Slow pace,  bilat step height and length.    Transfer 1  Transfer From 1: Bed to  Transfer to 1: Stand  Technique 1: Sit to  stand  Transfer Device 1: Quad cane  Transfer Level of Assistance 1: Minimum assistance  Trials/Comments 1: Pt used quad cane to take ~5-6 steps to chair, min assist of 1.  Transfers 2  Transfer From 2: Stand to  Transfer to 2: Sit, Chair with arms  Technique 2: Stand to sit  Transfer Device 2: Quad cane  Transfer Level of Assistance 2: Minimum assistance, Minimal verbal cues  Trials/Comments 2: Verbal cues for fully backing up to chair. x2 trials  Transfers 3  Transfer From 3: Chair with arms to  Transfer to 3: Stand  Technique 3: Sit to stand  Transfer Device 3: Quad cane  Transfer Level of Assistance 3: Moderate assistance  Transfers 4  Transfer From 4: Stand to  Transfer to 4: Sit, Toilet  Technique 4: Stand to sit  Transfer Device 4: Quad cane  Transfer Level of Assistance 4: Minimum assistance, Minimal verbal cues  Trials/Comments 4: Verbal cues for fully backing up to toilet and to reach back with right UE for sink.  Transfers 5  Transfer From 5: Toilet to  Transfer to 5: Stand  Technique 5: Sit to stand  Transfer Device 5: Quad cane  Transfer Level of Assistance 5: Moderate assistance    Outcome Measures:  [facility] Basic Mobility  Turning from your back to your side while in a flat bed without using bedrails: A little  Moving from lying on your back to sitting on the side of a flat bed without using bedrails: A lot  Moving to and from bed to chair (including a wheelchair): A little  Standing up from a chair using your arms (e.g. wheelchair or bedside chair): A lot  To walk in hospital room: A little  Climbing 3-5 steps with railing: Total  Basic Mobility - Total Score: 14    Education Documentation  Precautions, taught by Mariela Lemus PTA at 5/7/2025 10:36 AM.  Learner: Patient  Readiness: Acceptance  Method: Explanation  Response: Needs Reinforcement  Comment: Safety with mobility    Body Mechanics, taught by Mariela Lemus PTA at 5/7/2025 10:36 AM.  Learner: Patient  Readiness: Acceptance  Method:  Explanation  Response: Needs Reinforcement  Comment: Safety with mobility    Mobility Training, taught by Mariela Lemus PTA at 5/7/2025 10:36 AM.  Learner: Patient  Readiness: Acceptance  Method: Explanation  Response: Needs Reinforcement  Comment: Safety with mobility    Education Comments  No comments found.        OP EDUCATION:       Encounter Problems       Encounter Problems (Active)       Balance       Standing Balance (Progressing)       Start:  05/02/25    Expected End:  05/16/25       Pt will demonstrate good static standing balance to promote safe participation with out of bed activity, transfers, and mobility              Mobility       Ambulation (Progressing)       Start:  05/02/25    Expected End:  05/16/25       Pt will ambulate 50' modified independent assist with LRD to promote safe home mobility              PT Transfers       Supine to sit (Progressing)       Start:  05/02/25    Expected End:  05/16/25       Pt will transfer supine to sitting at edge of bed with modified independent assist to promote acute care out of bed activity           Sit to stand (Progressing)       Start:  05/02/25    Expected End:  05/16/25       Pt will transfer sit to standing position with modified independent assist and LRD to promote safe out of bed activity           Bed to chair (Progressing)       Start:  05/02/25    Expected End:  05/16/25       Pt will transfer from sitting edge of bed to the chair with modified independent assist and LRD to promote out of bed activity and reduce the risks of prolonged acute care bedrest              Pain - Adult

## 2025-05-09 ENCOUNTER — TELEPHONE (OUTPATIENT)
Dept: PALLIATIVE MEDICINE | Facility: HOSPITAL | Age: 75
End: 2025-05-09

## 2025-05-09 ENCOUNTER — APPOINTMENT (OUTPATIENT)
Dept: HEMATOLOGY/ONCOLOGY | Facility: HOSPITAL | Age: 75
End: 2025-05-09
Payer: MEDICARE

## 2025-05-09 ENCOUNTER — DOCUMENTATION (OUTPATIENT)
Dept: PRIMARY CARE | Facility: CLINIC | Age: 75
End: 2025-05-09

## 2025-05-09 ENCOUNTER — APPOINTMENT (OUTPATIENT)
Dept: PRIMARY CARE | Facility: CLINIC | Age: 75
End: 2025-05-09
Payer: MEDICARE

## 2025-05-09 NOTE — PROGRESS NOTES
PET rescheduled for 5/13 at [facility] [id number]. To arrive at 8:30 am. To be NPO with no insulin 6 hours prior. Tai at [facility] updated on rescheduled PET scan as well as follow up appointment with Dr. Melva Almonte on 5/29 at 9 am.   Spoke with emergency contact, Emilee, as well regarding rescheduled PET.

## 2025-05-09 NOTE — TELEPHONE ENCOUNTER
Patient referred to supportive oncology/palliative care by  Dr. Gaines. Patient with metastatic  endometrial cancer experiencing cancer related pain. Called patient, unable to leave VM will try to call again next week.

## 2025-05-12 ENCOUNTER — APPOINTMENT (OUTPATIENT)
Dept: RADIOLOGY | Facility: HOSPITAL | Age: 75
End: 2025-05-12
Payer: MEDICARE

## 2025-05-13 ENCOUNTER — APPOINTMENT (OUTPATIENT)
Dept: RADIOLOGY | Facility: CLINIC | Age: 75
End: 2025-05-13
Payer: MEDICARE

## 2025-05-13 DIAGNOSIS — C55 MALIGNANT NEOPLASM OF UTERUS, UNSPECIFIED SITE (MULTI): Primary | ICD-10-CM

## 2025-05-13 LAB — TEST COMMENT - SURGICAL SENDOUT REQUEST: NORMAL

## 2025-05-14 NOTE — DOCUMENTATION CLARIFICATION NOTE
PATIENT:               MART[facility] VALLADARES  [id number]  MRN:                       [id number]  :                       1950  ADMIT DATE:       2025 3:54 PM  DISCH DATE:        2025 3:21 PM  RESPONDING PROVIDER #:        [id number]          PROVIDER RESPONSE TEXT:    Generalized malaise a result of neoplasm related pain    CDI QUERY TEXT:    Clarification    Instruction:    Based on your assessment of the patient and the clinical information, please provide the requested documentation by clicking on the appropriate radio button and enter any additional information if prompted.    Question: Please further clarify the most likely etiology of malaise & fatigue requiring admission x6 days    When answering this query, please exercise your independent professional judgment. The fact that a question is being asked, does not imply that any particular answer is desired or expected.    The patient's clinical indicators include:  Clinical Information: 74y.o. F admitted with Asthenia, Acute cystitis w/o hematuria, Uterine cancer, CHHAYA & pathologic fracture L humerus.     H&P: Malaise & fatigue, from multiple diseases, patient is declining. Asthenia, chronic A fib, Acute cystitis without hematuria, Uterine cancer. L arm weakness with possible pathologic shoulder fracture from bone cancer, CHHAYA & dehydration     Palliative Care: CHHAYA on CKD 3, dehydration, UTI. Metastatic uterine cancer with pathologic fracture of left humerus. Patient would like to pursue treatment options if there are available options.     IM PN: Malaise / Fatigue / Weakness, likely multifactorial from UTI, mild CHHAYA, recently found cancer.     Onco: Newly dx metastatic endometrial cancer with bone mets. P/to ER with worsening fatigue. CBC generally OK with mild anemia. She may benefit from palliative radiation for bone pain.     IM PN: Acute cystitis without hematuria. Stop Rocephin, Urine cultures 5/3 and  have  no growth. Uterine cancer, a new diagnosis for patient 2-3 weeks ago    5/6 D/C Summary: Patient presented with generalized malaise fatigue. Admitted with UTI, uterine cancer 2 to 3 weeks, pathologic left shoulder fracture/weakness 2 to 3 weeks. Urine culture was negative and antibiotics have been stopped.    5/7 PN: Abd distention, constipation - related to immobility and opioid (as needed)    Clinical Indicators:  5/1 BCx: No growth x4 days  5/1 UCx: Growth indicates contamination  5/3 UCx: No growth    5/1 H&H 11.9/37.0, WBC 9.6, Plts 258, Cr 1.81, GFR 29, BUN 37, Lactate 2.3  5/2 H&H 10.4/32.8, WBC 7.3, Plts 208, Cr 1.66, GFR 32, BUN 34  5/3 H&H   9.8/30.9, WBC 8.0, Plts 202, Cr 1.34, GFR 42, BUN 26  5/4 H&H 10.1/32.4, WBC 8.3, Plts 203, Cr 1.16, GFR 50, BUN 21    Treatment:  5/1-5/3 NS x4L  5/1-5/5 Ceftriaxone 1gm IV x5 doses  5/3-5/6 Oxycodone 5mg po x4 doses    Risk Factors: DM2, CKD stage 3, A fib, Chronic systolic CHF, Hypothyroid, BMI: 28.8  Options provided:  -- Generalized malaise a result of neoplasm related pain  -- Malaise & fatigue a result of CHHAYA 2/2 dehydration  -- Malaise & fatigue likely related to opioid-induced Constipation  -- Malaise a result of, Please identify related etiology here  -- Other - I will add my own diagnosis  -- Refer to Clinical Documentation Reviewer    Query created by: Lissette Shankar on 5/13/2025 6:53 AM      Electronically signed by:  JEANINE DELCID 5/14/2025 2:10 PM

## 2025-05-15 ENCOUNTER — DOCUMENTATION (OUTPATIENT)
Dept: GYNECOLOGIC ONCOLOGY | Facility: HOSPITAL | Age: 75
End: 2025-05-15
Payer: MEDICARE

## 2025-05-15 NOTE — PROGRESS NOTES
Per Isidra Phillips requisition and supporting documents e-mailed to  pathology team for testing on specimen dated 4/9/25.

## 2025-05-16 ENCOUNTER — TELEPHONE (OUTPATIENT)
Dept: PALLIATIVE MEDICINE | Facility: HOSPITAL | Age: 75
End: 2025-05-16

## 2025-05-16 ENCOUNTER — TUMOR BOARD CONFERENCE (OUTPATIENT)
Dept: HEMATOLOGY/ONCOLOGY | Facility: HOSPITAL | Age: 75
End: 2025-05-16
Payer: MEDICARE

## 2025-05-16 DIAGNOSIS — C54.1 ENDOMETRIAL CANCER (MULTI): Primary | ICD-10-CM

## 2025-05-16 NOTE — TELEPHONE ENCOUNTER
Called patient at primary number to schedule NPV with supportive oncology. Patients friend answered and said he is unsure when she will be discharged from rehab. Will call back.

## 2025-05-22 PROBLEM — K52.9 GASTROENTERITIS: Status: ACTIVE | Noted: 2025-01-01

## 2025-05-22 PROBLEM — R74.01 TRANSAMINITIS: Status: ACTIVE | Noted: 2025-01-01

## 2025-05-22 PROBLEM — J96.01 ACUTE HYPOXIC RESPIRATORY FAILURE: Status: ACTIVE | Noted: 2025-01-01

## 2025-05-22 PROBLEM — I21.4 NSTEMI (NON-ST ELEVATED MYOCARDIAL INFARCTION) (MULTI): Status: ACTIVE | Noted: 2025-01-01

## 2025-05-22 PROBLEM — A41.9 SEPTIC SHOCK (MULTI): Status: ACTIVE | Noted: 2025-01-01

## 2025-05-22 PROBLEM — E87.20 LACTIC ACIDOSIS: Status: ACTIVE | Noted: 2025-01-01

## 2025-05-22 PROBLEM — N17.9 ACUTE RENAL FAILURE: Status: ACTIVE | Noted: 2025-01-01

## 2025-05-22 PROBLEM — K52.9 COLITIS: Status: ACTIVE | Noted: 2025-01-01

## 2025-05-22 PROBLEM — R65.21 SEPTIC SHOCK (MULTI): Status: ACTIVE | Noted: 2025-01-01

## 2025-05-22 PROBLEM — E83.52 HYPERCALCEMIA: Status: ACTIVE | Noted: 2025-01-01

## 2025-05-22 NOTE — PROGRESS NOTES
05/22/25 1454   Discharge Planning   Living Arrangements   (Admitted from [facility])   Support Systems Friends/neighbors   Assistance Needed YES - ADLs & IADLs - Currently at SNF   Type of Residence Skilled nursing facility   Home or Post Acute Services Post acute facilities (Rehab/SNF/etc)   Type of Post Acute Facility Services Skilled nursing   Expected Discharge Disposition SNF   Does the patient need discharge transport arranged? Yes   RoundTrip coordination needed? Yes   Has discharge transport been arranged? No

## 2025-05-22 NOTE — CARE PLAN
The patient's goals for the shift include      The clinical goals for the shift include Continuous Bipap, Antibiotics and hemodynamic stability    Over the shift, the patient did not make progress toward the following goals. Barriers to progression include .. Recommendations to address these barriers include ..

## 2025-05-22 NOTE — PROGRESS NOTES
Briefly spoke with nurse taking care of patient.  GYN consult ordered but not called to physician.  She has already been seen with GYN/ONC and discussed at tumor board.  Consult here not necessary.  Message to be relayed to hospitalist following patient.

## 2025-05-22 NOTE — CARE PLAN
Problem: Skin  Goal: Decreased wound size/increased tissue granulation at next dressing change  Outcome: Progressing  Flowsheets (Taken 5/22/2025 0517)  Decreased wound size/increased tissue granulation at next dressing change:   Promote sleep for wound healing   Utilize specialty bed per algorithm   Protective dressings over bony prominences  Goal: Participates in plan/prevention/treatment measures  Outcome: Progressing  Flowsheets (Taken 5/22/2025 0517)  Participates in plan/prevention/treatment measures:   Discuss with provider PT/OT consult   Elevate heels   Increase activity/out of bed for meals  Goal: Prevent/manage excess moisture  Outcome: Progressing  Flowsheets (Taken 5/22/2025 0517)  Prevent/manage excess moisture:   Cleanse incontinence/protect with barrier cream   Moisturize dry skin   Use wicking fabric (obtain order)   Monitor for/manage infection if present   Follow provider orders for dressing changes  Goal: Prevent/minimize sheer/friction injuries  Outcome: Progressing  Flowsheets (Taken 5/22/2025 0517)  Prevent/minimize sheer/friction injuries:   Complete micro-shifts as needed if patient unable. Adjust patient position to relieve pressure points, not a full turn   Increase activity/out of bed for meals   Use pull sheet   Turn/reposition every 2 hours/use positioning/transfer devices   HOB 30 degrees or less   Utilize specialty bed per algorithm  Goal: Promote/optimize nutrition  Outcome: Progressing  Flowsheets (Taken 5/22/2025 0517)  Promote/optimize nutrition:   Assist with feeding   Monitor/record intake including meals   Consume > 50% meals/supplements   Offer water/supplements/favorite foods   Reassess MST if dietician not consulted   Discuss with provider if NPO > 2 days  Goal: Promote skin healing  Outcome: Progressing  Flowsheets (Taken 5/22/2025 0517)  Promote skin healing:   Assess skin/pad under line(s)/device(s)   Turn/reposition every 2 hours/use positioning/transfer devices    Protective dressings over bony prominences   Ensure correct size (line/device) and apply per  instructions   Rotate device position/do not position patient on device     Problem: Diabetes  Goal: Achieve decreasing blood glucose levels by end of shift  Outcome: Progressing  Goal: Increase stability of blood glucose readings by end of shift  Outcome: Progressing  Goal: Decrease in ketones present in urine by end of shift  Outcome: Progressing  Goal: Maintain electrolyte levels within acceptable range throughout shift  Outcome: Progressing  Goal: Maintain glucose levels >70mg/dl to <250mg/dl throughout shift  Outcome: Progressing  Goal: No changes in neurological exam by end of shift  Outcome: Progressing  Goal: Learn about and adhere to nutrition recommendations by end of shift  Outcome: Progressing  Goal: Vital signs within normal range for age by end of shift  Outcome: Progressing  Goal: Increase self care and/or family involovement by end of shift  Outcome: Progressing  Goal: Receive DSME education by end of shift  Outcome: Progressing     Problem: Heart Failure  Goal: Improved gas exchange this shift  Outcome: Progressing  Goal: Improved urinary output this shift  Outcome: Progressing  Goal: Reduction in peripheral edema within 24 hours  Outcome: Progressing  Goal: Report improvement of dyspnea/breathlessness this shift  Outcome: Progressing  Goal: Weight from fluid excess reduced over 2-3 days, then stabilize  Outcome: Progressing  Goal: Increase self care and/or family involvement in 24 hours  Outcome: Progressing     Problem: Pain - Adult  Goal: Verbalizes/displays adequate comfort level or baseline comfort level  Outcome: Progressing     Problem: Safety - Adult  Goal: Free from fall injury  Outcome: Progressing     Problem: Discharge Planning  Goal: Discharge to home or other facility with appropriate resources  Outcome: Progressing     Problem: Chronic Conditions and Co-morbidities  Goal:  Patient's chronic conditions and co-morbidity symptoms are monitored and maintained or improved  Outcome: Progressing     Problem: Nutrition  Goal: Nutrient intake appropriate for maintaining nutritional needs  Outcome: Progressing     Problem: Fall/Injury  Goal: Not fall by end of shift  Outcome: Progressing  Goal: Be free from injury by end of the shift  Outcome: Progressing  Goal: Verbalize understanding of personal risk factors for fall in the hospital  Outcome: Progressing  Goal: Verbalize understanding of risk factor reduction measures to prevent injury from fall in the home  Outcome: Progressing  Goal: Use assistive devices by end of the shift  Outcome: Progressing  Goal: Pace activities to prevent fatigue by end of the shift  Outcome: Progressing     Problem: Pain  Goal: Takes deep breaths with improved pain control throughout the shift  Outcome: Progressing  Goal: Turns in bed with improved pain control throughout the shift  Outcome: Progressing  Goal: Walks with improved pain control throughout the shift  Outcome: Progressing  Goal: Performs ADL's with improved pain control throughout shift  Outcome: Progressing  Goal: Participates in PT with improved pain control throughout the shift  Outcome: Progressing  Goal: Free from opioid side effects throughout the shift  Outcome: Progressing  Goal: Free from acute confusion related to pain meds throughout the shift  Outcome: Progressing   The patient's goals for the shift include      The clinical goals for the shift include Continuous Bipap, Antibiotics and hemodynamic stability    Over the shift, the patient did not make progress toward the following goals. Barriers to progression include . Recommendations to address these barriers include .

## 2025-05-22 NOTE — ASSESSMENT & PLAN NOTE
Admit to MICU  Continuous cardiac and pulse oximetry monitoring  Multifactorial etiology in the setting of distributive shock versus septic shock  Continue vasopressor support and wean as tolerated  Follow-up blood cultures and continue empiric IV antibiotics  Judicious use of IV linezolid in the setting of lactic acidosis  Infectious disease consultation requested

## 2025-05-22 NOTE — CONSULTS
Inpatient consult to Cardiology  Consult performed by: Javier Roa DO  Consult ordered by: Justin Constantino MD  Reason for consult: Atrial fibrillation, shock        History Of Present Illness:    Jing Sanchez is a 74 y.o. female presenting from a nursing facility with shortness of breath and hypoxia.  In the emergency room she was found to be tachycardic, and EKG confirms atrial fibrillation with controlled ventricular response.  With a urinalysis showing pyuria and hypotension requiring pressors she is being treated for urosepsis with empiric antibiotic therapy.  She is on both Levophed and vasopressin.  Her laboratory analysis is significant for acute kidney injury on stage III chronic kidney disease with a serum creatinine of 3.48.  She has a significant leukocytosis, 33.5 K on arrival.  Her lactate is elevated at 5.9.  Her proBNP is 3840, high-sensitivity troponins are 4419 and 3561 in sequence.  Her transaminases are mildly elevated.  She has been placed on BiPAP.  Telemetry currently shows atrial fibrillation with rapid ventricular response heart rate in the 120s to 130s.  She remains hypotensive with systolics in the 80s to 90s on Levophed and vasopressin.    As far as her cardiac history is concerned she has a history of atherosclerotic heart disease and ischemic cardiomyopathy.  She had three-vessel bypass in 2016 consisting of a LIMA to LAD, saphenous graft to PDA, and saphenous graft to obtuse marginal.  She follows with my partner Dr. Villafana who has her on guideline directed medical therapy: Carvedilol, losartan, Jardiance.  She was last seen 6 months ago doing reasonably well from a cardiac standpoint.  Her last echocardiogram was from earlier this month showing an ejection fraction of 30 to 35% which is old in comparison to a previous study in 2023.  She does not have a device.  Also noted is RV enlargement with mild to moderate RV systolic dysfunction.    Review of Systems   Constitutional:  Negative for diaphoresis, fever, weight gain and weight loss.   Eyes:  Negative for visual disturbance.   Cardiovascular:  Negative for chest pain, claudication, dyspnea on exertion, irregular heartbeat, leg swelling, near-syncope, orthopnea, palpitations, paroxysmal nocturnal dyspnea and syncope.   Respiratory:  Negative for cough, shortness of breath and wheezing.    Musculoskeletal:  Negative for muscle weakness and myalgias.   Neurological:  Negative for dizziness and weakness.   All other systems reviewed and are negative.      Last Recorded Vitals:  Vitals:    05/22/25 0815 05/22/25 0830 05/22/25 0834 05/22/25 0845   BP: 80/53 79/63  80/63   BP Location:       Patient Position:       Pulse: (!) 127 (!) 123  (!) 128   Resp: (!) 29 (!) 38  (!) 39   Temp:       TempSrc:       SpO2: 90% (!) 89% 94% 97%   Weight:       Height:           Last Labs:  CBC - 5/22/2025:  4:50 AM  26.9 11.6 211    35.5      CMP - 5/22/2025:  4:50 AM  9.0 6.1 141 --- 1.4   _ 3.3 79 101      PTT - No results in last year.  3.6   35.6 _     Troponin I, High Sensitivity   Date/Time Value Ref Range Status   05/21/2025 11:22 PM 3,561 (HH) 0 - 13 ng/L Final     Comment:     Previous result verified on 5/21/2025 2322 on specimen/case [id number] called with component [facility] for procedure Troponin I, High Sensitivity, Initial with value 4,419 ng/L.   05/21/2025 10:24 PM 4,419 (HH) 0 - 13 ng/L Final   05/01/2025 05:18 PM 11 0 - 13 ng/L Final     BNP   Date/Time Value Ref Range Status   05/21/2025 10:24 PM 3,840 (H) 0 - 99 pg/mL Final     Hemoglobin A1C   Date/Time Value Ref Range Status   04/08/2025 05:14 AM 7.7 (H) See comment % Final   09/05/2024 03:00 PM 8.2 (H) see below % Final     LDL Calculated   Date/Time Value Ref Range Status   03/01/2024 01:41 PM 72 <=99 mg/dL Final     Comment:                                 Near   Borderline      AGE      Desirable  Optimal    High     High     Very High     0-19 Y     0 - 109     ---    110-129    >/= 130     ----    20-24 Y     0 - 119     ---    120-159   >/= 160     ----      >24 Y     0 -  99   100-129  130-159   160-189     >/=190     02/21/2023 11:40 AM 58 (L) 65 - 130 MG/DL Final   01/23/2020 03:55  65 - 130 MG/DL Final   07/19/2018 03:37 PM 95 65 - 130 MG/DL Final     VLDL   Date/Time Value Ref Range Status   03/01/2024 01:41 PM 70 (H) 0 - 40 mg/dL Final      Last I/O:  I/O last 3 completed shifts:  In: 2394.8 (29.9 mL/kg) [I.V.:394.8 (4.9 mL/kg); IV Piggyback:2000]  Out: - (0 mL/kg)   Weight: 80 kg     Past Cardiology Tests (Last 3 Years):  EKG:  ECG 12 lead 05/01/2025      ECG 12 lead 04/07/2025    Echo:  Transthoracic Echo (TTE) Limited 05/02/2025    Ejection Fractions:  EF   Date/Time Value Ref Range Status   05/02/2025 10:35 AM 33 %      Cath:  No results found for this or any previous visit from the past 1095 days.    Stress Test:  No results found for this or any previous visit from the past 1095 days.    Cardiac Imaging:  No results found for this or any previous visit from the past 1095 days.      Past Medical History:  She has a past medical history of A-fib (Multi), Acute MI (Multi) (08/2016), ASHD (arteriosclerotic heart disease), CAD (coronary artery disease), CHF (congestive heart failure), DM (diabetes mellitus) (Multi), Dyslipidemia, H/O echocardiogram (04/2017), H/O echocardiogram (09/2018), History of nuclear stress test (02/2015), History of nuclear stress test (04/2016), HTN (hypertension), Obesity, Obstructive sleep apnea, Personal history of other specified conditions, S/P cardiac catheterization (06/2016), and SOB (shortness of breath).    Past Surgical History:  She has a past surgical history that includes MR angio head wo IV contrast (04/06/2017); Cardiac catheterization (09/04/2013); Tumor removal; Colonoscopy (04/2015); Coronary artery bypass graft (10/2016); and Cholecystectomy (03/11/2021).      Social History:  She reports that she has never smoked. She has never  "been exposed to tobacco smoke. She has never used smokeless tobacco. She reports that she does not currently use alcohol. She reports that she does not use drugs.    Family History:  Family History[1]     Allergies:  Cetirizine    Inpatient Medications:  Scheduled Medications[2]  PRN Medications[3]  Continuous Medications[4]  Outpatient Medications:  Current Outpatient Medications   Medication Instructions    acetaminophen (TYLENOL) 650 mg, oral, Every 4 hours PRN    bisacodyl (DULCOLAX) 10 mg, oral, Daily PRN, Do not crush, chew, or split.    blood-glucose meter (Accu-Chek Guide Glucose Meter) misc Test TID    carvedilol (COREG) 3.125 mg, oral, 2 times daily    docusate sodium (COLACE) 200 mg, oral, 2 times daily    empagliflozin (JARDIANCE) 10 mg, oral, Daily    ergocalciferol (Vitamin D-2) 1.25 MG (09684 UT) capsule 1 CAP BY MOUTH WEEKLY ON SUNDAY    fish oil (Omega-3) 60- mg capsule 1,000 mg, oral, Daily    glucagon 1 mg, intramuscular, Every 15 min PRN    insulin lispro (HumaLOG) 100 unit/mL injection INJECT SUBQ PER SLIDING SCALE WITH MEALS (MAX DAILY DOSE OF 80 UNITS)    lancets misc Accucheck lancets    Lantus Solostar U-100 Insulin 100 unit/mL (3 mL) pen 25 UNITS SUBQ DAILY AT BEDTIME - TAKE AS DIRECTED PER INSULIN INSTRUCTIONS    losartan (COZAAR) 25 mg, oral, Daily    nystatin (Mycostatin) 100,000 unit/gram powder 1 Application, Topical, 2 times daily    pantoprazole (PROTONIX) 40 mg, oral, Daily before breakfast    pen needle, diabetic (BD Ultra-Fine Short Pen Needle) 31 gauge x 5/16\" needle Use and discard 4 pen needles per day subcutaneously Dx: E11.65 for 90 days    polyethylene glycol (GLYCOLAX, MIRALAX) 17 g, oral, Daily PRN    polyethylene glycol (GLYCOLAX, MIRALAX) 17 g, oral, Daily    torsemide (DEMADEX) 20 mg, oral, Daily    warfarin (Coumadin) 2 mg tablet Take as directed per After Visit Summary.       Physical Exam:   Gen: Mild respiratory distress   Neck: no JVD, carotid upstroke is " brisk and without delay   Heart: Irregular, tachycardic, s1s2+ no mrg   Lungs: CTA   Ext: Slightly cool, no edema     Assessment/Plan   Septic shock/urosepsis: Empiric antibiotics and pressors under the direction of the primary care team.  Would continue to volume resuscitate with caution given systolic dysfunction.  Atrial fibrillation with rapid ventricular response: She is anticoagulated with Coumadin, reasonable to hold at this point for an INR of 3.1.  With her hypotension and left ventricular systolic dysfunction her options for rate control are limited.  Her transaminases are only mildly elevated will need to monitor this.  Will start amiodarone bolus and infusion.  NSTEMI: I suspect that this is a secondary process related to her urosepsis in the setting of acute kidney injury.  I would not treat this as acute coronary syndrome.  Reasonable to get an echocardiogram limited for systolic function.  Hypoxic respiratory failure: Unclear etiology.  Not in heart failure on my exam.  I would not diurese her especially with her impaired kidney function secondary to hypotension.  Ischemic cardiomyopathy: Currently her guideline directed therapy is on hold appropriately for hypotension and acute kidney injury.  Will resume when appropriate.  Atherosclerotic heart disease: Curious as to why she is not on a high potency statin, will need to address this.  No need for aspirin while anticoagulated.  Acute kidney injury on stage III chronic kidney disease: Likely secondary to hypotension and hypoxic respiratory failure.  Will avoid nephrotoxic agents from a cardiac perspective.  CVC 05/22/25 Triple lumen Non-tunneled Right Femoral (Active)   Site Assessment Clean;Dry;Intact 05/22/25 0857   Dressing Status Clean;Dry 05/22/25 0857   Number of days: 0       Peripheral IV 05/21/25 20 G Left Antecubital (Active)   Site Assessment Clean;Dry;Intact 05/22/25 0857   Dressing Status Clean;Dry 05/22/25 0857   Number of days: 1        Peripheral IV 05/21/25 20 G Right Antecubital (Active)   Site Assessment Clean;Dry;Intact 05/22/25 0857   Dressing Status Clean;Dry 05/22/25 0857   Number of days: 1       Peripheral IV 05/21/25 20 G Anterior;Left Hand (Active)   Site Assessment Clean;Intact;Dry 05/22/25 0856   Dressing Status Clean;Dry 05/22/25 0856   Number of days: 1       Code Status:  DNR and No Intubation        Javier Roa DO         [1]   Family History  Problem Relation Name Age of Onset    Hypertension Mother      Cancer Mother      Diabetes Father      Stroke Father      Diabetes Sister      Heart disease Maternal Grandmother      Heart disease Maternal Grandfather      Heart disease Paternal Grandmother      Heart disease Paternal Grandfather     [2]   Scheduled medications   Medication Dose Route Frequency    linezolid  600 mg intravenous q12h    oxygen   inhalation Continuous - Inhalation    pantoprazole  40 mg intravenous Daily    piperacillin-tazobactam  2.25 g intravenous q6h   [3]   PRN medications   Medication    benzocaine-menthol    dextromethorphan-guaifenesin    dextrose    dextrose    glucagon    guaiFENesin    ondansetron ODT    Or    ondansetron    polyethylene glycol   [4]   Continuous Medications   Medication Dose Last Rate    norepinephrine  0-1 mcg/kg/min 0.08 mcg/kg/min (05/22/25 0518)    sodium chloride 0.9%  75 mL/hr 75 mL/hr (05/22/25 0518)    vasopressin  0-0.03 Units/min 0.03 Units/min (05/22/25 0845)

## 2025-05-22 NOTE — ASSESSMENT & PLAN NOTE
Patient placed on BiPAP device in the ED  Possibly secondary to respiratory compensation for metabolic picture  CT Scott of the chest appreciated  Will wean supplemental oxygen as tolerated  Pulmonary consultation requested

## 2025-05-22 NOTE — H&P
History Of Present Illness      Jing Sanchez is a 74 y.o. female presenting via EMS from SNF with Shortness of Breath.    The patient presents from [facility].  At the facility she was noted to have a low pulse oximetry in the 80s.  Patient was applied supplemental oxygen.  Patient noted to be in atrial fibrillation with RVR.  Patient was tachypneic.    Upon arrival to the hospital patient's vital signs are noted for Tmax 36.3, pulse rate elevated 156 bpm, respiratory rate elevated 42, BP initially 127/86 and then dropping to 64/53.  Patient placed on continuous BiPAP device.    ED diagnostic workup noted for marked leukocytosis of 33.5.  The H&H and platelet count were stable.  Was a significant neutrophil predominance at 27.81.  Blood cultures were obtained for the patient.  The blood chemistry was noted for an elevated glucose 208.  Patient's potassium was 135.  Chloride 93.  The BUN and creatinine were elevated at 61/3.7.  Patient's calcium was elevated 10.8.  The AST and ALT were elevated at 99/54, respectively.  Bilirubin level elevated 1.5.  Lactate level elevated to 5.9.  The BNP was elevated at 3840.  Patient's first set cardiac enzyme level was 4419.  The second set came down to 3561.  Coagulation profile noted for an INR of 3.1.  PTT was 30.8.  A VBG was obtained with a pH of 7.5.  Initial lactic acid level noted be 6.7 increasing to 6.9.  Patient's urinalysis was noted for pyorrhea.  Patient underwent a CT head without IV contrast as well as a CT angio chest for pulmonary embolism as well as a CT abdomen pelvis with IV contrast.    Results noted for the following:    IMPRESSION:  CHEST:   No acute cardiopulmonary disease.  Cardiomegaly.  Soft tissue density of the apex of the heart on the  abdominal images measures 2.3 cm.  Recommend echocardiogram for  further evaluation.  Soft tissue mass with lytic changes of the right tenth rib measuring  3.2 cm compatible with metastatic  disease/malignancy.  This appears  stable.  ABDOMEN:   No acute inflammatory changes.  Fatty liver morphology.  PELVIS:   Large lobulated mass in the pelvis measuring 11.6 cm with adjacent  masses of the uterus suggesting uterine fibroids.  The lobular mass  along the superior margin is more concerning for possible malignancy.  Findings compatible with gastroenteritis.  Mild colitis in the sigmoid  colon.  Redemonstration lytic mass in the right hemisacrum.    CT head noted for the following:    IMPRESSION:  Cerebral atrophy and chronic periventricular white matter small  vessel ischemic change.      No evidence of acute intracranial hemorrhage.      Mucosal thickening of left maxillary sinus.        EKG noted for atrial fibrillation with rapid ventricular response at 155 bpm  Left axis deviation  Inferior infarct  Anterolateral infarct  QTc 472 ms      In the ED the patient was administered Cardizem 10 mg IV push x 1.  The patient was given azithromycin 500 mg IV piggyback x 1.  The patient was given Zosyn 4.5 g IV piggyback x 1.  Patient was given a total of 2 L normal saline bolus.  The patient was started on Levophed drip.      Request made to admit the patient for further evaluation and management MICU.        Off note:    Patient recently discharged from [facility] on May 6, 2025.  At that time she presented with generalized weakness and malaise.  Was admitted for a UTI management of stage IVb uterine cancer.  Also noted to have a pathological fracture of the left shoulder.  Patient was provided with warfarin for atrial fibrillation.  She was ultimately discharged to a SNF and instructed to obtain the PET scan as an outpatient which she already did.  During that hospitalization she was evaluated by Ortho surgery oncology and palliative care.        Past Medical History      Past Medical History:   Diagnosis Date    A-fib (Multi)     Acute MI (Multi) 08/2016    cath by kd Knott in RCA  and clot migrated to PDA and no PTCA.    ASHD (arteriosclerotic heart disease)     CAD (coronary artery disease)     CHF (congestive heart failure)     DM (diabetes mellitus) (Multi)     Dyslipidemia     H/O echocardiogram 04/2017    EF 35%    H/O echocardiogram 09/2018    EF20%    History of nuclear stress test 02/2015    no ischemia and apex scar.    History of nuclear stress test 04/2016    apical scar and inf ischemia    HTN (hypertension)     Obesity     Obstructive sleep apnea     Personal history of other specified conditions     Acute AW MI / 9/4/13, cath LM-nl, , CX irreg, RCA irreg, LV ant hypo, PTCA with 2.75 RENETTA to LAD    S/P cardiac catheterization 06/2016    severe diffuse ASHD and recommended OHS    SOB (shortness of breath)          Surgical History        Past Surgical History:   Procedure Laterality Date    CARDIAC CATHETERIZATION  09/04/2013    with RENETTA    CHOLECYSTECTOMY  03/11/2021    COLONOSCOPY  04/2015    hemorrhoid removed    CORONARY ARTERY BYPASS GRAFT  10/2016    LIMA-LAD, SVG-PDA, SVG-Diag, SVG-M1 and M@    MR HEAD ANGIO WO IV CONTRAST  04/06/2017    MR HEAD ANGIO WO IV CONTRAST [facility]    TUMOR REMOVAL      Benign rectal tumor removed          Social History    She reports that she has never smoked. She has never been exposed to tobacco smoke. She has never used smokeless tobacco. She reports that she does not currently use alcohol. She reports that she does not use drugs.      Family History      Family History   Problem Relation Name Age of Onset    Hypertension Mother      Cancer Mother      Diabetes Father      Stroke Father      Diabetes Sister      Heart disease Maternal Grandmother      Heart disease Maternal Grandfather      Heart disease Paternal Grandmother      Heart disease Paternal Grandfather            Allergies      Cetirizine        Review of Systems    14-point ROS otherwise negative, as per HPI/Interval History.    General: No change in weight. No  "weakenss. No Fevers/Chills/Night Sweats   Skin: No skin/hair/nail changes. No rashes or sores.  Head:  No trauma. No Headache/nasuea/vomitting.   Eyes: No visual changes. No tearing. No itching.   Ears: No hearing loss. No tinnitus. No vertigo. No discharge.  Nose, Sinuses: No rhinorrhea, No nasal congestion. No epistaxis.  Mouth, Throat, Neck: No bleeding gums, hoarseness, sore throat or swollen neck  Cardiac: No palpitations. No ANDERSON. No PND. No Orthopnea.   Respiratory: Yes to Shortness of Breath. No wheezing. No cough. No hemoptysis.   GI: No nausea/vomiting. No indigestion. No diarrhea. No constipation.   Extremities: No numbness or tingling. No paresthesias. Left arm pain  Urinary: No change in urinary frequency. No change in hesitancy. No hematuria. No incontinence.             Physical Exam      Constitutional: Patient wearing BiPAP device  Eyes: PERRL, EOMI,   ENMT: mucous membranes moist  Head/Neck: Neck supple, No JVD,   Respiratory/Thorax: Patent airways, CTAB,   Cardiovascular: Regular, rate and rhythm, no murmurs  Gastrointestinal: Soft, non-distended, +BS.  Musculoskeletal: ROM limited LUE  : Serosanguinous Bloody Vaginal Discharge  Extremities: peripheral pulses intact; no edema.  Right femoral triple-lumen catheter  Neurological: Alert and Oriented x 3; no focal deficits  Psychological: Appropriate mood and behavior  Skin: No lesions, No rashes.         Last Recorded Vitals  Blood pressure 96/74, pulse (!) 117, temperature 36.3 °C (97.3 °F), resp. rate (!) 25, height 1.651 m (5' 5\"), weight 78.2 kg (172 lb 8 oz), SpO2 94%.    Relevant Results    Lab Results   Component Value Date    WBC 33.5 (H) 05/21/2025    HGB 14.0 05/21/2025    HCT 42.5 05/21/2025     05/21/2025     05/21/2025       Lab Results   Component Value Date    GLUCOSE 208 (H) 05/21/2025    CALCIUM 10.8 (H) 05/21/2025     (L) 05/21/2025    K 5.0 05/21/2025    CO2 21 05/21/2025    CL 93 (L) 05/21/2025    BUN 61 (H) " 05/21/2025    CREATININE 3.70 (H) 05/21/2025       Lab Results   Component Value Date    HGBA1C 7.7 (H) 04/08/2025         CT head wo IV contrast  Result Date: 5/21/2025  Interpreted By:  Jose Manuel Garcia, STUDY: CT HEAD WO IV CONTRAST;  5/21/2025 10:51 pm   INDICATION: Signs/Symptoms:Change in mental status.   COMPARISON: None   ACCESSION NUMBER(S): [id number]   ORDERING CLINICIAN: BAILEY CARRILLO   TECHNIQUE: Contiguous axial images of the head were obtained without intravenous contrast.   FINDINGS: BRAIN PARENCHYMA:  There is cerebral atrophy and chronic periventricular white matter small vessel ischemic change. The gray white matter differentiation is preserved.  No mass effect or midline shift.   HEMORRHAGE:  No evidence of acute intracranial hemorrhage. VENTRICLES AND EXTRA-AXIAL SPACES:  The ventricles are within normal limits in size for brain volume.  No evidence of abnormal extraaxial fluid collection. EXTRACRANIAL SOFT TISSUES:  Within normal limits. PARANASAL SINUSES/MASTOIDS:  Mucosal thickening left maxillary sinus. CALVARIUM:  No evidence of depressed calvarial fracture.   OTHER FINDINGS:  None       Cerebral atrophy and chronic periventricular white matter small vessel ischemic change.   No evidence of acute intracranial hemorrhage.   Mucosal thickening of left maxillary sinus.       MACRO: None   Signed by: Jose Manuel Garcia 5/21/2025 11:19 PM Dictation workstation:   [id number]    CT head wo IV contrast  Result Date: 5/1/2025  Interpreted By:  Jose Manuel Garcia, STUDY: CT HEAD WO IV CONTRAST;  5/1/2025 11:08 pm   INDICATION: Signs/Symptoms:confusion.   COMPARISON: None   ACCESSION NUMBER(S): [id number]   ORDERING CLINICIAN: JEANINE BELLO   TECHNIQUE: Contiguous axial images of the head were obtained without intravenous contrast.   FINDINGS: BRAIN PARENCHYMA:  There is cerebral atrophy and chronic periventricular white matter small vessel ischemic change. The gray white matter differentiation is preserved.   No mass effect or midline shift.   HEMORRHAGE:  No evidence of acute intracranial hemorrhage. VENTRICLES AND EXTRA-AXIAL SPACES:  The ventricles are within normal limits in size for brain volume.  No evidence of abnormal extraaxial fluid collection. EXTRACRANIAL SOFT TISSUES:  Within normal limits. PARANASAL SINUSES/MASTOIDS:  Mucosal thickening with mucosal retention cyst or polyp left maxillary sinus. CALVARIUM:  No evidence of depressed calvarial fracture.   OTHER FINDINGS:  None       Cerebral atrophy and chronic periventricular white matter small vessel ischemic change.   No evidence of acute intracranial hemorrhage.   Mucosal thickening with mucosal retention cyst or polyp in left maxillary sinus.   MACRO: None   Signed by: Jose Manuel Garcia 5/1/2025 11:41 PM Dictation workstation:   [id number]        Scheduled medications  Scheduled Medications[1]  Continuous medications  Continuous Medications[2]  PRN medications  PRN Medications[3]    Jing Sanchez is a 74 y.o. female presenting via EMS from [facility] with Shortness of Breath.  The patient presents from [facility].  At the facility she was noted to have a low pulse oximetry in the 80s.  Patient was applied supplemental oxygen.  Patient noted to be in atrial fibrillation with RVR.  Patient was tachypneic.  Patient admitted for further evaluation and management.      Assessment/Plan   Assessment & Plan  Septic shock (Multi)  Admit to MICU  Continuous cardiac and pulse oximetry monitoring  Multifactorial etiology in the setting of distributive shock versus septic shock  Continue vasopressor support and wean as tolerated  Follow-up blood cultures and continue empiric IV antibiotics  Judicious use of IV linezolid in the setting of lactic acidosis  Infectious disease consultation requested    Acute hypoxic respiratory failure  Patient placed on BiPAP device in the ED  Possibly secondary to respiratory compensation for metabolic picture  RONALD Chavez of the  chest appreciated  Will wean supplemental oxygen as tolerated  Pulmonary consultation requested    Leukocytosis  In the setting of overt shock and stage IV malignancy    Lactic acidosis  The setting of overt shock    Acute renal failure  Appears to be acute on chronic  Continue IVF hydration judiciously in the setting of cardiomyopathy  Hold home losartan dose  Continue to hold all Nephrotoxic agents  CT abdomen pelvis negative for any hydronephrosis  Patient did receive contrast load in ED. Will need to monitor for development of JOSSIE  Obtain Urine electrolytes, Urinalysis , Urine Culture + Sensitivity   Continue to Monitor Renal Function (BUN/Cr) + Urine Output.   Nephrology consultation.  Appreciate recs    Colitis  Gastroenteritis  Incidental finding  Consider gastroenterology consultation as inpatient or outpatient    Transaminitis  Avoid hepatotoxic agents    Chronic atrial fibrillation (Multi)  Patient presented with atrial fibrillation with RVR  Holding warfarin at this time until SLP evaluation  Restart warfarin when appropriate    NSTEMI (non-ST elevated myocardial infarction) (Multi)  Significant elevation in troponin level  Was probably type II MI demand ischemia in the setting of undifferentiated shock and A-fib with RVR  Will add on CK level to evaluate for rhabdomyolysis  Cardiology consultation requested.  Appreciate recs.    CHF (congestive heart failure)  Meticulous volume management  Holding home torsemide in the setting of hypotension    Uterine cancer (Multi)  Stage IVB/IVC (FIGO 2009/2023) metastatic endometrial cancer including bony metastases.  Gynecologic Oncology Tumor Board 05/09/2025  PET staging scan at [facility] performed on 5/21 earlier today.  Noted for the following:  IMPRESSION:  1. Bulky appearance of the uterus with areas of hypermetabolic  activity corresponding to patient's primary neoplasm.  2. Multiple hypermetabolic osseous metastatic disease throughout the  axial and  appendicular skeleton with soft tissue components as  described above. There is pathologic fracture of the left humerus.  3. Focal hypermetabolic activity within the right vocal cord with  underlying soft tissue fullness. Findings are nonspecific and can not  be completely characterized on the current exam. May consider direct  visualization and dedicated imaging if clinically warranted.  Follow-up appointment with Dr. Gaines -> Th, 5/29 at [facility].  Patient would like to pursue treatment (palliative cancer-directed therapy) if appropriate.  Bloody serosanguineous discharge noted from vaginal area  Gynecology consultation requested    Cardiomyopathy  Meticulous volume management    Hypercalcemia  Most probably the setting of dehydration and hypercalcemia of malignancy  Follow-up ionized calcium level in a.m.    Hypothyroidism  TSH level requested    GI and DVT prophylaxis  PPI IV push daily  Sequential compression devices until safe to restart warfarin therapy      Advance care directives:    Patient is DNR CCA/DNI  Noted from palliative care notes from last hospitalization.  POA HC noted to be a friend of Mr. Killian Strong  Will request reevaluation by our palliative care specialist who provided an extensive comprehensive evaluation last hospitalization.  Appreciate reevaluation.      The patient will require greater than 2 midnight stay for further evaluation management.  The patient remains in critical condition and remains at high risk for acute clinical decompensation.  Will continue with telemetry monitoring in the MICU.      Disposition:  Continue to monitor inpatient, await consultant recommendations, await test results, and await clinical improvement      This Dictation was Transcribed using a Nuance Dragon Voice Recognition System Device (with Compatible Computer + Software) and as such may contain Grammatical Errors and Unintentional Typing Misprints.      I spent 45 minutes in the  professional and overall CCT of this patient.      Justin Constantino MD           [1] linezolid, 600 mg, intravenous, q12h  oxygen, , inhalation, Continuous - Inhalation  pantoprazole, 40 mg, intravenous, Daily  piperacillin-tazobactam, 2.25 g, intravenous, q6h  sodium chloride, 1,000 mL, intravenous, Once  vancomycin, 1,500 mg, intravenous, Once  [2] norepinephrine, 0-1 mcg/kg/min, Last Rate: 0.14 mcg/kg/min (05/22/25 0010)  sodium chloride 0.9%, 75 mL/hr  vasopressin, 0-0.03 Units/min  [3] PRN medications: benzocaine-menthol, dextromethorphan-guaifenesin, dextrose, dextrose, glucagon, guaiFENesin, ondansetron ODT **OR** ondansetron, polyethylene glycol

## 2025-05-22 NOTE — ASSESSMENT & PLAN NOTE
Stage IVB/IVC (FIGO 2009/2023) metastatic endometrial cancer including bony metastases.  Gynecologic Oncology Tumor Board 05/09/2025  PET staging scan at [facility] performed on 5/21 earlier today.  Noted for the following:  IMPRESSION:  1. Bulky appearance of the uterus with areas of hypermetabolic  activity corresponding to patient's primary neoplasm.  2. Multiple hypermetabolic osseous metastatic disease throughout the  axial and appendicular skeleton with soft tissue components as  described above. There is pathologic fracture of the left humerus.  3. Focal hypermetabolic activity within the right vocal cord with  underlying soft tissue fullness. Findings are nonspecific and can not  be completely characterized on the current exam. May consider direct  visualization and dedicated imaging if clinically warranted.  Follow-up appointment with Dr. Gaines -> Th, 5/29 at [facility].  Patient would like to pursue treatment (palliative cancer-directed therapy) if appropriate.  Bloody serosanguineous discharge noted from vaginal area  Gynecology consultation requested

## 2025-05-22 NOTE — PROGRESS NOTES
Spiritual Care Visit  Spiritual Care Request    Reason for Visit:  Routine Visit: Introduction     Request Received From:       Focus of Care:  Visited With: Patient         Refer to :          Spiritual Care Assessment    Spiritual Assessment:                      Care Provided:  Intended Effects: Establish rapport and connectedness, Build relationship of care and support, Convey a calming presence  Methods: Offer emotional support    Sense of Community and or Caodaism Affiliation:  Confucianist         Addressed Needs/Concerns and/or Boris Through:          Outcome:  Outcome of Spiritual Care Visit: Identifying spiritual/emotional issues, Comfort/healing presence     Advance Directives:         Spiritual Care Annotation      Annotation:  provided patient support while rounding the Unit.  introduced  services of [facility].   explained the role of the  in providing emotional and spiritual support for patient's and family while in admitted to the hospital.      attempted to provided support today. Patient was resting with a BIPAP and appears to be unable to visit today.  offered support and offered for a visit another time which the patient acknowledged.   will follow up at a later date.  will remain available as desired.     No spiritual or Rastafari needs were expressed. Spiritual care will remain available for support as requested.

## 2025-05-22 NOTE — CONSULTS
Inpatient consult to Palliative Care  Consult performed by: Justyna Diana, MARTÍN-CNP  Consult ordered by: Justin Constantino MD  Reason for consult: GOC          Reason For Consult  Reason for Consult: communication / medical decision making     History Of Present Illness  Jing Sanchez is a 74 y.o. female presenting via EMS from [facility] with Shortness of Breath.     The patient presents from [facility].  At the facility she was noted to have a low pulse oximetry in the 80s.  Patient was applied supplemental oxygen.  Patient noted to be in atrial fibrillation with RVR.  Patient was tachypneic.  Upon arrival to the hospital patient's vital signs are noted for Tmax 36.3, pulse rate elevated 156 bpm, respiratory rate elevated 42, BP initially 127/86 and then dropping to 64/53.  Patient placed on continuous BiPAP device.  ED diagnostic workup noted for marked leukocytosis of 33.5, with a significant neutrophil predominance at 27.81.  Blood cultures were obtained for the patient.  The blood chemistry was noted for an elevated glucose 208.   The BUN and creatinine were elevated at 61/3.7.  Patient's calcium was elevated 10.8.  The AST and ALT were elevated at 99/54, respectively.  Bilirubin level elevated 1.5.  Lactate level elevated to 5.9.  The BNP was elevated at 3840.  Patient's first set cardiac enzyme level was 4419.  The second set came down to 3561.  Coagulation profile noted for an INR of 3.1.  PTT was 30.8.  A VBG was obtained with a pH of 7.5.  CT Brain without acute findings. CT Chest/Abd/Pelvis No acute cardiopulmonary disease.  Cardiomegaly.  Soft tissue density of the apex of the heart on the  abdominal images measures 2.3 cm. Soft tissue mass with lytic changes of the right tenth rib measuring 3.2 cm compatible with metastatic disease/malignancy. Fatty liver morphology. Large lobulated mass in the pelvis measuring 11.6 cm with adjacent masses of the uterus suggesting uterine fibroids.  The  lobular mass along the superior margin is more concerning for possible malignancy. Findings compatible with gastroenteritis.  Mild colitis in the sigmoid colon. Redemonstration lytic mass in the right hemisacrum. In ER, patient given cardizem Ivpush, azithromycin, zosyn, 2L NS boluses, was started on levophed and admitted to ICU. Antibiotics were switched to merrem and lenzolid. BP remained low, vasopressin was added. Patient was continued on ivf. Amiodarone was added for afib RVR. Currently patient on bipap 35%Fio2, HR 90's afib, MAP >60 on 2 pressors. Patient does have UO 250ml documented in 24hr. She is confused, restless at times, unable to contribute to ROS or HPI. Friend Emilee at bedside. She tells me that patient was in her usual state of health during their last visit 1-2days prior to admission. Patient is complaining of lower abdominal pain today, but unable to provide further details.      Symptoms (0 - 10, Best to Worst)  [place] Symptom Assessment System  0-10 (Numeric) Pain Score: 0 - No pain    BM in last 48 hours? unknown    Emotional/Psychological/Spiritual Needs  Na-encephalopathic  Serious Illness Conversation  NA-Encephalopathic    Personal/Social History    She reports that she has never smoked. She has never been exposed to tobacco smoke. She has never used smokeless tobacco. She reports that she does not currently use alcohol. She reports that she does not use drugs.    Functional Status  Limited function at [place], Friend tells me she was able to ambulate with assist x 1 short distances only.     Caregiving/Caregiver Support  Does the patient require assistance in some or all components of his care, including coordination of medical care? Yes  If Yes, which person serves that role?  caregiver   Caregiver emotional or practical needs:      Past Medical History  She has a past medical history of A-fib (Multi), Acute MI (Multi) (08/2016), ASHD (arteriosclerotic heart disease), CAD  (coronary artery disease), CHF (congestive heart failure), DM (diabetes mellitus) (Multi), Dyslipidemia, H/O echocardiogram (04/2017), H/O echocardiogram (09/2018), History of nuclear stress test (02/2015), History of nuclear stress test (04/2016), HTN (hypertension), Obesity, Obstructive sleep apnea, Personal history of other specified conditions, S/P cardiac catheterization (06/2016), and SOB (shortness of breath).    Surgical History  She has a past surgical history that includes MR angio head wo IV contrast (04/06/2017); Cardiac catheterization (09/04/2013); Tumor removal; Colonoscopy (04/2015); Coronary artery bypass graft (10/2016); and Cholecystectomy (03/11/2021).     Family History  Family History[1]  Allergies  Cetirizine    Review of Systems   Unable to perform ROS: Mental status change        Physical Exam  Vitals and nursing note reviewed.   Constitutional:       General: She is in acute distress.      Appearance: She is ill-appearing.   HENT:      Head: Normocephalic and atraumatic.      Nose: Nose normal.      Mouth/Throat:      Mouth: Mucous membranes are dry.      Pharynx: Oropharynx is clear.   Eyes:      Extraocular Movements: Extraocular movements intact.      Pupils: Pupils are equal, round, and reactive to light.   Cardiovascular:      Rate and Rhythm: Normal rate and regular rhythm.      Pulses: Normal pulses.      Heart sounds: No murmur heard.  Pulmonary:      Effort: Respiratory distress present.      Breath sounds: Normal breath sounds.      Comments: Continuous bipap  Abdominal:      General: Abdomen is flat. Bowel sounds are normal. There is distension.      Palpations: Abdomen is soft. There is no mass.      Tenderness: There is abdominal tenderness.      Hernia: No hernia is present.   Musculoskeletal:         General: No swelling, tenderness, deformity or signs of injury. Normal range of motion.      Cervical back: Normal range of motion and neck supple.      Right lower leg: No  "edema.      Left lower leg: No edema.   Skin:     General: Skin is warm and dry.      Capillary Refill: Capillary refill takes 2 to 3 seconds.      Coloration: Skin is pale.   Neurological:      General: No focal deficit present.      Mental Status: She is disoriented.      Motor: Weakness present.         Last Recorded Vitals  Blood pressure 71/59, pulse 98, temperature (!) 3 °C (37.4 °F), resp. rate (!) 38, height 1.651 m (5' 5\"), weight 80 kg (176 lb 5.9 oz), SpO2 95%.    Relevant Results  Results for orders placed or performed during the hospital encounter of 05/21/25 (from the past 24 hours)   CBC and Auto Differential   Result Value Ref Range    WBC 33.5 (H) 4.4 - 11.3 x10*3/uL    nRBC 0.0 0.0 - 0.0 /100 WBCs    RBC 4.24 4.00 - 5.20 x10*6/uL    Hemoglobin 14.0 12.0 - 16.0 g/dL    Hematocrit 42.5 36.0 - 46.0 %     80 - 100 fL    MCH 33.0 26.0 - 34.0 pg    MCHC 32.9 32.0 - 36.0 g/dL    RDW 13.4 11.5 - 14.5 %    Platelets 264 150 - 450 x10*3/uL    Immature Granulocytes %, Automated 0.6 0.0 - 0.9 %    Immature Granulocytes Absolute, Automated 0.19 0.00 - 0.50 x10*3/uL   Comprehensive Metabolic Panel   Result Value Ref Range    Glucose 208 (H) 74 - 99 mg/dL    Sodium 135 (L) 136 - 145 mmol/L    Potassium 5.0 3.5 - 5.3 mmol/L    Chloride 93 (L) 98 - 107 mmol/L    Bicarbonate 21 21 - 32 mmol/L    Anion Gap 26 (H) 10 - 20 mmol/L    Urea Nitrogen 61 (H) 6 - 23 mg/dL    Creatinine 3.70 (H) 0.50 - 1.05 mg/dL    eGFR 12 (L) >60 mL/min/1.73m*2    Calcium 10.8 (H) 8.6 - 10.3 mg/dL    Albumin 4.0 3.4 - 5.0 g/dL    Alkaline Phosphatase 124 33 - 136 U/L    Total Protein 7.5 6.4 - 8.2 g/dL    AST 99 (H) 9 - 39 U/L    Bilirubin, Total 1.5 (H) 0.0 - 1.2 mg/dL    ALT 54 (H) 7 - 45 U/L   Lactate   Result Value Ref Range    Lactate 5.9 (HH) 0.4 - 2.0 mmol/L   Blood Culture    Specimen: Peripheral Venipuncture; Blood culture   Result Value Ref Range    Blood Culture Loaded on Instrument - Culture in progress    Blood Culture "    Specimen: Peripheral Venipuncture; Blood culture   Result Value Ref Range    Blood Culture Loaded on Instrument - Culture in progress    Blood Gas Venous Full Panel   Result Value Ref Range    POCT pH, Venous 7.50 (H) 7.33 - 7.43 pH    POCT pCO2, Venous 28 (L) 41 - 51 mm Hg    POCT pO2, Venous 44 35 - 45 mm Hg    POCT SO2, Venous 68 45 - 75 %    POCT Oxy Hemoglobin, Venous 66.4 45.0 - 75.0 %    POCT Hematocrit Calculated, Venous 43.0 36.0 - 46.0 %    POCT Sodium, Venous 131 (L) 136 - 145 mmol/L    POCT Potassium, Venous 5.5 (H) 3.5 - 5.3 mmol/L    POCT Chloride, Venous 96 (L) 98 - 107 mmol/L    POCT Ionized [place], Venous 1.25 1.10 - 1.33 mmol/L    POCT Glucose, Venous 230 (H) 74 - 99 mg/dL    POCT Lactate, Venous 6.7 (HH) 0.4 - 2.0 mmol/L    POCT Base Excess, Venous -0.2 -2.0 - 3.0 mmol/L    POCT HCO3 Calculated, Venous 21.8 (L) 22.0 - 26.0 mmol/L    POCT Hemoglobin, Venous 14.2 12.0 - 16.0 g/dL    POCT Anion Gap, Venous 19.0 10.0 - 25.0 mmol/L    Patient Temperature 37.0 degrees Celsius    FiO2 21 %   B-Type Natriuretic Peptide   Result Value Ref Range    BNP 3,840 (H) 0 - 99 pg/mL   Troponin I, High Sensitivity, Initial   Result Value Ref Range    Troponin I, High Sensitivity 4,419 (HH) 0 - 13 ng/L   Manual Differential   Result Value Ref Range    Neutrophils %, Manual 83.0 40.0 - 80.0 %    Bands %, Manual 4.0 0.0 - 5.0 %    Lymphocytes %, Manual 6.0 13.0 - 44.0 %    Monocytes %, Manual 7.0 2.0 - 10.0 %    Eosinophils %, Manual 0.0 0.0 - 6.0 %    Basophils %, Manual 0.0 0.0 - 2.0 %    Seg Neutrophils Absolute, Manual 27.81 (H) 1.60 - 5.00 x10*3/uL    Bands Absolute, Manual 1.34 (H) 0.00 - 0.50 x10*3/uL    Lymphocytes Absolute, Manual 2.01 0.80 - 3.00 x10*3/uL    Monocytes Absolute, Manual 2.35 (H) 0.05 - 0.80 x10*3/uL    Eosinophils Absolute, Manual 0.00 0.00 - 0.40 x10*3/uL    Basophils Absolute, Manual 0.00 0.00 - 0.10 x10*3/uL    Total Cells Counted 100     Neutrophils Absolute, Manual 29.15 (H) 1.60 -  5.50 x10*3/uL    RBC Morphology No significant RBC morphology present    Urinalysis with Reflex Culture and Microscopic   Result Value Ref Range    Color, Urine Yellow Light-Yellow, Yellow, Dark-Yellow    Appearance, Urine Clear Clear    Specific Gravity, Urine 1.015 1.005 - 1.035    pH, Urine 7.0 5.0, 5.5, 6.0, 6.5, 7.0, 7.5, 8.0    Protein, Urine 20 (TRACE) NEGATIVE, 10 (TRACE), 20 (TRACE) mg/dL    Glucose, Urine OVER (4+) (A) Normal mg/dL    Blood, Urine 0.06 (1+) (A) NEGATIVE mg/dL    Ketones, Urine NEGATIVE NEGATIVE mg/dL    Bilirubin, Urine NEGATIVE NEGATIVE mg/dL    Urobilinogen, Urine Normal Normal mg/dL    Nitrite, Urine NEGATIVE NEGATIVE    Leukocyte Esterase, Urine NEGATIVE NEGATIVE   Urinalysis Microscopic   Result Value Ref Range    WBC, Urine 6-10 (A) 1-5, NONE /HPF    RBC, Urine 1-2 NONE, 1-2, 3-5 /HPF    Squamous Epithelial Cells, Urine 1-9 (SPARSE) Reference range not established. /HPF    Hyaline Casts, Urine 2+ (A) NONE /LPF   Blood Gas Lactic Acid, Venous   Result Value Ref Range    POCT Lactate, Venous 6.9 (HH) 0.4 - 2.0 mmol/L   Protime-INR   Result Value Ref Range    Protime 30.8 (H) 9.3 - 12.7 seconds    INR 3.1 (H) 0.9 - 1.2   Troponin, High Sensitivity, 1 Hour   Result Value Ref Range    Troponin I, High Sensitivity 3,561 (HH) 0 - 13 ng/L   Creatine Kinase   Result Value Ref Range    Creatine Kinase 305 (H) 0 - 215 U/L   Lactate   Result Value Ref Range    Lactate 4.1 (HH) 0.4 - 2.0 mmol/L   CBC and Auto Differential   Result Value Ref Range    WBC 26.9 (H) 4.4 - 11.3 x10*3/uL    nRBC 0.0 0.0 - 0.0 /100 WBCs    RBC 3.50 (L) 4.00 - 5.20 x10*6/uL    Hemoglobin 11.6 (L) 12.0 - 16.0 g/dL    Hematocrit 35.5 (L) 36.0 - 46.0 %     (H) 80 - 100 fL    MCH 33.1 26.0 - 34.0 pg    MCHC 32.7 32.0 - 36.0 g/dL    RDW 13.7 11.5 - 14.5 %    Platelets 211 150 - 450 x10*3/uL    Neutrophils % 90.7 40.0 - 80.0 %    Immature Granulocytes %, Automated 0.8 0.0 - 0.9 %    Lymphocytes % 2.6 13.0 - 44.0 %     Monocytes % 5.5 2.0 - 10.0 %    Eosinophils % 0.2 0.0 - 6.0 %    Basophils % 0.2 0.0 - 2.0 %    Neutrophils Absolute 24.45 (H) 1.60 - 5.50 x10*3/uL    Immature Granulocytes Absolute, Automated 0.21 0.00 - 0.50 x10*3/uL    Lymphocytes Absolute 0.70 (L) 0.80 - 3.00 x10*3/uL    Monocytes Absolute 1.48 (H) 0.05 - 0.80 x10*3/uL    Eosinophils Absolute 0.05 0.00 - 0.40 x10*3/uL    Basophils Absolute 0.05 0.00 - 0.10 x10*3/uL   Comprehensive metabolic panel   Result Value Ref Range    Glucose 381 (H) 74 - 99 mg/dL    Sodium 132 (L) 136 - 145 mmol/L    Potassium 5.0 3.5 - 5.3 mmol/L    Chloride 94 (L) 98 - 107 mmol/L    Bicarbonate 19 (L) 21 - 32 mmol/L    Anion Gap 24 (H) 10 - 20 mmol/L    Urea Nitrogen 61 (H) 6 - 23 mg/dL    Creatinine 3.48 (H) 0.50 - 1.05 mg/dL    eGFR 13 (L) >60 mL/min/1.73m*2    Calcium 9.0 8.6 - 10.3 mg/dL    Albumin 3.3 (L) 3.4 - 5.0 g/dL    Alkaline Phosphatase 101 33 - 136 U/L    Total Protein 6.1 (L) 6.4 - 8.2 g/dL     (H) 9 - 39 U/L    Bilirubin, Total 1.4 (H) 0.0 - 1.2 mg/dL    ALT 79 (H) 7 - 45 U/L   TSH   Result Value Ref Range    Thyroid Stimulating Hormone 1.44 0.44 - 3.98 mIU/L   Magnesium   Result Value Ref Range    Magnesium 2.06 1.60 - 2.40 mg/dL   Calcium, ionized   Result Value Ref Range    POCT Calcium, Ionized 1.12 1.1 - 1.33 mmol/L   Protime-INR   Result Value Ref Range    Protime 35.6 (H) 9.3 - 12.7 seconds    INR 3.6 (H) 0.9 - 1.2   Lactate   Result Value Ref Range    Lactate 4.2 (HH) 0.4 - 2.0 mmol/L   POCT GLUCOSE   Result Value Ref Range    POCT Glucose 390 (H) 74 - 99 mg/dL   POCT GLUCOSE   Result Value Ref Range    POCT Glucose 371 (H) 74 - 99 mg/dL   POCT GLUCOSE   Result Value Ref Range    POCT Glucose 447 (H) 74 - 99 mg/dL     *Note: Due to a large number of results and/or encounters for the requested time period, some results have not been displayed. A complete set of results can be found in Results Review.    CT angio chest for pulmonary embolism  Result Date:  5/21/2025  STUDY: CT Angiogram of the Chest, CT Abdomen and Pelvis with IV Contrast; 5/21/2025 10:51 PM INDICATION: Abdominal pain. COMPARISON: XR abdomen 5/7/2025.  CXR 5/1/2025.  CT AP 5/1/2025.  CT CAP 4/8/2025. ACCESSION NUMBER(S): [id number], [id number] ORDERING CLINICIAN: BAILEY CARRILLO TECHNIQUE:  CTA of the chest was performed following rapid injection of intravenous contrast.  Images are reviewed and processed at a workstation according to the CT angiogram protocol with 3-D and/or MIP post processing imaging generated.  CT of the abdomen and pelvis was performed with intravenous contrast.  Omnipaque 350 75 mL was administered intravenously; positive oral contrast was given.  Automated mA/kV exposure control was utilized and patient examination was performed in strict accordance with principles of ALARA. FINDINGS:  CTA CHEST: Pulmonary arteries are adequately opacified without acute or chronic filling defects.  The thoracic aorta is normal in course and caliber without dissection or aneurysm. The heart is enlarged without pericardial effusion.  Thoracic lymph nodes are not enlarged.  Patient is status post median sternotomy and coronary artery calcifications are marker for coronary artery disease.  There is mass density in the apex of the left ventricle measuring 2.3 x 1.9 cm on the abdominal images (image 10 series 2). There is no pleural effusion, pleural thickening, or pneumothorax. The airways are patent. Lungs are clear without consolidation, interstitial disease, or suspicious nodules. ABDOMEN:  LIVER: No hepatomegaly.  Smooth surface contour.  There is fatty liver morphology.  There is reflux of contrast in the hepatic veins.  BILE DUCTS: No intrahepatic or extrahepatic biliary ductal dilatation.  GALLBLADDER: The gallbladder is surgically absent.  STOMACH: There is mild to moderate fluid distention of the stomach.  PANCREAS: No masses or ductal dilatation.  SPLEEN: No splenomegaly or focal  splenic lesion.  ADRENAL GLANDS: No thickening or nodules.  KIDNEYS AND URETERS: Kidneys are normal in size and location.  No renal or ureteral calculi.  PELVIS:  BLADDER: No abnormalities identified.  REPRODUCTIVE ORGANS: There is lobulated mass located along the superior aspect of the uterus measuring 10.9 x 6.8 x 11.6 cm; this appears stable compared with prior study.  There is mild peripheral enhancement.  There is also mass adjacent to the uterus on the right measuring 11.2 x 5.6 cm, which appears stable, as well as multiple uterine fibroids.  BOWEL: There are prominent fluid-filled small bowel loops in the mid and upper abdomen suggesting enteritis.  There is mild wall thickening of the sigmoid colon.  The appendix is normal.  VESSELS: No abnormalities identified.  Abdominal aorta is normal in caliber.  PERITONEUM/RETROPERITONEUM/LYMPH NODES: No free fluid.  No pneumoperitoneum.  No lymphadenopathy.  ABDOMINAL WALL: No abnormalities identified. SOFT TISSUES: No abnormalities identified.  BONES: There is a lytic mass involving the right tenth rib with soft tissue mass density measuring 2.9 x 2.2 x 3.2 cm.  There is also lytic mass in the right hemisacrum measuring 3.5 x 3.3 x 3.1 cm.  Findings were seen on prior study.  There is mild superior endplate concavity with deformity at L4 with mild disc space narrowing and endplate degenerative changes.    CHEST: No acute cardiopulmonary disease. Cardiomegaly.  Soft tissue density of the apex of the heart on the abdominal images measures 2.3 cm.  Recommend echocardiogram for further evaluation. Soft tissue mass with lytic changes of the right tenth rib measuring 3.2 cm compatible with metastatic disease/malignancy.  This appears stable. ABDOMEN: No acute inflammatory changes.  Fatty liver morphology. PELVIS: Large lobulated mass in the pelvis measuring 11.6 cm with adjacent masses of the uterus suggesting uterine fibroids.  The lobular mass along the superior margin  is more concerning for possible malignancy. Findings compatible with gastroenteritis.  Mild colitis in the sigmoid colon. Redemonstration lytic mass in the right hemisacrum. Signed by Germán Shepard, DO    CT abdomen pelvis w IV contrast  Result Date: 5/21/2025  STUDY: CT Angiogram of the Chest, CT Abdomen and Pelvis with IV Contrast; 5/21/2025 10:51 PM INDICATION: Abdominal pain. COMPARISON: XR abdomen 5/7/2025.  CXR 5/1/2025.  CT AP 5/1/2025.  CT CAP 4/8/2025. ACCESSION NUMBER(S): [id number], [id number] ORDERING CLINICIAN: BAILEY CARRILLO TECHNIQUE:  CTA of the chest was performed following rapid injection of intravenous contrast.  Images are reviewed and processed at a workstation according to the CT angiogram protocol with 3-D and/or MIP post processing imaging generated.  CT of the abdomen and pelvis was performed with intravenous contrast.  Omnipaque 350 75 mL was administered intravenously; positive oral contrast was given.  Automated mA/kV exposure control was utilized and patient examination was performed in strict accordance with principles of ALARA. FINDINGS:  CTA CHEST: Pulmonary arteries are adequately opacified without acute or chronic filling defects.  The thoracic aorta is normal in course and caliber without dissection or aneurysm. The heart is enlarged without pericardial effusion.  Thoracic lymph nodes are not enlarged.  Patient is status post median sternotomy and coronary artery calcifications are marker for coronary artery disease.  There is mass density in the apex of the left ventricle measuring 2.3 x 1.9 cm on the abdominal images (image 10 series 2). There is no pleural effusion, pleural thickening, or pneumothorax. The airways are patent. Lungs are clear without consolidation, interstitial disease, or suspicious nodules. ABDOMEN:  LIVER: No hepatomegaly.  Smooth surface contour.  There is fatty liver morphology.  There is reflux of contrast in the hepatic veins.  BILE DUCTS: No  intrahepatic or extrahepatic biliary ductal dilatation.  GALLBLADDER: The gallbladder is surgically absent.  STOMACH: There is mild to moderate fluid distention of the stomach.  PANCREAS: No masses or ductal dilatation.  SPLEEN: No splenomegaly or focal splenic lesion.  ADRENAL GLANDS: No thickening or nodules.  KIDNEYS AND URETERS: Kidneys are normal in size and location.  No renal or ureteral calculi.  PELVIS:  BLADDER: No abnormalities identified.  REPRODUCTIVE ORGANS: There is lobulated mass located along the superior aspect of the uterus measuring 10.9 x 6.8 x 11.6 cm; this appears stable compared with prior study.  There is mild peripheral enhancement.  There is also mass adjacent to the uterus on the right measuring 11.2 x 5.6 cm, which appears stable, as well as multiple uterine fibroids.  BOWEL: There are prominent fluid-filled small bowel loops in the mid and upper abdomen suggesting enteritis.  There is mild wall thickening of the sigmoid colon.  The appendix is normal.  VESSELS: No abnormalities identified.  Abdominal aorta is normal in caliber.  PERITONEUM/RETROPERITONEUM/LYMPH NODES: No free fluid.  No pneumoperitoneum.  No lymphadenopathy.  ABDOMINAL WALL: No abnormalities identified. SOFT TISSUES: No abnormalities identified.  BONES: There is a lytic mass involving the right tenth rib with soft tissue mass density measuring 2.9 x 2.2 x 3.2 cm.  There is also lytic mass in the right hemisacrum measuring 3.5 x 3.3 x 3.1 cm.  Findings were seen on prior study.  There is mild superior endplate concavity with deformity at L4 with mild disc space narrowing and endplate degenerative changes.    CHEST: No acute cardiopulmonary disease. Cardiomegaly.  Soft tissue density of the apex of the heart on the abdominal images measures 2.3 cm.  Recommend echocardiogram for further evaluation. Soft tissue mass with lytic changes of the right tenth rib measuring 3.2 cm compatible with metastatic disease/malignancy.   This appears stable. ABDOMEN: No acute inflammatory changes.  Fatty liver morphology. PELVIS: Large lobulated mass in the pelvis measuring 11.6 cm with adjacent masses of the uterus suggesting uterine fibroids.  The lobular mass along the superior margin is more concerning for possible malignancy. Findings compatible with gastroenteritis.  Mild colitis in the sigmoid colon. Redemonstration lytic mass in the right hemisacrum. Signed by Germán Shepard DO    CT head wo IV contrast  Result Date: 5/21/2025  Interpreted By:  Jose Manuel Garcia, STUDY: CT HEAD WO IV CONTRAST;  5/21/2025 10:51 pm   INDICATION: Signs/Symptoms:Change in mental status.   COMPARISON: None   ACCESSION NUMBER(S): [id number]   ORDERING CLINICIAN: BAILEY CARRILLO   TECHNIQUE: Contiguous axial images of the head were obtained without intravenous contrast.   FINDINGS: BRAIN PARENCHYMA:  There is cerebral atrophy and chronic periventricular white matter small vessel ischemic change. The gray white matter differentiation is preserved.  No mass effect or midline shift.   HEMORRHAGE:  No evidence of acute intracranial hemorrhage. VENTRICLES AND EXTRA-AXIAL SPACES:  The ventricles are within normal limits in size for brain volume.  No evidence of abnormal extraaxial fluid collection. EXTRACRANIAL SOFT TISSUES:  Within normal limits. PARANASAL SINUSES/MASTOIDS:  Mucosal thickening left maxillary sinus. CALVARIUM:  No evidence of depressed calvarial fracture.   OTHER FINDINGS:  None       Cerebral atrophy and chronic periventricular white matter small vessel ischemic change.   No evidence of acute intracranial hemorrhage.   Mucosal thickening of left maxillary sinus.       MACRO: None   Signed by: Jose Manuel Garcia 5/21/2025 11:19 PM Dictation workstation:   [id number]    NM PET CT FDG oncology  Result Date: 5/21/2025  Interpreted By:  Haroon Roy and Afshari Mirak Sohrab STUDY: NM PET CT FDG ONCOLOGY;  5/21/2025 2:06 pm   INDICATION:  Signs/Symptoms:metastatic cancer, favor GYN primary.   ,C55 Malignant neoplasm of uterus, part unspecified (Multi),C79.51 Secondary malignant neoplasm of bone (Multi)   COMPARISON: CT CHEST, ABDOMEN AND PELVIS 04/08/2025.   ACCESSION NUMBER(S): [id number]   ORDERING CLINICIAN: ERICH BOUDREAUX   TECHNIQUE: DIVISION OF NUCLEAR MEDICINE POSITRON EMISSION TOMOGRAPHY (PET-CT)   The patient received an intravenous dose of 11.6 mCi of Fluorine-18 fluorodeoxyglucose (FDG).   Positron emission tomographic (PET) images from skull base to mid-thigh were then acquired after a one hour delay.  Also acquired was a contemporaneous low dose noncontrast CT scan performed for attenuation correction of PET images and anatomic localization.  The PET and CT images were digitally fused for display.  All images were acquired on a combined PET-CT scanner unit.  Some areas of FDG accumulation may be described in standardized uptake value (SUV) units.   CODING:   Initial Treatment Strategy (PI)   CALIBRATION:   Dose Injection-to-Scan Interval (mins): 62 Mediastinal bloodpool SUV (normal 1.5-2.5): 3 Blood glucose: 241 mg/dL   FINDINGS: NECK AND CHEST: There is focal uptake within the right vocal cord with an SUV of 8.2. There is no evidence of hypermetabolic cervical lymphadenopathy.  There is no evidence of hypermetabolic hilar, mediastinal, or axillary lymphadenopathy.  There is no evidence of hypermetabolic pulmonary nodules.   There is a four-chamber cardiomegaly. There is enlargement of the main pulmonary artery. Postsurgical changes from median sternotomy are present.   ABDOMEN: Within the abdomen, there is no evidence of hypermetabolic lesions to suggest metastatic disease.   PELVIS: There is bulky appearance of the uterus with areas of hypermetabolic activity and SUVs of up to 9. There are areas of photopenia which might represent tumoral necrosis. There are multiple partially calcified lesions which might represent fibroids.    MUSCULOSKELETAL: Hypermetabolic expansile lesion involving the proximal clavicle at the sternoclavicular joint with an SUV of 5.2. Soft tissue mass with destructive osseous changes within and surrounding the left humeral head with an SUV of 7.2. Soft tissue mass with destructive osseous changes within the left humeral diaphysis and associated pathologic fracture with an SUV of 7.2. Hypermetabolic expansile lesion with underlying osseous destruction within the right posteromedial 10th rib with an SUV of the 8.5. Hypermetabolic sclerotic lesion within the left iliac bone at the sacroiliac joint with an SUV of 7. Hypermetabolic expansile predominantly lytic lesion in the right sacrum with an SUV of 7.8. Predominantly lytic lesion within the posteromedial aspect of the left 4th rib with an SUV of 3.4.       1. Bulky appearance of the uterus with areas of hypermetabolic activity corresponding to patient's primary neoplasm. 2. Multiple hypermetabolic osseous metastatic disease throughout the axial and appendicular skeleton with soft tissue components as described above. There is pathologic fracture of the left humerus. 3. Focal hypermetabolic activity within the right vocal cord with underlying soft tissue fullness. Findings are nonspecific and can not be completely characterized on the current exam. May consider direct visualization and dedicated imaging if clinically warranted.     This study has been interpreted at [facility] in [place], [place].   MACRO: None   Signed by: Haroon Roy 5/21/2025 3:05 PM Dictation workstation:   [id number]    XR abdomen 1 view  Result Date: 5/7/2025  Interpreted By:  Emily Renteria, STUDY: XR ABDOMEN 1 VIEW; ;  5/7/2025 11:20 am   INDICATION: Signs/Symptoms:abd distention, constipation.     COMPARISON: None.   ACCESSION NUMBER(S): [id number]   ORDERING CLINICIAN: GILMER BUSH   TECHNIQUE: Single view abdomen   FINDINGS: Gas-filled distended loops  of large bowel. No air-fluid levels. Mild stool-filled distal colon. Surgical staples right upper quadrant. Diffusely calcified fibroid along the left lateral pelvis measuring 7.1 cm.   Multilevel facet arthropathy lower lumbar spine with degenerative discogenic changes noted.               1. Gas-filled distended loops of large bowel without air-fluid levels     MACRO: None   Signed by: Emily Renteria 5/7/2025 11:50 AM Dictation workstation:   [id number]    ECG 12 lead  Result Date: 5/2/2025  Atrial fibrillation with premature ventricular or aberrantly conducted complexes Inferior infarct (cited on or before 07-APR-2025) Anterolateral infarct (cited on or before 07-APR-2025) QTcB >= 480 msec Abnormal ECG When compared with ECG of 07-APR-2025 15:53, No significant change was found Confirmed by Yury Bajwa ([id number]) on 5/2/2025 2:28:41 PM    Transthoracic Echo (TTE) Limited  Result Date: 5/2/2025            [place] [id number]             Phone [phone] TRANSTHORACIC ECHOCARDIOGRAM REPORT Patient Name:       MARTIJUAN VALLADARES      Reading Physician:    [id number] Joie Robison MD Study Date:         5/2/2025             Ordering Provider:    [id number] JEANINE BELLO MRN/PID:            [id number]             Fellow: Accession#:         [id number]         Nurse: Date of Birth/Age:  1950 / 74 years Sonographer:          Cari Ocampo RDCS Gender Assigned at  F                    Additional Staff: Birth: Height:             165.10 cm            Admit Date: Weight:             78.47 kg             Admission Status:     Inpatient -                                                                Routine BSA / BMI:          1.86 m2 / 28.79      Department Location:  [facility]                      kg/m2 Blood Pressure: 100 /68 mmHg Study Type:    TRANSTHORACIC ECHO (TTE) LIMITED Diagnosis/ICD: Chest pain, unspecified-R07.9 Indication:    dypsnea, chest pain CPT Codes:     Echo Limited-00044; Color [id number] Patient History: Pertinent History: Cardiomyopathy, AFIb, CHF, TIA, STEMI, HLD, acute MI, CKD,                    DM. Study Detail: The following Echo studies were performed: 2D, M-Mode, Doppler and               color flow. Technically challenging study due to patient lying in               supine position and body habitus. Definity used as a contrast               agent for endocardial border definition. Total contrast used for               this procedure was 3 mL via IV push.  Critical Event Critical Event: Test was completed as per department protocol. Critical Finding: Possible mass towards apex of LV. Notify Enriqueta through epic chat. Time Test was Completed: 10:30:00 AM Notified: Enriqueta. Attending notification time: 10:34:49 AM  PHYSICIAN INTERPRETATION: Left Ventricle: The left ventricular systolic function is moderately decreased, with a visually estimated ejection fraction of 30-35%. The patient is in atrial fibrillation which may influence the estimate of left ventricular function and transvalvular flows. Wall motion is abnormal. The left ventricular cavity size is normal. There is normal septal and mildly increased posterior left ventricular wall thickness. There is left ventricular concentric remodeling. Abnormal (paradoxical) septal motion, consistent with an intraventricular conduction delay. Left ventricular diastolic filling was indeterminate due to atrial fibrillation/flutter. The apical wall is akinetic with a large thrombus seen. Left Atrium: The left atrial size is mildly dilated. Right Ventricle: The right ventricle is moderately enlarged. There is reduced right ventricular systolic function. Right Atrium: The right atrial size is moderately dilated. Aortic Valve: The aortic  valve appears bicuspid. There is no evidence of aortic valve regurgitation. The aortic valve off axis. Possible bicuspid aortic valve. No evidence of aortic valve stenosis. No regurgitation. Mitral Valve: The mitral valve is normal in structure. There is trace mitral valve regurgitation. Tricuspid Valve: The tricuspid valve is structurally normal. There is trace tricuspid regurgitation. The Doppler estimated RVSP is slightly elevated right ventricular systolic pressure at 32.2 mmHg. Pulmonic Valve: The pulmonic valve is structurally normal. There is physiologic pulmonic valve regurgitation. Pericardium: Small pericardial effusion. Aorta: The aortic root is normal. Systemic Veins: The inferior vena cava appears normal in size, with IVC inspiratory collapse greater than 50%.  CONCLUSIONS:  1. The left ventricular systolic function is moderately decreased, with a visually estimated ejection fraction of 30-35%.  2. Abnormal wall motion.  3. Left ventricular diastolic filling was indeterminate due to atrial fibrillation/flutter.  4. The apical wall is akinetic with a large thrombus seen.  5. There is reduced right ventricular systolic function.  6. Moderately enlarged right ventricle.  7. The right atrial size is moderately dilated.  8. Trace mitral valve regurgitation.  9. Slightly elevated right ventricular systolic pressure. 10. Trace tricuspid regurgitation is visualized. 11. The aortic valve appears bicuspid. 12. The patient is in atrial fibrillation which may influence the estimate of left ventricular function and transvalvular flows. QUANTITATIVE DATA SUMMARY:  2D MEASUREMENTS:             Normal Ranges: LAs:             3.80 cm     (2.7-4.0cm) IVSd:            0.78 cm     (0.6-1.1cm) LVPWd:           1.17 cm     (0.6-1.1cm) LVIDd:           4.44 cm     (3.9-5.9cm) LVIDs:           3.88 cm LV Mass Index:   83.3 g/m2 LVEDV Index:     77.45 ml/m2 LV % FS          12.8 %  LEFT ATRIUM:                  Normal Ranges:  LA Vol A4C:        90.2 ml    (22+/-6mL/m2) LA Vol A2C:        102.4 ml LA Vol BP:         96.3 ml LA Vol Index A4C:  48.5ml/m2 LA Vol Index A2C:  55.1 ml/m2 LA Vol Index BP:   51.8 ml/m2 LA Area A4C:       28.2 cm2 LA Area A2C:       30.1 cm2 LA Major Axis A4C: 7.5 cm LA Major Axis A2C: 7.5 cm LA Volume Index:   50.4 ml/m2 LA Vol A4C:        88.1 ml LA Vol A2C:        99.6 ml LA Vol Index BSA:  50.5 ml/m2  RIGHT ATRIUM:                 Normal Ranges: RA Vol A4C:        96.9 ml    (8.3-19.5ml) RA Vol Index A4C:  52.1 ml/m2 RA Area A4C:       29.0 cm2 RA Major Axis A4C: 7.4 cm  M-MODE MEASUREMENTS:         Normal Ranges: Ao Root:             2.50 cm (2.0-3.7cm) LAs:                 3.20 cm (2.7-4.0cm)  LV SYSTOLIC FUNCTION:                      Normal Ranges: EF-A4C View:    33 % (>=55%) EF-A2C View:    42 % EF-Biplane:     38 % EF-Visual:      33 % LV EF Reported: 33 %  LV DIASTOLIC FUNCTION:           Normal Ranges: MV Peak E:             1.15 m/s  (0.7-1.2 m/s) MV e'                  0.066 m/s (>8.0) MV lateral e'          0.07 m/s MV medial e'           0.06 m/s E/e' Ratio:            17.48     (<8.0)  MITRAL VALVE:          Normal Ranges: MV DT:        164 msec (150-240msec)  RIGHT VENTRICLE: RV Basal 4.60 cm RV Mid   4.04 cm RV Major 7.8 cm TAPSE:   15.2 mm RV s'    0.06 m/s  TRICUSPID VALVE/RVSP:          Normal Ranges: Peak TR Velocity:     2.70 m/s RV Syst Pressure:     32 mmHg  (< 30mmHg) IVC Diam:             1.73 cm  PULMONIC VALVE:          Normal Ranges: PV Accel Time:  87 msec  (>120ms) PV Max Tristan:     1.1 m/s  (0.6-0.9m/s) PV Max P.6 mmHg  [id number] Joie Robison MD Electronically signed on 2025 at 11:33:48 AM  ** Final **     XR shoulder left 2+ views  Result Date: 2025  Interpreted By:  Paty Florian, STUDY: XR SHOULDER LEFT 2+ VIEWS 2025 10:56 pm   INDICATION: Signs/Symptoms:pain and weakness. Follow-up fracture.   COMPARISON: 2025   ACCESSION NUMBER(S): [id number]   ORDERING  CLINICIAN: JEANINE BELLO   TECHNIQUE: Three views of the left shoulder   FINDINGS: There is a healing mildly displaced fracture of the proximal humeral diaphysis noted with the distal fracture fragment displaced medially by a distance of 4 mm. There is now some periosteal reaction about the fracture site.   There is postoperative change from CABG with coronary artery stent graft seen.       Healing mildly displaced fracture of the proximal humeral diaphysis.   Signed by: Paty Florian 5/2/2025 7:23 AM Dictation workstation:   [id number]    CT head wo IV contrast  Result Date: 5/1/2025  Interpreted By:  Jose Manuel Garcia, STUDY: CT HEAD WO IV CONTRAST;  5/1/2025 11:08 pm   INDICATION: Signs/Symptoms:confusion.   COMPARISON: None   ACCESSION NUMBER(S): [id number]   ORDERING CLINICIAN: JEANINE BELLO   TECHNIQUE: Contiguous axial images of the head were obtained without intravenous contrast.   FINDINGS: BRAIN PARENCHYMA:  There is cerebral atrophy and chronic periventricular white matter small vessel ischemic change. The gray white matter differentiation is preserved.  No mass effect or midline shift.   HEMORRHAGE:  No evidence of acute intracranial hemorrhage. VENTRICLES AND EXTRA-AXIAL SPACES:  The ventricles are within normal limits in size for brain volume.  No evidence of abnormal extraaxial fluid collection. EXTRACRANIAL SOFT TISSUES:  Within normal limits. PARANASAL SINUSES/MASTOIDS:  Mucosal thickening with mucosal retention cyst or polyp left maxillary sinus. CALVARIUM:  No evidence of depressed calvarial fracture.   OTHER FINDINGS:  None       Cerebral atrophy and chronic periventricular white matter small vessel ischemic change.   No evidence of acute intracranial hemorrhage.   Mucosal thickening with mucosal retention cyst or polyp in left maxillary sinus.   MACRO: None   Signed by: Jose Manuel Garcia 5/1/2025 11:41 PM Dictation workstation:   [id number]    XR chest 1 view  Result Date: 5/1/2025  STUDY: Chest  Radiograph;  05/01/2025 5:23 PM INDICATION: Generalized weakness. COMPARISON: XR chest 04/07/2025, 03/11/2023, 03/11/2021. ACCESSION NUMBER(S): [id number] ORDERING CLINICIAN: LELE TURNER TECHNIQUE:  Frontal chest was obtained at 17:23 hours. FINDINGS: CARDIOMEDIASTINAL SILHOUETTE: There is mild cardiomegaly.  Sternotomy wires present.  LUNGS: Lungs are clear.  ABDOMEN: No remarkable upper abdominal findings.  BONES: No acute osseous changes.    No acute pulmonary abnormality. Signed by Brennan Hartman MD    CT abdomen pelvis wo IV contrast  Result Date: 5/1/2025  STUDY: CT Abdomen and Pelvis without IV Contrast; 05/01/2025, 4:25 PM. INDICATION: Nausea, diarrhea, generalized weakness. COMPARISON: CT CAP: 04/08/25; US pelvis: 03/16/23. ACCESSION NUMBER(S): [id number] ORDERING CLINICIAN: ELLE TURNER TECHNIQUE: CT of the abdomen and pelvis was performed.  Contiguous axial images were obtained at 3 mm slice thickness through the abdomen and pelvis. Coronal and sagittal reconstructions at 3 mm slice thickness were performed. No intravenous contrast was administered.  Automated mA/kV exposure control was utilized and patient examination was performed in strict accordance with principles of ALARA. FINDINGS: Please note that the evaluation of vessels, lymph nodes and organs is limited without intravenous contrast.  LOWER CHEST: Heart is enlarged with coronary artery calcifications  No pericardial effusion.  Lung bases are clear.  ABDOMEN:  LIVER: No hepatomegaly.  Smooth surface contour.  Normal attenuation.  BILE DUCTS: No intrahepatic or extrahepatic biliary ductal dilatation.  GALLBLADDER: The gallbladder is absent. STOMACH: No abnormalities identified.  PANCREAS: No masses or ductal dilatation.  SPLEEN: No splenomegaly or focal splenic lesion.  ADRENAL GLANDS: No thickening or nodules.  KIDNEYS AND URETERS: Kidneys are normal in size and location.  No renal or ureteral calculi.  PELVIS:  BLADDER: No abnormalities  identified.  REPRODUCTIVE ORGANS: Enlarged fibroid uterus again noted.  Overall appearance is unchanged.  BOWEL: No abnormalities identified.  Appendix is normal.  VESSELS: No abnormalities identified.  Abdominal aorta is normal in caliber. Moderate to severe plaque along the distal aorta.  PERITONEUM/RETROPERITONEUM/LYMPH NODES: No free fluid.  No pneumoperitoneum.  Small fat-containing umbilical hernia. No lymphadenopathy.  ABDOMINAL WALL: No abnormalities identified. SOFT TISSUES: No abnormalities identified.  BONES: Stable lytic lesions in the right hemisacrum and right 10th rib adjacent to the costotransverse junction unchanged from prior    1.No acute process. 2.Cardiomegaly with coronary artery calcifications. 3.Enlarged fibroid uterus again noted. Overall appearance is unchanged. 4.Stable lytic lesions in the right hemisacrum and right 10th rib adjacent to the costotransverse junction unchanged from prior.  As previously suggested, metastatic disease should be considered. Consider further evaluation with MRI or pathologic sampling. Signed by Juan Early MD        Assessment/Plan   IMP:    Shock, septic  Acute Respiratory Failure with hypoxia  Leukocytosis  Lactic Acidosis  CHHAYA on CKD-Received contrast in ER  Colitis/Gastritis  Elevated LFT's  Afib with RVR  NSTEMI-in setting of shock  CHF/cardiomyopathy-EF 30-35%/diastolic filling intermediate, apical wall kinetic with large thrombus noted, reduced RV systolic function  Endometrial Cancer-with bone mets-Dr Melva Gaines, Stage IVB/IVC, no treatment started yet.   COPD  Palliative Care    DNRCCA/DNI  Not Capable  Documented HPOA is Abdias Logan, alternate is Friend Frank(RN at ). Living Will also in epic.     Per documentation, ok to discuss patient information with friend Ammy at bedside, this was confirmed by Frank.   Provided updates regarding patient diagnoses and treatment plan to both friends at bedside and HPDANIEL Marie, ECTOR Logan's phone is out of  "service, with no alternate #.   Patient known to me from prior hospitalization. \"She tells me that she lost her  2 months ago and she knows he is waiting on the other side for her and she is not afraid but she does want to live if there is quality of life. She personally experienced her  being on a ventilator, and she tells me that she has no desire for CPR or intubation should she arrest.\" Per my discussion with hpoa and friends, patient would also be opposed to dialysis as she watched her spouse go through it. Ultimately overall prognosis is poor given her multisystem organ failure in the setting of cardiomyopathy and IVB/C endometrial cancer.   I reviewed options with HPOA includin. continuing in treatment model of care in the hopes of improving patient enough that she could potentially participate in her own goals of care discussion   2. Hospice involvement and switching to comfort care only.   Frank would like to speak to his brother Desmond(primary HPOA) before making any decisions. He does agree that patient would not benefit from Dialysis or feeding tubes in addition to the already established DNRCCA/DNI. He is not sure she would have wanted pressors but is ok with them for now until he can talk to Desmond.   HPDANIEL was given my phone # to call once he speaks to his brother.     I added low dose dilaudid prn pain.     Treatment model of care for now.     Symptom Management  Pain: severe  Medications recommended for pain?  Yes  Tiredness: moderate to severe  Nausea: none  Depression: na  Anxiety: na  Drowsiness: severe  Appetite: NPO  Wellbeing: poor  Dyspnea: severe  Intervention recommended for dyspnea?  yes  Other: na  Intervention recommended for constipation?  Yes-known history of constipation requiring relistor.     I spent 100 minutes in the professional and overall care of this patient. Spent over 50min in acp discussion between HPOA and friends at bedside.       Justyna Diana, " APRN-CNP         [1]   Family History  Problem Relation Name Age of Onset    Hypertension Mother      Cancer Mother      Diabetes Father      Stroke Father      Diabetes Sister      Heart disease Maternal Grandmother      Heart disease Maternal Grandfather      Heart disease Paternal Grandmother      Heart disease Paternal Grandfather

## 2025-05-22 NOTE — ED PROVIDER NOTES
HPI   Chief Complaint   Patient presents with    hypoxia     BIB ems from [facility] for low pox in the 80s, they repositioned her and applied O2, spo2 increased to 99. Pt in afib rvr, tachypneic. HR fluctuating from 130s-170s       HPI  74-year-old female presents from nursing facility unresponsive and hypoxic.  Patient is a DNR [employer] DNI with a history of ovarian cancer.  Really noticed her to be hypoxic and tachypneic and tachycardic.  Patient has a history of atrial fibrillation.  No other history is provided.      Patient History   Medical History[1]  Surgical History[2]  Family History[3]  Social History[4]    Physical Exam   ED Triage Vitals   Temperature Heart Rate Respirations BP   05/21/25 2203 05/21/25 2203 05/21/25 2203 05/21/25 2203   36.3 °C (97.3 °F) (!) 156 (!) 42 127/86      Pulse Ox Temp src Heart Rate Source Patient Position   05/21/25 2203 -- 05/21/25 2203 05/21/25 2203   97 %  Monitor Sitting      BP Location FiO2 (%)     05/21/25 2203 05/21/25 2226     Right arm 35 %       Physical Exam  General:  Awake, not responsive, moderate distress.  Head: Normocephalic, Atraumatic  Neck: Supple, trachea midline, no stridor  Skin: Warm and dry, no rashes   Lungs: Clear to auscultation bilaterally, acute respiratory distress, tachypneic,   CV: Tachycardic rate, irregular irregular rhythm, with no obvious   Abdomen: Soft, nondistended, positive bowel sounds, no peritoneal signs  Neuro:  No gross focal neurologic deficits, NIH is 0  Musculoskeletal:  Full range of motion in all 4 extremities  Psychiatric:  Alert oriented x 3, Good insight into condition.    ED Course & MDM   Diagnoses as of 05/22/25 0023   Sepsis associated hypotension   Elevated troponin I level   Acute kidney injury   Septic shock (Multi)                 No data recorded     Matt Coma Scale Score: 15 (05/21/25 2219 : Adonis Hess RN)                           Medical Decision Making  My EKG interpretation at  2206:  Atrial fibrillation 155 bpm left axis QTc 472 no ectopy or acute ischemic changes noted  SEP-1 CORE MEASURE DATA       Visit Vitals  BP 90/64 (BP Location: Right arm, Patient Position: Lying)   Pulse 109   Temp 36.3 °C (97.3 °F) (Temporal)   Resp (!) 33      WBC   Date/Time Value Ref Range Status   05/23/2025 05:38 AM 19.5 (H) 4.4 - 11.3 x10*3/uL Final     Lactate   Date/Time Value Ref Range Status   05/22/2025 05:26 AM 4.2 (HH) 0.4 - 2.0 mmol/L Final     Comment:     Previous result verified on 5/21/2025 2322 on specimen/case [id number] called with component LACT for procedure Lactate with value 5.9 mmol/L.     Creatinine   Date/Time Value Ref Range Status   05/23/2025 05:38 AM 4.32 (H) 0.50 - 1.05 mg/dL Final     Bilirubin, Total   Date/Time Value Ref Range Status   05/23/2025 05:38 AM 1.0 0.0 - 1.2 mg/dL Final     POC INR   Date/Time Value Ref Range Status   03/10/2025 02:02 PM 3.00 Normal Range: .9-1.1 Final     INR   Date/Time Value Ref Range Status   05/23/2025 07:34 AM >8.0 (HH) 0.9 - 1.2 Final     Platelets   Date/Time Value Ref Range Status   05/23/2025 05:38  150 - 450 x10*3/uL Final        Fluid Resuscitation Rationale: Due to  Concern for Fluid Overload, ordered less than 30mL/kg actual body weight. Actual fluid amount given: 2000ml    I performed a sepsis reperfusion exam on Jing Sanchez on 5/22/2025 at 0015     Patient was seen immediate upon arrival to the ED.    She is a DNR/DNI which I did confirm in her chart.  She was in respiratory distress so she was placed on BiPAP and her mentation improved and she was more comfortable breathing.  Patient became hypotensive and she was in A-fib RVR and was treated with 10 mg of Cardizem which improved her heart rate to 104 however she was still hypotensive.  2 L normal saline were ordered and Levophed.  Central venous cath was placed.  Patient was started on Zosyn and vancomycin.  Patient requires admission to hospital obviously.  I did  speak with the cardiologist regarding her elevated troponins.  He was comfortable with her being admitted to hospital.  Contact the hospitalist for admission.    Procedure  Procedures       Jose Trujillo DO  05/22/25 0026         [1]   Past Medical History:  Diagnosis Date    A-fib (Multi)     Acute MI (Multi) 08/2016    cath by [facility]os, wire in RCA and clot migrated to PDA and no PTCA.    ASHD (arteriosclerotic heart disease)     CAD (coronary artery disease)     CHF (congestive heart failure)     DM (diabetes mellitus) (Multi)     Dyslipidemia     H/O echocardiogram 04/2017    EF 35%    H/O echocardiogram 09/2018    EF20%    History of nuclear stress test 02/2015    no ischemia and apex scar.    History of nuclear stress test 04/2016    apical scar and inf ischemia    HTN (hypertension)     Obesity     Obstructive sleep apnea     Personal history of other specified conditions     Acute AW MI / 9/4/13, cath LM-nl, , CX irreg, RCA irreg, LV ant hypo, PTCA with 2.75 RENETTA to LAD    S/P cardiac catheterization 06/2016    severe diffuse ASHD and recommended OHS    SOB (shortness of breath)    [2]   Past Surgical History:  Procedure Laterality Date    CARDIAC CATHETERIZATION  09/04/2013    with RENETTA    CHOLECYSTECTOMY  03/11/2021    COLONOSCOPY  04/2015    hemorrhoid removed    CORONARY ARTERY BYPASS GRAFT  10/2016    LIMA-LAD, SVG-PDA, SVG-Diag, SVG-M1 and M@    MR HEAD ANGIO WO IV CONTRAST  04/06/2017    MR HEAD ANGIO WO IV CONTRAST [facility]    TUMOR REMOVAL      Benign rectal tumor removed   [3]   Family History  Problem Relation Name Age of Onset    Hypertension Mother      Cancer Mother      Diabetes Father      Stroke Father      Diabetes Sister      Heart disease Maternal Grandmother      Heart disease Maternal Grandfather      Heart disease Paternal Grandmother      Heart disease Paternal Grandfather     [4]   Social History  Tobacco Use    Smoking status: Never     Passive exposure: Never     Smokeless tobacco: Never   Substance Use Topics    Alcohol use: Not Currently    Drug use: Never        Jose Trujillo DO  05/25/25 4508

## 2025-05-22 NOTE — NURSING NOTE
Pt has a R femoral TLC, drsg dry and intact without any redness, swelling or drainage, all lumens currently in use and infusing without any difficulty.

## 2025-05-22 NOTE — CONSULTS
"Nutrition Assessement Note    Nutrition Assessment    Reason for Assessment: Admission nursing screening    Reason for Hospital Admission:  Jing Sanchez is a 74 y.o. female who is admitted for Septic Shock. Pt has Stage IV b Uterine Cancer. Unable to speak to patient upon visit due to continuous BiPap.     Malnutrition Screening Tool (MST)  Have you recently lost weight without trying?: Unsure  If yes, how much weight have you lost?: Unsure  Weight Loss Score: 2  Have you been eating poorly because of a decreased appetite?: No  Malnutrition Score: 2  Nutrition Screen  Stage 3 or 4 Pressure Injury or Multiple Non-Healing Wounds: No  Home Tube Feeding or Total Parenteral Nutrition (TPN): No  Dietitian Consult Needed: No    Medical History[1]   Surgical History[2]    Nutrition History:  Food and Nutrient History: Pt is NPO at this time.    Anthropometrics:  Ht: 165.1 cm (5' 5\"), Wt: 80 kg (176 lb 5.9 oz), BMI: 29.35  IBW/kg (Dietitian Calculated): 56.81 kg  Percent of IBW: 141 %  Adjusted Body Weight (kg): 62.61 kg    Weight Change:  Daily Weight  05/22/25 : 80 kg (176 lb 5.9 oz)  05/01/25 : 78.5 kg (173 lb)  05/01/25 : 78.6 kg (173 lb 4.5 oz)  04/24/25 : 77.1 kg (170 lb)  04/07/25 : 77.2 kg (170 lb 4.8 oz)  04/03/25 : 79.8 kg (176 lb)  03/14/25 : 78.7 kg (173 lb 6.4 oz)  01/15/25 : 83.7 kg (184 lb 9.6 oz)  11/05/24 : 85.7 kg (189 lb)  09/05/24 : 86.6 kg (191 lb)  08/07/24 : 89.4 kg (197 lb)  07/03/24 : 89.6 kg (197 lb 9.6 oz)  05/01/24 : 91.2 kg (201 lb)  03/01/24 : 93 kg (205 lb)  02/16/24 : 92.1 kg (203 lb)  01/05/24 : 92.2 kg (203 lb 3.2 oz)  11/01/23 : 91.2 kg (201 lb)  08/29/23 : 89.9 kg (198 lb 3.2 oz)  07/19/23 : 90.7 kg (200 lb)  06/20/23 : 86.7 kg (191 lb 3.2 oz)    Weight History / % Weight Change: Wt has been declining over the past year. 28# (13.9%) Loss over one year (5/1/24-5/1/25).  Significant Weight Loss: No    Nutrition Focused Physical Exam Findings: Defer. Not appropriate at this time   "   Nutrition Significant Labs:  Lab Results   Component Value Date    WBC 26.9 (H) 05/22/2025    HGB 11.6 (L) 05/22/2025    HCT 35.5 (L) 05/22/2025     05/22/2025    CHOL 174 03/01/2024    TRIG 350 (H) 03/01/2024    HDL 31.7 03/01/2024    ALT 79 (H) 05/22/2025     (H) 05/22/2025     (L) 05/22/2025    K 5.0 05/22/2025    CL 94 (L) 05/22/2025    CREATININE 3.48 (H) 05/22/2025    BUN 61 (H) 05/22/2025    CO2 19 (L) 05/22/2025    TSH 1.44 05/22/2025    INR 3.6 (H) 05/22/2025    HGBA1C 7.7 (H) 04/08/2025    ALBUR 520 (H) 01/27/2020     Nutrition Specific Medications:  Scheduled Medications[3]  Continuous Medications[4]    Dietary Orders (From admission, onward)       Start     Ordered    05/22/25 0227  May Not Participate in Room Service  ( ROOM SERVICE MAY NOT PARTICIPATE)  Once        Question:  .  Answer:  Yes    05/22/25 0226 05/22/25 0023  NPO Diet; Effective now  Diet effective now         05/22/25 0022                  Estimated Needs:   Estimated Energy Needs  Total Energy Estimated Needs in 24 hours (kCal): 2000 kCal  Energy Estimated Needs per kg Body Weight in 24 hours (kCal/kg): 25 kCal/kg  Method for Estimating Needs: CBW    Estimated Protein Needs  Total Protein Estimated Needs in 24 Hours (g): 96 g  Protein Estimated Needs per kg Body Weight in 24 Hours (g/kg): 1.2 g/kg  Method for Estimating 24 Hour Protein Needs: CBW    Estimated Fluid Needs  Total Fluid Estimated Needs in 24 Hours (mL): 2000 mL  Total Fluid Estimated Needs in 24 hours (mL/kg): 25 mL/kg  Method for Estimating 24 Hour Fluid Needs: CBW       Nutrition Diagnosis   Nutrition Diagnosis:  Malnutrition Diagnosis  Patient has Malnutrition Diagnosis: No    Nutrition Diagnosis  Patient has Nutrition Diagnosis: Yes  Diagnosis Status (1): New  Nutrition Diagnosis 1: Increased nutrient needs  Related to (1): increased demand for nutrients  As Evidenced by (1): conditions associated with diagnosis  Additional Nutrition  Diagnosis: Diagnosis 2  Diagnosis Status (2): New  Nutrition Diagnosis 2: Inadequate oral intake  Related to (2): decreased ability to consume/tolerate sufficient energy  As Evidenced by (2): NPO diet       Nutrition Interventions/Recommendations   Nutrition Interventions and Recommendations:  Nutrition Prescription: Nutrition prescription for oral nutrition    Nutrition Recommendations:  Individualized Nutrition Prescription Provided for : Advanced to adult regular diet when appropriate    Nutrition Interventions/Goals:   Food and/or Nutrient Delivery Interventions  Interventions: Meals and snacks, Medical food supplement  Meals and Snacks: General healthful diet  Goal: advance to regular diet when able  Medical Food Supplement: Commercial beverage medical food supplement therapy  Goal: if po intake inadequate, can provide supplements when diet advanced    Education Documentation  No documentation found.              Nutrition Monitoring and Evaluation   Monitoring/Evaluation:   Food/Nutrient Related History Monitoring  Monitoring and Evaluation Plan: Estimated Energy Intake  Estimated Energy Intake: Energy intake greater or equal to 75% of estimated energy needs  Additional Plans: Upon advancing to PO intake.    Anthropometric Measurements  Monitoring and Evaluation Plan: Body weight  Body Weight: Body weight - Maintain stable weight    Biochemical Data, Medical Tests and Procedures  Monitoring and Evaluation Plan: Glucose/endocrine profile  Glucose/Endocrine Profile: Glucose within normal limits - ICU (140-180 mg/dL)  Criteria: stabilize glucose levels    Goal Status: New goal(s) identified    Follow Up  Time Spent (min): 30 minutes  Last Date of Nutrition Visit: 05/22/25  Nutrition Follow-Up Needed?: 5-7 days  Follow up Comment: 5-          [1]   Past Medical History:  Diagnosis Date    A-fib (Multi)     Acute MI (Multi) 08/2016    cath by [facility]os, wire in RCA and clot migrated to PDA and no PTCA.     ASHD (arteriosclerotic heart disease)     CAD (coronary artery disease)     CHF (congestive heart failure)     DM (diabetes mellitus) (Multi)     Dyslipidemia     H/O echocardiogram 04/2017    EF 35%    H/O echocardiogram 09/2018    EF20%    History of nuclear stress test 02/2015    no ischemia and apex scar.    History of nuclear stress test 04/2016    apical scar and inf ischemia    HTN (hypertension)     Obesity     Obstructive sleep apnea     Personal history of other specified conditions     Acute AW MI / 9/4/13, cath LM-nl, , CX irreg, RCA irreg, LV ant hypo, PTCA with 2.75 RENETTA to LAD    S/P cardiac catheterization 06/2016    severe diffuse ASHD and recommended OHS    SOB (shortness of breath)    [2]   Past Surgical History:  Procedure Laterality Date    CARDIAC CATHETERIZATION  09/04/2013    with RENETTA    CHOLECYSTECTOMY  03/11/2021    COLONOSCOPY  04/2015    hemorrhoid removed    CORONARY ARTERY BYPASS GRAFT  10/2016    LIMA-LAD, SVG-PDA, SVG-Diag, SVG-M1 and M@    MR HEAD ANGIO WO IV CONTRAST  04/06/2017    MR HEAD ANGIO WO IV CONTRAST [facility]    TUMOR REMOVAL      Benign rectal tumor removed   [3] insulin lispro, 0-5 Units, subcutaneous, q4h  linezolid, 600 mg, intravenous, q12h  meropenem, 500 mg, intravenous, q12h  oxygen, , inhalation, Continuous - Inhalation  pantoprazole, 40 mg, intravenous, Daily     [4] amiodarone, 1 mg/min, Last Rate: 1 mg/min (05/22/25 1004)   Followed by  amiodarone, 0.5 mg/min  norepinephrine, 0-1 mcg/kg/min, Last Rate: 0.07 mcg/kg/min (05/22/25 1234)  sodium chloride 0.9%, 75 mL/hr, Last Rate: 75 mL/hr (05/22/25 1355)  vasopressin, 0-0.03 Units/min, Last Rate: 0.03 Units/min (05/22/25 0845)

## 2025-05-22 NOTE — ASSESSMENT & PLAN NOTE
Patient presented with atrial fibrillation with RVR  Holding warfarin at this time until SLP evaluation  Restart warfarin when appropriate

## 2025-05-22 NOTE — CONSULTS
Inpatient consult to Infectious Diseases  Consult performed by: FARHANA Marrero  Consult ordered by: Justin Constantino MD  Reason for consult: Shock          Primary MD: Kathie Selby, APRN-CNP        History Of Present Illness  Jing Sanchez is a 74 y.o. female who presented to the emergency room from skilled nursing facility with shortness of breath and hypoxia.  She was found to be hypoxic saturating in the 80s, she is on Bipap.  She was found to be in atrial fibrillation RVR.  She is hypotensive, on levophed and vasopressin.  Noted to have serosanguinous vaginal drainage.   Labs with leukocytosis, Acute kidney injury. Lactic acidosis.   Elevated troponin, interval evaluation by cardiology-note reviewed.   CT of head no evidence of acute intracranial hemorrhage.  CT angio chest no acute cardiopulmonary diease.   CT abdomen/pelvis Large lobulated mass in pelvis.  History of recent diagnosis metastatic endometrial cancer, Stage IV  with pathological left humerus fracture.  Recent hospitalization discharged on 5/6/2025 treated for UTI.  She is on IV zosyn and zyvox.     Past Medical History  She has a past medical history of A-fib (Multi), Acute MI (Multi) (08/2016), ASHD (arteriosclerotic heart disease), CAD (coronary artery disease), CHF (congestive heart failure), DM (diabetes mellitus) (Multi), Dyslipidemia, H/O echocardiogram (04/2017), H/O echocardiogram (09/2018), History of nuclear stress test (02/2015), History of nuclear stress test (04/2016), HTN (hypertension), Obesity, Obstructive sleep apnea, Personal history of other specified conditions, S/P cardiac catheterization (06/2016), and SOB (shortness of breath).    Surgical History  She has a past surgical history that includes MR angio head wo IV contrast (04/06/2017); Cardiac catheterization (09/04/2013); Tumor removal; Colonoscopy (04/2015); Coronary artery bypass graft (10/2016); and Cholecystectomy (03/11/2021).     Social History  "    Occupational History    Not on file   Tobacco Use    Smoking status: Never     Passive exposure: Never    Smokeless tobacco: Never   Substance and Sexual Activity    Alcohol use: Not Currently    Drug use: Never    Sexual activity: Not on file     Travel History   Travel since 04/22/25    No documented travel since 04/22/25          Family History  Family History[1]  Allergies  Cetirizine     Immunization History   Administered Date(s) Administered    COVID-19, mRNA, LNP-S, PF, 30 mcg/0.3 mL dose 04/30/2021, 05/21/2021    Flu vaccine, quadrivalent, high-dose, preservative free, age 65y+ (FLUZONE) 11/04/2021, 10/06/2022    Influenza, Unspecified 10/06/2022    Influenza, seasonal, injectable 09/07/2013, 09/18/2013, 01/08/2015    Pfizer Gray Cap SARS-CoV-2 06/02/2022    Pneumococcal conjugate vaccine, 13-valent (PREVNAR 13) 03/09/2016    Pneumococcal polysaccharide vaccine, 23-valent, age 2 years and older (PNEUMOVAX 23) 10/04/2013, 01/08/2015    Tdap vaccine, age 7 year and older (BOOSTRIX, ADACEL) 11/25/2013     Medications  Home medications:  Prescriptions Prior to Admission[2]  Current medications:  Scheduled medications  Scheduled Medications[3]  Continuous medications  Continuous Medications[4]  PRN medications  PRN Medications[5]    Review of Systems   Reason unable to perform ROS: On Bipap, respiratory distress.        Objective  Range of Vitals (last 24 hours)  Heart Rate:  []   Temp:  [3 °C (37.4 °F)-36.8 °C (98.2 °F)]   Resp:  [24-42]   BP: ()/()   Height:  [165.1 cm (5' 5\")]   Weight:  [78.2 kg (172 lb 6.4 oz)-80 kg (176 lb 5.9 oz)]   SpO2:  [85 %-100 %]   Daily Weight  05/22/25 : 80 kg (176 lb 5.9 oz)    Body mass index is 29.35 kg/m².     Physical Exam  Constitutional:       Appearance: She is ill-appearing.   HENT:      Head: Normocephalic and atraumatic.      Mouth/Throat:      Comments: On Bipap  Eyes:      General: No scleral icterus.  Cardiovascular:      Rate and Rhythm: " "Tachycardia present. Rhythm irregular.   Pulmonary:      Comments: Decreased bilateral breath sounds   Abdominal:      General: Bowel sounds are normal.      Palpations: Abdomen is soft.   Musculoskeletal:      Cervical back: Neck supple.   Skin:     General: Skin is warm and dry.   Neurological:      Comments: Awake   Psychiatric:      Comments: restless          Relevant Results  Outside Hospital Results  No  Labs  Results from last 72 hours   Lab Units 05/22/25  0450 05/21/25  2224   WBC AUTO x10*3/uL 26.9* 33.5*   HEMOGLOBIN g/dL 11.6* 14.0   HEMATOCRIT % 35.5* 42.5   PLATELETS AUTO x10*3/uL 211 264   NEUTROS PCT AUTO % 90.7  --    LYMPHO PCT MAN %  --  6.0   LYMPHS PCT AUTO % 2.6  --    MONO PCT MAN %  --  7.0   MONOS PCT AUTO % 5.5  --    EOSINO PCT MAN %  --  0.0   EOS PCT AUTO % 0.2  --      Results from last 72 hours   Lab Units 05/22/25  0450 05/21/25  2224   SODIUM mmol/L 132* 135*   POTASSIUM mmol/L 5.0 5.0   CHLORIDE mmol/L 94* 93*   CO2 mmol/L 19* 21   BUN mg/dL 61* 61*   CREATININE mg/dL 3.48* 3.70*   GLUCOSE mg/dL 381* 208*   CALCIUM mg/dL 9.0 10.8*   ANION GAP mmol/L 24* 26*   EGFR mL/min/1.73m*2 13* 12*     Results from last 72 hours   Lab Units 05/22/25  0450 05/21/25  2224   ALK PHOS U/L 101 124   BILIRUBIN TOTAL mg/dL 1.4* 1.5*   PROTEIN TOTAL g/dL 6.1* 7.5   ALT U/L 79* 54*   AST U/L 141* 99*   ALBUMIN g/dL 3.3* 4.0     Estimated Creatinine Clearance: 14.8 mL/min (A) (by C-G formula based on SCr of 3.48 mg/dL (H)).  Sedimentation Rate   Date Value Ref Range Status   04/08/2025 71 (H) 0 - 30 mm/h Final     No results found for: \"HIV1X2\", \"HIVCONF\", \"[id number]\"  No results found for: \"HEPCABINIT\", \"HEPCAB\", \"HCVPCRQUANT\"  Microbiology  Blood culture pending      Imaging  CT angio chest for pulmonary embolism  Result Date: 5/21/2025  STUDY: CT Angiogram of the Chest, CT Abdomen and Pelvis with IV Contrast; 5/21/2025 10:51 PM INDICATION: Abdominal pain. COMPARISON: XR abdomen 5/7/2025.  CXR " 5/1/2025.  CT AP 5/1/2025.  CT CAP 4/8/2025. ACCESSION NUMBER(S): [id number], [id number] ORDERING CLINICIAN: BAILEY CARRILLO TECHNIQUE:  CTA of the chest was performed following rapid injection of intravenous contrast.  Images are reviewed and processed at a workstation according to the CT angiogram protocol with 3-D and/or MIP post processing imaging generated.  CT of the abdomen and pelvis was performed with intravenous contrast.  Omnipaque 350 75 mL was administered intravenously; positive oral contrast was given.  Automated mA/kV exposure control was utilized and patient examination was performed in strict accordance with principles of ALARA. FINDINGS:  CTA CHEST: Pulmonary arteries are adequately opacified without acute or chronic filling defects.  The thoracic aorta is normal in course and caliber without dissection or aneurysm. The heart is enlarged without pericardial effusion.  Thoracic lymph nodes are not enlarged.  Patient is status post median sternotomy and coronary artery calcifications are marker for coronary artery disease.  There is mass density in the apex of the left ventricle measuring 2.3 x 1.9 cm on the abdominal images (image 10 series 2). There is no pleural effusion, pleural thickening, or pneumothorax. The airways are patent. Lungs are clear without consolidation, interstitial disease, or suspicious nodules. ABDOMEN:  LIVER: No hepatomegaly.  Smooth surface contour.  There is fatty liver morphology.  There is reflux of contrast in the hepatic veins.  BILE DUCTS: No intrahepatic or extrahepatic biliary ductal dilatation.  GALLBLADDER: The gallbladder is surgically absent.  STOMACH: There is mild to moderate fluid distention of the stomach.  PANCREAS: No masses or ductal dilatation.  SPLEEN: No splenomegaly or focal splenic lesion.  ADRENAL GLANDS: No thickening or nodules.  KIDNEYS AND URETERS: Kidneys are normal in size and location.  No renal or ureteral calculi.  PELVIS:  BLADDER: No  abnormalities identified.  REPRODUCTIVE ORGANS: There is lobulated mass located along the superior aspect of the uterus measuring 10.9 x 6.8 x 11.6 cm; this appears stable compared with prior study.  There is mild peripheral enhancement.  There is also mass adjacent to the uterus on the right measuring 11.2 x 5.6 cm, which appears stable, as well as multiple uterine fibroids.  BOWEL: There are prominent fluid-filled small bowel loops in the mid and upper abdomen suggesting enteritis.  There is mild wall thickening of the sigmoid colon.  The appendix is normal.  VESSELS: No abnormalities identified.  Abdominal aorta is normal in caliber.  PERITONEUM/RETROPERITONEUM/LYMPH NODES: No free fluid.  No pneumoperitoneum.  No lymphadenopathy.  ABDOMINAL WALL: No abnormalities identified. SOFT TISSUES: No abnormalities identified.  BONES: There is a lytic mass involving the right tenth rib with soft tissue mass density measuring 2.9 x 2.2 x 3.2 cm.  There is also lytic mass in the right hemisacrum measuring 3.5 x 3.3 x 3.1 cm.  Findings were seen on prior study.  There is mild superior endplate concavity with deformity at L4 with mild disc space narrowing and endplate degenerative changes.    CHEST: No acute cardiopulmonary disease. Cardiomegaly.  Soft tissue density of the apex of the heart on the abdominal images measures 2.3 cm.  Recommend echocardiogram for further evaluation. Soft tissue mass with lytic changes of the right tenth rib measuring 3.2 cm compatible with metastatic disease/malignancy.  This appears stable. ABDOMEN: No acute inflammatory changes.  Fatty liver morphology. PELVIS: Large lobulated mass in the pelvis measuring 11.6 cm with adjacent masses of the uterus suggesting uterine fibroids.  The lobular mass along the superior margin is more concerning for possible malignancy. Findings compatible with gastroenteritis.  Mild colitis in the sigmoid colon. Redemonstration lytic mass in the right hemisacrum.  Signed by Germán Shepard, DO    CT abdomen pelvis w IV contrast  Result Date: 5/21/2025  STUDY: CT Angiogram of the Chest, CT Abdomen and Pelvis with IV Contrast; 5/21/2025 10:51 PM INDICATION: Abdominal pain. COMPARISON: XR abdomen 5/7/2025.  CXR 5/1/2025.  CT AP 5/1/2025.  CT CAP 4/8/2025. ACCESSION NUMBER(S): [id number], [id number] ORDERING CLINICIAN: BAILEY CARRILLO TECHNIQUE:  CTA of the chest was performed following rapid injection of intravenous contrast.  Images are reviewed and processed at a workstation according to the CT angiogram protocol with 3-D and/or MIP post processing imaging generated.  CT of the abdomen and pelvis was performed with intravenous contrast.  Omnipaque 350 75 mL was administered intravenously; positive oral contrast was given.  Automated mA/kV exposure control was utilized and patient examination was performed in strict accordance with principles of ALARA. FINDINGS:  CTA CHEST: Pulmonary arteries are adequately opacified without acute or chronic filling defects.  The thoracic aorta is normal in course and caliber without dissection or aneurysm. The heart is enlarged without pericardial effusion.  Thoracic lymph nodes are not enlarged.  Patient is status post median sternotomy and coronary artery calcifications are marker for coronary artery disease.  There is mass density in the apex of the left ventricle measuring 2.3 x 1.9 cm on the abdominal images (image 10 series 2). There is no pleural effusion, pleural thickening, or pneumothorax. The airways are patent. Lungs are clear without consolidation, interstitial disease, or suspicious nodules. ABDOMEN:  LIVER: No hepatomegaly.  Smooth surface contour.  There is fatty liver morphology.  There is reflux of contrast in the hepatic veins.  BILE DUCTS: No intrahepatic or extrahepatic biliary ductal dilatation.  GALLBLADDER: The gallbladder is surgically absent.  STOMACH: There is mild to moderate fluid distention of the stomach.   PANCREAS: No masses or ductal dilatation.  SPLEEN: No splenomegaly or focal splenic lesion.  ADRENAL GLANDS: No thickening or nodules.  KIDNEYS AND URETERS: Kidneys are normal in size and location.  No renal or ureteral calculi.  PELVIS:  BLADDER: No abnormalities identified.  REPRODUCTIVE ORGANS: There is lobulated mass located along the superior aspect of the uterus measuring 10.9 x 6.8 x 11.6 cm; this appears stable compared with prior study.  There is mild peripheral enhancement.  There is also mass adjacent to the uterus on the right measuring 11.2 x 5.6 cm, which appears stable, as well as multiple uterine fibroids.  BOWEL: There are prominent fluid-filled small bowel loops in the mid and upper abdomen suggesting enteritis.  There is mild wall thickening of the sigmoid colon.  The appendix is normal.  VESSELS: No abnormalities identified.  Abdominal aorta is normal in caliber.  PERITONEUM/RETROPERITONEUM/LYMPH NODES: No free fluid.  No pneumoperitoneum.  No lymphadenopathy.  ABDOMINAL WALL: No abnormalities identified. SOFT TISSUES: No abnormalities identified.  BONES: There is a lytic mass involving the right tenth rib with soft tissue mass density measuring 2.9 x 2.2 x 3.2 cm.  There is also lytic mass in the right hemisacrum measuring 3.5 x 3.3 x 3.1 cm.  Findings were seen on prior study.  There is mild superior endplate concavity with deformity at L4 with mild disc space narrowing and endplate degenerative changes.    CHEST: No acute cardiopulmonary disease. Cardiomegaly.  Soft tissue density of the apex of the heart on the abdominal images measures 2.3 cm.  Recommend echocardiogram for further evaluation. Soft tissue mass with lytic changes of the right tenth rib measuring 3.2 cm compatible with metastatic disease/malignancy.  This appears stable. ABDOMEN: No acute inflammatory changes.  Fatty liver morphology. PELVIS: Large lobulated mass in the pelvis measuring 11.6 cm with adjacent masses of the  uterus suggesting uterine fibroids.  The lobular mass along the superior margin is more concerning for possible malignancy. Findings compatible with gastroenteritis.  Mild colitis in the sigmoid colon. Redemonstration lytic mass in the right hemisacrum. Signed by Germán Shepard DO    CT head wo IV contrast  Result Date: 5/21/2025  Interpreted By:  Jose Manuel Garcia, STUDY: CT HEAD WO IV CONTRAST;  5/21/2025 10:51 pm   INDICATION: Signs/Symptoms:Change in mental status.   COMPARISON: None   ACCESSION NUMBER(S): [id number]   ORDERING CLINICIAN: BAILEY CARRILLO   TECHNIQUE: Contiguous axial images of the head were obtained without intravenous contrast.   FINDINGS: BRAIN PARENCHYMA:  There is cerebral atrophy and chronic periventricular white matter small vessel ischemic change. The gray white matter differentiation is preserved.  No mass effect or midline shift.   HEMORRHAGE:  No evidence of acute intracranial hemorrhage. VENTRICLES AND EXTRA-AXIAL SPACES:  The ventricles are within normal limits in size for brain volume.  No evidence of abnormal extraaxial fluid collection. EXTRACRANIAL SOFT TISSUES:  Within normal limits. PARANASAL SINUSES/MASTOIDS:  Mucosal thickening left maxillary sinus. CALVARIUM:  No evidence of depressed calvarial fracture.   OTHER FINDINGS:  None       Cerebral atrophy and chronic periventricular white matter small vessel ischemic change.   No evidence of acute intracranial hemorrhage.   Mucosal thickening of left maxillary sinus.       MACRO: None   Signed by: Jose Manuel Garcia 5/21/2025 11:19 PM Dictation workstation:   [id number]    NM PET CT FDG oncology  Result Date: 5/21/2025  Interpreted By:  Haroon Roy and Afshari Mirak Sohrab STUDY: NM PET CT FDG ONCOLOGY;  5/21/2025 2:06 pm   INDICATION: Signs/Symptoms:metastatic cancer, favor GYN primary.   ,C55 Malignant neoplasm of uterus, part unspecified (Multi),C79.51 Secondary malignant neoplasm of bone (Multi)   COMPARISON: CT CHEST,  ABDOMEN AND PELVIS 04/08/2025.   ACCESSION NUMBER(S): [id number]   ORDERING CLINICIAN: ERICH BOUDREAUX   TECHNIQUE: DIVISION OF NUCLEAR MEDICINE POSITRON EMISSION TOMOGRAPHY (PET-CT)   The patient received an intravenous dose of 11.6 mCi of Fluorine-18 fluorodeoxyglucose (FDG).   Positron emission tomographic (PET) images from skull base to mid-thigh were then acquired after a one hour delay.  Also acquired was a contemporaneous low dose noncontrast CT scan performed for attenuation correction of PET images and anatomic localization.  The PET and CT images were digitally fused for display.  All images were acquired on a combined PET-CT scanner unit.  Some areas of FDG accumulation may be described in standardized uptake value (SUV) units.   CODING:   Initial Treatment Strategy (PI)   CALIBRATION:   Dose Injection-to-Scan Interval (mins): 62 Mediastinal bloodpool SUV (normal 1.5-2.5): 3 Blood glucose: 241 mg/dL   FINDINGS: NECK AND CHEST: There is focal uptake within the right vocal cord with an SUV of 8.2. There is no evidence of hypermetabolic cervical lymphadenopathy.  There is no evidence of hypermetabolic hilar, mediastinal, or axillary lymphadenopathy.  There is no evidence of hypermetabolic pulmonary nodules.   There is a four-chamber cardiomegaly. There is enlargement of the main pulmonary artery. Postsurgical changes from median sternotomy are present.   ABDOMEN: Within the abdomen, there is no evidence of hypermetabolic lesions to suggest metastatic disease.   PELVIS: There is bulky appearance of the uterus with areas of hypermetabolic activity and SUVs of up to 9. There are areas of photopenia which might represent tumoral necrosis. There are multiple partially calcified lesions which might represent fibroids.   MUSCULOSKELETAL: Hypermetabolic expansile lesion involving the proximal clavicle at the sternoclavicular joint with an SUV of 5.2. Soft tissue mass with destructive osseous changes within and  surrounding the left humeral head with an SUV of 7.2. Soft tissue mass with destructive osseous changes within the left humeral diaphysis and associated pathologic fracture with an SUV of 7.2. Hypermetabolic expansile lesion with underlying osseous destruction within the right posteromedial 10th rib with an SUV of the 8.5. Hypermetabolic sclerotic lesion within the left iliac bone at the sacroiliac joint with an SUV of 7. Hypermetabolic expansile predominantly lytic lesion in the right sacrum with an SUV of 7.8. Predominantly lytic lesion within the posteromedial aspect of the left 4th rib with an SUV of 3.4.       1. Bulky appearance of the uterus with areas of hypermetabolic activity corresponding to patient's primary neoplasm. 2. Multiple hypermetabolic osseous metastatic disease throughout the axial and appendicular skeleton with soft tissue components as described above. There is pathologic fracture of the left humerus. 3. Focal hypermetabolic activity within the right vocal cord with underlying soft tissue fullness. Findings are nonspecific and can not be completely characterized on the current exam. May consider direct visualization and dedicated imaging if clinically warranted.     This study has been interpreted at [facility] in [place], [place].   MACRO: None   Signed by: Haroon Roy 5/21/2025 3:05 PM Dictation workstation:   [id number]    XR abdomen 1 view  Result Date: 5/7/2025  Interpreted By:  Emily Renteria, STUDY: XR ABDOMEN 1 VIEW; ;  5/7/2025 11:20 am   INDICATION: Signs/Symptoms:abd distention, constipation.     COMPARISON: None.   ACCESSION NUMBER(S): [id number]   ORDERING CLINICIAN: GILMER BUSH   TECHNIQUE: Single view abdomen   FINDINGS: Gas-filled distended loops of large bowel. No air-fluid levels. Mild stool-filled distal colon. Surgical staples right upper quadrant. Diffusely calcified fibroid along the left lateral pelvis measuring 7.1 cm.    Multilevel facet arthropathy lower lumbar spine with degenerative discogenic changes noted.               1. Gas-filled distended loops of large bowel without air-fluid levels     MACRO: None   Signed by: Emily Renteria 5/7/2025 11:50 AM Dictation workstation:   [id number]    ECG 12 lead  Result Date: 5/2/2025  Atrial fibrillation with premature ventricular or aberrantly conducted complexes Inferior infarct (cited on or before 07-APR-2025) Anterolateral infarct (cited on or before 07-APR-2025) QTcB >= 480 msec Abnormal ECG When compared with ECG of 07-APR-2025 15:53, No significant change was found Confirmed by Yury Bajwa ([id number]) on 5/2/2025 2:28:41 PM    Transthoracic Echo (TTE) Limited  Result Date: 5/2/2025            [place] [address]             Phone [phone] TRANSTHORACIC ECHOCARDIOGRAM REPORT Patient Name:       MARTI VALLADARES      Reading Physician:    [id number] Joie Robison MD Study Date:         5/2/2025             Ordering Provider:    [id number] JEANINE BELLO MRN/PID:            [id number]             Fellow: Accession#:         [id number]         Nurse: Date of Birth/Age:  1950 / 74 years Sonographer:          Cari Ocampo RDCS Gender Assigned at  F                    Additional Staff: Birth: Height:             165.10 cm            Admit Date: Weight:             78.47 kg             Admission Status:     Inpatient -                                                                Routine BSA / BMI:          1.86 m2 / 28.79      Department Location:  [facility]                     kg/m2 Blood Pressure: 100 /68 mmHg Study Type:    TRANSTHORACIC ECHO (TTE) LIMITED Diagnosis/ICD: Chest pain, unspecified-R07.9 Indication:    dypsnea, chest pain CPT Codes:     Echo  Limited-48981; Color Doppler-[id number] Patient History: Pertinent History: Cardiomyopathy, AFIb, CHF, TIA, STEMI, HLD, acute MI, CKD,                    DM. Study Detail: The following Echo studies were performed: 2D, M-Mode, Doppler and               color flow. Technically challenging study due to patient lying in               supine position and body habitus. Definity used as a contrast               agent for endocardial border definition. Total contrast used for               this procedure was 3 mL via IV push.  Critical Event Critical Event: Test was completed as per department protocol. Critical Finding: Possible mass towards apex of LV. Notify Nasif through epic chat. Time Test was Completed: 10:30:00 AM Notified: Nasif. Attending notification time: 10:34:49 AM  PHYSICIAN INTERPRETATION: Left Ventricle: The left ventricular systolic function is moderately decreased, with a visually estimated ejection fraction of 30-35%. The patient is in atrial fibrillation which may influence the estimate of left ventricular function and transvalvular flows. Wall motion is abnormal. The left ventricular cavity size is normal. There is normal septal and mildly increased posterior left ventricular wall thickness. There is left ventricular concentric remodeling. Abnormal (paradoxical) septal motion, consistent with an intraventricular conduction delay. Left ventricular diastolic filling was indeterminate due to atrial fibrillation/flutter. The apical wall is akinetic with a large thrombus seen. Left Atrium: The left atrial size is mildly dilated. Right Ventricle: The right ventricle is moderately enlarged. There is reduced right ventricular systolic function. Right Atrium: The right atrial size is moderately dilated. Aortic Valve: The aortic valve appears bicuspid. There is no evidence of aortic valve regurgitation. The aortic valve off axis. Possible bicuspid aortic valve. No evidence of aortic valve stenosis. No regurgitation.  Mitral Valve: The mitral valve is normal in structure. There is trace mitral valve regurgitation. Tricuspid Valve: The tricuspid valve is structurally normal. There is trace tricuspid regurgitation. The Doppler estimated RVSP is slightly elevated right ventricular systolic pressure at 32.2 mmHg. Pulmonic Valve: The pulmonic valve is structurally normal. There is physiologic pulmonic valve regurgitation. Pericardium: Small pericardial effusion. Aorta: The aortic root is normal. Systemic Veins: The inferior vena cava appears normal in size, with IVC inspiratory collapse greater than 50%.  CONCLUSIONS:  1. The left ventricular systolic function is moderately decreased, with a visually estimated ejection fraction of 30-35%.  2. Abnormal wall motion.  3. Left ventricular diastolic filling was indeterminate due to atrial fibrillation/flutter.  4. The apical wall is akinetic with a large thrombus seen.  5. There is reduced right ventricular systolic function.  6. Moderately enlarged right ventricle.  7. The right atrial size is moderately dilated.  8. Trace mitral valve regurgitation.  9. Slightly elevated right ventricular systolic pressure. 10. Trace tricuspid regurgitation is visualized. 11. The aortic valve appears bicuspid. 12. The patient is in atrial fibrillation which may influence the estimate of left ventricular function and transvalvular flows. QUANTITATIVE DATA SUMMARY:  2D MEASUREMENTS:             Normal Ranges: LAs:             3.80 cm     (2.7-4.0cm) IVSd:            0.78 cm     (0.6-1.1cm) LVPWd:           1.17 cm     (0.6-1.1cm) LVIDd:           4.44 cm     (3.9-5.9cm) LVIDs:           3.88 cm LV Mass Index:   83.3 g/m2 LVEDV Index:     77.45 ml/m2 LV % FS          12.8 %  LEFT ATRIUM:                  Normal Ranges: LA Vol A4C:        90.2 ml    (22+/-6mL/m2) LA Vol A2C:        102.4 ml LA Vol BP:         96.3 ml LA Vol Index A4C:  48.5ml/m2 LA Vol Index A2C:  55.1 ml/m2 LA Vol Index BP:   51.8 ml/m2 LA  Area A4C:       28.2 cm2 LA Area A2C:       30.1 cm2 LA Major Axis A4C: 7.5 cm LA Major Axis A2C: 7.5 cm LA Volume Index:   50.4 ml/m2 LA Vol A4C:        88.1 ml LA Vol A2C:        99.6 ml LA Vol Index BSA:  50.5 ml/m2  RIGHT ATRIUM:                 Normal Ranges: RA Vol A4C:        96.9 ml    (8.3-19.5ml) RA Vol Index A4C:  52.1 ml/m2 RA Area A4C:       29.0 cm2 RA Major Axis A4C: 7.4 cm  M-MODE MEASUREMENTS:         Normal Ranges: Ao Root:             2.50 cm (2.0-3.7cm) LAs:                 3.20 cm (2.7-4.0cm)  LV SYSTOLIC FUNCTION:                      Normal Ranges: EF-A4C View:    33 % (>=55%) EF-A2C View:    42 % EF-Biplane:     38 % EF-Visual:      33 % LV EF Reported: 33 %  LV DIASTOLIC FUNCTION:           Normal Ranges: MV Peak E:             1.15 m/s  (0.7-1.2 m/s) MV e'                  0.066 m/s (>8.0) MV lateral e'          0.07 m/s MV medial e'           0.06 m/s E/e' Ratio:            17.48     (<8.0)  MITRAL VALVE:          Normal Ranges: MV DT:        164 msec (150-240msec)  RIGHT VENTRICLE: RV Basal 4.60 cm RV Mid   4.04 cm RV Major 7.8 cm TAPSE:   15.2 mm RV s'    0.06 m/s  TRICUSPID VALVE/RVSP:          Normal Ranges: Peak TR Velocity:     2.70 m/s RV Syst Pressure:     32 mmHg  (< 30mmHg) IVC Diam:             1.73 cm  PULMONIC VALVE:          Normal Ranges: PV Accel Time:  87 msec  (>120ms) PV Max Tristan:     1.1 m/s  (0.6-0.9m/s) PV Max P.6 mmHg  [id number] Joie Robison MD Electronically signed on 2025 at 11:33:48 AM  ** Final **     XR shoulder left 2+ views  Result Date: 2025  Interpreted By:  Paty Florian, STUDY: XR SHOULDER LEFT 2+ VIEWS 2025 10:56 pm   INDICATION: Signs/Symptoms:pain and weakness. Follow-up fracture.   COMPARISON: 2025   ACCESSION NUMBER(S): [id number]   ORDERING CLINICIAN: JEANINE BELLO   TECHNIQUE: Three views of the left shoulder   FINDINGS: There is a healing mildly displaced fracture of the proximal humeral diaphysis noted with the distal  fracture fragment displaced medially by a distance of 4 mm. There is now some periosteal reaction about the fracture site.   There is postoperative change from CABG with coronary artery stent graft seen.       Healing mildly displaced fracture of the proximal humeral diaphysis.   Signed by: Paty Florian 5/2/2025 7:23 AM Dictation workstation:   [id number]    CT head wo IV contrast  Result Date: 5/1/2025  Interpreted By:  Jose Manuel Garcia, STUDY: CT HEAD WO IV CONTRAST;  5/1/2025 11:08 pm   INDICATION: Signs/Symptoms:confusion.   COMPARISON: None   ACCESSION NUMBER(S): [id number]   ORDERING CLINICIAN: JEANINE BELLO   TECHNIQUE: Contiguous axial images of the head were obtained without intravenous contrast.   FINDINGS: BRAIN PARENCHYMA:  There is cerebral atrophy and chronic periventricular white matter small vessel ischemic change. The gray white matter differentiation is preserved.  No mass effect or midline shift.   HEMORRHAGE:  No evidence of acute intracranial hemorrhage. VENTRICLES AND EXTRA-AXIAL SPACES:  The ventricles are within normal limits in size for brain volume.  No evidence of abnormal extraaxial fluid collection. EXTRACRANIAL SOFT TISSUES:  Within normal limits. PARANASAL SINUSES/MASTOIDS:  Mucosal thickening with mucosal retention cyst or polyp left maxillary sinus. CALVARIUM:  No evidence of depressed calvarial fracture.   OTHER FINDINGS:  None       Cerebral atrophy and chronic periventricular white matter small vessel ischemic change.   No evidence of acute intracranial hemorrhage.   Mucosal thickening with mucosal retention cyst or polyp in left maxillary sinus.   MACRO: None   Signed by: Jose Manuel Garcia 5/1/2025 11:41 PM Dictation workstation:   [id number]    XR chest 1 view  Result Date: 5/1/2025  STUDY: Chest Radiograph;  05/01/2025 5:23 PM INDICATION: Generalized weakness. COMPARISON: XR chest 04/07/2025, 03/11/2023, 03/11/2021. ACCESSION NUMBER(S): [id number] ORDERING CLINICIAN: ELLE TURNER  TECHNIQUE:  Frontal chest was obtained at 17:23 hours. FINDINGS: CARDIOMEDIASTINAL SILHOUETTE: There is mild cardiomegaly.  Sternotomy wires present.  LUNGS: Lungs are clear.  ABDOMEN: No remarkable upper abdominal findings.  BONES: No acute osseous changes.    No acute pulmonary abnormality. Signed by Brennan Hartman MD    CT abdomen pelvis wo IV contrast  Result Date: 5/1/2025  STUDY: CT Abdomen and Pelvis without IV Contrast; 05/01/2025, 4:25 PM. INDICATION: Nausea, diarrhea, generalized weakness. COMPARISON: CT CAP: 04/08/25; US pelvis: 03/16/23. ACCESSION NUMBER(S): [id number] ORDERING CLINICIAN: ELLE TURNER TECHNIQUE: CT of the abdomen and pelvis was performed.  Contiguous axial images were obtained at 3 mm slice thickness through the abdomen and pelvis. Coronal and sagittal reconstructions at 3 mm slice thickness were performed. No intravenous contrast was administered.  Automated mA/kV exposure control was utilized and patient examination was performed in strict accordance with principles of ALARA. FINDINGS: Please note that the evaluation of vessels, lymph nodes and organs is limited without intravenous contrast.  LOWER CHEST: Heart is enlarged with coronary artery calcifications  No pericardial effusion.  Lung bases are clear.  ABDOMEN:  LIVER: No hepatomegaly.  Smooth surface contour.  Normal attenuation.  BILE DUCTS: No intrahepatic or extrahepatic biliary ductal dilatation.  GALLBLADDER: The gallbladder is absent. STOMACH: No abnormalities identified.  PANCREAS: No masses or ductal dilatation.  SPLEEN: No splenomegaly or focal splenic lesion.  ADRENAL GLANDS: No thickening or nodules.  KIDNEYS AND URETERS: Kidneys are normal in size and location.  No renal or ureteral calculi.  PELVIS:  BLADDER: No abnormalities identified.  REPRODUCTIVE ORGANS: Enlarged fibroid uterus again noted.  Overall appearance is unchanged.  BOWEL: No abnormalities identified.  Appendix is normal.  VESSELS: No abnormalities  identified.  Abdominal aorta is normal in caliber. Moderate to severe plaque along the distal aorta.  PERITONEUM/RETROPERITONEUM/LYMPH NODES: No free fluid.  No pneumoperitoneum.  Small fat-containing umbilical hernia. No lymphadenopathy.  ABDOMINAL WALL: No abnormalities identified. SOFT TISSUES: No abnormalities identified.  BONES: Stable lytic lesions in the right hemisacrum and right 10th rib adjacent to the costotransverse junction unchanged from prior    1.No acute process. 2.Cardiomegaly with coronary artery calcifications. 3.Enlarged fibroid uterus again noted. Overall appearance is unchanged. 4.Stable lytic lesions in the right hemisacrum and right 10th rib adjacent to the costotransverse junction unchanged from prior.  As previously suggested, metastatic disease should be considered. Consider further evaluation with MRI or pathologic sampling. Signed by Juan Early MD      Assessment/Plan   Septic shock, on Levophed, vasopressin  Acute hypoxic respiratory failure, on Bipap  Acute on chronic kidney diease, worsening   Metastatic Uterine cancer to bone  Atrial fibrillation with RVR  Ischemic cardiomyopathy/NSTEMI-management per cardiology       IV zosyn-expand coverage to IV meropenem   IV zyvox  Follow up blood culture  Urine culture  Bipap per pulmonary, wean as tolerated   Pressor support, wean as tolerated   Monitor renal function   Follow up pending work up   Pulmonary consult  Nephrology consult  Cardiology follow up   Gynecology consult   Palliative care consult     Discussed with Dr. Jennifer Roberson, APRN-CNP       [1]   Family History  Problem Relation Name Age of Onset    Hypertension Mother      Cancer Mother      Diabetes Father      Stroke Father      Diabetes Sister      Heart disease Maternal Grandmother      Heart disease Maternal Grandfather      Heart disease Paternal Grandmother      Heart disease Paternal Grandfather     [2]   Medications Prior to Admission   Medication  "Sig Dispense Refill Last Dose/Taking    acetaminophen (Tylenol) 325 mg tablet Take 2 tablets (650 mg) by mouth every 4 hours if needed for mild pain (1 - 3). 30 tablet 0     bisacodyl (Dulcolax) 5 mg EC tablet Take 2 tablets (10 mg) by mouth once daily as needed for constipation. Do not crush, chew, or split.       blood-glucose meter (Accu-Chek Guide Glucose Meter) misc Test TID 1 each 0     carvedilol (Coreg) 3.125 mg tablet Take 1 tablet (3.125 mg) by mouth 2 times a day. 180 tablet 2     docusate sodium (Colace) 100 mg capsule Take 2 capsules (200 mg) by mouth 2 times a day. 60 capsule 0     empagliflozin (Jardiance) 10 mg tablet Take 1 tablet (10 mg) by mouth once daily.       ergocalciferol (Vitamin D-2) 1.25 MG (13476 UT) capsule 1 CAP BY MOUTH WEEKLY ON SUNDAY 4 capsule 11     fish oil (Omega-3) 60- mg capsule Take 2 capsules (1,000 mg) by mouth once daily. 60 capsule 11     glucagon 0.5 mg/0.1 mL auto-injector Inject 1 mg into the muscle every 15 minutes if needed (< 40). 0.2 mL 12     insulin lispro (HumaLOG) 100 unit/mL injection INJECT SUBQ PER SLIDING SCALE WITH MEALS (MAX DAILY DOSE OF 80 UNITS) 30 mL 10     lancets misc Accucheck lancets 100 each 11     Lantus Solostar U-100 Insulin 100 unit/mL (3 mL) pen 25 UNITS SUBQ DAILY AT BEDTIME - TAKE AS DIRECTED PER INSULIN INSTRUCTIONS 9 mL 11     losartan (Cozaar) 25 mg tablet Take 1 tablet (25 mg) by mouth once daily. 90 tablet 2     nystatin (Mycostatin) 100,000 unit/gram powder Apply 1 Application topically 2 times a day.       pantoprazole (ProtoNix) 40 mg EC tablet Take 1 tablet (40 mg) by mouth once daily in the morning. Take before meals. 90 tablet 2     pen needle, diabetic (BD Ultra-Fine Short Pen Needle) 31 gauge x 5/16\" needle Use and discard 4 pen needles per day subcutaneously Dx: E11.65 for 90 days       polyethylene glycol (Glycolax, Miralax) 17 gram packet Take 17 g by mouth once daily as needed (constipation).       polyethylene " glycol (Glycolax, Miralax) 17 gram packet Take 17 g by mouth once daily. Do not fill before May 8, 2025.       torsemide (Demadex) 20 mg tablet Take 1 tablet (20 mg) by mouth once daily. 30 tablet 11     warfarin (Coumadin) 2 mg tablet Take as directed per After Visit Summary.      [3] linezolid, 600 mg, intravenous, q12h  oxygen, , inhalation, Continuous - Inhalation  pantoprazole, 40 mg, intravenous, Daily  piperacillin-tazobactam, 2.25 g, intravenous, q6h    [4] amiodarone, 1 mg/min   Followed by  amiodarone, 0.5 mg/min  norepinephrine, 0-1 mcg/kg/min, Last Rate: 0.08 mcg/kg/min (05/22/25 0518)  sodium chloride 0.9%, 75 mL/hr, Last Rate: 75 mL/hr (05/22/25 0518)  vasopressin, 0-0.03 Units/min, Last Rate: 0.03 Units/min (05/22/25 0845)    [5] PRN medications: benzocaine-menthol, dextromethorphan-guaifenesin, dextrose, dextrose, glucagon, guaiFENesin, ondansetron ODT **OR** ondansetron, polyethylene glycol

## 2025-05-22 NOTE — TUMOR BOARD NOTE
Gynecologic Oncology Tumor Board 2025    Specialties Present: Gyn Onc, Radiology, Genetics, Radiation oncology, Pathology, Nurse Navigator, Research    Jing Sanchez  74 y.o.    1950  MRN [id number]    Provider: Melva Gaines MD  Disease Site: Uterine  Pathology: Left humerus soft tissue biopsy (4/10/25) with metastatic carcinoma of Mullerian origin (diffuse positive PAX8, ER; patchy to diffuse p16) with immunophenotype c/w endometrioid type; p53WT  Prior Therapy:  N/A  Impression:  74 y.o. with stage IVB/IVC (FIGO ) metastatic endometrial cancer including bony metastases and sigmoid colon involvement.     We reviewed previous medical and familial history, history of present illness, and recent lab results along with all available histopathologic and imaging studies. The tumor board considered available treatment options and made the following recommendations.    Recommendations:     Consider diversion. Follow up Caris/FOLR1 testing. Systemic therapy with Carboplatin/Paclitaxel/Pembrolizumab vs. best supportive care. MMR testing. Consider Rad Onc referral for palliative RT to bone mets.      Clinical Trial Consideration: Not Eligible    National site-specific guidelines were discussed with respect to the case. It is recognized that there may be additional factors not discussed in the Review Board discussion that can influence management decisions, and alternative management options which fall within the standard of care. Accordingly the final treatment disposition will be determined by the patient, in the context of an informed discussion with their providers. The actual prescribed management or treatment plan may or may not be consistent with the Review Board recommendations.

## 2025-05-22 NOTE — ASSESSMENT & PLAN NOTE
Significant elevation in troponin level  Was probably type II MI demand ischemia in the setting of undifferentiated shock and A-fib with RVR  Will add on CK level to evaluate for rhabdomyolysis  Cardiology consultation requested.  Appreciate recs.

## 2025-05-22 NOTE — ED PROCEDURE NOTE
Procedure  Central Line    Performed by: Jose Trujillo DO  Authorized by: oJse Trujillo DO    Consent:     Consent obtained:  Verbal    Consent given by:  Patient    Risks discussed:  Arterial puncture  Universal protocol:     Imaging studies available: yes      Site/side marked: yes      Patient identity confirmed:  Verbally with patient  Pre-procedure details:     Indication(s): central venous access      Hand hygiene: Hand hygiene performed prior to insertion      Sterile barrier technique: All elements of maximal sterile technique followed      Skin preparation:  Chlorhexidine    Skin preparation agent: Skin preparation agent completely dried prior to procedure    Sedation:     Sedation type:  None  Anesthesia:     Anesthesia method:  Local infiltration    Local anesthetic:  Lidocaine 1% w/o epi  Procedure details:     Location:  R femoral    Site selection rationale:  Patient is on BiPAP.  Profoundly hypotensive despite Levophed and peripheral line.  Emergent access required with minimal complications    Patient position:  Supine    Procedural supplies:  Triple lumen    Ultrasound guidance: no      Number of attempts:  1    Successful placement: yes    Post-procedure details:     Post-procedure:  Line sutured and dressing applied    Assessment:  Blood return through all ports and free fluid flow    Procedure completion:  Tolerated  Critical Care    Performed by: Jose Trujillo DO  Authorized by: Jose Trujillo DO    Critical care provider statement:     Critical care time (minutes):  45    Critical care time was exclusive of:  Separately billable procedures and treating other patients    Critical care was necessary to treat or prevent imminent or life-threatening deterioration of the following conditions:  Respiratory failure, circulatory failure, sepsis, cardiac failure and renal failure    Critical care was time spent personally by me on the following activities:  Evaluation of patient's response to  treatment, examination of patient, interpretation of cardiac output measurements, obtaining history from patient or surrogate, review of old charts, re-evaluation of patient's condition, pulse oximetry, ordering and review of radiographic studies, ordering and review of laboratory studies and ordering and performing treatments and interventions    Care discussed with: admitting provider                 Jose Trujillo DO  05/22/25 1678

## 2025-05-22 NOTE — ASSESSMENT & PLAN NOTE
Appears to be acute on chronic  Continue IVF hydration judiciously in the setting of cardiomyopathy  Hold home losartan dose  Continue to hold all Nephrotoxic agents  CT abdomen pelvis negative for any hydronephrosis  Patient did receive contrast load in ED. Will need to monitor for development of JOSSIE  Obtain Urine electrolytes, Urinalysis , Urine Culture + Sensitivity   Continue to Monitor Renal Function (BUN/Cr) + Urine Output.   Nephrology consultation.  Appreciate recs

## 2025-05-22 NOTE — ASSESSMENT & PLAN NOTE
Most probably the setting of dehydration and hypercalcemia of malignancy  Follow-up ionized calcium level in a.m.

## 2025-05-22 NOTE — PROGRESS NOTES
Speech-Language Pathology                 Therapy Communication Note    Patient Name: Jing Sanchez  MRN: [id number]  Department: [facility] 4 ICU  Room: 407/[facility]  Today's Date: 5/22/2025     Discipline: Speech Language Pathology    Missed Visit Reason: Pt on continuous BiPAP    Missed Time: Attempt 10:05 am    Comment:  10:05 am--Pt referred to [facility]LP Services for Clinical Swallow Evaluation. Unable to complete at this time due to currently on continuous BiPAP.  SLP reviewed chart and discussed case with bedside PETER Banegas, who is uncertain when pt may be able to transition from BiPAP.  Will attempt later or next treatment date as pt status and schedule allows.    Addendum 2:00 pm--Pt still on BiPAP with no plans for removal at this time per PETER Banegas.  Will attempt 5/23/25 as pt status and schedule allows.

## 2025-05-23 NOTE — PROGRESS NOTES
05/23/25 1240   Discharge Planning   Home or Post Acute Services   ([facility].  Awaiting updates.)   Expected Discharge Disposition [facility]     1555  Per Anurag, [facility] advised of meeting 5/24/25 between 6-6:30 pm at bedside.

## 2025-05-23 NOTE — PROGRESS NOTES
Jing Sanchez is a 74 y.o. female on day 1 of admission presenting with Septic shock (Multi).      Subjective   Patient wearing full face mask.  Does not complain of pain.  We discussed the plan for today's care.       Objective     Last Recorded Vitals  BP 90/78   Pulse 107   Temp 36 °C (96.8 °F) (Temporal)   Resp (!) 38   Wt 84.2 kg (185 lb 10 oz)   SpO2 97%   Intake/Output last 3 Shifts:    Intake/Output Summary (Last 24 hours) at 5/23/2025 1040  Last data filed at 5/23/2025 0630  Gross per 24 hour   Intake 3927.85 ml   Output 250 ml   Net 3677.85 ml       Admission Weight  Weight: 78.2 kg (172 lb 8 oz) (05/21/25 2203)    Daily Weight  05/23/25 : 84.2 kg (185 lb 10 oz)    Image Results  Electrocardiogram, 12-lead PRN ACS symptoms  Atrial flutter with variable AV block  Left axis deviation  Inferior infarct (cited on or before 07-APR-2025)  Anterolateral infarct (cited on or before 07-APR-2025)  Prolonged QT  Abnormal ECG  When compared with ECG of 21-MAY-2025 22:06, (unconfirmed)  Atrial flutter has replaced Atrial fibrillation  Vent. rate has decreased BY  62 BPM  Questionable change in initial forces of Lateral leads      Physical Exam  Generally no acute distress  HEENT PERRL EOMI  Cardiovascular S1-S2 heard slight irregularity to rhythm regular rate  Lungs anterior clear to auscultation  Abdomen bowel sounds present  Extremities no clubbing cyanosis edema    Relevant Results  Scheduled medications  Scheduled Medications[1]  Continuous medications  Continuous Medications[2]  PRN medications  PRN Medications[3]  Results for orders placed or performed during the hospital encounter of 05/21/25 (from the past 24 hours)   POCT GLUCOSE   Result Value Ref Range    POCT Glucose 371 (H) 74 - 99 mg/dL   POCT GLUCOSE   Result Value Ref Range    POCT Glucose 447 (H) 74 - 99 mg/dL   POCT GLUCOSE   Result Value Ref Range    POCT Glucose 435 (H) 74 - 99 mg/dL   POCT GLUCOSE   Result Value Ref Range    POCT Glucose 401  (H) 74 - 99 mg/dL   Electrocardiogram, 12-lead PRN ACS symptoms   Result Value Ref Range    Ventricular Rate 93 BPM    Atrial Rate 344 BPM    QRS Duration 94 ms    QT Interval 456 ms    QTC Calculation(Bazett) 566 ms    R Axis -52 degrees    T Axis 138 degrees    QRS Count 16 beats    Q Onset 213 ms    T Offset 441 ms    QTC Fredericia 528 ms   Blood Gas Arterial Full Panel   Result Value Ref Range    POCT pH, Arterial 7.37 (L) 7.38 - 7.42 pH    POCT pCO2, Arterial 29 (L) 38 - 42 mm Hg    POCT pO2, Arterial 153 (H) 85 - 95 mm Hg    POCT SO2, Arterial 100 94 - 100 %    POCT Oxy Hemoglobin, Arterial 96.0 94.0 - 98.0 %    POCT Hematocrit Calculated, Arterial 35.0 (L) 36.0 - 46.0 %    POCT Sodium, Arterial 126 (L) 136 - 145 mmol/L    POCT Potassium, Arterial 4.9 3.5 - 5.3 mmol/L    POCT Chloride, Arterial 93 (L) 98 - 107 mmol/L    POCT Ionized Calcium, Arterial 1.15 1.10 - 1.33 mmol/L    POCT Glucose, Arterial 426 (H) 74 - 99 mg/dL    POCT Lactate, Arterial 5.0 (HH) 0.4 - 2.0 mmol/L    POCT Base Excess, Arterial -7.3 (L) -2.0 - 3.0 mmol/L    POCT HCO3 Calculated, Arterial 16.8 (L) 22.0 - 26.0 mmol/L    POCT Hemoglobin, Arterial 11.8 (L) 12.0 - 16.0 g/dL    POCT Anion Gap, Arterial 21 10 - 25 mmo/L    Patient Temperature 37.0 degrees Celsius    FiO2 35 %    Ventilator Mode BiPAP     Ipap CMH2O 14.0 cm H2O    Epap CMH2O 5.0 cm H2O    Critical Called By COCO ROD, RRT     Critical Called To DR. VICK CHOW     Critical Call Time 2121     Critical Read Back Y     Critical Note CRITICAL LAC AT 5.0     Site of Arterial Puncture Brachial Right    POCT GLUCOSE   Result Value Ref Range    POCT Glucose 385 (H) 74 - 99 mg/dL   POCT GLUCOSE   Result Value Ref Range    POCT Glucose 327 (H) 74 - 99 mg/dL   POCT GLUCOSE   Result Value Ref Range    POCT Glucose 352 (H) 74 - 99 mg/dL   POCT GLUCOSE   Result Value Ref Range    POCT Glucose 280 (H) 74 - 99 mg/dL   Blood Gas Lactic Acid, Venous   Result Value Ref Range    POCT Lactate,  Venous 4.1 (HH) 0.4 - 2.0 mmol/L   Protime-INR   Result Value Ref Range    Protime >90.0 (HH) 9.3 - 12.7 seconds    INR >8.0 () 0.9 - 1.2   CBC and Auto Differential   Result Value Ref Range    WBC 19.5 (H) 4.4 - 11.3 x10*3/uL    nRBC 0.0 0.0 - 0.0 /100 WBCs    RBC 3.25 (L) 4.00 - 5.20 x10*6/uL    Hemoglobin 10.7 (L) 12.0 - 16.0 g/dL    Hematocrit 32.7 (L) 36.0 - 46.0 %     (H) 80 - 100 fL    MCH 32.9 26.0 - 34.0 pg    MCHC 32.7 32.0 - 36.0 g/dL    RDW 13.5 11.5 - 14.5 %    Platelets 195 150 - 450 x10*3/uL    Immature Granulocytes %, Automated 1.5 (H) 0.0 - 0.9 %    Immature Granulocytes Absolute, Automated 0.29 0.00 - 0.50 x10*3/uL   Comprehensive metabolic panel   Result Value Ref Range    Glucose 284 (H) 74 - 99 mg/dL    Sodium 130 (L) 136 - 145 mmol/L    Potassium 5.1 3.5 - 5.3 mmol/L    Chloride 92 (L) 98 - 107 mmol/L    Bicarbonate 19 (L) 21 - 32 mmol/L    Anion Gap 24 (H) 10 - 20 mmol/L    Urea Nitrogen 72 (H) 6 - 23 mg/dL    Creatinine 4.32 (H) 0.50 - 1.05 mg/dL    eGFR 10 (L) >60 mL/min/1.73m*2    Calcium 8.3 (L) 8.6 - 10.3 mg/dL    Albumin 3.0 (L) 3.4 - 5.0 g/dL    Alkaline Phosphatase 117 33 - 136 U/L    Total Protein 5.7 (L) 6.4 - 8.2 g/dL    AST 61 (H) 9 - 39 U/L    Bilirubin, Total 1.0 0.0 - 1.2 mg/dL    ALT 70 (H) 7 - 45 U/L   Manual Differential   Result Value Ref Range    Neutrophils %, Manual 62.0 40.0 - 80.0 %    Bands %, Manual 29.0 0.0 - 5.0 %    Lymphocytes %, Manual 0.0 13.0 - 44.0 %    Monocytes %, Manual 1.0 2.0 - 10.0 %    Eosinophils %, Manual 0.0 0.0 - 6.0 %    Basophils %, Manual 0.0 0.0 - 2.0 %    Metamyelocytes %, Manual 2.0 0.0 - 0.0 %    Myelocytes %, Manual 6.0 0.0 - 0.0 %    Seg Neutrophils Absolute, Manual 12.09 (H) 1.60 - 5.00 x10*3/uL    Bands Absolute, Manual 5.66 (H) 0.00 - 0.50 x10*3/uL    Lymphocytes Absolute, Manual 0.00 (L) 0.80 - 3.00 x10*3/uL    Monocytes Absolute, Manual 0.20 0.05 - 0.80 x10*3/uL    Eosinophils Absolute, Manual 0.00 0.00 - 0.40 x10*3/uL     Basophils Absolute, Manual 0.00 0.00 - 0.10 x10*3/uL    Metamyelocytes Absolute, Manual 0.39 0.00 - 0.00 x10*3/uL    Myelocytes Absolute, Manual 1.17 0.00 - 0.00 x10*3/uL    Total Cells Counted 100     Neutrophils Absolute, Manual 17.75 (H) 1.60 - 5.50 x10*3/uL    RBC Morphology See Below     La Cells Many    Coagulation Screen   Result Value Ref Range    Protime >90.0 (HH) 9.3 - 12.7 seconds    INR >8.0 (HH) 0.9 - 1.2    aPTT 53.2 (H) 22.0 - 32.5 seconds   POCT GLUCOSE   Result Value Ref Range    POCT Glucose 249 (H) 74 - 99 mg/dL     *Note: Due to a large number of results and/or encounters for the requested time period, some results have not been displayed. A complete set of results can be found in Results Review.     Impression: 74-year-old woman admitted with acute hypoxic respiratory failure with sepsis in the setting of uterine cancer.    Plan:    1.  Sepsis  - Still requiring pressors, appreciate pulmonary critical care input, continue with current antibiotics appreciate infectious disease input.  - Cultures pending  - Otherwise kidney function continues to worsen renal has been consulted awaiting their input    2.  Acute kidney injury  - Likely secondary to sepsis and prerenal azotemia.  Waiting on nephrology to see patient today.    3.  Hypercalcemia  - Likely secondary to malignancy, await renal input regarding hypercalcemia    4.  Systolic heart failure  - Cardiology input, continue to be mindful of volume status otherwise continue with cardiac recommendations    5.  Metastatic endometrial cancer  - Tumor board recommendations noted in chart.  Palliative care involved with patient's care in hospital.  Attempting to reverse reversible causes regarding admission at this point as patient would like to pursue treatment which will be palliative cancer/directed therapy.    DNR/DNI                                       Sonny Conner MD           [1] insulin glargine, 12 Units, subcutaneous,  q24h  insulin lispro, 0-5 Units, subcutaneous, q4h  linezolid, 600 mg, intravenous, q12h  meropenem, 500 mg, intravenous, q12h  oxygen, , inhalation, Continuous - Inhalation  pantoprazole, 40 mg, intravenous, Daily  perflutren lipid microspheres, 0.5-10 mL of dilution, intravenous, Once in imaging  perflutren protein A microsphere, 0.5 mL, intravenous, Once in imaging  sulfur hexafluoride microsphr, 2 mL, intravenous, Once in imaging  [2] amiodarone, 0.5 mg/min, Last Rate: 0.5 mg/min (05/23/25 0630)  norepinephrine, 0-1 mcg/kg/min, Last Rate: 0.11 mcg/kg/min (05/23/25 0735)  sodium chloride 0.45 % 1,000 mL with sodium bicarbonate 1 mEq/mL (8.4 %) 75 mEq infusion, 75 mL/hr, Last Rate: 75 mL/hr (05/23/25 0630)  vasopressin, 0-0.03 Units/min, Last Rate: 0.03 Units/min (05/23/25 0630)  [3] PRN medications: benzocaine-menthol, dextromethorphan-guaifenesin, dextrose, dextrose, glucagon, guaiFENesin, HYDROmorphone, polyethylene glycol

## 2025-05-23 NOTE — PROGRESS NOTES
Speech-Language Pathology                 Therapy Communication Note    Patient Name: Jing Sanchez  MRN: [id number]  Department: TRI 4 ICU  Room: 407/[facility]  Today's Date: 5/23/2025     Discipline: Speech Language Pathology    Missed Visit Reason:  On BiPAP    Missed Time: Attempt     Comment: Pt remains on BiPAP. Per notes review and conversation with RN, clinical presentation is worsening. Pt remains not appropriate for PO trials at this time. SLP to sign off at this time. Please reconsult with any changes.

## 2025-05-23 NOTE — CARE PLAN
Problem: Skin  Goal: Decreased wound size/increased tissue granulation at next dressing change  Outcome: Progressing  Goal: Participates in plan/prevention/treatment measures  Outcome: Progressing  Goal: Prevent/manage excess moisture  Outcome: Progressing  Goal: Prevent/minimize sheer/friction injuries  Outcome: Progressing  Goal: Promote/optimize nutrition  Outcome: Progressing  Goal: Promote skin healing  Outcome: Progressing     Problem: Diabetes  Goal: Achieve decreasing blood glucose levels by end of shift  Outcome: Not Progressing  Goal: Increase stability of blood glucose readings by end of shift  Outcome: Not Progressing  Goal: Decrease in ketones present in urine by end of shift  Outcome: Progressing  Goal: Maintain electrolyte levels within acceptable range throughout shift  Outcome: Not Progressing  Goal: Maintain glucose levels >70mg/dl to <250mg/dl throughout shift  Outcome: Not Progressing  Goal: No changes in neurological exam by end of shift  Outcome: Progressing  Goal: Learn about and adhere to nutrition recommendations by end of shift  Outcome: Not Progressing  Goal: Vital signs within normal range for age by end of shift  Outcome: Not Progressing  Goal: Increase self care and/or family involovement by end of shift  Outcome: Not Progressing  Goal: Receive DSME education by end of shift  Outcome: Not Progressing     Problem: Heart Failure  Goal: Improved gas exchange this shift  Outcome: Not Progressing  Goal: Improved urinary output this shift  Outcome: Not Progressing  Goal: Reduction in peripheral edema within 24 hours  Outcome: Not Progressing  Goal: Report improvement of dyspnea/breathlessness this shift  Outcome: Not Progressing  Goal: Weight from fluid excess reduced over 2-3 days, then stabilize  Outcome: Not Progressing  Goal: Increase self care and/or family involvement in 24 hours  Outcome: Not Progressing     Problem: Pain - Adult  Goal: Verbalizes/displays adequate comfort level or  baseline comfort level  Outcome: Not Progressing     Problem: Safety - Adult  Goal: Free from fall injury  Outcome: Met     Problem: Discharge Planning  Goal: Discharge to home or other facility with appropriate resources  Outcome: Not Progressing     Problem: Chronic Conditions and Co-morbidities  Goal: Patient's chronic conditions and co-morbidity symptoms are monitored and maintained or improved  Outcome: Not Progressing     Problem: Nutrition  Goal: Nutrient intake appropriate for maintaining nutritional needs  Outcome: Not Progressing     Problem: Fall/Injury  Goal: Not fall by end of shift  Outcome: Met  Goal: Be free from injury by end of the shift  Outcome: Met  Goal: Verbalize understanding of personal risk factors for fall in the hospital  Outcome: Not Progressing  Goal: Verbalize understanding of risk factor reduction measures to prevent injury from fall in the home  Outcome: Not Progressing  Goal: Use assistive devices by end of the shift  Outcome: Not Progressing  Goal: Pace activities to prevent fatigue by end of the shift  Outcome: Not Progressing     Problem: Pain  Goal: Takes deep breaths with improved pain control throughout the shift  Outcome: Not Progressing  Goal: Turns in bed with improved pain control throughout the shift  Outcome: Not Progressing  Goal: Walks with improved pain control throughout the shift  Outcome: Not Progressing  Goal: Performs ADL's with improved pain control throughout shift  Outcome: Not Progressing  Goal: Participates in PT with improved pain control throughout the shift  Outcome: Not Progressing  Goal: Free from opioid side effects throughout the shift  Outcome: Met  Goal: Free from acute confusion related to pain meds throughout the shift  Outcome: Met     Problem: Pain  Goal: LTG - Patient will manage pain with the appropriate technique/Intervention  Outcome: Not Progressing  Goal: LTG - Patient will demonstrate intervention for managing pain  Outcome: Not  Progressing  Goal: STG - Patient will reduce or eliminate use of analgesics  Outcome: Not Progressing  Goal: STG - Pain is manageable through therapies  Outcome: Progressing  Goal: STG - Patient will verbalize an acceptable level of pain  Outcome: Progressing  Goal: STG - Patients pain is managed to allow active participation in daily activities  Outcome: Not Progressing  Goal: STG - Patient will increase activity level  Outcome: Not Progressing  Goal: STG - Patient verbalizes a reduction in pain level  Outcome: Progressing     Problem: Respiratory  Goal: Clear secretions with interventions this shift  Outcome: Not Progressing  Goal: Minimize anxiety/maximize coping throughout shift  Outcome: Not Progressing  Goal: Minimal/no exertional discomfort or dyspnea this shift  Outcome: Not Progressing  Goal: No signs of respiratory distress (eg. Use of accessory muscles. Peds grunting)  Outcome: Not Progressing  Goal: Patent airway maintained this shift  Outcome: Not Progressing  Goal: Tolerate pulmonary toileting this shift  Outcome: Not Progressing  Goal: Verbalize decreased shortness of breath this shift  Outcome: Not Progressing  Goal: Wean oxygen to maintain O2 saturation per order/standard this shift  Outcome: Not Progressing  Goal: Increase self care and/or family involvement in next 24 hours  Outcome: Not Progressing     Problem: Infection prevention/bleeding  Goal: Infection s/sx managed  Outcome: Not Progressing  Goal: No further progression of infection  Outcome: Progressing  Goal: No signs of bleeding  Outcome: Progressing  Goal: Normal coagulation studies  Outcome: Not Progressing     Problem: Perfusion/Cardiac  Goal: Adequate perfusion to organs/extremities  Outcome: Not Progressing  Goal: Hemodynamically stable  Outcome: Not Progressing  Goal: No cardiac arrhythmias  Outcome: Not Progressing     Problem: Fluid/Electrolyte/Nutrition  Goal: Fluid balance within 1 liter of normovolemia  Outcome: Not  Progressing  Goal: No signs of renal failure  Outcome: Not Progressing  Goal: Normal electrolyte levels  Outcome: Not Progressing  Goal: Normal glucose levels  Outcome: Not Progressing  Goal: Tolerates nutritional intake  Outcome: Not Progressing     Problem: Acute Kidney Failure  Goal: Electrolytes/labs return to normal range  Outcome: Not Progressing  Goal: No signs of infection  Outcome: Not Progressing  Goal: No signs of neurosensory, musculoskeletal, cardiac changes  Outcome: Not Progressing  Goal: Stable weight and I&O  Outcome: Not Progressing  Goal: Tolerates renal replacement therapy  Outcome: Not Progressing  Goal: Wean vasopressor/achieve hemodynamic stability  Outcome: Not Progressing   The patient's goals for the shift include      The clinical goals for the shift include stable hemodynamics, wean pressors as able    Over the shift, the patient did not make progress toward the following goals. Barriers to progression include hemodynamic instability, respiratory failure. Recommendations to address these barriers include comfort care.    Problem: Diabetes  Goal: Achieve decreasing blood glucose levels by end of shift  Outcome: Not Progressing  Goal: Increase stability of blood glucose readings by end of shift  Outcome: Not Progressing  Goal: Maintain electrolyte levels within acceptable range throughout shift  Outcome: Not Progressing  Goal: Maintain glucose levels >70mg/dl to <250mg/dl throughout shift  Outcome: Not Progressing  Goal: Learn about and adhere to nutrition recommendations by end of shift  Outcome: Not Progressing  Goal: Vital signs within normal range for age by end of shift  Outcome: Not Progressing  Goal: Increase self care and/or family involovement by end of shift  Outcome: Not Progressing  Goal: Receive DSME education by end of shift  Outcome: Not Progressing     Problem: Heart Failure  Goal: Improved gas exchange this shift  Outcome: Not Progressing  Goal: Improved urinary output this  shift  Outcome: Not Progressing  Goal: Reduction in peripheral edema within 24 hours  Outcome: Not Progressing  Goal: Report improvement of dyspnea/breathlessness this shift  Outcome: Not Progressing  Goal: Weight from fluid excess reduced over 2-3 days, then stabilize  Outcome: Not Progressing  Goal: Increase self care and/or family involvement in 24 hours  Outcome: Not Progressing     Problem: Pain - Adult  Goal: Verbalizes/displays adequate comfort level or baseline comfort level  Outcome: Not Progressing     Problem: Discharge Planning  Goal: Discharge to home or other facility with appropriate resources  Outcome: Not Progressing     Problem: Chronic Conditions and Co-morbidities  Goal: Patient's chronic conditions and co-morbidity symptoms are monitored and maintained or improved  Outcome: Not Progressing     Problem: Nutrition  Goal: Nutrient intake appropriate for maintaining nutritional needs  Outcome: Not Progressing     Problem: Fall/Injury  Goal: Verbalize understanding of personal risk factors for fall in the hospital  Outcome: Not Progressing  Goal: Verbalize understanding of risk factor reduction measures to prevent injury from fall in the home  Outcome: Not Progressing  Goal: Use assistive devices by end of the shift  Outcome: Not Progressing  Goal: Pace activities to prevent fatigue by end of the shift  Outcome: Not Progressing     Problem: Pain  Goal: Takes deep breaths with improved pain control throughout the shift  Outcome: Not Progressing  Goal: Turns in bed with improved pain control throughout the shift  Outcome: Not Progressing  Goal: Walks with improved pain control throughout the shift  Outcome: Not Progressing  Goal: Performs ADL's with improved pain control throughout shift  Outcome: Not Progressing  Goal: Participates in PT with improved pain control throughout the shift  Outcome: Not Progressing     Problem: Pain  Goal: LTG - Patient will manage pain with the appropriate  technique/Intervention  Outcome: Not Progressing  Goal: LTG - Patient will demonstrate intervention for managing pain  Outcome: Not Progressing  Goal: STG - Patient will reduce or eliminate use of analgesics  Outcome: Not Progressing  Goal: STG - Patients pain is managed to allow active participation in daily activities  Outcome: Not Progressing  Goal: STG - Patient will increase activity level  Outcome: Not Progressing     Problem: Respiratory  Goal: Clear secretions with interventions this shift  Outcome: Not Progressing  Goal: Minimize anxiety/maximize coping throughout shift  Outcome: Not Progressing  Goal: Minimal/no exertional discomfort or dyspnea this shift  Outcome: Not Progressing  Goal: No signs of respiratory distress (eg. Use of accessory muscles. Peds grunting)  Outcome: Not Progressing  Goal: Patent airway maintained this shift  Outcome: Not Progressing  Goal: Tolerate pulmonary toileting this shift  Outcome: Not Progressing  Goal: Verbalize decreased shortness of breath this shift  Outcome: Not Progressing  Goal: Wean oxygen to maintain O2 saturation per order/standard this shift  Outcome: Not Progressing  Goal: Increase self care and/or family involvement in next 24 hours  Outcome: Not Progressing     Problem: Infection prevention/bleeding  Goal: Infection s/sx managed  Outcome: Not Progressing  Goal: Normal coagulation studies  Outcome: Not Progressing     Problem: Perfusion/Cardiac  Goal: Adequate perfusion to organs/extremities  Outcome: Not Progressing  Goal: Hemodynamically stable  Outcome: Not Progressing  Goal: No cardiac arrhythmias  Outcome: Not Progressing     Problem: Fluid/Electrolyte/Nutrition  Goal: Fluid balance within 1 liter of normovolemia  Outcome: Not Progressing  Goal: No signs of renal failure  Outcome: Not Progressing  Goal: Normal electrolyte levels  Outcome: Not Progressing  Goal: Normal glucose levels  Outcome: Not Progressing  Goal: Tolerates nutritional intake  Outcome:  Not Progressing     Problem: Acute Kidney Failure  Goal: Electrolytes/labs return to normal range  Outcome: Not Progressing  Goal: No signs of infection  Outcome: Not Progressing  Goal: No signs of neurosensory, musculoskeletal, cardiac changes  Outcome: Not Progressing  Goal: Stable weight and I&O  Outcome: Not Progressing  Goal: Tolerates renal replacement therapy  Outcome: Not Progressing  Goal: Wean vasopressor/achieve hemodynamic stability  Outcome: Not Progressing

## 2025-05-23 NOTE — NURSING NOTE
Foam border applied to bridge of nose due to delayed blanching present from BIPAP mask. Will continue to monitor closely.

## 2025-05-23 NOTE — PROGRESS NOTES
Jing Sanchez is a 74 y.o. female on day 1 of admission presenting with Septic shock (Multi).    Subjective   Symptoms (0 - 10, Best to Worst)  [facility] Symptom Assessment System  0-10 (Numeric) Pain Score: 0 - No pain  Remains on continuous bipap, changed to full face mask due to damage to bridge of nose. Remains on levo, vaso, amiodarone, now on bicarb drip due to worsened renal function. Arouses to noxious stimuli, grimacing and yelling out with light touch to abdomen.        Objective     Vitals and nursing note reviewed.   Constitutional:       General: She is in acute distress.      Appearance: She is ill-appearing.   HENT:      Head: Normocephalic and atraumatic.      Nose: Nose normal. Bruising present to bridge of nose     Mouth/Throat:      Mouth: Mucous membranes are dry.      Pharynx: Oropharynx is clear.   Eyes:      Extraocular Movements: Extraocular movements intact.      Pupils: Pupils are equal, round, and reactive to light.   Cardiovascular:      Rate and Rhythm: Normal rate and regular rhythm.      Pulses: Normal pulses.      Heart sounds: No murmur heard.  Pulmonary:      Effort: Respiratory distress present. Tachypneic     Breath sounds: Normal breath sounds.      Comments: Continuous bipap  Abdominal:      General: Abdomen is mildly distended. Bowel sounds are normal. There is distension.      Palpations: Abdomen is soft. There is no mass.      Tenderness: There is abdominal tenderness.      Hernia: No hernia is present.   Musculoskeletal:         General: No swelling, tenderness, deformity or signs of injury. Normal range of motion.      Cervical back: Normal range of motion and neck supple.      Right lower leg: No edema.      Left lower leg: No edema.   Skin:     General: Skin is warm and dry.      Capillary Refill: Capillary refill takes 2 to 3 seconds.      Coloration: Skin is pale.   Neurological:      General: No focal deficit present.      Mental Status: She is disoriented.       "Motor: Weakness present.     Last Recorded Vitals  Blood pressure 90/78, pulse 107, temperature 36 °C (96.8 °F), temperature source Temporal, resp. rate (!) 38, height 1.651 m (5' 5\"), weight 84.2 kg (185 lb 10 oz), SpO2 97%.  Intake/Output last 3 Shifts:  I/O last 3 completed shifts:  In: 6372.6 (75.7 mL/kg) [I.V.:3322.6 (39.5 mL/kg); IV Piggyback:3050]  Out: 250 (3 mL/kg) [Urine:250 (0.1 mL/kg/hr)]  Weight: 84.2 kg     Relevant Results  Results for orders placed or performed during the hospital encounter of 05/21/25 (from the past 24 hours)   POCT GLUCOSE   Result Value Ref Range    POCT Glucose 371 (H) 74 - 99 mg/dL   POCT GLUCOSE   Result Value Ref Range    POCT Glucose 447 (H) 74 - 99 mg/dL   POCT GLUCOSE   Result Value Ref Range    POCT Glucose 435 (H) 74 - 99 mg/dL   POCT GLUCOSE   Result Value Ref Range    POCT Glucose 401 (H) 74 - 99 mg/dL   Electrocardiogram, 12-lead PRN ACS symptoms   Result Value Ref Range    Ventricular Rate 93 BPM    Atrial Rate 344 BPM    QRS Duration 94 ms    QT Interval 456 ms    QTC Calculation(Bazett) 566 ms    R Axis -52 degrees    T Axis 138 degrees    QRS Count 16 beats    Q Onset 213 ms    T Offset 441 ms    QTC Fredericia 528 ms   Blood Gas Arterial Full Panel   Result Value Ref Range    POCT pH, Arterial 7.37 (L) 7.38 - 7.42 pH    POCT pCO2, Arterial 29 (L) 38 - 42 mm Hg    POCT pO2, Arterial 153 (H) 85 - 95 mm Hg    POCT SO2, Arterial 100 94 - 100 %    POCT Oxy Hemoglobin, Arterial 96.0 94.0 - 98.0 %    POCT Hematocrit Calculated, Arterial 35.0 (L) 36.0 - 46.0 %    POCT Sodium, Arterial 126 (L) 136 - 145 mmol/L    POCT Potassium, Arterial 4.9 3.5 - 5.3 mmol/L    POCT Chloride, Arterial 93 (L) 98 - 107 mmol/L    POCT Ionized Calcium, Arterial 1.15 1.10 - 1.33 mmol/L    POCT Glucose, Arterial 426 (H) 74 - 99 mg/dL    POCT Lactate, Arterial 5.0 (HH) 0.4 - 2.0 mmol/L    POCT Base Excess, Arterial -7.3 (L) -2.0 - 3.0 mmol/L    POCT HCO3 Calculated, Arterial 16.8 (L) 22.0 - 26.0 " mmol/L    POCT Hemoglobin, Arterial 11.8 (L) 12.0 - 16.0 g/dL    POCT Anion Gap, Arterial 21 10 - 25 mmo/L    Patient Temperature 37.0 degrees Celsius    FiO2 35 %    Ventilator Mode BiPAP     Ipap CMH2O 14.0 cm H2O    Epap CMH2O 5.0 cm H2O    Critical Called By COCO ROD, RRT     Critical Called To DR. VICK CHOW     Critical Call Time 2121     Critical Read Back Y     Critical Note CRITICAL LAC AT 5.0     Site of Arterial Puncture Brachial Right    POCT GLUCOSE   Result Value Ref Range    POCT Glucose 385 (H) 74 - 99 mg/dL   POCT GLUCOSE   Result Value Ref Range    POCT Glucose 327 (H) 74 - 99 mg/dL   POCT GLUCOSE   Result Value Ref Range    POCT Glucose 352 (H) 74 - 99 mg/dL   POCT GLUCOSE   Result Value Ref Range    POCT Glucose 280 (H) 74 - 99 mg/dL   Blood Gas Lactic Acid, Venous   Result Value Ref Range    POCT Lactate, Venous 4.1 (HH) 0.4 - 2.0 mmol/L   Protime-INR   Result Value Ref Range    Protime >90.0 (HH) 9.3 - 12.7 seconds    INR >8.0 (HH) 0.9 - 1.2   CBC and Auto Differential   Result Value Ref Range    WBC 19.5 (H) 4.4 - 11.3 x10*3/uL    nRBC 0.0 0.0 - 0.0 /100 WBCs    RBC 3.25 (L) 4.00 - 5.20 x10*6/uL    Hemoglobin 10.7 (L) 12.0 - 16.0 g/dL    Hematocrit 32.7 (L) 36.0 - 46.0 %     (H) 80 - 100 fL    MCH 32.9 26.0 - 34.0 pg    MCHC 32.7 32.0 - 36.0 g/dL    RDW 13.5 11.5 - 14.5 %    Platelets 195 150 - 450 x10*3/uL    Immature Granulocytes %, Automated 1.5 (H) 0.0 - 0.9 %    Immature Granulocytes Absolute, Automated 0.29 0.00 - 0.50 x10*3/uL   Comprehensive metabolic panel   Result Value Ref Range    Glucose 284 (H) 74 - 99 mg/dL    Sodium 130 (L) 136 - 145 mmol/L    Potassium 5.1 3.5 - 5.3 mmol/L    Chloride 92 (L) 98 - 107 mmol/L    Bicarbonate 19 (L) 21 - 32 mmol/L    Anion Gap 24 (H) 10 - 20 mmol/L    Urea Nitrogen 72 (H) 6 - 23 mg/dL    Creatinine 4.32 (H) 0.50 - 1.05 mg/dL    eGFR 10 (L) >60 mL/min/1.73m*2    Calcium 8.3 (L) 8.6 - 10.3 mg/dL    Albumin 3.0 (L) 3.4 - 5.0 g/dL    Alkaline  Phosphatase 117 33 - 136 U/L    Total Protein 5.7 (L) 6.4 - 8.2 g/dL    AST 61 (H) 9 - 39 U/L    Bilirubin, Total 1.0 0.0 - 1.2 mg/dL    ALT 70 (H) 7 - 45 U/L   Manual Differential   Result Value Ref Range    Neutrophils %, Manual 62.0 40.0 - 80.0 %    Bands %, Manual 29.0 0.0 - 5.0 %    Lymphocytes %, Manual 0.0 13.0 - 44.0 %    Monocytes %, Manual 1.0 2.0 - 10.0 %    Eosinophils %, Manual 0.0 0.0 - 6.0 %    Basophils %, Manual 0.0 0.0 - 2.0 %    Metamyelocytes %, Manual 2.0 0.0 - 0.0 %    Myelocytes %, Manual 6.0 0.0 - 0.0 %    Seg Neutrophils Absolute, Manual 12.09 (H) 1.60 - 5.00 x10*3/uL    Bands Absolute, Manual 5.66 (H) 0.00 - 0.50 x10*3/uL    Lymphocytes Absolute, Manual 0.00 (L) 0.80 - 3.00 x10*3/uL    Monocytes Absolute, Manual 0.20 0.05 - 0.80 x10*3/uL    Eosinophils Absolute, Manual 0.00 0.00 - 0.40 x10*3/uL    Basophils Absolute, Manual 0.00 0.00 - 0.10 x10*3/uL    Metamyelocytes Absolute, Manual 0.39 0.00 - 0.00 x10*3/uL    Myelocytes Absolute, Manual 1.17 0.00 - 0.00 x10*3/uL    Total Cells Counted 100     Neutrophils Absolute, Manual 17.75 (H) 1.60 - 5.50 x10*3/uL    RBC Morphology See Below     La Cells Many    Coagulation Screen   Result Value Ref Range    Protime >90.0 (HH) 9.3 - 12.7 seconds    INR >8.0 (HH) 0.9 - 1.2    aPTT 53.2 (H) 22.0 - 32.5 seconds   POCT GLUCOSE   Result Value Ref Range    POCT Glucose 249 (H) 74 - 99 mg/dL     *Note: Due to a large number of results and/or encounters for the requested time period, some results have not been displayed. A complete set of results can be found in Results Review.    Electrocardiogram, 12-lead PRN ACS symptoms  Result Date: 5/23/2025  Atrial flutter with variable AV block Left axis deviation Inferior infarct (cited on or before 07-APR-2025) Anterolateral infarct (cited on or before 07-APR-2025) Prolonged QT Abnormal ECG When compared with ECG of 21-MAY-2025 22:06, (unconfirmed) Atrial flutter has replaced Atrial fibrillation Vent. rate has  decreased BY  62 BPM Questionable change in initial forces of Lateral leads    CT angio chest for pulmonary embolism  Result Date: 5/21/2025  STUDY: CT Angiogram of the Chest, CT Abdomen and Pelvis with IV Contrast; 5/21/2025 10:51 PM INDICATION: Abdominal pain. COMPARISON: XR abdomen 5/7/2025.  CXR 5/1/2025.  CT AP 5/1/2025.  CT CAP 4/8/2025. ACCESSION NUMBER(S): [id number], [id number] ORDERING CLINICIAN: BAILEY CARRILLO TECHNIQUE:  CTA of the chest was performed following rapid injection of intravenous contrast.  Images are reviewed and processed at a workstation according to the CT angiogram protocol with 3-D and/or MIP post processing imaging generated.  CT of the abdomen and pelvis was performed with intravenous contrast.  Omnipaque 350 75 mL was administered intravenously; positive oral contrast was given.  Automated mA/kV exposure control was utilized and patient examination was performed in strict accordance with principles of ALARA. FINDINGS:  CTA CHEST: Pulmonary arteries are adequately opacified without acute or chronic filling defects.  The thoracic aorta is normal in course and caliber without dissection or aneurysm. The heart is enlarged without pericardial effusion.  Thoracic lymph nodes are not enlarged.  Patient is status post median sternotomy and coronary artery calcifications are marker for coronary artery disease.  There is mass density in the apex of the left ventricle measuring 2.3 x 1.9 cm on the abdominal images (image 10 series 2). There is no pleural effusion, pleural thickening, or pneumothorax. The airways are patent. Lungs are clear without consolidation, interstitial disease, or suspicious nodules. ABDOMEN:  LIVER: No hepatomegaly.  Smooth surface contour.  There is fatty liver morphology.  There is reflux of contrast in the hepatic veins.  BILE DUCTS: No intrahepatic or extrahepatic biliary ductal dilatation.  GALLBLADDER: The gallbladder is surgically absent.  STOMACH: There is  mild to moderate fluid distention of the stomach.  PANCREAS: No masses or ductal dilatation.  SPLEEN: No splenomegaly or focal splenic lesion.  ADRENAL GLANDS: No thickening or nodules.  KIDNEYS AND URETERS: Kidneys are normal in size and location.  No renal or ureteral calculi.  PELVIS:  BLADDER: No abnormalities identified.  REPRODUCTIVE ORGANS: There is lobulated mass located along the superior aspect of the uterus measuring 10.9 x 6.8 x 11.6 cm; this appears stable compared with prior study.  There is mild peripheral enhancement.  There is also mass adjacent to the uterus on the right measuring 11.2 x 5.6 cm, which appears stable, as well as multiple uterine fibroids.  BOWEL: There are prominent fluid-filled small bowel loops in the mid and upper abdomen suggesting enteritis.  There is mild wall thickening of the sigmoid colon.  The appendix is normal.  VESSELS: No abnormalities identified.  Abdominal aorta is normal in caliber.  PERITONEUM/RETROPERITONEUM/LYMPH NODES: No free fluid.  No pneumoperitoneum.  No lymphadenopathy.  ABDOMINAL WALL: No abnormalities identified. SOFT TISSUES: No abnormalities identified.  BONES: There is a lytic mass involving the right tenth rib with soft tissue mass density measuring 2.9 x 2.2 x 3.2 cm.  There is also lytic mass in the right hemisacrum measuring 3.5 x 3.3 x 3.1 cm.  Findings were seen on prior study.  There is mild superior endplate concavity with deformity at L4 with mild disc space narrowing and endplate degenerative changes.    CHEST: No acute cardiopulmonary disease. Cardiomegaly.  Soft tissue density of the apex of the heart on the abdominal images measures 2.3 cm.  Recommend echocardiogram for further evaluation. Soft tissue mass with lytic changes of the right tenth rib measuring 3.2 cm compatible with metastatic disease/malignancy.  This appears stable. ABDOMEN: No acute inflammatory changes.  Fatty liver morphology. PELVIS: Large lobulated mass in the pelvis  measuring 11.6 cm with adjacent masses of the uterus suggesting uterine fibroids.  The lobular mass along the superior margin is more concerning for possible malignancy. Findings compatible with gastroenteritis.  Mild colitis in the sigmoid colon. Redemonstration lytic mass in the right hemisacrum. Signed by Germán Shepard, DO    CT abdomen pelvis w IV contrast  Result Date: 5/21/2025  STUDY: CT Angiogram of the Chest, CT Abdomen and Pelvis with IV Contrast; 5/21/2025 10:51 PM INDICATION: Abdominal pain. COMPARISON: XR abdomen 5/7/2025.  CXR 5/1/2025.  CT AP 5/1/2025.  CT CAP 4/8/2025. ACCESSION NUMBER(S): [id number], [id number] ORDERING CLINICIAN: BAILEY CARRILLO TECHNIQUE:  CTA of the chest was performed following rapid injection of intravenous contrast.  Images are reviewed and processed at a workstation according to the CT angiogram protocol with 3-D and/or MIP post processing imaging generated.  CT of the abdomen and pelvis was performed with intravenous contrast.  Omnipaque 350 75 mL was administered intravenously; positive oral contrast was given.  Automated mA/kV exposure control was utilized and patient examination was performed in strict accordance with principles of ALARA. FINDINGS:  CTA CHEST: Pulmonary arteries are adequately opacified without acute or chronic filling defects.  The thoracic aorta is normal in course and caliber without dissection or aneurysm. The heart is enlarged without pericardial effusion.  Thoracic lymph nodes are not enlarged.  Patient is status post median sternotomy and coronary artery calcifications are marker for coronary artery disease.  There is mass density in the apex of the left ventricle measuring 2.3 x 1.9 cm on the abdominal images (image 10 series 2). There is no pleural effusion, pleural thickening, or pneumothorax. The airways are patent. Lungs are clear without consolidation, interstitial disease, or suspicious nodules. ABDOMEN:  LIVER: No hepatomegaly.   Smooth surface contour.  There is fatty liver morphology.  There is reflux of contrast in the hepatic veins.  BILE DUCTS: No intrahepatic or extrahepatic biliary ductal dilatation.  GALLBLADDER: The gallbladder is surgically absent.  STOMACH: There is mild to moderate fluid distention of the stomach.  PANCREAS: No masses or ductal dilatation.  SPLEEN: No splenomegaly or focal splenic lesion.  ADRENAL GLANDS: No thickening or nodules.  KIDNEYS AND URETERS: Kidneys are normal in size and location.  No renal or ureteral calculi.  PELVIS:  BLADDER: No abnormalities identified.  REPRODUCTIVE ORGANS: There is lobulated mass located along the superior aspect of the uterus measuring 10.9 x 6.8 x 11.6 cm; this appears stable compared with prior study.  There is mild peripheral enhancement.  There is also mass adjacent to the uterus on the right measuring 11.2 x 5.6 cm, which appears stable, as well as multiple uterine fibroids.  BOWEL: There are prominent fluid-filled small bowel loops in the mid and upper abdomen suggesting enteritis.  There is mild wall thickening of the sigmoid colon.  The appendix is normal.  VESSELS: No abnormalities identified.  Abdominal aorta is normal in caliber.  PERITONEUM/RETROPERITONEUM/LYMPH NODES: No free fluid.  No pneumoperitoneum.  No lymphadenopathy.  ABDOMINAL WALL: No abnormalities identified. SOFT TISSUES: No abnormalities identified.  BONES: There is a lytic mass involving the right tenth rib with soft tissue mass density measuring 2.9 x 2.2 x 3.2 cm.  There is also lytic mass in the right hemisacrum measuring 3.5 x 3.3 x 3.1 cm.  Findings were seen on prior study.  There is mild superior endplate concavity with deformity at L4 with mild disc space narrowing and endplate degenerative changes.    CHEST: No acute cardiopulmonary disease. Cardiomegaly.  Soft tissue density of the apex of the heart on the abdominal images measures 2.3 cm.  Recommend echocardiogram for further evaluation.  Soft tissue mass with lytic changes of the right tenth rib measuring 3.2 cm compatible with metastatic disease/malignancy.  This appears stable. ABDOMEN: No acute inflammatory changes.  Fatty liver morphology. PELVIS: Large lobulated mass in the pelvis measuring 11.6 cm with adjacent masses of the uterus suggesting uterine fibroids.  The lobular mass along the superior margin is more concerning for possible malignancy. Findings compatible with gastroenteritis.  Mild colitis in the sigmoid colon. Redemonstration lytic mass in the right hemisacrum. Signed by Germán Shepard DO    CT head wo IV contrast  Result Date: 5/21/2025  Interpreted By:  Jose Manuel Garcia, STUDY: CT HEAD WO IV CONTRAST;  5/21/2025 10:51 pm   INDICATION: Signs/Symptoms:Change in mental status.   COMPARISON: None   ACCESSION NUMBER(S): [id number]   ORDERING CLINICIAN: BAILEY CARRILLO   TECHNIQUE: Contiguous axial images of the head were obtained without intravenous contrast.   FINDINGS: BRAIN PARENCHYMA:  There is cerebral atrophy and chronic periventricular white matter small vessel ischemic change. The gray white matter differentiation is preserved.  No mass effect or midline shift.   HEMORRHAGE:  No evidence of acute intracranial hemorrhage. VENTRICLES AND EXTRA-AXIAL SPACES:  The ventricles are within normal limits in size for brain volume.  No evidence of abnormal extraaxial fluid collection. EXTRACRANIAL SOFT TISSUES:  Within normal limits. PARANASAL SINUSES/MASTOIDS:  Mucosal thickening left maxillary sinus. CALVARIUM:  No evidence of depressed calvarial fracture.   OTHER FINDINGS:  None       Cerebral atrophy and chronic periventricular white matter small vessel ischemic change.   No evidence of acute intracranial hemorrhage.   Mucosal thickening of left maxillary sinus.       MACRO: None   Signed by: Jose Manuel Garcia 5/21/2025 11:19 PM Dictation workstation:   [id number]    NM PET CT FDG oncology  Result Date: 5/21/2025  Interpreted By:   Haroon Roy  and Shraddha Knight John STUDY: NM PET CT FDG ONCOLOGY;  5/21/2025 2:06 pm   INDICATION: Signs/Symptoms:metastatic cancer, favor GYN primary.   ,C55 Malignant neoplasm of uterus, part unspecified (Multi),C79.51 Secondary malignant neoplasm of bone (Multi)   COMPARISON: CT CHEST, ABDOMEN AND PELVIS 04/08/2025.   ACCESSION NUMBER(S): [id number]   ORDERING CLINICIAN: ERICH BOUDREAUX   TECHNIQUE: DIVISION OF NUCLEAR MEDICINE POSITRON EMISSION TOMOGRAPHY (PET-CT)   The patient received an intravenous dose of 11.6 mCi of Fluorine-18 fluorodeoxyglucose (FDG).   Positron emission tomographic (PET) images from skull base to mid-thigh were then acquired after a one hour delay.  Also acquired was a contemporaneous low dose noncontrast CT scan performed for attenuation correction of PET images and anatomic localization.  The PET and CT images were digitally fused for display.  All images were acquired on a combined PET-CT scanner unit.  Some areas of FDG accumulation may be described in standardized uptake value (SUV) units.   CODING:   Initial Treatment Strategy (PI)   CALIBRATION:   Dose Injection-to-Scan Interval (mins): 62 Mediastinal bloodpool SUV (normal 1.5-2.5): 3 Blood glucose: 241 mg/dL   FINDINGS: NECK AND CHEST: There is focal uptake within the right vocal cord with an SUV of 8.2. There is no evidence of hypermetabolic cervical lymphadenopathy.  There is no evidence of hypermetabolic hilar, mediastinal, or axillary lymphadenopathy.  There is no evidence of hypermetabolic pulmonary nodules.   There is a four-chamber cardiomegaly. There is enlargement of the main pulmonary artery. Postsurgical changes from median sternotomy are present.   ABDOMEN: Within the abdomen, there is no evidence of hypermetabolic lesions to suggest metastatic disease.   PELVIS: There is bulky appearance of the uterus with areas of hypermetabolic activity and SUVs of up to 9. There are areas of photopenia which might represent  tumoral necrosis. There are multiple partially calcified lesions which might represent fibroids.   MUSCULOSKELETAL: Hypermetabolic expansile lesion involving the proximal clavicle at the sternoclavicular joint with an SUV of 5.2. Soft tissue mass with destructive osseous changes within and surrounding the left humeral head with an SUV of 7.2. Soft tissue mass with destructive osseous changes within the left humeral diaphysis and associated pathologic fracture with an SUV of 7.2. Hypermetabolic expansile lesion with underlying osseous destruction within the right posteromedial 10th rib with an SUV of the 8.5. Hypermetabolic sclerotic lesion within the left iliac bone at the sacroiliac joint with an SUV of 7. Hypermetabolic expansile predominantly lytic lesion in the right sacrum with an SUV of 7.8. Predominantly lytic lesion within the posteromedial aspect of the left 4th rib with an SUV of 3.4.       1. Bulky appearance of the uterus with areas of hypermetabolic activity corresponding to patient's primary neoplasm. 2. Multiple hypermetabolic osseous metastatic disease throughout the axial and appendicular skeleton with soft tissue components as described above. There is pathologic fracture of the left humerus. 3. Focal hypermetabolic activity within the right vocal cord with underlying soft tissue fullness. Findings are nonspecific and can not be completely characterized on the current exam. May consider direct visualization and dedicated imaging if clinically warranted.     This study has been interpreted at [facility] in [place], [place].   MACRO: None   Signed by: Haroon Roy 5/21/2025 3:05 PM Dictation workstation:   [id number]    XR abdomen 1 view  Result Date: 5/7/2025  Interpreted By:  Emily Renteria, STUDY: XR ABDOMEN 1 VIEW; ;  5/7/2025 11:20 am   INDICATION: Signs/Symptoms:abd distention, constipation.     COMPARISON: None.   ACCESSION NUMBER(S): [id number]    ORDERING CLINICIAN: GILMER BUSH   TECHNIQUE: Single view abdomen   FINDINGS: Gas-filled distended loops of large bowel. No air-fluid levels. Mild stool-filled distal colon. Surgical staples right upper quadrant. Diffusely calcified fibroid along the left lateral pelvis measuring 7.1 cm.   Multilevel facet arthropathy lower lumbar spine with degenerative discogenic changes noted.               1. Gas-filled distended loops of large bowel without air-fluid levels     MACRO: None   Signed by: Emily Renteria 5/7/2025 11:50 AM Dictation workstation:   [id number]    ECG 12 lead  Result Date: 5/2/2025  Atrial fibrillation with premature ventricular or aberrantly conducted complexes Inferior infarct (cited on or before 07-APR-2025) Anterolateral infarct (cited on or before 07-APR-2025) QTcB >= 480 msec Abnormal ECG When compared with ECG of 07-APR-2025 15:53, No significant change was found Confirmed by Yury Bajwa ([id number]) on 5/2/2025 2:28:41 PM    Transthoracic Echo (TTE) Limited  Result Date: 5/2/2025            [place] [address]             Phone [phone] TRANSTHORACIC ECHOCARDIOGRAM REPORT Patient Name:       MARTI VALLADARES      Reading Physician:    [id number] Joie Robison MD Study Date:         5/2/2025             Ordering Provider:    [id number] JEANINE BELLO MRN/PID:            [id number]             Fellow: Accession#:         [id number]         Nurse: Date of Birth/Age:  1950 / 74 years Sonographer:          Cari Ocampo RDCS Gender Assigned at  F                    Additional Staff: Birth: Height:             165.10 cm            Admit Date: Weight:             78.47 kg             Admission Status:     Inpatient -                                                                 Routine BSA / BMI:          1.86 m2 / 28.79      [facility]                     kg/m2 Blood Pressure: 100 /68 mmHg Study Type:    TRANSTHORACIC ECHO (TTE) LIMITED Diagnosis/ICD: Chest pain, unspecified-R07.9 Indication:    dypsnea, chest pain CPT Codes:     Echo Limited-49423; Color Doppler-08937 Patient History: Pertinent History: Cardiomyopathy, AFIb, CHF, TIA, STEMI, HLD, acute MI, CKD,                    DM. Study Detail: The following Echo studies were performed: 2D, M-Mode, Doppler and               color flow. Technically challenging study due to patient lying in               supine position and body habitus. Definity used as a contrast               agent for endocardial border definition. Total contrast used for               this procedure was 3 mL via IV push.  Critical Event Critical Event: Test was completed as per department protocol. Critical Finding: Possible mass towards apex of LV. Notify Nasif through epic chat. Time Test was Completed: 10:30:00 AM Notified: Enriqueta. Attending notification time: 10:34:49 AM  PHYSICIAN INTERPRETATION: Left Ventricle: The left ventricular systolic function is moderately decreased, with a visually estimated ejection fraction of 30-35%. The patient is in atrial fibrillation which may influence the estimate of left ventricular function and transvalvular flows. Wall motion is abnormal. The left ventricular cavity size is normal. There is normal septal and mildly increased posterior left ventricular wall thickness. There is left ventricular concentric remodeling. Abnormal (paradoxical) septal motion, consistent with an intraventricular conduction delay. Left ventricular diastolic filling was indeterminate due to atrial fibrillation/flutter. The apical wall is akinetic with a large thrombus seen. Left Atrium: The left atrial size is mildly dilated. Right Ventricle: The right ventricle is moderately enlarged. There is reduced right ventricular  systolic function. Right Atrium: The right atrial size is moderately dilated. Aortic Valve: The aortic valve appears bicuspid. There is no evidence of aortic valve regurgitation. The aortic valve off axis. Possible bicuspid aortic valve. No evidence of aortic valve stenosis. No regurgitation. Mitral Valve: The mitral valve is normal in structure. There is trace mitral valve regurgitation. Tricuspid Valve: The tricuspid valve is structurally normal. There is trace tricuspid regurgitation. The Doppler estimated RVSP is slightly elevated right ventricular systolic pressure at 32.2 mmHg. Pulmonic Valve: The pulmonic valve is structurally normal. There is physiologic pulmonic valve regurgitation. Pericardium: Small pericardial effusion. Aorta: The aortic root is normal. Systemic Veins: The inferior vena cava appears normal in size, with IVC inspiratory collapse greater than 50%.  CONCLUSIONS:  1. The left ventricular systolic function is moderately decreased, with a visually estimated ejection fraction of 30-35%.  2. Abnormal wall motion.  3. Left ventricular diastolic filling was indeterminate due to atrial fibrillation/flutter.  4. The apical wall is akinetic with a large thrombus seen.  5. There is reduced right ventricular systolic function.  6. Moderately enlarged right ventricle.  7. The right atrial size is moderately dilated.  8. Trace mitral valve regurgitation.  9. Slightly elevated right ventricular systolic pressure. 10. Trace tricuspid regurgitation is visualized. 11. The aortic valve appears bicuspid. 12. The patient is in atrial fibrillation which may influence the estimate of left ventricular function and transvalvular flows. QUANTITATIVE DATA SUMMARY:  2D MEASUREMENTS:             Normal Ranges: LAs:             3.80 cm     (2.7-4.0cm) IVSd:            0.78 cm     (0.6-1.1cm) LVPWd:           1.17 cm     (0.6-1.1cm) LVIDd:           4.44 cm     (3.9-5.9cm) LVIDs:           3.88 cm LV Mass Index:   83.3  g/m2 LVEDV Index:     77.45 ml/m2 LV % FS          12.8 %  LEFT ATRIUM:                  Normal Ranges: LA Vol A4C:        90.2 ml    (22+/-6mL/m2) LA Vol A2C:        102.4 ml LA Vol BP:         96.3 ml LA Vol Index A4C:  48.5ml/m2 LA Vol Index A2C:  55.1 ml/m2 LA Vol Index BP:   51.8 ml/m2 LA Area A4C:       28.2 cm2 LA Area A2C:       30.1 cm2 LA Major Axis A4C: 7.5 cm LA Major Axis A2C: 7.5 cm LA Volume Index:   50.4 ml/m2 LA Vol A4C:        88.1 ml LA Vol A2C:        99.6 ml LA Vol Index BSA:  50.5 ml/m2  RIGHT ATRIUM:                 Normal Ranges: RA Vol A4C:        96.9 ml    (8.3-19.5ml) RA Vol Index A4C:  52.1 ml/m2 RA Area A4C:       29.0 cm2 RA Major Axis A4C: 7.4 cm  M-MODE MEASUREMENTS:         Normal Ranges: Ao Root:             2.50 cm (2.0-3.7cm) LAs:                 3.20 cm (2.7-4.0cm)  LV SYSTOLIC FUNCTION:                      Normal Ranges: EF-A4C View:    33 % (>=55%) EF-A2C View:    42 % EF-Biplane:     38 % EF-Visual:      33 % LV EF Reported: 33 %  LV DIASTOLIC FUNCTION:           Normal Ranges: MV Peak E:             1.15 m/s  (0.7-1.2 m/s) MV e'                  0.066 m/s (>8.0) MV lateral e'          0.07 m/s MV medial e'           0.06 m/s E/e' Ratio:            17.48     (<8.0)  MITRAL VALVE:          Normal Ranges: MV DT:        164 msec (150-240msec)  RIGHT VENTRICLE: RV Basal 4.60 cm RV Mid   4.04 cm RV Major 7.8 cm TAPSE:   15.2 mm RV s'    0.06 m/s  TRICUSPID VALVE/RVSP:          Normal Ranges: Peak TR Velocity:     2.70 m/s RV Syst Pressure:     32 mmHg  (< 30mmHg) IVC Diam:             1.73 cm  PULMONIC VALVE:          Normal Ranges: PV Accel Time:  87 msec  (>120ms) PV Max Tristan:     1.1 m/s  (0.6-0.9m/s) PV Max P.6 mmHg  [id number] Joie Robison MD Electronically signed on 2025 at 11:33:48 AM  ** Final **     XR shoulder left 2+ views  Result Date: 2025  Interpreted By:  Paty Florian, STUDY: XR SHOULDER LEFT 2+ VIEWS 2025 10:56 pm   INDICATION: Signs/Symptoms:pain  and weakness. Follow-up fracture.   COMPARISON: 04/03/2025   ACCESSION NUMBER(S): [id number]   ORDERING CLINICIAN: JEANINE BELLO   TECHNIQUE: Three views of the left shoulder   FINDINGS: There is a healing mildly displaced fracture of the proximal humeral diaphysis noted with the distal fracture fragment displaced medially by a distance of 4 mm. There is now some periosteal reaction about the fracture site.   There is postoperative change from CABG with coronary artery stent graft seen.       Healing mildly displaced fracture of the proximal humeral diaphysis.   Signed by: Paty Florian 5/2/2025 7:23 AM Dictation workstation:   [id number]    CT head wo IV contrast  Result Date: 5/1/2025  Interpreted By:  Jose Manuel Garcia, STUDY: CT HEAD WO IV CONTRAST;  5/1/2025 11:08 pm   INDICATION: Signs/Symptoms:confusion.   COMPARISON: None   ACCESSION NUMBER(S): [id number]   ORDERING CLINICIAN: JEANINE BELLO   TECHNIQUE: Contiguous axial images of the head were obtained without intravenous contrast.   FINDINGS: BRAIN PARENCHYMA:  There is cerebral atrophy and chronic periventricular white matter small vessel ischemic change. The gray white matter differentiation is preserved.  No mass effect or midline shift.   HEMORRHAGE:  No evidence of acute intracranial hemorrhage. VENTRICLES AND EXTRA-AXIAL SPACES:  The ventricles are within normal limits in size for brain volume.  No evidence of abnormal extraaxial fluid collection. EXTRACRANIAL SOFT TISSUES:  Within normal limits. PARANASAL SINUSES/MASTOIDS:  Mucosal thickening with mucosal retention cyst or polyp left maxillary sinus. CALVARIUM:  No evidence of depressed calvarial fracture.   OTHER FINDINGS:  None       Cerebral atrophy and chronic periventricular white matter small vessel ischemic change.   No evidence of acute intracranial hemorrhage.   Mucosal thickening with mucosal retention cyst or polyp in left maxillary sinus.   MACRO: None   Signed by: Jose Manuel Garcia 5/1/2025  11:41 PM Dictation workstation:   [id number]    XR chest 1 view  Result Date: 5/1/2025  STUDY: Chest Radiograph;  05/01/2025 5:23 PM INDICATION: Generalized weakness. COMPARISON: XR chest 04/07/2025, 03/11/2023, 03/11/2021. ACCESSION NUMBER(S): [id number] ORDERING CLINICIAN: ELLE TURNER TECHNIQUE:  Frontal chest was obtained at 17:23 hours. FINDINGS: CARDIOMEDIASTINAL SILHOUETTE: There is mild cardiomegaly.  Sternotomy wires present.  LUNGS: Lungs are clear.  ABDOMEN: No remarkable upper abdominal findings.  BONES: No acute osseous changes.    No acute pulmonary abnormality. Signed by Brennan Hartman MD    CT abdomen pelvis wo IV contrast  Result Date: 5/1/2025  STUDY: CT Abdomen and Pelvis without IV Contrast; 05/01/2025, 4:25 PM. INDICATION: Nausea, diarrhea, generalized weakness. COMPARISON: CT CAP: 04/08/25; US pelvis: 03/16/23. ACCESSION NUMBER(S): [id number] ORDERING CLINICIAN: ELLE TURNER TECHNIQUE: CT of the abdomen and pelvis was performed.  Contiguous axial images were obtained at 3 mm slice thickness through the abdomen and pelvis. Coronal and sagittal reconstructions at 3 mm slice thickness were performed. No intravenous contrast was administered.  Automated mA/kV exposure control was utilized and patient examination was performed in strict accordance with principles of ALARA. FINDINGS: Please note that the evaluation of vessels, lymph nodes and organs is limited without intravenous contrast.  LOWER CHEST: Heart is enlarged with coronary artery calcifications  No pericardial effusion.  Lung bases are clear.  ABDOMEN:  LIVER: No hepatomegaly.  Smooth surface contour.  Normal attenuation.  BILE DUCTS: No intrahepatic or extrahepatic biliary ductal dilatation.  GALLBLADDER: The gallbladder is absent. STOMACH: No abnormalities identified.  PANCREAS: No masses or ductal dilatation.  SPLEEN: No splenomegaly or focal splenic lesion.  ADRENAL GLANDS: No thickening or nodules.  KIDNEYS AND URETERS:  Kidneys are normal in size and location.  No renal or ureteral calculi.  PELVIS:  BLADDER: No abnormalities identified.  REPRODUCTIVE ORGANS: Enlarged fibroid uterus again noted.  Overall appearance is unchanged.  BOWEL: No abnormalities identified.  Appendix is normal.  VESSELS: No abnormalities identified.  Abdominal aorta is normal in caliber. Moderate to severe plaque along the distal aorta.  PERITONEUM/RETROPERITONEUM/LYMPH NODES: No free fluid.  No pneumoperitoneum.  Small fat-containing umbilical hernia. No lymphadenopathy.  ABDOMINAL WALL: No abnormalities identified. SOFT TISSUES: No abnormalities identified.  BONES: Stable lytic lesions in the right hemisacrum and right 10th rib adjacent to the costotransverse junction unchanged from prior    1.No acute process. 2.Cardiomegaly with coronary artery calcifications. 3.Enlarged fibroid uterus again noted. Overall appearance is unchanged. 4.Stable lytic lesions in the right hemisacrum and right 10th rib adjacent to the costotransverse junction unchanged from prior.  As previously suggested, metastatic disease should be considered. Consider further evaluation with MRI or pathologic sampling. Signed by Juan Early MD       Assessment/Plan   IMP:    Shock, septic  Acute Respiratory Failure with hypoxia  Leukocytosis  Lactic Acidosis  CHHAYA on CKD-Received contrast in ER  Colitis/Gastritis  Elevated LFT's  Afib with RVR  NSTEMI-in setting of shock  CHF/cardiomyopathy-EF 30-35%/diastolic filling intermediate, apical wall kinetic with large thrombus noted, reduced RV systolic function  Endometrial Cancer-with bone mets-Dr Melva Gaines, Stage IVB/IVC, no treatment started yet.   COPD  Palliative Care     DNRCCA/DNI  Not Capable  Documented HPOA is Friend Desmond, alternate is Friend Frank(RN at ). [facility].      5/23  Discussed with attending service.   Rising INR, no signs of active bleeding. Rising creatinine, remains on continous bipap, levo, vaso,  "amiodarone drips. Increased abdominal pain today with light touch. Reached out to HPDANIEL, left  requesting return call. Patient condition overall worsened today. Priority previously established was quality of life. Plan to follow up on conversation yesterday about comfort care vs treatment model of care.     Addendum: Reached back out to alternate HPOA, Frank, provided updates regarding condition, overall worse with increased discomfort. HPOA agreeable to hospice consult and comfort care only. He is reaching out to Desmond to come to bedside. End of life orders put in place, code status changed to DNRCC. Reached out to  Neel for final blessings. Reached out to friend Kei with updates. Reviewed orders with staff RN.       Per documentation, ok to discuss patient information with friend Emilee and Kei at bedside, this was confirmed by Frank.   Provided updates regarding patient diagnoses and treatment plan to both friends at bedside and HPOA Frank, ECTOR Desmond's phone is out of service, with no alternate #.   Patient known to me from prior hospitalization. \"She tells me that she lost her  2 months ago and she knows he is waiting on the other side for her and she is not afraid but she does want to live if there is quality of life. She personally experienced her  being on a ventilator, and she tells me that she has no desire for CPR or intubation should she arrest.\" Per my discussion with hpoa and friends, patient would also be opposed to dialysis as she watched her spouse go through it. Ultimately overall prognosis is poor given her multisystem organ failure in the setting of cardiomyopathy and IVB/C endometrial cancer.   I reviewed options with HPDANIEL includin. continuing in treatment model of care in the hopes of improving patient enough that she could potentially participate in her own goals of care discussion   2. Hospice involvement and switching to comfort care only.   Frank would like to speak to " his brother Desmond(primary HPOA) before making any decisions. He does agree that patient would not benefit from Dialysis or feeding tubes in addition to the already established DNRCCA/DNI. He is not sure she would have wanted pressors but is ok with them for now until he can talk to Desmond.   ECTOR was given my phone # to call once he speaks to his brother.      I added low dose dilaudid prn pain.      Treatment model of care for now.     I spent 30 minutes in the professional and overall care of this patient.  I spent an additional 46min in acp discussion and coordination of care for end of life.     Justyna Diana, APRN-CNP

## 2025-05-23 NOTE — CARE PLAN
The patient's goals for the shift include  no pain and have BIPAP off    The clinical goals for the shift include stable hemodynamics with ability wean titratable medications.       Problem: Skin  Goal: Decreased wound size/increased tissue granulation at next dressing change  Outcome: Progressing  Goal: Participates in plan/prevention/treatment measures  Outcome: Progressing  Goal: Prevent/manage excess moisture  Outcome: Progressing  Goal: Prevent/minimize sheer/friction injuries  Outcome: Progressing  Goal: Promote/optimize nutrition  Outcome: Progressing  Goal: Promote skin healing  Outcome: Progressing     Problem: Diabetes  Goal: Achieve decreasing blood glucose levels by end of shift  Outcome: Progressing  Goal: Increase stability of blood glucose readings by end of shift  Outcome: Progressing  Goal: Decrease in ketones present in urine by end of shift  Outcome: Progressing  Goal: Maintain electrolyte levels within acceptable range throughout shift  Outcome: Progressing  Goal: Maintain glucose levels >70mg/dl to <250mg/dl throughout shift  Outcome: Progressing  Goal: No changes in neurological exam by end of shift  Outcome: Progressing  Goal: Learn about and adhere to nutrition recommendations by end of shift  Outcome: Progressing  Goal: Vital signs within normal range for age by end of shift  Outcome: Progressing  Goal: Increase self care and/or family involovement by end of shift  Outcome: Progressing  Goal: Receive DSME education by end of shift  Outcome: Progressing     Problem: Heart Failure  Goal: Improved gas exchange this shift  Outcome: Progressing  Goal: Improved urinary output this shift  Outcome: Progressing  Goal: Reduction in peripheral edema within 24 hours  Outcome: Progressing  Goal: Report improvement of dyspnea/breathlessness this shift  Outcome: Progressing  Goal: Weight from fluid excess reduced over 2-3 days, then stabilize  Outcome: Progressing  Goal: Increase self care and/or family  involvement in 24 hours  Outcome: Progressing     Problem: Pain - Adult  Goal: Verbalizes/displays adequate comfort level or baseline comfort level  Outcome: Progressing     Problem: Safety - Adult  Goal: Free from fall injury  Outcome: Progressing     Problem: Discharge Planning  Goal: Discharge to home or other facility with appropriate resources  Outcome: Progressing     Problem: Chronic Conditions and Co-morbidities  Goal: Patient's chronic conditions and co-morbidity symptoms are monitored and maintained or improved  Outcome: Progressing     Problem: Nutrition  Goal: Nutrient intake appropriate for maintaining nutritional needs  Outcome: Progressing     Problem: Fall/Injury  Goal: Not fall by end of shift  Outcome: Progressing  Goal: Be free from injury by end of the shift  Outcome: Progressing  Goal: Verbalize understanding of personal risk factors for fall in the hospital  Outcome: Progressing  Goal: Verbalize understanding of risk factor reduction measures to prevent injury from fall in the home  Outcome: Progressing  Goal: Use assistive devices by end of the shift  Outcome: Progressing  Goal: Pace activities to prevent fatigue by end of the shift  Outcome: Progressing     Problem: Pain  Goal: Takes deep breaths with improved pain control throughout the shift  Outcome: Progressing  Goal: Turns in bed with improved pain control throughout the shift  Outcome: Progressing  Goal: Walks with improved pain control throughout the shift  Outcome: Progressing  Goal: Performs ADL's with improved pain control throughout shift  Outcome: Progressing  Goal: Participates in PT with improved pain control throughout the shift  Outcome: Progressing  Goal: Free from opioid side effects throughout the shift  Outcome: Progressing  Goal: Free from acute confusion related to pain meds throughout the shift  Outcome: Progressing     Problem: Pain  Goal: LTG - Patient will manage pain with the appropriate  technique/Intervention  Outcome: Progressing  Goal: LTG - Patient will demonstrate intervention for managing pain  Outcome: Progressing  Goal: STG - Patient will reduce or eliminate use of analgesics  Outcome: Progressing  Goal: STG - Pain is manageable through therapies  Outcome: Progressing  Goal: STG - Patient will verbalize an acceptable level of pain  Outcome: Progressing  Goal: STG - Patients pain is managed to allow active participation in daily activities  Outcome: Progressing  Goal: STG - Patient will increase activity level  Outcome: Progressing  Goal: STG - Patient verbalizes a reduction in pain level  Outcome: Progressing     Problem: Respiratory  Goal: Clear secretions with interventions this shift  Outcome: Progressing  Goal: Minimize anxiety/maximize coping throughout shift  Outcome: Progressing  Goal: Minimal/no exertional discomfort or dyspnea this shift  Outcome: Progressing  Goal: No signs of respiratory distress (eg. Use of accessory muscles. Peds grunting)  Outcome: Progressing  Goal: Patent airway maintained this shift  Outcome: Progressing  Goal: Tolerate pulmonary toileting this shift  Outcome: Progressing  Goal: Verbalize decreased shortness of breath this shift  Outcome: Progressing  Goal: Wean oxygen to maintain O2 saturation per order/standard this shift  Outcome: Progressing  Goal: Increase self care and/or family involvement in next 24 hours  Outcome: Progressing     Problem: Infection prevention/bleeding  Goal: Infection s/sx managed  Outcome: Progressing  Goal: No further progression of infection  Outcome: Progressing  Goal: No signs of bleeding  Outcome: Progressing  Goal: Normal coagulation studies  Outcome: Progressing     Problem: Perfusion/Cardiac  Goal: Adequate perfusion to organs/extremities  Outcome: Progressing  Goal: Hemodynamically stable  Outcome: Progressing  Goal: No cardiac arrhythmias  Outcome: Progressing     Problem: Respiratory/Oxygenation  Goal: No signs of  respiratory compromise  Outcome: Progressing  Goal: Tolerates activity without increased O2 demands  Outcome: Progressing     Problem: Neuro/Coping  Goal: Minimal anxiety; utilize coping mechanisms  Outcome: Progressing  Goal: No signs of neurological compromise  Outcome: Progressing  Goal: Increase self care/family involvement  Outcome: Progressing     Problem: Fluid/Electrolyte/Nutrition  Goal: Fluid balance within 1 liter of normovolemia  Outcome: Progressing  Goal: No signs of renal failure  Outcome: Progressing  Goal: Normal electrolyte levels  Outcome: Progressing  Goal: Normal glucose levels  Outcome: Progressing  Goal: Tolerates nutritional intake  Outcome: Progressing     Problem: Acute Kidney Failure  Goal: Electrolytes/labs return to normal range  Outcome: Progressing  Goal: No signs of infection  Outcome: Progressing  Goal: No signs of neurosensory, musculoskeletal, cardiac changes  Outcome: Progressing  Goal: Stable weight and I&O  Outcome: Progressing  Goal: Tolerates renal replacement therapy  Outcome: Progressing  Goal: Wean vasopressor/achieve hemodynamic stability  Outcome: Progressing

## 2025-05-23 NOTE — PROGRESS NOTES
Jing Sanchez is a 74 y.o. female on day 1 of admission presenting with Septic shock (Multi).    Subjective   Interval History:   On bipap  Awake  Afebrile  On levophed, vasopressin  Interval positive blood culture gram positive cocci, cluster 1/2   Worsening renal function         Objective   Range of Vitals (last 24 hours)  Heart Rate:  []   Temp:  [35.8 °C (96.4 °F)-36.7 °C (98.1 °F)]   Resp:  [13-39]   BP: ()/()   Weight:  [84.2 kg (185 lb 10 oz)]   SpO2:  [90 %-100 %]   Daily Weight  05/23/25 : 84.2 kg (185 lb 10 oz)    Body mass index is 30.89 kg/m².    Physical Exam  Constitutional:       Appearance: She is ill-appearing.   HENT:      Head: Normocephalic and atraumatic.      Mouth/Throat:      Comments: On Bipap  Eyes:      General: No scleral icterus.  Cardiovascular:      Rate and Rhythm: Rhythm irregular.   Pulmonary:      Effort: Pulmonary effort is normal.      Breath sounds: Normal breath sounds.   Abdominal:      General: Bowel sounds are normal.      Palpations: Abdomen is soft.   Musculoskeletal:         General: Normal range of motion.      Cervical back: Normal range of motion and neck supple.   Skin:     General: Skin is warm and dry.   Neurological:      Comments: Awake   Psychiatric:         Mood and Affect: Mood normal.         Behavior: Behavior normal.         Antibiotics  linezolid - 600 mg/300 mL  meropenem - 500 mg/50 mL    Relevant Results  Labs  Results from last 72 hours   Lab Units 05/23/25  0538 05/22/25  0450 05/21/25  2224   WBC AUTO x10*3/uL 19.5* 26.9* 33.5*   HEMOGLOBIN g/dL 10.7* 11.6* 14.0   HEMATOCRIT % 32.7* 35.5* 42.5   PLATELETS AUTO x10*3/uL 195 211 264   NEUTROS PCT AUTO %  --  90.7  --    LYMPHO PCT MAN % 0.0  --  6.0   LYMPHS PCT AUTO %  --  2.6  --    MONO PCT MAN % 1.0  --  7.0   MONOS PCT AUTO %  --  5.5  --    EOSINO PCT MAN % 0.0  --  0.0   EOS PCT AUTO %  --  0.2  --      Results from last 72 hours   Lab Units 05/23/25  0538 05/22/25  0454  "05/21/25 2224   SODIUM mmol/L 130* 132* 135*   POTASSIUM mmol/L 5.1 5.0 5.0   CHLORIDE mmol/L 92* 94* 93*   CO2 mmol/L 19* 19* 21   BUN mg/dL 72* 61* 61*   CREATININE mg/dL 4.32* 3.48* 3.70*   GLUCOSE mg/dL 284* 381* 208*   CALCIUM mg/dL 8.3* 9.0 10.8*   ANION GAP mmol/L 24* 24* 26*   EGFR mL/min/1.73m*2 10* 13* 12*     Results from last 72 hours   Lab Units 05/23/25  0538 05/22/25  0450 05/21/25  2224   ALK PHOS U/L 117 101 124   BILIRUBIN TOTAL mg/dL 1.0 1.4* 1.5*   PROTEIN TOTAL g/dL 5.7* 6.1* 7.5   ALT U/L 70* 79* 54*   AST U/L 61* 141* 99*   ALBUMIN g/dL 3.0* 3.3* 4.0     Estimated Creatinine Clearance: 12.2 mL/min (A) (by C-G formula based on SCr of 4.32 mg/dL (H)).  No results found for: \"CRP\"  Microbiology  Urine culture pending  blood culture- gram positive cocci, cluster 1/2       Imaging  Electrocardiogram, 12-lead PRN ACS symptoms  Result Date: 5/23/2025  Atrial flutter with variable AV block Left axis deviation Inferior infarct (cited on or before 07-APR-2025) Anterolateral infarct (cited on or before 07-APR-2025) Prolonged QT Abnormal ECG When compared with ECG of 21-MAY-2025 22:06, (unconfirmed) Atrial flutter has replaced Atrial fibrillation Vent. rate has decreased BY  62 BPM Questionable change in initial forces of Lateral leads    CT angio chest for pulmonary embolism  Result Date: 5/21/2025  STUDY: CT Angiogram of the Chest, CT Abdomen and Pelvis with IV Contrast; 5/21/2025 10:51 PM INDICATION: Abdominal pain. COMPARISON: XR abdomen 5/7/2025.  CXR 5/1/2025.  CT AP 5/1/2025.  CT CAP 4/8/2025. ACCESSION NUMBER(S): [id number], [id number] ORDERING CLINICIAN: BAILEY CARRILLO TECHNIQUE:  CTA of the chest was performed following rapid injection of intravenous contrast.  Images are reviewed and processed at a workstation according to the CT angiogram protocol with 3-D and/or MIP post processing imaging generated.  CT of the abdomen and pelvis was performed with intravenous contrast.  Omnipaque 350 75 " mL was administered intravenously; positive oral contrast was given.  Automated mA/kV exposure control was utilized and patient examination was performed in strict accordance with principles of ALARA. FINDINGS:  CTA CHEST: Pulmonary arteries are adequately opacified without acute or chronic filling defects.  The thoracic aorta is normal in course and caliber without dissection or aneurysm. The heart is enlarged without pericardial effusion.  Thoracic lymph nodes are not enlarged.  Patient is status post median sternotomy and coronary artery calcifications are marker for coronary artery disease.  There is mass density in the apex of the left ventricle measuring 2.3 x 1.9 cm on the abdominal images (image 10 series 2). There is no pleural effusion, pleural thickening, or pneumothorax. The airways are patent. Lungs are clear without consolidation, interstitial disease, or suspicious nodules. ABDOMEN:  LIVER: No hepatomegaly.  Smooth surface contour.  There is fatty liver morphology.  There is reflux of contrast in the hepatic veins.  BILE DUCTS: No intrahepatic or extrahepatic biliary ductal dilatation.  GALLBLADDER: The gallbladder is surgically absent.  STOMACH: There is mild to moderate fluid distention of the stomach.  PANCREAS: No masses or ductal dilatation.  SPLEEN: No splenomegaly or focal splenic lesion.  ADRENAL GLANDS: No thickening or nodules.  KIDNEYS AND URETERS: Kidneys are normal in size and location.  No renal or ureteral calculi.  PELVIS:  BLADDER: No abnormalities identified.  REPRODUCTIVE ORGANS: There is lobulated mass located along the superior aspect of the uterus measuring 10.9 x 6.8 x 11.6 cm; this appears stable compared with prior study.  There is mild peripheral enhancement.  There is also mass adjacent to the uterus on the right measuring 11.2 x 5.6 cm, which appears stable, as well as multiple uterine fibroids.  BOWEL: There are prominent fluid-filled small bowel loops in the mid and upper  abdomen suggesting enteritis.  There is mild wall thickening of the sigmoid colon.  The appendix is normal.  VESSELS: No abnormalities identified.  Abdominal aorta is normal in caliber.  PERITONEUM/RETROPERITONEUM/LYMPH NODES: No free fluid.  No pneumoperitoneum.  No lymphadenopathy.  ABDOMINAL WALL: No abnormalities identified. SOFT TISSUES: No abnormalities identified.  BONES: There is a lytic mass involving the right tenth rib with soft tissue mass density measuring 2.9 x 2.2 x 3.2 cm.  There is also lytic mass in the right hemisacrum measuring 3.5 x 3.3 x 3.1 cm.  Findings were seen on prior study.  There is mild superior endplate concavity with deformity at L4 with mild disc space narrowing and endplate degenerative changes.    CHEST: No acute cardiopulmonary disease. Cardiomegaly.  Soft tissue density of the apex of the heart on the abdominal images measures 2.3 cm.  Recommend echocardiogram for further evaluation. Soft tissue mass with lytic changes of the right tenth rib measuring 3.2 cm compatible with metastatic disease/malignancy.  This appears stable. ABDOMEN: No acute inflammatory changes.  Fatty liver morphology. PELVIS: Large lobulated mass in the pelvis measuring 11.6 cm with adjacent masses of the uterus suggesting uterine fibroids.  The lobular mass along the superior margin is more concerning for possible malignancy. Findings compatible with gastroenteritis.  Mild colitis in the sigmoid colon. Redemonstration lytic mass in the right hemisacrum. Signed by Germán Shepard, DO    CT abdomen pelvis w IV contrast  Result Date: 5/21/2025  STUDY: CT Angiogram of the Chest, CT Abdomen and Pelvis with IV Contrast; 5/21/2025 10:51 PM INDICATION: Abdominal pain. COMPARISON: XR abdomen 5/7/2025.  CXR 5/1/2025.  CT AP 5/1/2025.  CT CAP 4/8/2025. ACCESSION NUMBER(S): [id number], [id number] ORDERING CLINICIAN: BAILEY CARRILLO TECHNIQUE:  CTA of the chest was performed following rapid injection of  intravenous contrast.  Images are reviewed and processed at a workstation according to the CT angiogram protocol with 3-D and/or MIP post processing imaging generated.  CT of the abdomen and pelvis was performed with intravenous contrast.  Omnipaque 350 75 mL was administered intravenously; positive oral contrast was given.  Automated mA/kV exposure control was utilized and patient examination was performed in strict accordance with principles of ALARA. FINDINGS:  CTA CHEST: Pulmonary arteries are adequately opacified without acute or chronic filling defects.  The thoracic aorta is normal in course and caliber without dissection or aneurysm. The heart is enlarged without pericardial effusion.  Thoracic lymph nodes are not enlarged.  Patient is status post median sternotomy and coronary artery calcifications are marker for coronary artery disease.  There is mass density in the apex of the left ventricle measuring 2.3 x 1.9 cm on the abdominal images (image 10 series 2). There is no pleural effusion, pleural thickening, or pneumothorax. The airways are patent. Lungs are clear without consolidation, interstitial disease, or suspicious nodules. ABDOMEN:  LIVER: No hepatomegaly.  Smooth surface contour.  There is fatty liver morphology.  There is reflux of contrast in the hepatic veins.  BILE DUCTS: No intrahepatic or extrahepatic biliary ductal dilatation.  GALLBLADDER: The gallbladder is surgically absent.  STOMACH: There is mild to moderate fluid distention of the stomach.  PANCREAS: No masses or ductal dilatation.  SPLEEN: No splenomegaly or focal splenic lesion.  ADRENAL GLANDS: No thickening or nodules.  KIDNEYS AND URETERS: Kidneys are normal in size and location.  No renal or ureteral calculi.  PELVIS:  BLADDER: No abnormalities identified.  REPRODUCTIVE ORGANS: There is lobulated mass located along the superior aspect of the uterus measuring 10.9 x 6.8 x 11.6 cm; this appears stable compared with prior study.   There is mild peripheral enhancement.  There is also mass adjacent to the uterus on the right measuring 11.2 x 5.6 cm, which appears stable, as well as multiple uterine fibroids.  BOWEL: There are prominent fluid-filled small bowel loops in the mid and upper abdomen suggesting enteritis.  There is mild wall thickening of the sigmoid colon.  The appendix is normal.  VESSELS: No abnormalities identified.  Abdominal aorta is normal in caliber.  PERITONEUM/RETROPERITONEUM/LYMPH NODES: No free fluid.  No pneumoperitoneum.  No lymphadenopathy.  ABDOMINAL WALL: No abnormalities identified. SOFT TISSUES: No abnormalities identified.  BONES: There is a lytic mass involving the right tenth rib with soft tissue mass density measuring 2.9 x 2.2 x 3.2 cm.  There is also lytic mass in the right hemisacrum measuring 3.5 x 3.3 x 3.1 cm.  Findings were seen on prior study.  There is mild superior endplate concavity with deformity at L4 with mild disc space narrowing and endplate degenerative changes.    CHEST: No acute cardiopulmonary disease. Cardiomegaly.  Soft tissue density of the apex of the heart on the abdominal images measures 2.3 cm.  Recommend echocardiogram for further evaluation. Soft tissue mass with lytic changes of the right tenth rib measuring 3.2 cm compatible with metastatic disease/malignancy.  This appears stable. ABDOMEN: No acute inflammatory changes.  Fatty liver morphology. PELVIS: Large lobulated mass in the pelvis measuring 11.6 cm with adjacent masses of the uterus suggesting uterine fibroids.  The lobular mass along the superior margin is more concerning for possible malignancy. Findings compatible with gastroenteritis.  Mild colitis in the sigmoid colon. Redemonstration lytic mass in the right hemisacrum. Signed by Germán Shepard DO    CT head wo IV contrast  Result Date: 5/21/2025  Interpreted By:  Jose Manuel Garcia, STUDY: CT HEAD WO IV CONTRAST;  5/21/2025 10:51 pm   INDICATION: Signs/Symptoms:Change  in mental status.   COMPARISON: None   ACCESSION NUMBER(S): [id number]   ORDERING CLINICIAN: BAILEY CARRILLO   TECHNIQUE: Contiguous axial images of the head were obtained without intravenous contrast.   FINDINGS: BRAIN PARENCHYMA:  There is cerebral atrophy and chronic periventricular white matter small vessel ischemic change. The gray white matter differentiation is preserved.  No mass effect or midline shift.   HEMORRHAGE:  No evidence of acute intracranial hemorrhage. VENTRICLES AND EXTRA-AXIAL SPACES:  The ventricles are within normal limits in size for brain volume.  No evidence of abnormal extraaxial fluid collection. EXTRACRANIAL SOFT TISSUES:  Within normal limits. PARANASAL SINUSES/MASTOIDS:  Mucosal thickening left maxillary sinus. CALVARIUM:  No evidence of depressed calvarial fracture.   OTHER FINDINGS:  None       Cerebral atrophy and chronic periventricular white matter small vessel ischemic change.   No evidence of acute intracranial hemorrhage.   Mucosal thickening of left maxillary sinus.       MACRO: None   Signed by: Jose Manuel Garcia 5/21/2025 11:19 PM Dictation workstation:   [id number]    NM PET CT FDG oncology  Result Date: 5/21/2025  Interpreted By:  Haroon Roy and Afshari Mirak Sohrab STUDY: NM PET CT FDG ONCOLOGY;  5/21/2025 2:06 pm   INDICATION: Signs/Symptoms:metastatic cancer, favor GYN primary.   ,C55 Malignant neoplasm of uterus, part unspecified (Multi),C79.51 Secondary malignant neoplasm of bone (Multi)   COMPARISON: CT CHEST, ABDOMEN AND PELVIS 04/08/2025.   ACCESSION NUMBER(S): [id number]   ORDERING CLINICIAN: ERICH BOUDREAUX   TECHNIQUE: DIVISION OF NUCLEAR MEDICINE POSITRON EMISSION TOMOGRAPHY (PET-CT)   The patient received an intravenous dose of 11.6 mCi of Fluorine-18 fluorodeoxyglucose (FDG).   Positron emission tomographic (PET) images from skull base to mid-thigh were then acquired after a one hour delay.  Also acquired was a contemporaneous low dose noncontrast CT scan  performed for attenuation correction of PET images and anatomic localization.  The PET and CT images were digitally fused for display.  All images were acquired on a combined PET-CT scanner unit.  Some areas of FDG accumulation may be described in standardized uptake value (SUV) units.   CODING:   Initial Treatment Strategy (PI)   CALIBRATION:   Dose Injection-to-Scan Interval (mins): 62 Mediastinal bloodpool SUV (normal 1.5-2.5): 3 Blood glucose: 241 mg/dL   FINDINGS: NECK AND CHEST: There is focal uptake within the right vocal cord with an SUV of 8.2. There is no evidence of hypermetabolic cervical lymphadenopathy.  There is no evidence of hypermetabolic hilar, mediastinal, or axillary lymphadenopathy.  There is no evidence of hypermetabolic pulmonary nodules.   There is a four-chamber cardiomegaly. There is enlargement of the main pulmonary artery. Postsurgical changes from median sternotomy are present.   ABDOMEN: Within the abdomen, there is no evidence of hypermetabolic lesions to suggest metastatic disease.   PELVIS: There is bulky appearance of the uterus with areas of hypermetabolic activity and SUVs of up to 9. There are areas of photopenia which might represent tumoral necrosis. There are multiple partially calcified lesions which might represent fibroids.   MUSCULOSKELETAL: Hypermetabolic expansile lesion involving the proximal clavicle at the sternoclavicular joint with an SUV of 5.2. Soft tissue mass with destructive osseous changes within and surrounding the left humeral head with an SUV of 7.2. Soft tissue mass with destructive osseous changes within the left humeral diaphysis and associated pathologic fracture with an SUV of 7.2. Hypermetabolic expansile lesion with underlying osseous destruction within the right posteromedial 10th rib with an SUV of the 8.5. Hypermetabolic sclerotic lesion within the left iliac bone at the sacroiliac joint with an SUV of 7. Hypermetabolic expansile predominantly  lytic lesion in the right sacrum with an SUV of 7.8. Predominantly lytic lesion within the posteromedial aspect of the left 4th rib with an SUV of 3.4.       1. Bulky appearance of the uterus with areas of hypermetabolic activity corresponding to patient's primary neoplasm. 2. Multiple hypermetabolic osseous metastatic disease throughout the axial and appendicular skeleton with soft tissue components as described above. There is pathologic fracture of the left humerus. 3. Focal hypermetabolic activity within the right vocal cord with underlying soft tissue fullness. Findings are nonspecific and can not be completely characterized on the current exam. May consider direct visualization and dedicated imaging if clinically warranted.     This study has been interpreted at [facility] in [place], [place].   MACRO: None   Signed by: Haroon Roy 5/21/2025 3:05 PM Dictation workstation:   [id number]    XR abdomen 1 view  Result Date: 5/7/2025  Interpreted By:  Emily Renteria, STUDY: XR ABDOMEN 1 VIEW; ;  5/7/2025 11:20 am   INDICATION: Signs/Symptoms:abd distention, constipation.     COMPARISON: None.   ACCESSION NUMBER(S): [id number]   ORDERING CLINICIAN: GILMER BUSH   TECHNIQUE: Single view abdomen   FINDINGS: Gas-filled distended loops of large bowel. No air-fluid levels. Mild stool-filled distal colon. Surgical staples right upper quadrant. Diffusely calcified fibroid along the left lateral pelvis measuring 7.1 cm.   Multilevel facet arthropathy lower lumbar spine with degenerative discogenic changes noted.               1. Gas-filled distended loops of large bowel without air-fluid levels     MACRO: None   Signed by: Emily Renteria 5/7/2025 11:50 AM Dictation workstation:   [id number]    ECG 12 lead  Result Date: 5/2/2025  Atrial fibrillation with premature ventricular or aberrantly conducted complexes Inferior infarct (cited on or before 07-APR-2025) Anterolateral infarct  (cited on or before 07-APR-2025) QTcB >= 480 msec Abnormal ECG When compared with ECG of 07-APR-2025 15:53, No significant change was found Confirmed by Yury Bajwa ([id number]) on 5/2/2025 2:28:41 PM    Transthoracic Echo (TTE) Limited  Result Date: 5/2/2025            [facility] [address], [place] [address]             Phone [phone] TRANSTHORACIC ECHOCARDIOGRAM REPORT Patient Name:       MARTI VALLADARES      Reading Physician:    [id number] Joie Robison MD Study Date:         5/2/2025             Ordering Provider:    [id number] JEANINE BELLO MRN/PID:            [id number]             Fellow: Accession#:         [id number]         Nurse: Date of Birth/Age:  1950 / 74 years Sonographer:          Cari Ocampo RDCS Gender Assigned at  F                    Additional Staff: Birth: Height:             165.10 cm            Admit Date: Weight:             78.47 kg             Admission Status:     Inpatient -                                                                Routine BSA / BMI:          1.86 m2 / 28.79      Department Location:  [facility]                     kg/m2 Blood Pressure: 100 /68 mmHg Study Type:    TRANSTHORACIC ECHO (TTE) LIMITED Diagnosis/ICD: Chest pain, unspecified-R07.9 Indication:    dypsnea, chest pain CPT Codes:     Echo Limited-35841; Color Doppler-90455 Patient History: Pertinent History: Cardiomyopathy, AFIb, CHF, TIA, STEMI, HLD, acute MI, CKD,                    DM. Study Detail: The following Echo studies were performed: 2D, M-Mode, Doppler and               color flow. Technically challenging study due to patient lying in               supine position and body habitus. Definity used as a contrast               agent for endocardial border definition. Total contrast used for                this procedure was 3 mL via IV push.  Critical Event Critical Event: Test was completed as per department protocol. Critical Finding: Possible mass towards apex of LV. Notify Enriqueta through epic chat. Time Test was Completed: 10:30:00 AM Notified: Enriqueta. Attending notification time: 10:34:49 AM  PHYSICIAN INTERPRETATION: Left Ventricle: The left ventricular systolic function is moderately decreased, with a visually estimated ejection fraction of 30-35%. The patient is in atrial fibrillation which may influence the estimate of left ventricular function and transvalvular flows. Wall motion is abnormal. The left ventricular cavity size is normal. There is normal septal and mildly increased posterior left ventricular wall thickness. There is left ventricular concentric remodeling. Abnormal (paradoxical) septal motion, consistent with an intraventricular conduction delay. Left ventricular diastolic filling was indeterminate due to atrial fibrillation/flutter. The apical wall is akinetic with a large thrombus seen. Left Atrium: The left atrial size is mildly dilated. Right Ventricle: The right ventricle is moderately enlarged. There is reduced right ventricular systolic function. Right Atrium: The right atrial size is moderately dilated. Aortic Valve: The aortic valve appears bicuspid. There is no evidence of aortic valve regurgitation. The aortic valve off axis. Possible bicuspid aortic valve. No evidence of aortic valve stenosis. No regurgitation. Mitral Valve: The mitral valve is normal in structure. There is trace mitral valve regurgitation. Tricuspid Valve: The tricuspid valve is structurally normal. There is trace tricuspid regurgitation. The Doppler estimated RVSP is slightly elevated right ventricular systolic pressure at 32.2 mmHg. Pulmonic Valve: The pulmonic valve is structurally normal. There is physiologic pulmonic valve regurgitation. Pericardium: Small pericardial effusion. Aorta: The aortic  root is normal. Systemic Veins: The inferior vena cava appears normal in size, with IVC inspiratory collapse greater than 50%.  CONCLUSIONS:  1. The left ventricular systolic function is moderately decreased, with a visually estimated ejection fraction of 30-35%.  2. Abnormal wall motion.  3. Left ventricular diastolic filling was indeterminate due to atrial fibrillation/flutter.  4. The apical wall is akinetic with a large thrombus seen.  5. There is reduced right ventricular systolic function.  6. Moderately enlarged right ventricle.  7. The right atrial size is moderately dilated.  8. Trace mitral valve regurgitation.  9. Slightly elevated right ventricular systolic pressure. 10. Trace tricuspid regurgitation is visualized. 11. The aortic valve appears bicuspid. 12. The patient is in atrial fibrillation which may influence the estimate of left ventricular function and transvalvular flows. QUANTITATIVE DATA SUMMARY:  2D MEASUREMENTS:             Normal Ranges: LAs:             3.80 cm     (2.7-4.0cm) IVSd:            0.78 cm     (0.6-1.1cm) LVPWd:           1.17 cm     (0.6-1.1cm) LVIDd:           4.44 cm     (3.9-5.9cm) LVIDs:           3.88 cm LV Mass Index:   83.3 g/m2 LVEDV Index:     77.45 ml/m2 LV % FS          12.8 %  LEFT ATRIUM:                  Normal Ranges: LA Vol A4C:        90.2 ml    (22+/-6mL/m2) LA Vol A2C:        102.4 ml LA Vol BP:         96.3 ml LA Vol Index A4C:  48.5ml/m2 LA Vol Index A2C:  55.1 ml/m2 LA Vol Index BP:   51.8 ml/m2 LA Area A4C:       28.2 cm2 LA Area A2C:       30.1 cm2 LA Major Axis A4C: 7.5 cm LA Major Axis A2C: 7.5 cm LA Volume Index:   50.4 ml/m2 LA Vol A4C:        88.1 ml LA Vol A2C:        99.6 ml LA Vol Index BSA:  50.5 ml/m2  RIGHT ATRIUM:                 Normal Ranges: RA Vol A4C:        96.9 ml    (8.3-19.5ml) RA Vol Index A4C:  52.1 ml/m2 RA Area A4C:       29.0 cm2 RA Major Axis A4C: 7.4 cm  M-MODE MEASUREMENTS:         Normal Ranges: Ao Root:             2.50 cm  (2.0-3.7cm) LAs:                 3.20 cm (2.7-4.0cm)  LV SYSTOLIC FUNCTION:                      Normal Ranges: EF-A4C View:    33 % (>=55%) EF-A2C View:    42 % EF-Biplane:     38 % EF-Visual:      33 % LV EF Reported: 33 %  LV DIASTOLIC FUNCTION:           Normal Ranges: MV Peak E:             1.15 m/s  (0.7-1.2 m/s) MV e'                  0.066 m/s (>8.0) MV lateral e'          0.07 m/s MV medial e'           0.06 m/s E/e' Ratio:            17.48     (<8.0)  MITRAL VALVE:          Normal Ranges: MV DT:        164 msec (150-240msec)  RIGHT VENTRICLE: RV Basal 4.60 cm RV Mid   4.04 cm RV Major 7.8 cm TAPSE:   15.2 mm RV s'    0.06 m/s  TRICUSPID VALVE/RVSP:          Normal Ranges: Peak TR Velocity:     2.70 m/s RV Syst Pressure:     32 mmHg  (< 30mmHg) IVC Diam:             1.73 cm  PULMONIC VALVE:          Normal Ranges: PV Accel Time:  87 msec  (>120ms) PV Max Tristan:     1.1 m/s  (0.6-0.9m/s) PV Max P.6 mmHg  [id number] Joie Robison MD Electronically signed on 2025 at 11:33:48 AM  ** Final **     XR shoulder left 2+ views  Result Date: 2025  Interpreted By:  Paty Florian, STUDY: XR SHOULDER LEFT 2+ VIEWS 2025 10:56 pm   INDICATION: Signs/Symptoms:pain and weakness. Follow-up fracture.   COMPARISON: 2025   ACCESSION NUMBER(S): [id number]   ORDERING CLINICIAN: JEANINE BELLO   TECHNIQUE: Three views of the left shoulder   FINDINGS: There is a healing mildly displaced fracture of the proximal humeral diaphysis noted with the distal fracture fragment displaced medially by a distance of 4 mm. There is now some periosteal reaction about the fracture site.   There is postoperative change from CABG with coronary artery stent graft seen.       Healing mildly displaced fracture of the proximal humeral diaphysis.   Signed by: Paty Florian 2025 7:23 AM Dictation workstation:   [id number]    CT head wo IV contrast  Result Date: 2025  Interpreted By:  Jose Manuel Garcia, STUDY: CT HEAD WO IV CONTRAST;   5/1/2025 11:08 pm   INDICATION: Signs/Symptoms:confusion.   COMPARISON: None   ACCESSION NUMBER(S): [id number]   ORDERING CLINICIAN: JEANINE BELLO   TECHNIQUE: Contiguous axial images of the head were obtained without intravenous contrast.   FINDINGS: BRAIN PARENCHYMA:  There is cerebral atrophy and chronic periventricular white matter small vessel ischemic change. The gray white matter differentiation is preserved.  No mass effect or midline shift.   HEMORRHAGE:  No evidence of acute intracranial hemorrhage. VENTRICLES AND EXTRA-AXIAL SPACES:  The ventricles are within normal limits in size for brain volume.  No evidence of abnormal extraaxial fluid collection. EXTRACRANIAL SOFT TISSUES:  Within normal limits. PARANASAL SINUSES/MASTOIDS:  Mucosal thickening with mucosal retention cyst or polyp left maxillary sinus. CALVARIUM:  No evidence of depressed calvarial fracture.   OTHER FINDINGS:  None       Cerebral atrophy and chronic periventricular white matter small vessel ischemic change.   No evidence of acute intracranial hemorrhage.   Mucosal thickening with mucosal retention cyst or polyp in left maxillary sinus.   MACRO: None   Signed by: Jose Manuel Garcia 5/1/2025 11:41 PM Dictation workstation:   [id number]    XR chest 1 view  Result Date: 5/1/2025  STUDY: Chest Radiograph;  05/01/2025 5:23 PM INDICATION: Generalized weakness. COMPARISON: XR chest 04/07/2025, 03/11/2023, 03/11/2021. ACCESSION NUMBER(S): [id number] ORDERING CLINICIAN: ELLE TURNER TECHNIQUE:  Frontal chest was obtained at 17:23 hours. FINDINGS: CARDIOMEDIASTINAL SILHOUETTE: There is mild cardiomegaly.  Sternotomy wires present.  LUNGS: Lungs are clear.  ABDOMEN: No remarkable upper abdominal findings.  BONES: No acute osseous changes.    No acute pulmonary abnormality. Signed by Brennan Hartman MD    CT abdomen pelvis wo IV contrast  Result Date: 5/1/2025  STUDY: CT Abdomen and Pelvis without IV Contrast; 05/01/2025, 4:25 PM. INDICATION:  Nausea, diarrhea, generalized weakness. COMPARISON: CT CAP: 04/08/25; US pelvis: 03/16/23. ACCESSION NUMBER(S): [id number] ORDERING CLINICIAN: ELLE TURNER TECHNIQUE: CT of the abdomen and pelvis was performed.  Contiguous axial images were obtained at 3 mm slice thickness through the abdomen and pelvis. Coronal and sagittal reconstructions at 3 mm slice thickness were performed. No intravenous contrast was administered.  Automated mA/kV exposure control was utilized and patient examination was performed in strict accordance with principles of ALARA. FINDINGS: Please note that the evaluation of vessels, lymph nodes and organs is limited without intravenous contrast.  LOWER CHEST: Heart is enlarged with coronary artery calcifications  No pericardial effusion.  Lung bases are clear.  ABDOMEN:  LIVER: No hepatomegaly.  Smooth surface contour.  Normal attenuation.  BILE DUCTS: No intrahepatic or extrahepatic biliary ductal dilatation.  GALLBLADDER: The gallbladder is absent. STOMACH: No abnormalities identified.  PANCREAS: No masses or ductal dilatation.  SPLEEN: No splenomegaly or focal splenic lesion.  ADRENAL GLANDS: No thickening or nodules.  KIDNEYS AND URETERS: Kidneys are normal in size and location.  No renal or ureteral calculi.  PELVIS:  BLADDER: No abnormalities identified.  REPRODUCTIVE ORGANS: Enlarged fibroid uterus again noted.  Overall appearance is unchanged.  BOWEL: No abnormalities identified.  Appendix is normal.  VESSELS: No abnormalities identified.  Abdominal aorta is normal in caliber. Moderate to severe plaque along the distal aorta.  PERITONEUM/RETROPERITONEUM/LYMPH NODES: No free fluid.  No pneumoperitoneum.  Small fat-containing umbilical hernia. No lymphadenopathy.  ABDOMINAL WALL: No abnormalities identified. SOFT TISSUES: No abnormalities identified.  BONES: Stable lytic lesions in the right hemisacrum and right 10th rib adjacent to the costotransverse junction unchanged from  prior    1.No acute process. 2.Cardiomegaly with coronary artery calcifications. 3.Enlarged fibroid uterus again noted. Overall appearance is unchanged. 4.Stable lytic lesions in the right hemisacrum and right 10th rib adjacent to the costotransverse junction unchanged from prior.  As previously suggested, metastatic disease should be considered. Consider further evaluation with MRI or pathologic sampling. Signed by Juan Early MD            Assessment/Plan   Septic shock, on Levophed, vasopressin  Acute hypoxic respiratory failure, on Bipap  Acute on chronic kidney diease, worsening   Positive blood culture-gram positive cocci, cluster-1/2-blood infection verses contaminant   Metastatic Uterine cancer to bone  Atrial fibrillation with RVR  Ischemic cardiomyopathy/NSTEMI-management per cardiology   Leukocytosis, resolving      IV meropenem   IV zyvox  Follow up blood culture  Repeat blood culture   Follow up Urine culture  Bipap per pulmonary, wean as tolerated   Pressor support, wean as tolerated   Monitor renal function   Follow up pending work up   Pulmonary follow up   Nephrology follow up   Cardiology follow up   Gynecology follow up   Palliative care follow up        Deepa Roberson, MARTÍN-CNP

## 2025-05-23 NOTE — CONSULTS
Inpatient consult to Pulmonology  Consult performed by: Violeta Collazo MD  Consult ordered by: Justin Constantino MD  Reason for consult: ICU Management      [facility] Pulmonary and Critical Care Medicine   History and Physical        Subjective   Patient is a 74 y.o. female admitted on 5/21/2025 10:10 PM with chief complaint of shortness of breath.     HPI:  Patient is a 74-year-old female with a past medical history of atrial fibrillation on Coumadin, CAD with an MI status post cardiac cath and CABG (2016), HFrEF, type 2 diabetes, dyslipidemia, hypertension, morbid obesity, RISHI, and recent diagnosis of metastatic endometrial cancer with pathologic left humerus fracture who presented from her skilled nursing facility to [facility] ED with complaints of shortness of breath.  Pulmonary critical care was consulted for further comanagement and ICU needs.    Of note patient was recently admitted to the hospital in early May for a UTI.    Upon arrival patient was found to be saturating in the 80s.  Placed on BiPAP.  Also was found to be in atrial fibrillation with RVR.  Was hypotensive at the time requiring pressors for control of her hypotension.  Patient's workup showed CHHAYA, elevated troponins, and large lobulated mass in her pelvis which is likely her metastatic endometrial cancer seen on CT abdomen pelvis.  Otherwise CT head was negative and CT chest was negative.  Other laboratory studies showed elevated lactate to 5.9, elevated BNP 3800, leukocytosis.     ED therapeutics consisted of antibiotics and pressors.    Patient seen and evaluated this morning.  Patient alert and oriented x 1.  Unable to provide further history.  Spoke with friend at bedside with nursing at bedside.  Friend stated that patient will most likely benefit from comfort care.  She also has a  that agrees.  I did discuss with Dr. Anderson and Justyna from palliative care who all agree that her prognosis is guarded at best.  Palliative  care team will continue goals of care discussions with POA and likely move towards hospice..    Past Medical History:  Medical History[1]    Past Surgical History:  Surgical History[2]     Family History:  Family History[3]     Social History:   reports that she has never smoked. She has never been exposed to tobacco smoke. She has never used smokeless tobacco. She reports that she does not currently use alcohol. She reports that she does not use drugs.    Scheduled Medications:   Scheduled Medications[4]     Continuous Medications:   Continuous Medications[5]     PRN Medications:   PRN Medications[6]    Review of systems:   Review of Systems   Unable to perform ROS: Mental status change      A 10+ point ROS was completed and otherwise negative except as noted above and per HPI.      Objective   Vitals:  Most Recent:  Vitals:    05/23/25 0900   BP: 92/68   Pulse: 106   Resp: (!) 37   Temp:    SpO2: 98%       24hr Min/Max:  Temp  Min: 35.8 °C (96.4 °F)  Max: 36.7 °C (98.1 °F)  Pulse  Min: 86  Max: 126  BP  Min: 53/29  Max: 174/114  Resp  Min: 13  Max: 39  SpO2  Min: 90 %  Max: 100 %    LDA:   CVC 05/22/25 Triple lumen Non-tunneled Right Femoral (Active)   Placement Date/Time: 05/22/25 0008   Hand Hygiene Performed Prior to CVC Insertion: Yes  Site Prep: Chlorhexidine   Site Prep Agent has Completely Dried Before Insertion: Yes  All 5 Sterile Barriers Used (Gloves, Gown, Cap, Mask, Large Sterile Drape):...   Number of days: 1         Vent settings:  FiO2 (%):  [35 %] 35 %  S RR:  [16-36] 16    Hemodynamic parameters for last 24 hours:         Intake/Output Summary (Last 24 hours) at 5/23/2025 0908  Last data filed at 5/23/2025 0630  Gross per 24 hour   Intake 3977.85 ml   Output 250 ml   Net 3727.85 ml         Physical exam:    Physical Exam  Constitutional:       General: She is in acute distress.      Appearance: She is ill-appearing and toxic-appearing.   HENT:      Head: Normocephalic and atraumatic.       Comments: Full face bilevel mask in place     Nose: Nose normal.      Mouth/Throat:      Mouth: Mucous membranes are dry.      Pharynx: Oropharynx is clear.   Eyes:      Extraocular Movements: Extraocular movements intact.   Neck:      Comments: No visualized masses  Cardiovascular:      Rate and Rhythm: Tachycardia present. Rhythm irregular.      Heart sounds: No murmur heard.     No friction rub. No gallop.   Pulmonary:      Effort: Respiratory distress present.      Breath sounds: Rhonchi present. No wheezing or rales.   Abdominal:      General: There is no distension.      Palpations: Abdomen is soft.      Tenderness: There is abdominal tenderness. There is no guarding.   Musculoskeletal:         General: No tenderness.      Right lower leg: No edema.      Left lower leg: No edema.   Skin:     General: Skin is warm and dry.      Findings: Bruising present.   Neurological:      Mental Status: She is alert. She is disoriented.          Lab/Radiology/Diagnostic Review:  Results for orders placed or performed during the hospital encounter of 05/21/25 (from the past 24 hours)   POCT GLUCOSE   Result Value Ref Range    POCT Glucose 371 (H) 74 - 99 mg/dL   POCT GLUCOSE   Result Value Ref Range    POCT Glucose 447 (H) 74 - 99 mg/dL   POCT GLUCOSE   Result Value Ref Range    POCT Glucose 435 (H) 74 - 99 mg/dL   POCT GLUCOSE   Result Value Ref Range    POCT Glucose 401 (H) 74 - 99 mg/dL   Electrocardiogram, 12-lead PRN ACS symptoms   Result Value Ref Range    Ventricular Rate 93 BPM    Atrial Rate 344 BPM    QRS Duration 94 ms    QT Interval 456 ms    QTC Calculation(Bazett) 566 ms    R Axis -52 degrees    T Axis 138 degrees    QRS Count 16 beats    Q Onset 213 ms    T Offset 441 ms    QTC Fredericia 528 ms   Blood Gas Arterial Full Panel   Result Value Ref Range    POCT pH, Arterial 7.37 (L) 7.38 - 7.42 pH    POCT pCO2, Arterial 29 (L) 38 - 42 mm Hg    POCT pO2, Arterial 153 (H) 85 - 95 mm Hg    POCT SO2, Arterial 100 94  - 100 %    POCT Oxy Hemoglobin, Arterial 96.0 94.0 - 98.0 %    POCT Hematocrit Calculated, Arterial 35.0 (L) 36.0 - 46.0 %    POCT Sodium, Arterial 126 (L) 136 - 145 mmol/L    POCT Potassium, Arterial 4.9 3.5 - 5.3 mmol/L    POCT Chloride, Arterial 93 (L) 98 - 107 mmol/L    POCT Ionized Calcium, Arterial 1.15 1.10 - 1.33 mmol/L    POCT Glucose, Arterial 426 (H) 74 - 99 mg/dL    POCT Lactate, Arterial 5.0 (HH) 0.4 - 2.0 mmol/L    POCT Base Excess, Arterial -7.3 (L) -2.0 - 3.0 mmol/L    POCT HCO3 Calculated, Arterial 16.8 (L) 22.0 - 26.0 mmol/L    POCT Hemoglobin, Arterial 11.8 (L) 12.0 - 16.0 g/dL    POCT Anion Gap, Arterial 21 10 - 25 mmo/L    Patient Temperature 37.0 degrees Celsius    FiO2 35 %    Ventilator Mode BiPAP     Ipap CMH2O 14.0 cm H2O    Epap CMH2O 5.0 cm H2O    Critical Called By COCO ROD, RRT     Critical Called To DR. VICK CHOW     Critical Call Time 2121     Critical Read Back Y     Critical Note CRITICAL LAC AT 5.0     Site of Arterial Puncture Brachial Right    POCT GLUCOSE   Result Value Ref Range    POCT Glucose 385 (H) 74 - 99 mg/dL   POCT GLUCOSE   Result Value Ref Range    POCT Glucose 327 (H) 74 - 99 mg/dL   POCT GLUCOSE   Result Value Ref Range    POCT Glucose 352 (H) 74 - 99 mg/dL   POCT GLUCOSE   Result Value Ref Range    POCT Glucose 280 (H) 74 - 99 mg/dL   Blood Gas Lactic Acid, Venous   Result Value Ref Range    POCT Lactate, Venous 4.1 (HH) 0.4 - 2.0 mmol/L   Protime-INR   Result Value Ref Range    Protime >90.0 (HH) 9.3 - 12.7 seconds    INR >8.0 (HH) 0.9 - 1.2   CBC and Auto Differential   Result Value Ref Range    WBC 19.5 (H) 4.4 - 11.3 x10*3/uL    nRBC 0.0 0.0 - 0.0 /100 WBCs    RBC 3.25 (L) 4.00 - 5.20 x10*6/uL    Hemoglobin 10.7 (L) 12.0 - 16.0 g/dL    Hematocrit 32.7 (L) 36.0 - 46.0 %     (H) 80 - 100 fL    MCH 32.9 26.0 - 34.0 pg    MCHC 32.7 32.0 - 36.0 g/dL    RDW 13.5 11.5 - 14.5 %    Platelets 195 150 - 450 x10*3/uL    Immature Granulocytes %, Automated 1.5 (H)  0.0 - 0.9 %    Immature Granulocytes Absolute, Automated 0.29 0.00 - 0.50 x10*3/uL   Comprehensive metabolic panel   Result Value Ref Range    Glucose 284 (H) 74 - 99 mg/dL    Sodium 130 (L) 136 - 145 mmol/L    Potassium 5.1 3.5 - 5.3 mmol/L    Chloride 92 (L) 98 - 107 mmol/L    Bicarbonate 19 (L) 21 - 32 mmol/L    Anion Gap 24 (H) 10 - 20 mmol/L    Urea Nitrogen 72 (H) 6 - 23 mg/dL    Creatinine 4.32 (H) 0.50 - 1.05 mg/dL    eGFR 10 (L) >60 mL/min/1.73m*2    Calcium 8.3 (L) 8.6 - 10.3 mg/dL    Albumin 3.0 (L) 3.4 - 5.0 g/dL    Alkaline Phosphatase 117 33 - 136 U/L    Total Protein 5.7 (L) 6.4 - 8.2 g/dL    AST 61 (H) 9 - 39 U/L    Bilirubin, Total 1.0 0.0 - 1.2 mg/dL    ALT 70 (H) 7 - 45 U/L   Manual Differential   Result Value Ref Range    Neutrophils %, Manual 62.0 40.0 - 80.0 %    Bands %, Manual 29.0 0.0 - 5.0 %    Lymphocytes %, Manual 0.0 13.0 - 44.0 %    Monocytes %, Manual 1.0 2.0 - 10.0 %    Eosinophils %, Manual 0.0 0.0 - 6.0 %    Basophils %, Manual 0.0 0.0 - 2.0 %    Metamyelocytes %, Manual 2.0 0.0 - 0.0 %    Myelocytes %, Manual 6.0 0.0 - 0.0 %    Seg Neutrophils Absolute, Manual 12.09 (H) 1.60 - 5.00 x10*3/uL    Bands Absolute, Manual 5.66 (H) 0.00 - 0.50 x10*3/uL    Lymphocytes Absolute, Manual 0.00 (L) 0.80 - 3.00 x10*3/uL    Monocytes Absolute, Manual 0.20 0.05 - 0.80 x10*3/uL    Eosinophils Absolute, Manual 0.00 0.00 - 0.40 x10*3/uL    Basophils Absolute, Manual 0.00 0.00 - 0.10 x10*3/uL    Metamyelocytes Absolute, Manual 0.39 0.00 - 0.00 x10*3/uL    Myelocytes Absolute, Manual 1.17 0.00 - 0.00 x10*3/uL    Total Cells Counted 100     Neutrophils Absolute, Manual 17.75 (H) 1.60 - 5.50 x10*3/uL    RBC Morphology See Below     [place] Cells Many    Coagulation Screen   Result Value Ref Range    Protime >90.0 (HH) 9.3 - 12.7 seconds    INR >8.0 (HH) 0.9 - 1.2    aPTT 53.2 (H) 22.0 - 32.5 seconds   POCT GLUCOSE   Result Value Ref Range    POCT Glucose 249 (H) 74 - 99 mg/dL     *Note: Due to a large  number of results and/or encounters for the requested time period, some results have not been displayed. A complete set of results can be found in Results Review.       All other labs and Imaging have been personally reviewed.         Assessment/Plan       Patient is a 74-year-old female with a past medical history of atrial fibrillation on Coumadin, CAD with an MI status post cardiac cath and CABG (2016), HFrEF, type 2 diabetes, dyslipidemia, hypertension, morbid obesity, RISHI, and recent diagnosis of metastatic endometrial cancer with pathologic left humerus fracture who presented from her skilled nursing facility to [facility] ED with complaints of shortness of breath.  Pulmonary critical care was consulted for further comanagement and ICU needs.       Neuro:  #Acute metabolic encephalopathy  - Likely in the setting of shock  - Correct metabolic abnormalities as able    Cardiovascular  #Atrial fibrillation with RVR  #Shock; multifactorial sepsis versus distributive versus cardiogenic  #HFrEF with ischemic cardiomyopathy  #NSTEMI; likely demand mismatch ischemia in the setting of A-fib with RVR  - Maintain MAPs>65  - Continue titrate pressors; currently on nor epi 0.11 and vasopressin at 0.03  - Cardiology following  - Judicious fluid management  - Elevated BNP  - Follow-up echocardiogram today  - Currently on amnio gtt.     Pulmonary:  #Acute hypoxic respiratory failure  -Wean FiO2 to SpO2 greater than 92%  - Currently on CPAP  - Can attempt to wean patient down to high flow nasal cannula with Vapotherm  - Bronchopulmonary hygiene with I-S and flutter valve  - Bronchodilators as needed; would caution with this so as patient does have atrial fibrillation and can push her RVR    GI:  #Colitis/gastroenteritis noted on scan  #Hypotensive liver injury  - Continue PPI for prophylaxis  - Continue to trend LFTs    Renal:   #CHHAYA on CKD  #Lactic acidosis  #Mild hyponatremia  #Hypercalcemia likely related to malignancy  -  Continue to monitor renal function and strict I's and O's  - Replete electrolytes as needed  - Avoid nephrotoxic meds  - Recommend nephrology consult; discussed with Dr. Oleary.  Poor prognosis.  Likely not to benefit from CRRT if needed.  - Consider zoledronic acid for hypercalcemia associated with malignancy and pathologic bone fractures  - Currently on bicarb gtt.    Endocrine:  #Type 2 diabetes  #Hypothyroidism    Heme/Onc:  #Stage IVb endometrial cancer with mets to the bone.  #Leukocytosis  #Supratherapeutic INR  #Chronic anemia  -Continue to hold Coumadin per cardiology for supratherapeutic INR  - Leukocytosis workup below in ID section  - Endometrial cancer workup will be continued as an outpatient.  Patient has follow-up with [facility] on 5/29.  Palliative care is also following see below.  Gynecology was consulted and stated to follow-up as an outpatient.      ID:  #Sepsis with septic shock  - Continue meropenem and Zyvox per ID  - Culture Data: 1 blood culture positive for gram-positive cocci in clusters.  Second blood culture pending  - Urine culture in process; UA reviewed and does not appear to be etiology of infection.    Goals of care:  - Palliative care following; currently DNR CCA DNI; prognosis is guarded at best.  Hospice care if possible and POA is amenable to prevent further suffering    ICU CHECK LIST:   Antimicrobials: Meropenem and Zyvox  Oxygen: CPAP 35%  Feeding: Advance diet as tolerated  Fluids: Bicarb gtt. at 75 cc/h  Drips: Vasopressin, Levophed, amiodarone  Analgesia: As needed per protocol  Sedation: None  Thromboprophylaxis: SCDs and hold chemoprophylaxis as patient has supratherapeutic INR on Coumadin  Ulcer prophylaxis: PPI ordered  Glycemic control: BGM with SSI as needed  Bowel care: PRN   Indwelling catheters: None  Lines: PIVs and central venous catheter and right femoral  Dispo: MICU   Code Status: DNR CCA DNI    I have personally spent 50 minutes of critical care  time, exclusive of time spent on any procedures, in evaluation and management of this critically ill patient’s condition mentioned above in the assessment and plan.     Violeta Collazo MD  Pulmonary & Critical Care Attending   [phone]    Please excuse any typographical or unwanted errors within this documentation as voice recognition software was used to dictate this note.          [1]   Past Medical History:  Diagnosis Date    A-fib (Multi)     Acute MI (Multi) 08/2016    cath by [facility]os, wire in RCA and clot migrated to PDA and no PTCA.    ASHD (arteriosclerotic heart disease)     CAD (coronary artery disease)     CHF (congestive heart failure)     DM (diabetes mellitus) (Multi)     Dyslipidemia     H/O echocardiogram 04/2017    EF 35%    H/O echocardiogram 09/2018    EF20%    History of nuclear stress test 02/2015    no ischemia and apex scar.    History of nuclear stress test 04/2016    apical scar and inf ischemia    HTN (hypertension)     Obesity     Obstructive sleep apnea     Personal history of other specified conditions     Acute AW MI / 9/4/13, cath LM-nl, , CX irreg, RCA irreg, LV ant hypo, PTCA with 2.75 RENETTA to LAD    S/P cardiac catheterization 06/2016    severe diffuse ASHD and recommended OHS    SOB (shortness of breath)    [2]   Past Surgical History:  Procedure Laterality Date    CARDIAC CATHETERIZATION  09/04/2013    with RENETTA    CHOLECYSTECTOMY  03/11/2021    COLONOSCOPY  04/2015    hemorrhoid removed    CORONARY ARTERY BYPASS GRAFT  10/2016    LIMA-LAD, SVG-PDA, SVG-Diag, SVG-M1 and M@    MR HEAD ANGIO WO IV CONTRAST  04/06/2017    MR HEAD ANGIO WO IV CONTRAST [facility]    TUMOR REMOVAL      Benign rectal tumor removed   [3]   Family History  Problem Relation Name Age of Onset    Hypertension Mother      Cancer Mother      Diabetes Father      Stroke Father      Diabetes Sister      Heart disease Maternal Grandmother      Heart disease Maternal Grandfather      Heart  disease Paternal Grandmother      Heart disease Paternal Grandfather     [4] insulin lispro, 0-5 Units, subcutaneous, q4h  linezolid, 600 mg, intravenous, q12h  meropenem, 500 mg, intravenous, q12h  oxygen, , inhalation, Continuous - Inhalation  pantoprazole, 40 mg, intravenous, Daily  perflutren lipid microspheres, 0.5-10 mL of dilution, intravenous, Once in imaging  perflutren protein A microsphere, 0.5 mL, intravenous, Once in imaging  sulfur hexafluoride microsphr, 2 mL, intravenous, Once in imaging  [5] amiodarone, 0.5 mg/min, Last Rate: 0.5 mg/min (05/23/25 0630)  norepinephrine, 0-1 mcg/kg/min, Last Rate: 0.12 mcg/kg/min (05/23/25 0630)  sodium chloride 0.45 % 1,000 mL with sodium bicarbonate 1 mEq/mL (8.4 %) 75 mEq infusion, 75 mL/hr, Last Rate: 75 mL/hr (05/23/25 0630)  vasopressin, 0-0.03 Units/min, Last Rate: 0.03 Units/min (05/23/25 0630)  [6] PRN medications: benzocaine-menthol, dextromethorphan-guaifenesin, dextrose, dextrose, glucagon, guaiFENesin, HYDROmorphone, polyethylene glycol

## 2025-05-23 NOTE — PROGRESS NOTES
"Jing Sanchez is a 74 y.o. female on day 1 of admission presenting with Septic shock (Multi).    Subjective   Remains on BiPAP and 2 pressors.  Atrial fibrillation with reasonable rates on amiodarone.  She is oliguric.     Objective     Physical Exam   Gen: Lethargic, moderate respiratory distress   Neck: no JVD, carotid upstroke is brisk and without delay   Heart: Irregular, s1s2+ no mrg   Lungs: CTA   Ext: warm no edema  Last Recorded Vitals  Blood pressure 102/63, pulse 96, temperature 36 °C (96.8 °F), temperature source Temporal, resp. rate (!) 35, height 1.651 m (5' 5\"), weight 84.2 kg (185 lb 10 oz), SpO2 98%.  Intake/Output last 3 Shifts:  I/O last 3 completed shifts:  In: 6372.6 (75.7 mL/kg) [I.V.:3322.6 (39.5 mL/kg); IV Piggyback:3050]  Out: 250 (3 mL/kg) [Urine:250 (0.1 mL/kg/hr)]  Weight: 84.2 kg       Assessment & Plan  Septic shock (Multi)    Cardiomyopathy    Chronic atrial fibrillation (Multi)    CHF (congestive heart failure)    Hypothyroidism    Leukocytosis    Uterine cancer (Multi)    Acute hypoxic respiratory failure    Lactic acidosis    Acute renal failure    Transaminitis    Colitis    Gastroenteritis    NSTEMI (non-ST elevated myocardial infarction) (Multi)    Hypercalcemia    Septic shock/urosepsis: Empiric antibiotics and pressors under the direction of the primary care team.  Would continue to volume resuscitate with caution given systolic dysfunction.  Atrial fibrillation with rapid ventricular response: She is anticoagulated with Coumadin, reasonable to hold at this point for a supratherapeutic INR.  With her hypotension and left ventricular systolic dysfunction her options for rate control are limited.  She is tolerating amiodarone and transaminases continue to downtrend.  Will continue IV.  NSTEMI: I suspect that this is a secondary process related to her urosepsis in the setting of acute kidney injury.  I would not treat this as acute coronary syndrome.  I have ordered a limited " echocardiogram.  Hypoxic respiratory failure: Unclear etiology.  Not in heart failure on my exam.  I would not diurese her especially with her impaired kidney function secondary to hypotension.  Ischemic cardiomyopathy: Currently her guideline directed therapy is on hold appropriately for hypotension and acute kidney injury.  Will resume when appropriate.  Atherosclerotic heart disease: Curious as to why she is not on a high potency statin, will need to address this.  No need for aspirin while anticoagulated.  Acute kidney injury on stage III chronic kidney disease: Likely secondary to infection, hypotension and hypoxic respiratory failure.  Will avoid nephrotoxic agents from a cardiac perspective.        Javier Roa, DO

## 2025-05-23 NOTE — CONSULTS
Reason For Consult  Acute kidney injury, lactic acidosis    History Of Present Illness  Jing Sanchez is a 74 y.o. female with a past medical history of atrial fibrillation, congestive heart failure, diabetes mellitus who presented to the hospital with dyspnea, found to be in A-fib with RVR, developed respiratory distress was put on BiPAP.  Patient was also found to have lactic acidosis and acute kidney injury.  She is now on pressors in septic shock in the ICU.  Patient is lethargic unable to provide any history.  Has an EF of around 30%.  Her creatinine is up to 4.32.  Appears her baseline creatinine is 1.1-1.4.  Does have a large pelvic mass with concern for metastatic disease and lytic lesions seen on imaging.  No hydronephrosis seen on imaging.  We are consulted for management of acute kidney injury and lactic acidosis.     Past Medical History  She has a past medical history of A-fib (Multi), Acute MI (Multi) (08/2016), ASHD (arteriosclerotic heart disease), CAD (coronary artery disease), CHF (congestive heart failure), DM (diabetes mellitus) (Multi), Dyslipidemia, H/O echocardiogram (04/2017), H/O echocardiogram (09/2018), History of nuclear stress test (02/2015), History of nuclear stress test (04/2016), HTN (hypertension), Obesity, Obstructive sleep apnea, Personal history of other specified conditions, S/P cardiac catheterization (06/2016), and SOB (shortness of breath).    Surgical History  She has a past surgical history that includes MR angio head wo IV contrast (04/06/2017); Cardiac catheterization (09/04/2013); Tumor removal; Colonoscopy (04/2015); Coronary artery bypass graft (10/2016); and Cholecystectomy (03/11/2021).     Social History  She reports that she has never smoked. She has never been exposed to tobacco smoke. She has never used smokeless tobacco. She reports that she does not currently use alcohol. She reports that she does not use drugs.    Family History  Family History[1]      Allergies  Cetirizine    Review of Systems  Unable to obtain due to patient condition     Physical Exam  General: On BiPAP, lethargic   Head/ears/nose/throat:  Normocephalic  Neck:  neck supple, no apparent JVD seen  Respiratory: On BiPAP, diminished breath sounds anteriorly  Cardiovascular:  Some distant heart sounds however no rubs  Gastrointestinal:  Soft, positive bowel sounds  Extremities:  Trace edema, no cyanosis  Musculoskeletal:  Unable to assess due to patient condition  Neurologic:  Lethargic, Unable to assess due to patient condition  Skin:  No ulcers noted, dry   Psychiatric: Unable to assess due to patient condition         I&O 24HR    Intake/Output Summary (Last 24 hours) at 5/23/2025 1133  Last data filed at 5/23/2025 0630  Gross per 24 hour   Intake 3927.85 ml   Output 250 ml   Net 3677.85 ml       Vitals 24HR  Heart Rate:  []   Temp:  [35.8 °C (96.4 °F)-36.7 °C (98.1 °F)]   Resp:  [13-39]   BP: ()/()   Weight:  [84.2 kg (185 lb 10 oz)]   SpO2:  [90 %-100 %]       Relevant Results  Results for orders placed or performed during the hospital encounter of 05/21/25 (from the past 24 hours)   POCT GLUCOSE   Result Value Ref Range    POCT Glucose 371 (H) 74 - 99 mg/dL   POCT GLUCOSE   Result Value Ref Range    POCT Glucose 447 (H) 74 - 99 mg/dL   POCT GLUCOSE   Result Value Ref Range    POCT Glucose 435 (H) 74 - 99 mg/dL   POCT GLUCOSE   Result Value Ref Range    POCT Glucose 401 (H) 74 - 99 mg/dL   Electrocardiogram, 12-lead PRN ACS symptoms   Result Value Ref Range    Ventricular Rate 93 BPM    Atrial Rate 344 BPM    QRS Duration 94 ms    QT Interval 456 ms    QTC Calculation(Bazett) 566 ms    R Axis -52 degrees    T Axis 138 degrees    QRS Count 16 beats    Q Onset 213 ms    T Offset 441 ms    QTC Fredericia 528 ms   Blood Gas Arterial Full Panel   Result Value Ref Range    POCT pH, Arterial 7.37 (L) 7.38 - 7.42 pH    POCT pCO2, Arterial 29 (L) 38 - 42 mm Hg    POCT pO2, Arterial  153 (H) 85 - 95 mm Hg    POCT SO2, Arterial 100 94 - 100 %    POCT Oxy Hemoglobin, Arterial 96.0 94.0 - 98.0 %    POCT Hematocrit Calculated, Arterial 35.0 (L) 36.0 - 46.0 %    POCT Sodium, Arterial 126 (L) 136 - 145 mmol/L    POCT Potassium, Arterial 4.9 3.5 - 5.3 mmol/L    POCT Chloride, Arterial 93 (L) 98 - 107 mmol/L    POCT Ionized Calcium, Arterial 1.15 1.10 - 1.33 mmol/L    POCT Glucose, Arterial 426 (H) 74 - 99 mg/dL    POCT Lactate, Arterial 5.0 (HH) 0.4 - 2.0 mmol/L    POCT Base Excess, Arterial -7.3 (L) -2.0 - 3.0 mmol/L    POCT HCO3 Calculated, Arterial 16.8 (L) 22.0 - 26.0 mmol/L    POCT Hemoglobin, Arterial 11.8 (L) 12.0 - 16.0 g/dL    POCT Anion Gap, Arterial 21 10 - 25 mmo/L    Patient Temperature 37.0 degrees Celsius    FiO2 35 %    Ventilator Mode BiPAP     Ipap CMH2O 14.0 cm H2O    Epap CMH2O 5.0 cm H2O    Critical Called By COCO ROD, RRT     Critical Called To DR. VICK CHOW     Critical Call Time 2121     Critical Read Back Y     Critical Note CRITICAL LAC AT 5.0     Site of Arterial Puncture Brachial Right    POCT GLUCOSE   Result Value Ref Range    POCT Glucose 385 (H) 74 - 99 mg/dL   POCT GLUCOSE   Result Value Ref Range    POCT Glucose 327 (H) 74 - 99 mg/dL   POCT GLUCOSE   Result Value Ref Range    POCT Glucose 352 (H) 74 - 99 mg/dL   POCT GLUCOSE   Result Value Ref Range    POCT Glucose 280 (H) 74 - 99 mg/dL   Blood Gas Lactic Acid, Venous   Result Value Ref Range    POCT Lactate, Venous 4.1 (HH) 0.4 - 2.0 mmol/L   Protime-INR   Result Value Ref Range    Protime >90.0 (HH) 9.3 - 12.7 seconds    INR >8.0 (HH) 0.9 - 1.2   CBC and Auto Differential   Result Value Ref Range    WBC 19.5 (H) 4.4 - 11.3 x10*3/uL    nRBC 0.0 0.0 - 0.0 /100 WBCs    RBC 3.25 (L) 4.00 - 5.20 x10*6/uL    Hemoglobin 10.7 (L) 12.0 - 16.0 g/dL    Hematocrit 32.7 (L) 36.0 - 46.0 %     (H) 80 - 100 fL    MCH 32.9 26.0 - 34.0 pg    MCHC 32.7 32.0 - 36.0 g/dL    RDW 13.5 11.5 - 14.5 %    Platelets 195 150 - 450  x10*3/uL    Immature Granulocytes %, Automated 1.5 (H) 0.0 - 0.9 %    Immature Granulocytes Absolute, Automated 0.29 0.00 - 0.50 x10*3/uL   Comprehensive metabolic panel   Result Value Ref Range    Glucose 284 (H) 74 - 99 mg/dL    Sodium 130 (L) 136 - 145 mmol/L    Potassium 5.1 3.5 - 5.3 mmol/L    Chloride 92 (L) 98 - 107 mmol/L    Bicarbonate 19 (L) 21 - 32 mmol/L    Anion Gap 24 (H) 10 - 20 mmol/L    Urea Nitrogen 72 (H) 6 - 23 mg/dL    Creatinine 4.32 (H) 0.50 - 1.05 mg/dL    eGFR 10 (L) >60 mL/min/1.73m*2    Calcium 8.3 (L) 8.6 - 10.3 mg/dL    Albumin 3.0 (L) 3.4 - 5.0 g/dL    Alkaline Phosphatase 117 33 - 136 U/L    Total Protein 5.7 (L) 6.4 - 8.2 g/dL    AST 61 (H) 9 - 39 U/L    Bilirubin, Total 1.0 0.0 - 1.2 mg/dL    ALT 70 (H) 7 - 45 U/L   Manual Differential   Result Value Ref Range    Neutrophils %, Manual 62.0 40.0 - 80.0 %    Bands %, Manual 29.0 0.0 - 5.0 %    Lymphocytes %, Manual 0.0 13.0 - 44.0 %    Monocytes %, Manual 1.0 2.0 - 10.0 %    Eosinophils %, Manual 0.0 0.0 - 6.0 %    Basophils %, Manual 0.0 0.0 - 2.0 %    Metamyelocytes %, Manual 2.0 0.0 - 0.0 %    Myelocytes %, Manual 6.0 0.0 - 0.0 %    Seg Neutrophils Absolute, Manual 12.09 (H) 1.60 - 5.00 x10*3/uL    Bands Absolute, Manual 5.66 (H) 0.00 - 0.50 x10*3/uL    Lymphocytes Absolute, Manual 0.00 (L) 0.80 - 3.00 x10*3/uL    Monocytes Absolute, Manual 0.20 0.05 - 0.80 x10*3/uL    Eosinophils Absolute, Manual 0.00 0.00 - 0.40 x10*3/uL    Basophils Absolute, Manual 0.00 0.00 - 0.10 x10*3/uL    Metamyelocytes Absolute, Manual 0.39 0.00 - 0.00 x10*3/uL    Myelocytes Absolute, Manual 1.17 0.00 - 0.00 x10*3/uL    Total Cells Counted 100     Neutrophils Absolute, Manual 17.75 (H) 1.60 - 5.50 x10*3/uL    RBC Morphology See Below     Heflin Cells Many    Coagulation Screen   Result Value Ref Range    Protime >90.0 (HH) 9.3 - 12.7 seconds    INR >8.0 (HH) 0.9 - 1.2    aPTT 53.2 (H) 22.0 - 32.5 seconds   POCT GLUCOSE   Result Value Ref Range    POCT  Glucose 249 (H) 74 - 99 mg/dL     *Note: Due to a large number of results and/or encounters for the requested time period, some results have not been displayed. A complete set of results can be found in Results Review.          Assessment & Plan  Septic shock (Multi)    Cardiomyopathy    Chronic atrial fibrillation (Multi)    CHF (congestive heart failure)    Hypothyroidism    Leukocytosis    Uterine cancer (Multi)    Acute hypoxic respiratory failure    Lactic acidosis    Acute renal failure    Transaminitis    Colitis    Gastroenteritis    NSTEMI (non-ST elevated myocardial infarction) (Multi)    Hypercalcemia    Acute kidney injury secondary to acute tubular necrosis from septic shock  Chronic kidney disease stage III (baseline creatinine 1.1-1.4)  Lactic acidosis in the setting of septic shock  Respiratory failure  Atrial fibrillation with RVR  Ischemic cardiomyopathy  Pelvic mass with concern for metastatic cancer    Plan: I had a detailed discussion with the palliative care team, pulmonologist and also discussed with the hospitalist.  Per palliative care I was told that the patient expressed wishes that she would not want dialysis and apparently the family is leaning towards comfort model of care however awaiting the final decision.  For now continue IV fluids and pressor support, infectious disease on board, and will await goals of care discussions.  Appears patient has a very poor prognosis unfortunately.  Thank you for your consultation.    Kalyan Villeda MD         [1]   Family History  Problem Relation Name Age of Onset    Hypertension Mother      Cancer Mother      Diabetes Father      Stroke Father      Diabetes Sister      Heart disease Maternal Grandmother      Heart disease Maternal Grandfather      Heart disease Paternal Grandmother      Heart disease Paternal Grandfather

## 2025-05-23 NOTE — NURSING NOTE
Peripheral collection ordered by provider for elevated protime and inr more than likely due to the bicarb drip infusing via central despite pausing and wasting appropriate volume prior to collection

## 2025-05-23 NOTE — NURSING NOTE
RT assisted with repositioning mask due to worsening pressure injury to nasal bridge. Will continue to monitor

## 2025-05-23 NOTE — SIGNIFICANT EVENT
Requested by nurse for time of death exam. No heart or lung sounds, no response. Pupils fixed and dilated.     Time of death: 443pm

## 2025-05-25 LAB
ATRIAL RATE: 344 BPM
BACTERIA BLD AEROBE CULT: ABNORMAL
BACTERIA BLD AEROBE CULT: ABNORMAL
BACTERIA BLD CULT: ABNORMAL
BACTERIA BLD CULT: ABNORMAL
BACTERIA BLD CULT: NORMAL
BACTERIA BLD CULT: NORMAL
GRAM STN SPEC: ABNORMAL
GRAM STN SPEC: ABNORMAL
Q ONSET: 213 MS
QRS COUNT: 16 BEATS
QRS DURATION: 94 MS
QT INTERVAL: 456 MS
QTC CALCULATION(BAZETT): 566 MS
QTC FREDERICIA: 528 MS
R AXIS: -52 DEGREES
T AXIS: 138 DEGREES
T OFFSET: 441 MS
VENTRICULAR RATE: 93 BPM

## 2025-05-26 LAB
BACTERIA BLD AEROBE CULT: ABNORMAL
BACTERIA BLD AEROBE CULT: ABNORMAL
BACTERIA BLD CULT: ABNORMAL
BACTERIA BLD CULT: ABNORMAL
GRAM STN SPEC: ABNORMAL
GRAM STN SPEC: ABNORMAL

## 2025-05-27 LAB
BACTERIA BLD CULT: NORMAL
BACTERIA BLD CULT: NORMAL

## 2025-05-29 ENCOUNTER — APPOINTMENT (OUTPATIENT)
Dept: GYNECOLOGIC ONCOLOGY | Facility: CLINIC | Age: 75
End: 2025-05-29
Payer: MEDICARE